# Patient Record
Sex: FEMALE | Race: WHITE | NOT HISPANIC OR LATINO | Employment: OTHER | URBAN - METROPOLITAN AREA
[De-identification: names, ages, dates, MRNs, and addresses within clinical notes are randomized per-mention and may not be internally consistent; named-entity substitution may affect disease eponyms.]

---

## 2017-03-13 ENCOUNTER — OFFICE VISIT (OUTPATIENT)
Dept: OBSTETRICS AND GYNECOLOGY | Facility: CLINIC | Age: 57
End: 2017-03-13
Payer: MEDICAID

## 2017-03-13 VITALS
HEART RATE: 78 BPM | SYSTOLIC BLOOD PRESSURE: 124 MMHG | WEIGHT: 190 LBS | DIASTOLIC BLOOD PRESSURE: 76 MMHG | RESPIRATION RATE: 14 BRPM | HEIGHT: 62 IN | BODY MASS INDEX: 34.96 KG/M2

## 2017-03-13 DIAGNOSIS — Z90.722 HISTORY OF TOTAL HYSTERECTOMY WITH BILATERAL SALPINGO-OOPHORECTOMY (BSO): ICD-10-CM

## 2017-03-13 DIAGNOSIS — Z78.0 POSTMENOPAUSAL STATUS: ICD-10-CM

## 2017-03-13 DIAGNOSIS — Z01.419 WELL WOMAN EXAM WITH ROUTINE GYNECOLOGICAL EXAM: Primary | ICD-10-CM

## 2017-03-13 DIAGNOSIS — Z90.79 HISTORY OF TOTAL HYSTERECTOMY WITH BILATERAL SALPINGO-OOPHORECTOMY (BSO): ICD-10-CM

## 2017-03-13 DIAGNOSIS — Z90.710 HISTORY OF TOTAL HYSTERECTOMY WITH BILATERAL SALPINGO-OOPHORECTOMY (BSO): ICD-10-CM

## 2017-03-13 DIAGNOSIS — L30.9 DERMATITIS: ICD-10-CM

## 2017-03-13 DIAGNOSIS — Z12.31 ENCOUNTER FOR SCREENING MAMMOGRAM FOR BREAST CANCER: ICD-10-CM

## 2017-03-13 PROCEDURE — 99396 PREV VISIT EST AGE 40-64: CPT | Mod: S$PBB,,, | Performed by: OBSTETRICS & GYNECOLOGY

## 2017-03-13 PROCEDURE — 99999 PR PBB SHADOW E&M-EST. PATIENT-LVL III: CPT | Mod: PBBFAC,,, | Performed by: OBSTETRICS & GYNECOLOGY

## 2017-03-13 PROCEDURE — 99213 OFFICE O/P EST LOW 20 MIN: CPT | Mod: PBBFAC | Performed by: OBSTETRICS & GYNECOLOGY

## 2017-03-13 RX ORDER — ZOLPIDEM TARTRATE 12.5 MG/1
12.5 TABLET, FILM COATED, EXTENDED RELEASE ORAL NIGHTLY
Refills: 0 | COMMUNITY
Start: 2017-02-01 | End: 2017-06-16

## 2017-03-13 RX ORDER — CLOBETASOL PROPIONATE 0.5 MG/G
OINTMENT TOPICAL
Refills: 2 | COMMUNITY
Start: 2017-02-16 | End: 2017-06-16

## 2017-03-13 RX ORDER — NYSTATIN 100000 [USP'U]/G
POWDER TOPICAL 2 TIMES DAILY
Qty: 60 G | Refills: 1 | Status: SHIPPED | OUTPATIENT
Start: 2017-03-13 | End: 2018-04-09 | Stop reason: SDUPTHER

## 2017-03-13 NOTE — MR AVS SNAPSHOT
[facility]  [address] [place] [id number]  Phone: [phone]                  Merna Regalado   3/13/2017 2:30 PM   Office Visit    Description:  Female : [id number]   Provider:  Ambar Herman MD   Department:  [facility]           Reason for Visit     Well Woman           Diagnoses this Visit        Comments    Well woman exam with routine gynecological exam    -  Primary     History of total hysterectomy with bilateral salpingo-oophorectomy (BSO)         Postmenopausal status         Encounter for screening mammogram for breast cancer                To Do List           Future Appointments        Provider Department Dept Phone    3/15/2017 3:20 PM STA MAMMO1 [facility] [phone]      Goals (5 Years of Data)     None      [facility]     [facility] -  Assistance  Registered nurses in the [facility] provide clinical advisement, health education, appointment booking, and other advisory services.  Call for this free service at [phone].             Medications           Message regarding Medications     Verify the changes and/or additions to your medication regime listed below are the same as discussed with your clinician today.  If any of these changes or additions are incorrect, please notify your healthcare provider.             Verify that the below list of medications is an accurate representation of the medications you are currently taking.  If none reported, the list may be blank. If incorrect, please contact your healthcare provider. Carry this list with you in case of emergency.           Current Medications     alendronate (FOSAMAX) 70 MG tablet Take 1 tablet (70 mg total) by mouth every 7 days. TAKE BEFORE BREAKFAST AS DIRECTED    aspirin 81 MG Chew Take 81 mg by mouth once daily.    carvedilol (COREG) 25 MG tablet Take 25 mg by mouth 2 (two) times daily with meals.    clobetasol (TEMOVATE) 0.05 %  "cream Apply thin layer to affected area (vulva) nightly for 2 months, then twice weekly at bedtime.    clobetasol 0.05% (TEMOVATE) 0.05 % Oint APPLY TWICE DAILY TO BODY    clopidogrel (PLAVIX) 75 mg tablet Take 75 mg by mouth once daily.    doxazosin (CARDURA) 2 MG tablet TAKE 1 TABLET ORALLY ONCE A DAY. NEEDS APPT FOR FURTHER REFILLS    doxazosin (CARDURA) 4 MG tablet TAKE 1 TABLET ORALLY ONCE A DAY.    fenofibrate micronized (LOFIBRA) 134 MG Cap Take 134 mg by mouth daily with breakfast.    fenofibric acid (FIBRICOR) 135 mg CpDR TAKE 1 CAPSULE(S) BY MOUTH ONCE A DAY    fluoxetine (PROZAC) 20 MG capsule TAKE 1 CAPSULE(S) EVERY DAY BY ORAL ROUTE FOR 30 DAYS.    gabapentin (NEURONTIN) 300 MG capsule Take 300 mg by mouth once daily.    hydrochlorothiazide (MICROZIDE) 12.5 mg capsule Take 12.5 mg by mouth once daily.    hydrocodone-acetaminophen 7.5-325mg (NORCO) 7.5-325 mg per tablet     insulin glargine (LANTUS) 100 unit/mL injection Inject 25 Units into the skin every evening.    JARDIANCE 25 mg Tab     lisinopril (PRINIVIL,ZESTRIL) 20 MG tablet Take 20 mg by mouth once daily.    meloxicam (MOBIC) 15 MG tablet Take 15 mg by mouth once daily.    nifedipine (PROCARDIA-XL) 90 MG (OSM) TR24 TAKE 1 TABLET ORALLY ONCE A DAY.    nitroGLYCERIN (NITROSTAT) 0.4 MG SL tablet Place 0.4 mg under the tongue every 5 (five) minutes as needed for Chest pain.    pravastatin (PRAVACHOL) 40 MG tablet Take 40 mg by mouth once daily.    TRUE METRIX GLUCOSE TEST STRIP Strp     zolpidem (AMBIEN CR) 12.5 MG CR tablet Take 12.5 mg by mouth nightly.    zolpidem (AMBIEN) 10 mg Tab Take 5 mg by mouth nightly as needed.    pantoprazole (PROTONIX) 40 MG tablet Take 1 tablet (40 mg total) by mouth once daily.           Clinical Reference Information           Your Vitals Were     BP Pulse Resp Height Weight BMI    124/76 78 14 5' 2" (1.575 m) 86.2 kg (190 lb) 34.75 kg/m2      Blood Pressure          Most Recent Value    BP  124/76      Allergies " as of 3/13/2017     Codeine    Pcn [Penicillins]    Sulfa (Sulfonamide Antibiotics)      Immunizations Administered on Date of Encounter - 3/13/2017     None      Orders Placed During Today's Visit     Future Labs/Procedures Expected by Expires    Mammo Digital Screening Bilat with CAD  3/13/2017 6/16/2018    Mammo Digital Screening Bilat with CAD  3/13/2017 6/16/2018      Language Assistance Services     ATTENTION: Language assistance services are available, free of charge. Please call [phone].      ATENCIÓN: Si habla sunilrah, tiene a hill disposición servicios gratuitos de asistencia lingüística. Llame al [phone].     CHÚ Ý: N?u b?n nói Ti?ng Vi?t, có các d?ch v? h? tr? ngôn ng? mi?n phí dành cho b?n. G?i s? [phone].         [facility] complies with applicable Federal civil rights laws and does not discriminate on the basis of race, color, national origin, age, disability, or sex.

## 2017-03-13 NOTE — PROGRESS NOTES
Subjective:    Patient ID: Merna Regalado is a 56 y.o. y.o. female.     Chief Complaint: Annual Well Woman Exam     History of Present Illness:  Merna presents today for Annual Well Woman exam. She has a history of hysterectomy with BSO.  She is currently using no hormone replacement therapy and she reports no problems with menopausal symptoms. She denies breast tenderness, masses, nipple discharge. She will be due for screening mammogram next month.  She denies difficulty with urination or bowel movements.  She is current with colonoscopy.  She is current with health maintenance.  She is known to have osteoporosis.  Takes fosamax for this.    The following portions of the patient's history were reviewed and updated as appropriate: allergies, current medications, past family history, past medical history, past social history, past surgical history and problem list.      Menstrual History:   No LMP recorded. Patient has had a hysterectomy.    OB History      Para Term  AB TAB SAB Ectopic Multiple Living    5 3   2  2               ROS:   CONSTITUTIONAL: positive for weight gain; Negative for fever, chills, diaphoresis, weakness, fatigue, weight loss  ENT: negative for sore throat, nasal congestion, nasal discharge, epistaxis, tinnitus, hearing loss  EYES: negative for blurry vision, decreased vision, loss of vision, eye pain, diplopia, photophobia, discharge  SKIN: Negative for rash, itching, hives  RESPIRATORY: negative for cough, hemoptysis, shortness of breath, pleuritic chest pain, wheezing  CARDIOVASCULAR: negative for chest pain, dyspnea on exertion, orthopnea, paroxysmal nocturnal dyspnea, edema, palpitations  BREAST: negative for breast pain, mass, nipple changes, nipple discharge  GASTROINTESTINAL: negative for abdominal pain, flank pain, nausea, vomiting, diarrhea, constipation, black stool, blood in stool  GENITOURINARY: negative for abnormal vaginal bleeding, amenorrhea, decreased libido,  dysuria, genital sores, hematuria, incontinence, menorrhagia, pelvic pain, urinary frequency, vaginal discharge  HEMATOLOGIC/LYMPHATIC: negative for swollen lymph nodes, bleeding, bruising  MUSCULOSKELETAL: negative for back pain, joint pain, joint stiffness, joint swelling, muscle pain, muscle weakness  NEUROLOGICAL: negative for dizzy/vertigo, headache, focal weakness, numbness/tingling, speech problems, loss of consciousness, confusion, memory loss  BEHAVORIAL/PSYCH: negative for anxiety, depression, psychosis  ENDOCRINE: negative for polydipsia/polyuria, palpitations, skin changes, temperature intolerance, unexpected weight changes  ALLERGIC/IMMUNOLOGIC: negative for urticaria, hay fever, angioedema      Objective:    Vital Signs:  Vitals:    03/13/17 1440   BP: 124/76   Pulse: 78   Resp: 14       Physical Exam:  General:  alert; oriented x 4; obese female   Skin:  Skin color, texture, turgor normal. No rashes or lesions   HEENT:  conjunctivae/corneas clear. PERRL.   Neck: supple, trachea midline   Respiratory:  clear to auscultation bilaterally   Heart:  regular rate and rhythm, S1, S2 normal, no murmur, click, rub or gallop   Breasts: Symmetrical; no masses or lymphadenopathy;  no discharge, erythema, or tenderness. + dermatitis present underneath right breast   Abdomen:  soft, non-tender; bowel sounds normal   Pelvis: External genitalia: normal general appearance  Urinary system: urethral meatus normal, bladder nontender  Vaginal: atrophic mucosa; no prolapse or lesions  Cervix: removed surgically  Uterus: removed surgically  Adnexa: removed surgically; nontender; no palpable masses   Extremities: Normal ROM; no edema, no cyanosis   Neurologial: Normal strength and tone. No focal numbness or weakness. Reflexes 2+ and equal.   Psychiatric: normal mood, speech, dress, and thought processes         Assessment:      1. Well woman exam with routine gynecological exam    2. History of total hysterectomy with  bilateral salpingo-oophorectomy (BSO)    3. Postmenopausal status    4. Encounter for screening mammogram for breast cancer          Plan:      Well woman exam with routine gynecological exam    History of total hysterectomy with bilateral salpingo-oophorectomy (BSO)    Postmenopausal status    Encounter for screening mammogram for breast cancer  -     Mammo Digital Screening Bilat with CAD; Future; Expected date: 3/13/17  -     Mammo Digital Screening Bilat with CAD; Future; Expected date: 3/13/17        COUNSELING:  Merna was counseled on A.C.O.G. Pap guidelines and recommendations for yearly pelvic exams in addition to recommendations for yearly mammograms and monthly self breast exams. In addition she was counseled on continued adequate intake of calcium and vitamin D; to see her PCP for other health maintenance.

## 2017-03-15 ENCOUNTER — HOSPITAL ENCOUNTER (OUTPATIENT)
Dept: RADIOLOGY | Facility: HOSPITAL | Age: 57
Discharge: HOME | End: 2017-03-15
Attending: OBSTETRICS & GYNECOLOGY
Payer: MEDICAID

## 2017-03-15 DIAGNOSIS — Z12.31 ENCOUNTER FOR SCREENING MAMMOGRAM FOR BREAST CANCER: ICD-10-CM

## 2017-03-15 PROCEDURE — 77063 BREAST TOMOSYNTHESIS BI: CPT | Mod: 26,,, | Performed by: RADIOLOGY

## 2017-03-15 PROCEDURE — 77067 SCR MAMMO BI INCL CAD: CPT | Mod: 26,,, | Performed by: RADIOLOGY

## 2017-03-15 PROCEDURE — 77067 SCR MAMMO BI INCL CAD: CPT | Mod: TC

## 2017-06-16 ENCOUNTER — HOSPITAL ENCOUNTER (EMERGENCY)
Facility: HOSPITAL | Age: 57
Discharge: ANOTHER HOSPITAL | End: 2017-06-16
Attending: SURGERY
Payer: MEDICAID

## 2017-06-16 VITALS
DIASTOLIC BLOOD PRESSURE: 59 MMHG | BODY MASS INDEX: 35.15 KG/M2 | HEIGHT: 62 IN | SYSTOLIC BLOOD PRESSURE: 162 MMHG | RESPIRATION RATE: 14 BRPM | TEMPERATURE: 98 F | HEART RATE: 51 BPM | OXYGEN SATURATION: 100 % | WEIGHT: 191 LBS

## 2017-06-16 DIAGNOSIS — R07.9 CHEST PAIN: ICD-10-CM

## 2017-06-16 DIAGNOSIS — R07.9 CHEST PAIN, UNSPECIFIED TYPE: Primary | ICD-10-CM

## 2017-06-16 LAB
ALBUMIN SERPL BCP-MCNC: 3.7 G/DL
ALP SERPL-CCNC: 47 U/L
ALT SERPL W/O P-5'-P-CCNC: 12 U/L
ANION GAP SERPL CALC-SCNC: 7 MMOL/L
APTT BLDCRRT: 22.5 SEC
AST SERPL-CCNC: 17 U/L
BASOPHILS # BLD AUTO: 0.04 K/UL
BASOPHILS NFR BLD: 0.6 %
BILIRUB SERPL-MCNC: 0.5 MG/DL
BNP SERPL-MCNC: 108 PG/ML
BUN SERPL-MCNC: 19 MG/DL
CALCIUM SERPL-MCNC: 9 MG/DL
CHLORIDE SERPL-SCNC: 107 MMOL/L
CK MB SERPL-MCNC: 1.5 NG/ML
CK MB SERPL-RTO: 1.9 %
CK SERPL-CCNC: 81 U/L
CK SERPL-CCNC: 81 U/L
CO2 SERPL-SCNC: 26 MMOL/L
CREAT SERPL-MCNC: 1.5 MG/DL
D DIMER PPP IA.FEU-MCNC: 0.34 MG/L FEU
DIFFERENTIAL METHOD: ABNORMAL
EOSINOPHIL # BLD AUTO: 0.2 K/UL
EOSINOPHIL NFR BLD: 2.3 %
ERYTHROCYTE [DISTWIDTH] IN BLOOD BY AUTOMATED COUNT: 13.8 %
EST. GFR  (AFRICAN AMERICAN): 45 ML/MIN/1.73 M^2
EST. GFR  (NON AFRICAN AMERICAN): 39 ML/MIN/1.73 M^2
GLUCOSE SERPL-MCNC: 223 MG/DL
HCT VFR BLD AUTO: 36.6 %
HGB BLD-MCNC: 12.1 G/DL
INR PPP: 1
LYMPHOCYTES # BLD AUTO: 2.3 K/UL
LYMPHOCYTES NFR BLD: 32.5 %
MCH RBC QN AUTO: 28.7 PG
MCHC RBC AUTO-ENTMCNC: 33.1 %
MCV RBC AUTO: 87 FL
MONOCYTES # BLD AUTO: 0.6 K/UL
MONOCYTES NFR BLD: 8.4 %
NEUTROPHILS # BLD AUTO: 4 K/UL
NEUTROPHILS NFR BLD: 56.2 %
PLATELET # BLD AUTO: 170 K/UL
PMV BLD AUTO: 11.4 FL
POTASSIUM SERPL-SCNC: 4.3 MMOL/L
PROT SERPL-MCNC: 6.9 G/DL
PROTHROMBIN TIME: 9.9 SEC
RBC # BLD AUTO: 4.22 M/UL
SODIUM SERPL-SCNC: 140 MMOL/L
TROPONIN I SERPL DL<=0.01 NG/ML-MCNC: <0.006 NG/ML
WBC # BLD AUTO: 7.02 K/UL

## 2017-06-16 PROCEDURE — 36415 COLL VENOUS BLD VENIPUNCTURE: CPT

## 2017-06-16 PROCEDURE — 63600175 PHARM REV CODE 636 W HCPCS: Performed by: SURGERY

## 2017-06-16 PROCEDURE — 85025 COMPLETE CBC W/AUTO DIFF WBC: CPT

## 2017-06-16 PROCEDURE — 25000003 PHARM REV CODE 250: Performed by: SURGERY

## 2017-06-16 PROCEDURE — 99285 EMERGENCY DEPT VISIT HI MDM: CPT | Mod: 25

## 2017-06-16 PROCEDURE — 85379 FIBRIN DEGRADATION QUANT: CPT

## 2017-06-16 PROCEDURE — 85730 THROMBOPLASTIN TIME PARTIAL: CPT

## 2017-06-16 PROCEDURE — 80053 COMPREHEN METABOLIC PANEL: CPT

## 2017-06-16 PROCEDURE — 27000494 *HC OXYGEN SET UP (STAH ONLY)

## 2017-06-16 PROCEDURE — 85610 PROTHROMBIN TIME: CPT

## 2017-06-16 PROCEDURE — 96374 THER/PROPH/DIAG INJ IV PUSH: CPT

## 2017-06-16 PROCEDURE — 93005 ELECTROCARDIOGRAM TRACING: CPT

## 2017-06-16 PROCEDURE — 84484 ASSAY OF TROPONIN QUANT: CPT

## 2017-06-16 PROCEDURE — 82553 CREATINE MB FRACTION: CPT

## 2017-06-16 PROCEDURE — 83880 ASSAY OF NATRIURETIC PEPTIDE: CPT

## 2017-06-16 RX ORDER — NAPROXEN SODIUM 220 MG/1
81 TABLET, FILM COATED ORAL
Status: COMPLETED | OUTPATIENT
Start: 2017-06-16 | End: 2017-06-16

## 2017-06-16 RX ORDER — PANTOPRAZOLE SODIUM 40 MG/1
40 TABLET, DELAYED RELEASE ORAL
Status: COMPLETED | OUTPATIENT
Start: 2017-06-16 | End: 2017-06-16

## 2017-06-16 RX ORDER — ONDANSETRON 2 MG/ML
4 INJECTION INTRAMUSCULAR; INTRAVENOUS
Status: COMPLETED | OUTPATIENT
Start: 2017-06-16 | End: 2017-06-16

## 2017-06-16 RX ORDER — ATORVASTATIN CALCIUM 40 MG/1
40 TABLET, FILM COATED ORAL
Status: COMPLETED | OUTPATIENT
Start: 2017-06-16 | End: 2017-06-16

## 2017-06-16 RX ADMIN — ASPIRIN 81 MG 81 MG: 81 TABLET ORAL at 03:06

## 2017-06-16 RX ADMIN — ATORVASTATIN CALCIUM 40 MG: 40 TABLET, FILM COATED ORAL at 05:06

## 2017-06-16 RX ADMIN — NITROGLYCERIN 1 INCH: 20 OINTMENT TOPICAL at 05:06

## 2017-06-16 RX ADMIN — ONDANSETRON 4 MG: 2 INJECTION INTRAMUSCULAR; INTRAVENOUS at 06:06

## 2017-06-16 RX ADMIN — PANTOPRAZOLE SODIUM 40 MG: 40 TABLET, DELAYED RELEASE ORAL at 05:06

## 2017-06-16 NOTE — ED TRIAGE NOTES
Patient comes in with a 2 day history of sharp stabbing, shooting pain under her left breast.  Now states her left hand is starting to go numb.  States she has been belching.  Denies nausea/vomiting and shortness of breath.  Has had diarrhea.

## 2017-06-16 NOTE — ED PROVIDER NOTES
"[facility]                                        June 16, 2017                   Chief Complaint  56 y.o. female with Chest Pain (pain to left breast area for 2 days.)    History of Present Illness  Merna Regalado presents to the emergency room with chest pain for last 2 days  Patient reports left pectoral pain for last 48 hours on again off again per HX  Patient gives a history of 7 peripheral vascular and coronary stents by [facility]  Patient rates the pain 4/10, more intense in the last 12 hours for history now  Patient is normal sinus rhythm on EKG without ST changes or concerning finding  Patient is a nonsmoker but states she has several cardiac risk factors on interview  Did not take any nitroglycerin before arrival, "I think my stents are clogged now"    The history is provided by the patient  Medical history: CAD, DM, lipidemia, GERD, HTN, neuropathy  Surgical history: Stents, cholecystectomy, hysterectomy, kidney stones  ALLERGIES: Codeine, penicillin, sulfa    Review of Systems and Physical Exam     Review of Systems  -- Constitution - no fever, denies fatigue, no weakness, no chills  -- Eyes - no tearing or redness, no visual disturbance  -- Ear, Nose - no tinnitus or earache, no nasal congestion or discharge  -- Mouth,Throat - no sore throat, no toothache, normal voice, normal swallowing  -- Respiratory - denies cough and congestion, no shortness of breath, no CHOUDHARY  -- Cardiovascular - chest pain, no palpitations, denies claudication  -- Gastrointestinal - denies abdominal pain, nausea, vomiting, or diarrhea  -- Musculoskeletal - denies back pain, negative for myalgias and arthralgias   -- Neurological - no headache, denies weakness or seizure; no LOC  -- Skin - denies pallor, rash, or changes in skin. no hives or welts noted    Vital Signs  -- Her oral temperature is 97.6 °F (36.4 °C).   -- Her blood pressure is 175/67 (abnormal) and her pulse is 53   -- Her respiration is 10 and oxygen " "saturation is 100%.      Physical Exam  -- Nursing note and vitals reviewed  -- Constitutional: Appears well-developed and well-nourished  -- Head: Atraumatic. Normocephalic. No obvious abnormality  -- Eyes: Pupils are equal and reactive to light. Normal conjunctiva and lids  -- Cardiac: Normal rate, regular rhythm and normal heart sounds  -- Pulmonary: Normal respiratory effort, breath sounds clear to auscultation  -- Abdominal: Soft, no tenderness. Normal bowel sounds. Normal liver edge  -- Musculoskeletal: Normal range of motion, no effusions. Joints stable   -- Neurological: No focal deficits. Showed good interaction with staff  -- Vascular: Posterior tibial, dorsalis pedis and radial pulses 2+ bilaterally      Emergency Room Course     Treatment and Evaluation  -- The EKG findings today were without concerning findings from baseline   -- Chest x-ray showed no infiltrate and showed no acute pathology   -- The CBC drawn in the ER today was within normal limits   -- The troponin drawn in the ER today was within normal limits   -- The PT, PTT, and INR were within normal limits.      Abnormal lab values  -- Glucose 223 (*)   -- Creatinine 1.5 (*)   -- Alkaline Phosphatase 47 (*)   -- Anion Gap 7 (*)   -- eGFR if  45 (*)   -- eGFR if non  39 (*)   -- Hematocrit 36.6 (*)   --  (*)     Medications Given  -- morphine injection 2 mg (not administered)   -- ondansetron injection 4 mg (not administered)   -- aspirin chewable tablet 81 mg (81 mg Oral Given 6/16/17 6936)   -- nitroGLYCERIN 2% TD oint ointment 1 inch (1 inch Topical Given 6/16/17 1750)   -- pantoprazole EC tablet 40 mg (40 mg Oral Given 6/16/17 1750)   -- atorvastatin tablet 40 mg (40 mg Oral Given 6/16/17 1750)     ED Physician Notes  -- 6:13 PM: I discussed the patient's negative workup with the patient at bedside  -- Still having chest pain: "blood is not flowing through my stents"  -- Patient is emphatic about seeing " a cardiologist, on [facility] [place] transfer   -- I do not have a cardiologist, discussed with LETTY MCCLELLAND at [place]     Diagnosis  -- The primary encounter diagnosis was Chest pain, unspecified type.   -- A diagnosis of Chest pain was also pertinent to this visit.    Disposition and Plan  -- Disposition: transfer  -- Condition: stable  -- The patient needs a higher level of care  -- The patient is appropriate for transfer    This note is dictated on Dragon Natural Speaking word recognition program.  There are word recognition mistakes that are occasionally missed on review.           Miky Boo MD  06/16/17 7667

## 2017-09-05 PROBLEM — R79.89 ABNORMAL THYROID BLOOD TEST: Status: ACTIVE | Noted: 2017-06-05

## 2017-09-05 PROBLEM — Z83.49 FAMILY HISTORY OF THYROID DISEASE IN SISTER: Chronic | Status: ACTIVE | Noted: 2017-09-05

## 2017-09-05 PROBLEM — E11.69 HYPERLIPIDEMIA ASSOCIATED WITH TYPE 2 DIABETES MELLITUS: Chronic | Status: ACTIVE | Noted: 2017-09-05

## 2017-09-05 PROBLEM — I10 HYPERTENSION: Chronic | Status: ACTIVE | Noted: 2017-09-05

## 2017-09-05 PROBLEM — E11.9 DIABETES MELLITUS: Status: ACTIVE | Noted: 2017-09-05

## 2017-09-05 PROBLEM — E78.5 HYPERLIPIDEMIA ASSOCIATED WITH TYPE 2 DIABETES MELLITUS: Chronic | Status: ACTIVE | Noted: 2017-09-05

## 2017-11-20 ENCOUNTER — HOSPITAL ENCOUNTER (OUTPATIENT)
Dept: RADIOLOGY | Facility: HOSPITAL | Age: 57
Discharge: HOME | End: 2017-11-20
Attending: INTERNAL MEDICINE
Payer: MEDICAID

## 2017-11-20 DIAGNOSIS — N18.4 CHRONIC KIDNEY DISEASE, STAGE IV (SEVERE): ICD-10-CM

## 2017-11-20 PROCEDURE — 76770 US EXAM ABDO BACK WALL COMP: CPT | Mod: TC

## 2017-11-20 PROCEDURE — 76770 US EXAM ABDO BACK WALL COMP: CPT | Mod: 26,,, | Performed by: RADIOLOGY

## 2017-12-14 DIAGNOSIS — L29.2 VULVAR ITCHING: ICD-10-CM

## 2017-12-14 NOTE — TELEPHONE ENCOUNTER
----- Message from Cora Saunders sent at 12/14/2017  1:38 PM CST -----  Contact: Self  Merna Regalado  MRN: [id number]  Home Phone      [phone]  Work Phone      Not on file.  Mobile          [phone]    Patient Care Team:  Billy De La Paz MD as PCP - General (Family Medicine)  Ambar Herman MD as Obstetrician (Obstetrics)  OB? No  What phone number can you be reached at? [phone]  Message:   Patient would like more vaginal cream called in to [facility] in [place]. Please return call.

## 2017-12-14 NOTE — TELEPHONE ENCOUNTER
Merna desire refill of clobetasol. Annual exam due 3/17  Patient Active Problem List   Diagnosis    Abnormal thyroid blood test    Family history of thyroid disease in sister    BMI 35.0-35.9,adult    Hypertension    Diabetes mellitus    Hyperlipidemia associated with type 2 diabetes mellitus     Prior to Admission medications    Medication Sig Start Date End Date Taking? Authorizing Provider   alendronate (FOSAMAX) 70 MG tablet  8/25/17   Historical Provider, MD   aspirin 81 MG Chew Take 81 mg by mouth once daily.    Historical Provider, MD MOHINDER BENTLEY 100 unit/mL (3 mL) InPn pen  8/26/17   Historical Provider, MD   carvedilol (COREG) 25 MG tablet Take 25 mg by mouth 2 (two) times daily with meals.    Historical Provider, MD   clobetasol (TEMOVATE) 0.05 % cream Apply thin layer to affected area (vulva) nightly for 2 months, then twice weekly at bedtime. 3/8/16   Ambar Herman MD   clobetasol-emollient 0.05 % Crea Apply 1 application topically 2 (two) times daily as needed. Apply to affected area 9/12/17   Historical Provider, MD   clopidogrel (PLAVIX) 75 mg tablet Take 75 mg by mouth once daily.    Historical Provider, MD   doxazosin (CARDURA) 4 MG tablet TAKE 1 TABLET ORALLY ONCE A DAY. 8/29/16   Historical Provider, MD   etodolac (LODINE) 400 MG tablet TAKE 1 TABLET(S) TWICE A DAY BY ORAL ROUTE. 9/12/17   Historical Provider, MD   fenofibrate 160 MG Tab Take 160 mg by mouth once daily. 9/13/17   Historical Provider, MD   fluoxetine (PROZAC) 20 MG capsule TAKE 1 CAPSULE(S) EVERY DAY BY ORAL ROUTE FOR 30 DAYS. 9/2/16   Historical Provider, MD   gabapentin (NEURONTIN) 300 MG capsule Take 300 mg by mouth once daily.    Historical Provider, MD   hydrocodone-acetaminophen 7.5-325mg (NORCO) 7.5-325 mg per tablet Take 1 tablet by mouth every 8 (eight) hours as needed. 10/2/17   TONG Salomon   JARDIANCE 25 mg Tab  11/2/16   Historical Provider, MD   levothyroxine (SYNTHROID) 50 MCG tablet Take 1  tablet (50 mcg total) by mouth before breakfast. Wait 30 minutes before eating or drinking. 9/5/17 9/5/18  Sally Irving MD   lisinopril (PRINIVIL,ZESTRIL) 40 MG tablet Take 40 mg by mouth once daily. 9/13/17   Historical Provider, MD   nifedipine (PROCARDIA-XL) 90 MG (OSM) TR24 TAKE 1 TABLET ORALLY ONCE A DAY. 8/29/16   Historical Provider, MD   nitroGLYCERIN (NITROSTAT) 0.4 MG SL tablet Place 1 tablet (0.4 mg total) under the tongue every 5 (five) minutes as needed for Chest pain. 6/16/17   Billy Alvares Jr., MD   nystatin (MYCOSTATIN) powder Apply topically 2 (two) times daily. 3/13/17   Ambar Herman MD   ondansetron (ZOFRAN-ODT) 4 MG TbDL Take 4 mg by mouth every 6 (six) hours as needed.    Historical Provider, MD   pantoprazole (PROTONIX) 40 MG tablet  8/27/17   Historical Provider, MD   pravastatin (PRAVACHOL) 40 MG tablet Take 40 mg by mouth once daily.    Historical Provider, MD   trazodone (DESYREL) 100 MG tablet  9/28/17   Historical Provider, MD   TRUE METRIX GLUCOSE TEST STRIP Strp  9/14/16   Historical Provider, MD   zolpidem (AMBIEN) 10 mg Tab Take 5 mg by mouth nightly as needed.    Historical Provider, MD

## 2017-12-15 RX ORDER — CLOBETASOL PROPIONATE 0.5 MG/G
CREAM TOPICAL
Qty: 60 G | Refills: 0 | Status: SHIPPED | OUTPATIENT
Start: 2017-12-15 | End: 2018-01-15 | Stop reason: SDUPTHER

## 2018-01-15 DIAGNOSIS — L29.2 VULVAR ITCHING: ICD-10-CM

## 2018-01-15 RX ORDER — CLOBETASOL PROPIONATE 0.5 MG/G
CREAM TOPICAL
Qty: 60 G | Refills: 0 | Status: SHIPPED | OUTPATIENT
Start: 2018-01-15 | End: 2018-04-09 | Stop reason: SDUPTHER

## 2018-01-15 NOTE — TELEPHONE ENCOUNTER
Merna desire refill of Clobetasol. Annual due 3/18 with Dr. Herman  Patient Active Problem List   Diagnosis    Abnormal thyroid blood test    Family history of thyroid disease in sister    BMI 35.0-35.9,adult    Hypertension    Diabetes mellitus    Hyperlipidemia associated with type 2 diabetes mellitus     Prior to Admission medications    Medication Sig Start Date End Date Taking? Authorizing Provider   alendronate (FOSAMAX) 70 MG tablet  8/25/17   Historical Provider, MD   aspirin 81 MG Chew Take 81 mg by mouth once daily.    Historical Provider, MD   BASAGLAR KWIKPEN 100 unit/mL (3 mL) InPn pen  8/26/17   Historical Provider, MD   carvedilol (COREG) 25 MG tablet Take 25 mg by mouth 2 (two) times daily with meals.    Historical Provider, MD   clobetasol (TEMOVATE) 0.05 % cream Apply thin layer to affected area (vulva) nightly for 2 months, then twice weekly at bedtime. 12/15/17   Ambar Herman MD   clobetasol-emollient 0.05 % Crea Apply 1 application topically 2 (two) times daily as needed. Apply to affected area 9/12/17   Historical Provider, MD   clopidogrel (PLAVIX) 75 mg tablet Take 75 mg by mouth once daily.    Historical Provider, MD   doxazosin (CARDURA) 4 MG tablet TAKE 1 TABLET ORALLY ONCE A DAY. 8/29/16   Historical Provider, MD   etodolac (LODINE) 400 MG tablet TAKE 1 TABLET(S) TWICE A DAY BY ORAL ROUTE. 9/12/17   Historical Provider, MD   fenofibrate 160 MG Tab Take 160 mg by mouth once daily. 9/13/17   Historical Provider, MD   fluoxetine (PROZAC) 20 MG capsule TAKE 1 CAPSULE(S) EVERY DAY BY ORAL ROUTE FOR 30 DAYS. 9/2/16   Historical Provider, MD   gabapentin (NEURONTIN) 300 MG capsule Take 300 mg by mouth once daily.    Historical Provider, MD   hydrocodone-acetaminophen 7.5-325mg (NORCO) 7.5-325 mg per tablet Take 1 tablet by mouth every 8 (eight) hours as needed. 10/2/17   TONG Salomon   JARDIANCE 25 mg Tab  11/2/16   Historical Provider, MD   levothyroxine (SYNTHROID) 50 MCG tablet  Take 1 tablet (50 mcg total) by mouth before breakfast. Wait 30 minutes before eating or drinking. 9/5/17 9/5/18  Sally Irving MD   lisinopril (PRINIVIL,ZESTRIL) 40 MG tablet Take 40 mg by mouth once daily. 9/13/17   Historical Provider, MD   nifedipine (PROCARDIA-XL) 90 MG (OSM) TR24 TAKE 1 TABLET ORALLY ONCE A DAY. 8/29/16   Historical Provider, MD   nitroGLYCERIN (NITROSTAT) 0.4 MG SL tablet Place 1 tablet (0.4 mg total) under the tongue every 5 (five) minutes as needed for Chest pain. 6/16/17   Billy Alvares Jr., MD   nystatin (MYCOSTATIN) powder Apply topically 2 (two) times daily. 3/13/17   Ambar Herman MD   ondansetron (ZOFRAN-ODT) 4 MG TbDL Take 4 mg by mouth every 6 (six) hours as needed.    Historical Provider, MD   pantoprazole (PROTONIX) 40 MG tablet  8/27/17   Historical Provider, MD   pravastatin (PRAVACHOL) 40 MG tablet Take 40 mg by mouth once daily.    Historical Provider, MD   trazodone (DESYREL) 100 MG tablet  9/28/17   Historical Provider, MD   TRUE METRIX GLUCOSE TEST STRIP Strp  9/14/16   Historical Provider, MD   zolpidem (AMBIEN) 10 mg Tab Take 5 mg by mouth nightly as needed.    Historical Provider, MD

## 2018-01-23 ENCOUNTER — TELEPHONE (OUTPATIENT)
Dept: OBSTETRICS AND GYNECOLOGY | Facility: CLINIC | Age: 58
End: 2018-01-23

## 2018-01-23 NOTE — TELEPHONE ENCOUNTER
----- Message from Hui Washington MA sent at 1/23/2018  3:31 PM CST -----  Contact: self  Merna Regalado  MRN: [id number]  Home Phone      [phone]  Work Phone      Not on file.  Mobile          [phone]    Patient Care Team:  Billy De La Paz MD as PCP - General (Family Medicine)  Ambar Herman MD as Obstetrician (Obstetrics)  OB? No  What phone number can you be reached at?  [phone]  Message:   Needs to discuss vaginal cream she is trying to get refilled.

## 2018-01-23 NOTE — TELEPHONE ENCOUNTER
PA for Clobetasol submitted via covermymeds per patient request. Patient notified she will be notified with approval/denial in 48-72 hours. Patient verbalized understanding.

## 2018-01-25 ENCOUNTER — TELEPHONE (OUTPATIENT)
Dept: OBSTETRICS AND GYNECOLOGY | Facility: CLINIC | Age: 58
End: 2018-01-25

## 2018-01-26 ENCOUNTER — TELEPHONE (OUTPATIENT)
Dept: OBSTETRICS AND GYNECOLOGY | Facility: CLINIC | Age: 58
End: 2018-01-26

## 2018-01-26 DIAGNOSIS — Z12.31 ENCOUNTER FOR SCREENING MAMMOGRAM FOR BREAST CANCER: Primary | ICD-10-CM

## 2018-01-26 NOTE — TELEPHONE ENCOUNTER
----- Message from Hui Washington MA sent at 1/26/2018  9:29 AM CST -----  Contact: self  Merna Regalado  MRN: [id number]  Home Phone      [phone]  Work Phone      Not on file.  Mobile          [phone]    Patient Care Team:  Billy De La Paz MD as PCP - General (Family Medicine)  Ambar Herman MD as Obstetrician (Obstetrics)  OB? No  What phone number can you be reached at?  [phone]  Message:  Please link mammo orders to appt 04/09/18.  Thanks!

## 2018-04-09 ENCOUNTER — OFFICE VISIT (OUTPATIENT)
Dept: OBSTETRICS AND GYNECOLOGY | Facility: CLINIC | Age: 58
End: 2018-04-09
Payer: MEDICAID

## 2018-04-09 VITALS
RESPIRATION RATE: 17 BRPM | SYSTOLIC BLOOD PRESSURE: 126 MMHG | WEIGHT: 189 LBS | HEIGHT: 62 IN | DIASTOLIC BLOOD PRESSURE: 85 MMHG | BODY MASS INDEX: 34.78 KG/M2 | HEART RATE: 59 BPM

## 2018-04-09 DIAGNOSIS — M80.00XD AGE-RELATED OSTEOPOROSIS WITH CURRENT PATHOLOGICAL FRACTURE WITH ROUTINE HEALING, SUBSEQUENT ENCOUNTER: ICD-10-CM

## 2018-04-09 DIAGNOSIS — Z12.4 CERVICAL CANCER SCREENING: ICD-10-CM

## 2018-04-09 DIAGNOSIS — L30.9 DERMATITIS: ICD-10-CM

## 2018-04-09 DIAGNOSIS — Z90.79 HISTORY OF TOTAL HYSTERECTOMY WITH BILATERAL SALPINGO-OOPHORECTOMY (BSO): ICD-10-CM

## 2018-04-09 DIAGNOSIS — L29.2 VULVAR ITCHING: ICD-10-CM

## 2018-04-09 DIAGNOSIS — Z90.710 HISTORY OF TOTAL HYSTERECTOMY WITH BILATERAL SALPINGO-OOPHORECTOMY (BSO): ICD-10-CM

## 2018-04-09 DIAGNOSIS — Z90.722 HISTORY OF TOTAL HYSTERECTOMY WITH BILATERAL SALPINGO-OOPHORECTOMY (BSO): ICD-10-CM

## 2018-04-09 DIAGNOSIS — Z01.419 WELL WOMAN EXAM WITH ROUTINE GYNECOLOGICAL EXAM: Primary | ICD-10-CM

## 2018-04-09 PROCEDURE — 99999 PR PBB SHADOW E&M-EST. PATIENT-LVL III: CPT | Mod: PBBFAC,,, | Performed by: OBSTETRICS & GYNECOLOGY

## 2018-04-09 PROCEDURE — 99213 OFFICE O/P EST LOW 20 MIN: CPT | Mod: PBBFAC | Performed by: OBSTETRICS & GYNECOLOGY

## 2018-04-09 PROCEDURE — 99396 PREV VISIT EST AGE 40-64: CPT | Mod: S$PBB,,, | Performed by: OBSTETRICS & GYNECOLOGY

## 2018-04-09 PROCEDURE — 88175 CYTOPATH C/V AUTO FLUID REDO: CPT | Performed by: PATHOLOGY

## 2018-04-09 PROCEDURE — 88141 CYTOPATH C/V INTERPRET: CPT | Mod: ,,, | Performed by: PATHOLOGY

## 2018-04-09 RX ORDER — CLOBETASOL PROPIONATE 0.5 MG/G
CREAM TOPICAL
Qty: 60 G | Refills: 4 | Status: SHIPPED | OUTPATIENT
Start: 2018-04-09 | End: 2022-07-28

## 2018-04-09 RX ORDER — ALENDRONATE SODIUM 70 MG/1
70 TABLET ORAL
Qty: 4 TABLET | Refills: 11 | Status: SHIPPED | OUTPATIENT
Start: 2018-04-09

## 2018-04-09 RX ORDER — NYSTATIN 100000 [USP'U]/G
POWDER TOPICAL 2 TIMES DAILY
Qty: 60 G | Refills: 4 | Status: SHIPPED | OUTPATIENT
Start: 2018-04-09 | End: 2022-07-28

## 2018-04-09 NOTE — PROGRESS NOTES
"Subjective:    Patient ID: Merna Regalado is a 57 y.o. y.o. female.     Chief Complaint: Annual Well Woman Exam     History of Present Illness:  Merna presents today for Annual Well Woman exam. She has a history of hysterectomy with BSO.  She is currently using no hormone replacement therapy and she reports no problems with menopausal symptoms. She denies breast tenderness, masses, nipple discharge. She states she has had negative screening mammogram earlier this year.   She denies difficulty with urination or bowel movements.  She is current with colonoscopy.  She is current with health maintenance.  She is known to have osteoporosis.  Takes fosamax for this.  Declines repeat DEXA until next year. She desires refills of the creams that she has been prescribed for skin breakdown and for rash underneath her breasts.     Past Medical History:   Diagnosis Date    Coronary artery disease     Diabetes mellitus     Dyslipidemia     GERD (gastroesophageal reflux disease)     Hypertension     Neuropathy      Past Surgical History:   Procedure Laterality Date    cardiac stents      CHOLECYSTECTOMY      HYSTERECTOMY      stents to kidney       Review of patient's allergies indicates:   Allergen Reactions    Codeine Nausea Only    Pcn [penicillins] Other (See Comments)     "I get the shakes"    Sulfa (sulfonamide antibiotics)      Current Outpatient Prescriptions on File Prior to Visit   Medication Sig Dispense Refill    aspirin 81 MG Chew Take 81 mg by mouth once daily.      BASAGLAR KWIKPEN 100 unit/mL (3 mL) InPn pen       carvedilol (COREG) 25 MG tablet Take 25 mg by mouth 2 (two) times daily with meals.      clopidogrel (PLAVIX) 75 mg tablet Take 75 mg by mouth once daily.      doxazosin (CARDURA) 4 MG tablet TAKE 1 TABLET ORALLY ONCE A DAY.  11    fenofibrate 160 MG Tab Take 160 mg by mouth once daily.  6    fluoxetine (PROZAC) 20 MG capsule TAKE 1 CAPSULE(S) EVERY DAY BY ORAL ROUTE FOR 30 DAYS.  5 "    gabapentin (NEURONTIN) 300 MG capsule Take 300 mg by mouth once daily.      JARDIANCE 25 mg Tab       levothyroxine (SYNTHROID) 50 MCG tablet Take 1 tablet (50 mcg total) by mouth before breakfast. Wait 30 minutes before eating or drinking. 30 tablet 11    lisinopril (PRINIVIL,ZESTRIL) 40 MG tablet Take 40 mg by mouth once daily.  6    nifedipine (PROCARDIA-XL) 90 MG (OSM) TR24 TAKE 1 TABLET ORALLY ONCE A DAY.  11    nitroGLYCERIN (NITROSTAT) 0.4 MG SL tablet Place 1 tablet (0.4 mg total) under the tongue every 5 (five) minutes as needed for Chest pain. 5 tablet 2    ondansetron (ZOFRAN-ODT) 4 MG TbDL Take 4 mg by mouth every 6 (six) hours as needed.      pantoprazole (PROTONIX) 40 MG tablet       pravastatin (PRAVACHOL) 40 MG tablet Take 40 mg by mouth once daily.      trazodone (DESYREL) 100 MG tablet       TRUE METRIX GLUCOSE TEST STRIP Strp       [DISCONTINUED] alendronate (FOSAMAX) 70 MG tablet       [DISCONTINUED] clobetasol (TEMOVATE) 0.05 % cream Apply thin layer to affected area (vulva) nightly for 2 months, then twice weekly at bedtime. 60 g 0    [DISCONTINUED] nystatin (MYCOSTATIN) powder Apply topically 2 (two) times daily. 60 g 1    [DISCONTINUED] clobetasol-emollient 0.05 % Crea Apply 1 application topically 2 (two) times daily as needed. Apply to affected area  0    [DISCONTINUED] etodolac (LODINE) 400 MG tablet TAKE 1 TABLET(S) TWICE A DAY BY ORAL ROUTE.  1    [DISCONTINUED] hydrocodone-acetaminophen 7.5-325mg (NORCO) 7.5-325 mg per tablet Take 1 tablet by mouth every 8 (eight) hours as needed. 35 tablet 0    [DISCONTINUED] zolpidem (AMBIEN) 10 mg Tab Take 5 mg by mouth nightly as needed.       No current facility-administered medications on file prior to visit.      Social History   Substance Use Topics    Smoking status: Former Smoker     Types: Cigarettes     Quit date: 11/15/2008    Smokeless tobacco: Never Used    Alcohol use No     Family History   Problem Relation Age  of Onset    Breast cancer Sister     Cancer Maternal Aunt     Heart disease Mother     Colon cancer Neg Hx     Ovarian cancer Neg Hx      The following portions of the patient's history were reviewed and updated as appropriate: allergies, current medications, past family history, past medical history, past social history, past surgical history and problem list.    Menstrual History:   No LMP recorded. Patient has had a hysterectomy.    OB History      Para Term  AB Living    5 3     2      SAB TAB Ectopic Multiple Live Births    2                    ROS:   CONSTITUTIONAL: Negative for fever, chills, diaphoresis, weakness, fatigue, weight loss, weight gain  ENT: negative for sore throat, nasal congestion, nasal discharge, epistaxis, tinnitus, hearing loss  EYES: negative for blurry vision, decreased vision, loss of vision, eye pain, diplopia, photophobia, discharge  SKIN: Negative for rash, itching, hives  RESPIRATORY: negative for cough, hemoptysis, shortness of breath, pleuritic chest pain, wheezing  CARDIOVASCULAR: negative for chest pain, dyspnea on exertion, orthopnea, paroxysmal nocturnal dyspnea, edema, palpitations  BREAST: negative for breast pain, mass, nipple changes, nipple discharge  GASTROINTESTINAL: negative for abdominal pain, flank pain, nausea, vomiting, diarrhea, constipation, black stool, blood in stool  GENITOURINARY: negative for abnormal vaginal bleeding, amenorrhea, decreased libido, dysuria, genital sores, hematuria, incontinence, menorrhagia, pelvic pain, urinary frequency, vaginal discharge  HEMATOLOGIC/LYMPHATIC: negative for swollen lymph nodes, bleeding, bruising  MUSCULOSKELETAL: negative for back pain, joint pain, joint stiffness, joint swelling, muscle pain, muscle weakness  NEUROLOGICAL: negative for dizzy/vertigo, headache, focal weakness, numbness/tingling, speech problems, loss of consciousness, confusion, memory loss  BEHAVORIAL/PSYCH: negative for anxiety,  depression, psychosis  ENDOCRINE: negative for polydipsia/polyuria, palpitations, skin changes, temperature intolerance, unexpected weight changes  ALLERGIC/IMMUNOLOGIC: negative for urticaria, hay fever, angioedema      Objective:    Vital Signs:  Vitals:    04/09/18 1440   BP: 126/85   Pulse: (!) 59   Resp: 17       Physical Exam:  General:  alert; oriented x 4; well-nourished female   Skin:  Skin color, texture, turgor normal. No rashes or lesions   HEENT:  conjunctivae/corneas clear.   Neck: supple, trachea midline   Respiratory:  clear to auscultation bilaterally   Heart:  regular rate and rhythm   Breasts: Symmetrical; no palpable masses or lymphadenopathy;  no discharge, erythema, or tenderness   Abdomen:  soft, non-tender; bowel sounds normal   Pelvis: External genitalia: normal general appearance  Urinary system: urethral meatus normal, bladder nontender  Vaginal: atrophic mucosa; no prolapse or lesions  Cervix: removed surgically  Uterus: removed surgically  Adnexa: removed surgically   Extremities: Normal ROM; no edema, no cyanosis   Neurologial: Normal strength and tone. No focal numbness or weakness. Reflexes 2+ and equal.   Psychiatric: normal mood, speech, dress, and thought processes         Assessment:      1. Well woman exam with routine gynecological exam    2. History of total hysterectomy with bilateral salpingo-oophorectomy (BSO)    3. Dermatitis    4. Vulvar itching    5. Age-related osteoporosis with current pathological fracture with routine healing, subsequent encounter    6. Cervical cancer screening          Plan:      Well woman exam with routine gynecological exam    History of total hysterectomy with bilateral salpingo-oophorectomy (BSO)    Dermatitis  -     nystatin (MYCOSTATIN) powder; Apply topically 2 (two) times daily.  Dispense: 60 g; Refill: 4  -     clobetasol (TEMOVATE) 0.05 % cream; Apply thin layer to affected area (vulva) nightly for 2 months, then twice weekly at bedtime.   Dispense: 60 g; Refill: 04    Vulvar itching  -     clobetasol (TEMOVATE) 0.05 % cream; Apply thin layer to affected area (vulva) nightly for 2 months, then twice weekly at bedtime.  Dispense: 60 g; Refill: 04    Age-related osteoporosis with current pathological fracture with routine healing, subsequent encounter  -     alendronate (FOSAMAX) 70 MG tablet; Take 1 tablet (70 mg total) by mouth every 7 days.  Dispense: 4 tablet; Refill: 11    Cervical cancer screening  -     Liquid-based pap smear, screening        COUNSELING:  Merna was counseled on A.C.O.G. Pap guidelines and recommendations for yearly pelvic exams in addition to recommendations for yearly mammograms and monthly self breast exams. In addition she was counseled on recommendations regarding DEXA and adequate intake of calcium and vitamin D. She is to see her PCP for other health maintenance.

## 2019-04-08 ENCOUNTER — HOSPITAL ENCOUNTER (OUTPATIENT)
Facility: HOSPITAL | Age: 59
Discharge: HOME | End: 2019-04-09
Attending: SURGERY | Admitting: INTERNAL MEDICINE
Payer: MEDICAID

## 2019-04-08 DIAGNOSIS — R52 GENERALIZED BODY ACHES: ICD-10-CM

## 2019-04-08 DIAGNOSIS — J10.1 INFLUENZA A: Primary | ICD-10-CM

## 2019-04-08 DIAGNOSIS — R05.9 COUGH: ICD-10-CM

## 2019-04-08 DIAGNOSIS — R06.02 SOB (SHORTNESS OF BREATH): ICD-10-CM

## 2019-04-08 DIAGNOSIS — J96.01 ACUTE HYPOXEMIC RESPIRATORY FAILURE: ICD-10-CM

## 2019-04-08 DIAGNOSIS — R79.89 POSITIVE D DIMER: ICD-10-CM

## 2019-04-08 DIAGNOSIS — Z79.4 TYPE 2 DIABETES MELLITUS WITH DIABETIC POLYNEUROPATHY, WITH LONG-TERM CURRENT USE OF INSULIN: ICD-10-CM

## 2019-04-08 DIAGNOSIS — E03.4 HYPOTHYROIDISM DUE TO ACQUIRED ATROPHY OF THYROID: ICD-10-CM

## 2019-04-08 DIAGNOSIS — I10 ESSENTIAL HYPERTENSION: Chronic | ICD-10-CM

## 2019-04-08 DIAGNOSIS — E11.42 TYPE 2 DIABETES MELLITUS WITH DIABETIC POLYNEUROPATHY, WITH LONG-TERM CURRENT USE OF INSULIN: ICD-10-CM

## 2019-04-08 PROBLEM — E03.9 HYPOTHYROID: Status: ACTIVE | Noted: 2019-04-08

## 2019-04-08 LAB
ALBUMIN SERPL BCP-MCNC: 3.7 G/DL (ref 3.5–5.2)
ALLENS TEST: ABNORMAL
ALP SERPL-CCNC: 36 U/L (ref 55–135)
ALT SERPL W/O P-5'-P-CCNC: 10 U/L (ref 10–44)
ANION GAP SERPL CALC-SCNC: 13 MMOL/L (ref 8–16)
AST SERPL-CCNC: 17 U/L (ref 10–40)
BASOPHILS # BLD AUTO: 0.01 K/UL (ref 0–0.2)
BASOPHILS NFR BLD: 0.2 % (ref 0–1.9)
BILIRUB SERPL-MCNC: 0.7 MG/DL (ref 0.1–1)
BNP SERPL-MCNC: 397 PG/ML (ref 0–99)
BUN SERPL-MCNC: 19 MG/DL (ref 6–20)
CALCIUM SERPL-MCNC: 8.6 MG/DL (ref 8.7–10.5)
CHLORIDE SERPL-SCNC: 103 MMOL/L (ref 95–110)
CK MB SERPL-MCNC: 0.9 NG/ML (ref 0.1–6.5)
CK MB SERPL-RTO: 0.9 % (ref 0–5)
CK SERPL-CCNC: 101 U/L (ref 20–180)
CK SERPL-CCNC: 101 U/L (ref 20–180)
CO2 SERPL-SCNC: 17 MMOL/L (ref 23–29)
CREAT SERPL-MCNC: 1.3 MG/DL (ref 0.5–1.4)
D DIMER PPP IA.FEU-MCNC: 0.5 MG/L FEU
DELSYS: ABNORMAL
DEPRECATED S PYO AG THROAT QL EIA: NEGATIVE
DIFFERENTIAL METHOD: ABNORMAL
EOSINOPHIL # BLD AUTO: 0 K/UL (ref 0–0.5)
EOSINOPHIL NFR BLD: 0.2 % (ref 0–8)
ERYTHROCYTE [DISTWIDTH] IN BLOOD BY AUTOMATED COUNT: 13.4 % (ref 11.5–14.5)
EST. GFR  (AFRICAN AMERICAN): 53 ML/MIN/1.73 M^2
EST. GFR  (NON AFRICAN AMERICAN): 46 ML/MIN/1.73 M^2
GLUCOSE SERPL-MCNC: 157 MG/DL (ref 70–110)
HCO3 UR-SCNC: 18.8 MMOL/L (ref 22–26)
HCT VFR BLD AUTO: 37.4 % (ref 37–48.5)
HGB BLD-MCNC: 12.3 G/DL (ref 12–16)
INFLUENZA A, MOLECULAR: POSITIVE
INFLUENZA B, MOLECULAR: NEGATIVE
LYMPHOCYTES # BLD AUTO: 1 K/UL (ref 1–4.8)
LYMPHOCYTES NFR BLD: 24.1 % (ref 18–48)
MCH RBC QN AUTO: 28.4 PG (ref 27–31)
MCHC RBC AUTO-ENTMCNC: 32.9 G/DL (ref 32–36)
MCV RBC AUTO: 86 FL (ref 82–98)
MONOCYTES # BLD AUTO: 0.5 K/UL (ref 0.3–1)
MONOCYTES NFR BLD: 11.7 % (ref 4–15)
NEUTROPHILS # BLD AUTO: 2.6 K/UL (ref 1.8–7.7)
NEUTROPHILS NFR BLD: 63.8 % (ref 38–73)
PCO2 BLDA: 29 MMHG (ref 35–45)
PH SMN: 7.42 [PH] (ref 7.35–7.45)
PLATELET # BLD AUTO: 114 K/UL (ref 150–350)
PMV BLD AUTO: 11.4 FL (ref 9.2–12.9)
PO2 BLDA: 58 MMHG (ref 75–100)
POC BE: -4.6 MMOL/L (ref -2–2)
POC COHB: 0.4 % (ref 0–3)
POC METHB: 0.4 % (ref 0–1.5)
POC O2HB ARTERIAL: 91.1 % (ref 94–100)
POC SATURATED O2: 91.8 % (ref 90–100)
POC TCO2: 19.7 MMOL/L
POC THB: 11.7 G/DL (ref 12–18)
POCT GLUCOSE: 187 MG/DL (ref 70–110)
POCT GLUCOSE: 238 MG/DL (ref 70–110)
POTASSIUM SERPL-SCNC: 4 MMOL/L (ref 3.5–5.1)
PROT SERPL-MCNC: 6.7 G/DL (ref 6–8.4)
RBC # BLD AUTO: 4.33 M/UL (ref 4–5.4)
SITE: ABNORMAL
SODIUM SERPL-SCNC: 133 MMOL/L (ref 136–145)
SPECIMEN SOURCE: ABNORMAL
TROPONIN I SERPL DL<=0.01 NG/ML-MCNC: 0.01 NG/ML (ref 0–0.03)
TROPONIN I SERPL DL<=0.01 NG/ML-MCNC: 0.02 NG/ML (ref 0–0.03)
WBC # BLD AUTO: 4.1 K/UL (ref 3.9–12.7)

## 2019-04-08 PROCEDURE — 85025 COMPLETE CBC W/AUTO DIFF WBC: CPT

## 2019-04-08 PROCEDURE — G0378 HOSPITAL OBSERVATION PER HR: HCPCS

## 2019-04-08 PROCEDURE — 83880 ASSAY OF NATRIURETIC PEPTIDE: CPT

## 2019-04-08 PROCEDURE — 82803 BLOOD GASES ANY COMBINATION: CPT | Performed by: NURSE PRACTITIONER

## 2019-04-08 PROCEDURE — 93005 ELECTROCARDIOGRAM TRACING: CPT

## 2019-04-08 PROCEDURE — 63600175 PHARM REV CODE 636 W HCPCS: Performed by: SURGERY

## 2019-04-08 PROCEDURE — 96372 THER/PROPH/DIAG INJ SC/IM: CPT | Performed by: SURGERY

## 2019-04-08 PROCEDURE — 93010 ELECTROCARDIOGRAM REPORT: CPT | Mod: ,,, | Performed by: INTERNAL MEDICINE

## 2019-04-08 PROCEDURE — 25000242 PHARM REV CODE 250 ALT 637 W/ HCPCS: Performed by: NURSE PRACTITIONER

## 2019-04-08 PROCEDURE — 99222 1ST HOSP IP/OBS MODERATE 55: CPT | Mod: ,,, | Performed by: INTERNAL MEDICINE

## 2019-04-08 PROCEDURE — 82550 ASSAY OF CK (CPK): CPT

## 2019-04-08 PROCEDURE — 94640 AIRWAY INHALATION TREATMENT: CPT

## 2019-04-08 PROCEDURE — 83036 HEMOGLOBIN GLYCOSYLATED A1C: CPT

## 2019-04-08 PROCEDURE — 94761 N-INVAS EAR/PLS OXIMETRY MLT: CPT

## 2019-04-08 PROCEDURE — 99285 EMERGENCY DEPT VISIT HI MDM: CPT | Mod: 25

## 2019-04-08 PROCEDURE — 87880 STREP A ASSAY W/OPTIC: CPT

## 2019-04-08 PROCEDURE — 25000242 PHARM REV CODE 250 ALT 637 W/ HCPCS: Performed by: SURGERY

## 2019-04-08 PROCEDURE — 85379 FIBRIN DEGRADATION QUANT: CPT

## 2019-04-08 PROCEDURE — 82553 CREATINE MB FRACTION: CPT

## 2019-04-08 PROCEDURE — 87502 INFLUENZA DNA AMP PROBE: CPT

## 2019-04-08 PROCEDURE — 93010 EKG 12-LEAD: ICD-10-PCS | Mod: ,,, | Performed by: INTERNAL MEDICINE

## 2019-04-08 PROCEDURE — 99222 PR INITIAL HOSPITAL CARE,LEVL II: ICD-10-PCS | Mod: ,,, | Performed by: INTERNAL MEDICINE

## 2019-04-08 PROCEDURE — 84484 ASSAY OF TROPONIN QUANT: CPT

## 2019-04-08 PROCEDURE — 84484 ASSAY OF TROPONIN QUANT: CPT | Mod: 91

## 2019-04-08 PROCEDURE — S5571 INSULIN DISPOS PEN 3 ML: HCPCS | Performed by: INTERNAL MEDICINE

## 2019-04-08 PROCEDURE — 80053 COMPREHEN METABOLIC PANEL: CPT

## 2019-04-08 PROCEDURE — 63600175 PHARM REV CODE 636 W HCPCS: Performed by: INTERNAL MEDICINE

## 2019-04-08 PROCEDURE — 27000221 HC OXYGEN, UP TO 24 HOURS

## 2019-04-08 PROCEDURE — 25000003 PHARM REV CODE 250: Performed by: INTERNAL MEDICINE

## 2019-04-08 PROCEDURE — 96374 THER/PROPH/DIAG INJ IV PUSH: CPT

## 2019-04-08 PROCEDURE — 87081 CULTURE SCREEN ONLY: CPT

## 2019-04-08 PROCEDURE — 36415 COLL VENOUS BLD VENIPUNCTURE: CPT

## 2019-04-08 PROCEDURE — 25000003 PHARM REV CODE 250: Performed by: NURSE PRACTITIONER

## 2019-04-08 PROCEDURE — 36600 WITHDRAWAL OF ARTERIAL BLOOD: CPT

## 2019-04-08 RX ORDER — ACETAMINOPHEN 325 MG/1
650 TABLET ORAL EVERY 8 HOURS PRN
Status: DISCONTINUED | OUTPATIENT
Start: 2019-04-08 | End: 2019-04-09 | Stop reason: HOSPADM

## 2019-04-08 RX ORDER — PRAVASTATIN SODIUM 40 MG/1
40 TABLET ORAL DAILY
Status: DISCONTINUED | OUTPATIENT
Start: 2019-04-09 | End: 2019-04-09 | Stop reason: HOSPADM

## 2019-04-08 RX ORDER — IBUPROFEN 200 MG
16 TABLET ORAL
Status: DISCONTINUED | OUTPATIENT
Start: 2019-04-08 | End: 2019-04-09 | Stop reason: HOSPADM

## 2019-04-08 RX ORDER — OSELTAMIVIR PHOSPHATE 30 MG/1
30 CAPSULE ORAL 2 TIMES DAILY
Status: DISCONTINUED | OUTPATIENT
Start: 2019-04-08 | End: 2019-04-09 | Stop reason: HOSPADM

## 2019-04-08 RX ORDER — OSELTAMIVIR PHOSPHATE 30 MG/1
30 CAPSULE ORAL 2 TIMES DAILY
Status: DISCONTINUED | OUTPATIENT
Start: 2019-04-08 | End: 2019-04-08

## 2019-04-08 RX ORDER — ONDANSETRON 2 MG/ML
4 INJECTION INTRAMUSCULAR; INTRAVENOUS EVERY 8 HOURS PRN
Status: DISCONTINUED | OUTPATIENT
Start: 2019-04-08 | End: 2019-04-09 | Stop reason: HOSPADM

## 2019-04-08 RX ORDER — ACETAMINOPHEN 500 MG
1000 TABLET ORAL
Status: COMPLETED | OUTPATIENT
Start: 2019-04-08 | End: 2019-04-08

## 2019-04-08 RX ORDER — IBUPROFEN 800 MG/1
800 TABLET ORAL
Status: COMPLETED | OUTPATIENT
Start: 2019-04-08 | End: 2019-04-08

## 2019-04-08 RX ORDER — GABAPENTIN 300 MG/1
300 CAPSULE ORAL DAILY
Status: DISCONTINUED | OUTPATIENT
Start: 2019-04-09 | End: 2019-04-09 | Stop reason: HOSPADM

## 2019-04-08 RX ORDER — DOXAZOSIN 2 MG/1
4 TABLET ORAL DAILY
Status: DISCONTINUED | OUTPATIENT
Start: 2019-04-09 | End: 2019-04-09 | Stop reason: HOSPADM

## 2019-04-08 RX ORDER — NAPROXEN SODIUM 220 MG/1
81 TABLET, FILM COATED ORAL DAILY
Status: DISCONTINUED | OUTPATIENT
Start: 2019-04-09 | End: 2019-04-09 | Stop reason: HOSPADM

## 2019-04-08 RX ORDER — IPRATROPIUM BROMIDE AND ALBUTEROL SULFATE 2.5; .5 MG/3ML; MG/3ML
3 SOLUTION RESPIRATORY (INHALATION) EVERY 4 HOURS
Status: DISCONTINUED | OUTPATIENT
Start: 2019-04-08 | End: 2019-04-09 | Stop reason: HOSPADM

## 2019-04-08 RX ORDER — GLUCAGON 1 MG
1 KIT INJECTION
Status: DISCONTINUED | OUTPATIENT
Start: 2019-04-08 | End: 2019-04-09 | Stop reason: HOSPADM

## 2019-04-08 RX ORDER — PANTOPRAZOLE SODIUM 40 MG/1
40 TABLET, DELAYED RELEASE ORAL DAILY
Status: DISCONTINUED | OUTPATIENT
Start: 2019-04-09 | End: 2019-04-09 | Stop reason: HOSPADM

## 2019-04-08 RX ORDER — CLOPIDOGREL BISULFATE 75 MG/1
75 TABLET ORAL DAILY
Status: DISCONTINUED | OUTPATIENT
Start: 2019-04-09 | End: 2019-04-09 | Stop reason: HOSPADM

## 2019-04-08 RX ORDER — METHYLPREDNISOLONE SOD SUCC 125 MG
125 VIAL (EA) INJECTION EVERY 8 HOURS
Status: DISCONTINUED | OUTPATIENT
Start: 2019-04-08 | End: 2019-04-08

## 2019-04-08 RX ORDER — FENOFIBRATE 160 MG/1
160 TABLET ORAL DAILY
Status: DISCONTINUED | OUTPATIENT
Start: 2019-04-09 | End: 2019-04-09 | Stop reason: HOSPADM

## 2019-04-08 RX ORDER — CARVEDILOL 25 MG/1
25 TABLET ORAL 2 TIMES DAILY WITH MEALS
Status: DISCONTINUED | OUTPATIENT
Start: 2019-04-08 | End: 2019-04-09 | Stop reason: HOSPADM

## 2019-04-08 RX ORDER — NIFEDIPINE 30 MG/1
90 TABLET, EXTENDED RELEASE ORAL DAILY
Status: DISCONTINUED | OUTPATIENT
Start: 2019-04-09 | End: 2019-04-09 | Stop reason: HOSPADM

## 2019-04-08 RX ORDER — IPRATROPIUM BROMIDE AND ALBUTEROL SULFATE 2.5; .5 MG/3ML; MG/3ML
3 SOLUTION RESPIRATORY (INHALATION)
Status: COMPLETED | OUTPATIENT
Start: 2019-04-08 | End: 2019-04-08

## 2019-04-08 RX ORDER — LISINOPRIL 40 MG/1
40 TABLET ORAL DAILY
Status: DISCONTINUED | OUTPATIENT
Start: 2019-04-09 | End: 2019-04-09 | Stop reason: HOSPADM

## 2019-04-08 RX ORDER — ALBUTEROL SULFATE 2.5 MG/.5ML
10 SOLUTION RESPIRATORY (INHALATION) ONCE
Status: COMPLETED | OUTPATIENT
Start: 2019-04-08 | End: 2019-04-08

## 2019-04-08 RX ORDER — TRAZODONE HYDROCHLORIDE 50 MG/1
100 TABLET ORAL NIGHTLY
Status: DISCONTINUED | OUTPATIENT
Start: 2019-04-08 | End: 2019-04-09 | Stop reason: HOSPADM

## 2019-04-08 RX ORDER — LEVOTHYROXINE SODIUM 50 UG/1
50 TABLET ORAL
Status: DISCONTINUED | OUTPATIENT
Start: 2019-04-09 | End: 2019-04-08

## 2019-04-08 RX ORDER — FLUOXETINE HYDROCHLORIDE 20 MG/1
20 CAPSULE ORAL DAILY
Status: DISCONTINUED | OUTPATIENT
Start: 2019-04-09 | End: 2019-04-09 | Stop reason: HOSPADM

## 2019-04-08 RX ORDER — OSELTAMIVIR PHOSPHATE 75 MG/1
75 CAPSULE ORAL 2 TIMES DAILY
Status: DISCONTINUED | OUTPATIENT
Start: 2019-04-08 | End: 2019-04-08

## 2019-04-08 RX ORDER — IBUPROFEN 200 MG
24 TABLET ORAL
Status: DISCONTINUED | OUTPATIENT
Start: 2019-04-08 | End: 2019-04-09 | Stop reason: HOSPADM

## 2019-04-08 RX ORDER — SODIUM CHLORIDE 0.9 % (FLUSH) 0.9 %
10 SYRINGE (ML) INJECTION
Status: DISCONTINUED | OUTPATIENT
Start: 2019-04-08 | End: 2019-04-09 | Stop reason: HOSPADM

## 2019-04-08 RX ORDER — INSULIN ASPART 100 [IU]/ML
1-10 INJECTION, SOLUTION INTRAVENOUS; SUBCUTANEOUS
Status: DISCONTINUED | OUTPATIENT
Start: 2019-04-08 | End: 2019-04-09 | Stop reason: HOSPADM

## 2019-04-08 RX ORDER — METHYLPREDNISOLONE SOD SUCC 125 MG
80 VIAL (EA) INJECTION DAILY
Status: DISCONTINUED | OUTPATIENT
Start: 2019-04-09 | End: 2019-04-09

## 2019-04-08 RX ADMIN — CARVEDILOL 25 MG: 25 TABLET, FILM COATED ORAL at 05:04

## 2019-04-08 RX ADMIN — METHYLPREDNISOLONE SODIUM SUCCINATE 125 MG: 125 INJECTION, POWDER, FOR SOLUTION INTRAMUSCULAR; INTRAVENOUS at 03:04

## 2019-04-08 RX ADMIN — ACETAMINOPHEN 1000 MG: 500 TABLET, FILM COATED ORAL at 01:04

## 2019-04-08 RX ADMIN — IPRATROPIUM BROMIDE AND ALBUTEROL SULFATE 3 ML: .5; 3 SOLUTION RESPIRATORY (INHALATION) at 01:04

## 2019-04-08 RX ADMIN — ALBUTEROL SULFATE 10 MG: 2.5 SOLUTION RESPIRATORY (INHALATION) at 03:04

## 2019-04-08 RX ADMIN — OSELTAMIVIR PHOSPHATE 30 MG: 30 CAPSULE ORAL at 05:04

## 2019-04-08 RX ADMIN — TRAZODONE HYDROCHLORIDE 100 MG: 50 TABLET ORAL at 09:04

## 2019-04-08 RX ADMIN — IBUPROFEN 800 MG: 800 TABLET ORAL at 02:04

## 2019-04-08 RX ADMIN — INSULIN DETEMIR 60 UNITS: 100 INJECTION, SOLUTION SUBCUTANEOUS at 09:04

## 2019-04-08 RX ADMIN — INSULIN ASPART 2 UNITS: 100 INJECTION, SOLUTION INTRAVENOUS; SUBCUTANEOUS at 09:04

## 2019-04-08 RX ADMIN — IPRATROPIUM BROMIDE AND ALBUTEROL SULFATE 3 ML: .5; 3 SOLUTION RESPIRATORY (INHALATION) at 07:04

## 2019-04-08 NOTE — NURSING TRANSFER
Nursing Transfer Note      4/8/2019     Transfer From: er    Transfer via wheelchair    Transfer with 2l to O2    Transported by [facility]    Medicines sent: na    Chart send with patient: Yes    Notified: na    Patient reassessed at: 4/8/2019 1615    Upon arrival to floor: cardiac monitor applied, patient oriented to room, call bell in reach and bed in lowest position

## 2019-04-08 NOTE — ASSESSMENT & PLAN NOTE
Due to influenza  Unable to get sats > 89% on RA  2L NC, wean as tolerated  duonebs Q4H  Solumedrol 80mg Qday, wean. Unclear if truly has COPD but may help wheezing/air movement

## 2019-04-08 NOTE — HPI
Patient presented to ER with cough and SOB. Sx started Friday evening but worsening yesterday afternoon. Cough is productive. + SOB, worse with exertion. H/o smoking but quit 12 years ago. No known diagnosis of COPD. She has subjective fevers, never checked her temperature. 101 F in ER. sats 89% on RA. Influenza positive. CXR clear. sats did not improved s/p breathing treatments. 92-94% on 2L on arrival to the floor though she reprots feeling much better. She has no other acute sx.     Of note, Reports off her synthroid for last 1 month; TSH was not drawn.

## 2019-04-08 NOTE — SUBJECTIVE & OBJECTIVE
"Past Medical History:   Diagnosis Date    Coronary artery disease     Diabetes mellitus     Dyslipidemia     GERD (gastroesophageal reflux disease)     Hypertension     Neuropathy        Past Surgical History:   Procedure Laterality Date    cardiac stents      CHOLECYSTECTOMY      HYSTERECTOMY      stents to kidney         Review of patient's allergies indicates:   Allergen Reactions    Codeine Nausea Only    Pcn [penicillins] Other (See Comments)     "I get the shakes"    Sulfa (sulfonamide antibiotics)        No current facility-administered medications on file prior to encounter.      Current Outpatient Medications on File Prior to Encounter   Medication Sig    alendronate (FOSAMAX) 70 MG tablet Take 1 tablet (70 mg total) by mouth every 7 days.    aspirin 81 MG Chew Take 81 mg by mouth once daily.    BASAGLAR KWIKPEN 100 unit/mL (3 mL) InPn pen 75 Units every evening.     carvedilol (COREG) 25 MG tablet Take 25 mg by mouth 2 (two) times daily with meals.    clopidogrel (PLAVIX) 75 mg tablet Take 75 mg by mouth once daily.    doxazosin (CARDURA) 4 MG tablet TAKE 1 TABLET ORALLY ONCE A DAY.    ertugliflozin (STEGLATRO) 15 mg Tab Take 15 mg by mouth once daily.    fenofibrate 160 MG Tab Take 160 mg by mouth once daily.    fluoxetine (PROZAC) 20 MG capsule TAKE 1 CAPSULE(S) EVERY DAY BY ORAL ROUTE FOR 30 DAYS.    gabapentin (NEURONTIN) 300 MG capsule Take 300 mg by mouth once daily.    levothyroxine (SYNTHROID) 50 MCG tablet Take 1 tablet (50 mcg total) by mouth before breakfast. Wait 30 minutes before eating or drinking.    lisinopril (PRINIVIL,ZESTRIL) 40 MG tablet Take 40 mg by mouth once daily.    nifedipine (PROCARDIA-XL) 90 MG (OSM) TR24 TAKE 1 TABLET ORALLY ONCE A DAY.    pantoprazole (PROTONIX) 40 MG tablet     pravastatin (PRAVACHOL) 40 MG tablet Take 40 mg by mouth once daily.    trazodone (DESYREL) 100 MG tablet     [DISCONTINUED] JARDIANCE 25 mg Tab     clobetasol " (TEMOVATE) 0.05 % cream Apply thin layer to affected area (vulva) nightly for 2 months, then twice weekly at bedtime.    nitroGLYCERIN (NITROSTAT) 0.4 MG SL tablet Place 1 tablet (0.4 mg total) under the tongue every 5 (five) minutes as needed for Chest pain.    nystatin (MYCOSTATIN) powder Apply topically 2 (two) times daily.    ondansetron (ZOFRAN-ODT) 4 MG TbDL Take 4 mg by mouth every 6 (six) hours as needed.    TRUE METRIX GLUCOSE TEST STRIP Strp      Family History     Problem Relation (Age of Onset)    Breast cancer Sister    Cancer Maternal Aunt    Heart disease Mother        Tobacco Use    Smoking status: Former Smoker     Types: Cigarettes     Last attempt to quit: 11/15/2008     Years since quitting: 10.4    Smokeless tobacco: Never Used   Substance and Sexual Activity    Alcohol use: No    Drug use: No    Sexual activity: Not Currently     Partners: Male     Birth control/protection: Surgical     Comment:      Review of Systems   Constitutional: Positive for fever (subjective). Negative for activity change, fatigue and unexpected weight change.   HENT: Negative for congestion, ear pain, hearing loss, rhinorrhea and sore throat.    Eyes: Negative for pain, redness and visual disturbance.   Respiratory: Positive for cough and shortness of breath. Negative for wheezing.    Cardiovascular: Negative for chest pain, palpitations and leg swelling.   Gastrointestinal: Negative for abdominal pain, constipation, diarrhea, nausea and vomiting.   Genitourinary: Negative for dysuria, frequency and urgency.   Musculoskeletal: Negative for back pain, joint swelling and neck pain.   Skin: Negative for color change, rash and wound.   Neurological: Negative for dizziness, tremors, weakness, light-headedness and headaches.     Objective:     Vital Signs (Most Recent):  Temp: 98.8 °F (37.1 °C) (04/08/19 1628)  Pulse: (!) 57 (04/08/19 1630)  Resp: 20 (04/08/19 1628)  BP: 135/63 (04/08/19 1628)  SpO2: 95 %  (04/08/19 1628) Vital Signs (24h Range):  Temp:  [98.8 °F (37.1 °C)-101.4 °F (38.6 °C)] 98.8 °F (37.1 °C)  Pulse:  [57-77] 57  Resp:  [16-27] 20  SpO2:  [89 %-99 %] 95 %  BP: (120-187)/(59-77) 135/63     Weight: 91 kg (200 lb 9.9 oz)  Body mass index is 39.18 kg/m².    Physical Exam   Constitutional: She is oriented to person, place, and time. She appears well-developed and well-nourished. No distress.   HENT:   Head: Normocephalic and atraumatic.   Right Ear: External ear normal.   Left Ear: External ear normal.   Eyes: Pupils are equal, round, and reactive to light. Conjunctivae and EOM are normal. Right eye exhibits no discharge. Left eye exhibits no discharge.   Neck: Neck supple. No tracheal deviation present.   Cardiovascular: Normal rate and regular rhythm.   No murmur heard.  Pulmonary/Chest: Effort normal and breath sounds normal. No respiratory distress. She has no wheezes.   Poor air movement b/l  No active wheezing   Abdominal: Soft. Bowel sounds are normal. She exhibits no distension. There is no tenderness.   Neurological: She is alert and oriented to person, place, and time. No cranial nerve deficit.   Skin: Skin is warm and dry.   Psychiatric: She has a normal mood and affect. Her behavior is normal.   Nursing note and vitals reviewed.        CRANIAL NERVES     CN III, IV, VI   Pupils are equal, round, and reactive to light.  Extraocular motions are normal.        Significant Labs:   CBC:   Recent Labs   Lab 04/08/19  1333   WBC 4.10   HGB 12.3   HCT 37.4   *     CMP:   Recent Labs   Lab 04/08/19  1333   *   K 4.0      CO2 17*   *   BUN 19   CREATININE 1.3   CALCIUM 8.6*   PROT 6.7   ALBUMIN 3.7   BILITOT 0.7   ALKPHOS 36*   AST 17   ALT 10   ANIONGAP 13   EGFRNONAA 46*     Troponin:   Recent Labs   Lab 04/08/19  1333   TROPONINI 0.014     Urine Studies: No results for input(s): COLORU, APPEARANCEUA, PHUR, SPECGRAV, PROTEINUA, GLUCUA, KETONESU, BILIRUBINUA, OCCULTUA,  NITRITE, UROBILINOGEN, LEUKOCYTESUR, RBCUA, WBCUA, BACTERIA, SQUAMEPITHEL, HYALINECASTS in the last 48 hours.    Invalid input(s): IGGYR  All pertinent labs within the past 24 hours have been reviewed.    Significant Imaging: I have reviewed all pertinent imaging results/findings within the past 24 hours.

## 2019-04-08 NOTE — H&P
"[facility] Medicine  History & Physical    Patient Name: Merna Regalado  MRN: [id number]  Admission Date: 4/8/2019  Attending Physician: Jalyn Garcia MD   Primary Care Provider: Billy De La Paz MD         Patient information was obtained from patient and ER records.     Subjective:     Principal Problem:Influenza A    Chief Complaint:   Chief Complaint   Patient presents with    Cough        HPI: Patient presented to ER with cough and SOB. Sx started Friday evening but worsening yesterday afternoon. Cough is productive. + SOB, worse with exertion. H/o smoking but quit 12 years ago. No known diagnosis of COPD. She has subjective fevers, never checked her temperature. 101 F in ER. sats 89% on RA. Influenza positive. CXR clear. sats did not improved s/p breathing treatments. 92-94% on 2L on arrival to the floor though she reprots feeling much better. She has no other acute sx.     Of note, Reports off her synthroid for last 1 month; TSH was not drawn.     Past Medical History:   Diagnosis Date    Coronary artery disease     Diabetes mellitus     Dyslipidemia     GERD (gastroesophageal reflux disease)     Hypertension     Neuropathy        Past Surgical History:   Procedure Laterality Date    cardiac stents      CHOLECYSTECTOMY      HYSTERECTOMY      stents to kidney         Review of patient's allergies indicates:   Allergen Reactions    Codeine Nausea Only    Pcn [penicillins] Other (See Comments)     "I get the shakes"    Sulfa (sulfonamide antibiotics)        No current facility-administered medications on file prior to encounter.      Current Outpatient Medications on File Prior to Encounter   Medication Sig    alendronate (FOSAMAX) 70 MG tablet Take 1 tablet (70 mg total) by mouth every 7 days.    aspirin 81 MG Chew Take 81 mg by mouth once daily.    BASAGLAR KWIKPEN 100 unit/mL (3 mL) InPn pen 75 Units every evening.     carvedilol (COREG) 25 MG tablet Take 25 mg " by mouth 2 (two) times daily with meals.    clopidogrel (PLAVIX) 75 mg tablet Take 75 mg by mouth once daily.    doxazosin (CARDURA) 4 MG tablet TAKE 1 TABLET ORALLY ONCE A DAY.    ertugliflozin (STEGLATRO) 15 mg Tab Take 15 mg by mouth once daily.    fenofibrate 160 MG Tab Take 160 mg by mouth once daily.    fluoxetine (PROZAC) 20 MG capsule TAKE 1 CAPSULE(S) EVERY DAY BY ORAL ROUTE FOR 30 DAYS.    gabapentin (NEURONTIN) 300 MG capsule Take 300 mg by mouth once daily.    levothyroxine (SYNTHROID) 50 MCG tablet Take 1 tablet (50 mcg total) by mouth before breakfast. Wait 30 minutes before eating or drinking.    lisinopril (PRINIVIL,ZESTRIL) 40 MG tablet Take 40 mg by mouth once daily.    nifedipine (PROCARDIA-XL) 90 MG (OSM) TR24 TAKE 1 TABLET ORALLY ONCE A DAY.    pantoprazole (PROTONIX) 40 MG tablet     pravastatin (PRAVACHOL) 40 MG tablet Take 40 mg by mouth once daily.    trazodone (DESYREL) 100 MG tablet     [DISCONTINUED] JARDIANCE 25 mg Tab     clobetasol (TEMOVATE) 0.05 % cream Apply thin layer to affected area (vulva) nightly for 2 months, then twice weekly at bedtime.    nitroGLYCERIN (NITROSTAT) 0.4 MG SL tablet Place 1 tablet (0.4 mg total) under the tongue every 5 (five) minutes as needed for Chest pain.    nystatin (MYCOSTATIN) powder Apply topically 2 (two) times daily.    ondansetron (ZOFRAN-ODT) 4 MG TbDL Take 4 mg by mouth every 6 (six) hours as needed.    TRUE METRIX GLUCOSE TEST STRIP Strp      Family History     Problem Relation (Age of Onset)    Breast cancer Sister    Cancer Maternal Aunt    Heart disease Mother        Tobacco Use    Smoking status: Former Smoker     Types: Cigarettes     Last attempt to quit: 11/15/2008     Years since quitting: 10.4    Smokeless tobacco: Never Used   Substance and Sexual Activity    Alcohol use: No    Drug use: No    Sexual activity: Not Currently     Partners: Male     Birth control/protection: Surgical     Comment:       Review of Systems   Constitutional: Positive for fever (subjective). Negative for activity change, fatigue and unexpected weight change.   HENT: Negative for congestion, ear pain, hearing loss, rhinorrhea and sore throat.    Eyes: Negative for pain, redness and visual disturbance.   Respiratory: Positive for cough and shortness of breath. Negative for wheezing.    Cardiovascular: Negative for chest pain, palpitations and leg swelling.   Gastrointestinal: Negative for abdominal pain, constipation, diarrhea, nausea and vomiting.   Genitourinary: Negative for dysuria, frequency and urgency.   Musculoskeletal: Negative for back pain, joint swelling and neck pain.   Skin: Negative for color change, rash and wound.   Neurological: Negative for dizziness, tremors, weakness, light-headedness and headaches.     Objective:     Vital Signs (Most Recent):  Temp: 98.8 °F (37.1 °C) (04/08/19 1628)  Pulse: (!) 57 (04/08/19 1630)  Resp: 20 (04/08/19 1628)  BP: 135/63 (04/08/19 1628)  SpO2: 95 % (04/08/19 1628) Vital Signs (24h Range):  Temp:  [98.8 °F (37.1 °C)-101.4 °F (38.6 °C)] 98.8 °F (37.1 °C)  Pulse:  [57-77] 57  Resp:  [16-27] 20  SpO2:  [89 %-99 %] 95 %  BP: (120-187)/(59-77) 135/63     Weight: 91 kg (200 lb 9.9 oz)  Body mass index is 39.18 kg/m².    Physical Exam   Constitutional: She is oriented to person, place, and time. She appears well-developed and well-nourished. No distress.   HENT:   Head: Normocephalic and atraumatic.   Right Ear: External ear normal.   Left Ear: External ear normal.   Eyes: Pupils are equal, round, and reactive to light. Conjunctivae and EOM are normal. Right eye exhibits no discharge. Left eye exhibits no discharge.   Neck: Neck supple. No tracheal deviation present.   Cardiovascular: Normal rate and regular rhythm.   No murmur heard.  Pulmonary/Chest: Effort normal and breath sounds normal. No respiratory distress. She has no wheezes.   Poor air movement b/l  No active wheezing    Abdominal: Soft. Bowel sounds are normal. She exhibits no distension. There is no tenderness.   Neurological: She is alert and oriented to person, place, and time. No cranial nerve deficit.   Skin: Skin is warm and dry.   Psychiatric: She has a normal mood and affect. Her behavior is normal.   Nursing note and vitals reviewed.        CRANIAL NERVES     CN III, IV, VI   Pupils are equal, round, and reactive to light.  Extraocular motions are normal.        Significant Labs:   CBC:   Recent Labs   Lab 04/08/19  1333   WBC 4.10   HGB 12.3   HCT 37.4   *     CMP:   Recent Labs   Lab 04/08/19  1333   *   K 4.0      CO2 17*   *   BUN 19   CREATININE 1.3   CALCIUM 8.6*   PROT 6.7   ALBUMIN 3.7   BILITOT 0.7   ALKPHOS 36*   AST 17   ALT 10   ANIONGAP 13   EGFRNONAA 46*     Troponin:   Recent Labs   Lab 04/08/19  1333   TROPONINI 0.014     Urine Studies: No results for input(s): COLORU, APPEARANCEUA, PHUR, SPECGRAV, PROTEINUA, GLUCUA, KETONESU, BILIRUBINUA, OCCULTUA, NITRITE, UROBILINOGEN, LEUKOCYTESUR, RBCUA, WBCUA, BACTERIA, SQUAMEPITHEL, HYALINECASTS in the last 48 hours.    Invalid input(s): WRIGHTSUR  All pertinent labs within the past 24 hours have been reviewed.    Significant Imaging: I have reviewed all pertinent imaging results/findings within the past 24 hours.    Assessment/Plan:     * Influenza A  tamiflu started  No signs of acute pneumonia  duoenbs for hypoxia      Hypothyroid  Off her synthroid for 1 month  Hold home med and check TSH in AM      Acute hypoxemic respiratory failure  Due to influenza  Unable to get sats > 89% on RA  2L NC, wean as tolerated  duonebs Q4H  Solumedrol 80mg Qday, wean. Unclear if truly has COPD but may help wheezing/air movement      Hyperlipidemia associated with type 2 diabetes mellitus  Cont home pravastatin      Diabetes mellitus  basaglar 75 units QHS at home: start 60 units here  SSI  Hold home PO meds  accuchecks  Diabetic  diet      Hypertension  Cont home meds as reported  BP stable        VTE Risk Mitigation (From admission, onward)        Ordered     IP VTE HIGH RISK PATIENT  Once      04/08/19 1654     Place sequential compression device  Until discontinued      04/08/19 1654             Jalyn Garcia MD  Department of Hospital Medicine   [facility]

## 2019-04-08 NOTE — ASSESSMENT & PLAN NOTE
basaglar 75 units QHS at home: start 60 units here  SSI  Hold home PO meds  accuchecks  Diabetic diet

## 2019-04-08 NOTE — ED TRIAGE NOTES
58 y.o. female presents to ER ED 05/ED 05   Chief Complaint   Patient presents with    Cough   Productive cough since Saturday. No acute distress noted.

## 2019-04-08 NOTE — ED PROVIDER NOTES
"Encounter Date: 4/8/2019       History     Chief Complaint   Patient presents with    Cough     The history is provided by the patient.   URI   The primary symptoms include fever, sore throat, cough and myalgias. Primary symptoms do not include headaches, ear pain, wheezing, abdominal pain, nausea, vomiting, arthralgias or rash. The current episode started two days ago. This is a new problem. The problem has been gradually worsening. The maximum temperature recorded prior to her arrival was unknown.   The sore throat is not accompanied by trouble swallowing, drooling, hoarse voice or stridor.   The cough is non-productive.   The myalgias are generalized. The myalgias are not associated with weakness, tenderness or swelling.   Symptoms associated with the illness include chills, congestion and rhinorrhea.     Review of patient's allergies indicates:   Allergen Reactions    Codeine Nausea Only    Pcn [penicillins] Other (See Comments)     "I get the shakes"    Sulfa (sulfonamide antibiotics)      Past Medical History:   Diagnosis Date    Coronary artery disease     Diabetes mellitus     Dyslipidemia     GERD (gastroesophageal reflux disease)     Hypertension     Neuropathy      Past Surgical History:   Procedure Laterality Date    cardiac stents      CHOLECYSTECTOMY      HYSTERECTOMY      stents to kidney       Family History   Problem Relation Age of Onset    Breast cancer Sister     Cancer Maternal Aunt     Heart disease Mother     Colon cancer Neg Hx     Ovarian cancer Neg Hx      Social History     Tobacco Use    Smoking status: Former Smoker     Types: Cigarettes     Last attempt to quit: 11/15/2008     Years since quitting: 10.4    Smokeless tobacco: Never Used   Substance Use Topics    Alcohol use: No    Drug use: No     Review of Systems   Constitutional: Positive for chills and fever.   HENT: Positive for congestion, postnasal drip, rhinorrhea and sore throat. Negative for drooling, ear " pain, hoarse voice and trouble swallowing.    Eyes: Negative for pain, discharge, redness and visual disturbance.   Respiratory: Positive for cough. Negative for shortness of breath, wheezing and stridor.    Cardiovascular: Negative for chest pain and leg swelling.   Gastrointestinal: Negative for abdominal pain, constipation, diarrhea, nausea and vomiting.   Genitourinary: Negative for difficulty urinating, dysuria, flank pain, frequency and urgency.   Musculoskeletal: Positive for myalgias. Negative for arthralgias, back pain and neck pain.   Skin: Negative for rash and wound.   Neurological: Negative for seizures, weakness and headaches.   Psychiatric/Behavioral: Negative.        Physical Exam     Initial Vitals [04/08/19 1302]   BP Pulse Resp Temp SpO2   (!) 120/59 77 20 (!) 100.9 °F (38.3 °C) (!) 92 %      MAP       --         Physical Exam    Nursing note and vitals reviewed.  Constitutional: No distress.   HENT:   Head: Normocephalic and atraumatic.   Right Ear: Tympanic membrane, external ear and ear canal normal.   Left Ear: Tympanic membrane, external ear and ear canal normal.   Mouth/Throat: No oropharyngeal exudate, posterior oropharyngeal erythema or tonsillar abscesses.   Clear post nasal drip noted. Erythema bilateral nasal mucosa with clear nasal discharge noted.   Eyes: Conjunctivae, EOM and lids are normal. Pupils are equal, round, and reactive to light.   Neck: Neck supple.   Cardiovascular: Normal rate, regular rhythm, S1 normal, S2 normal, normal heart sounds and intact distal pulses.   Pulmonary/Chest: Effort normal and breath sounds normal. No respiratory distress.   Abdominal: Soft. Bowel sounds are normal. There is no tenderness.   Musculoskeletal: Normal range of motion.   Neurological: She is alert and oriented to person, place, and time. She has normal strength. GCS eye subscore is 4. GCS verbal subscore is 5. GCS motor subscore is 6.   Skin: Skin is warm and dry. Capillary refill takes  less than 2 seconds. No rash noted.   Psychiatric: She has a normal mood and affect. Her speech is normal and behavior is normal.         ED Course   Procedures  Labs Reviewed   INFLUENZA A & B BY MOLECULAR - Abnormal; Notable for the following components:       Result Value    Influenza A, Molecular Positive (*)     All other components within normal limits   CBC W/ AUTO DIFFERENTIAL - Abnormal; Notable for the following components:    Platelets 114 (*)     All other components within normal limits   COMPREHENSIVE METABOLIC PANEL - Abnormal; Notable for the following components:    Sodium 133 (*)     CO2 17 (*)     Glucose 157 (*)     Calcium 8.6 (*)     Alkaline Phosphatase 36 (*)     eGFR if  53 (*)     eGFR if non  46 (*)     All other components within normal limits   D DIMER, QUANTITATIVE - Abnormal; Notable for the following components:    D-Dimer 0.50 (*)     All other components within normal limits   B-TYPE NATRIURETIC PEPTIDE - Abnormal; Notable for the following components:     (*)     All other components within normal limits   THROAT SCREEN, RAPID   CULTURE, STREP A,  THROAT   CK-MB   CK   TROPONIN I     EKG Readings: (Independently Interpreted)   EKG read with MD at the time it was performed. EKG without concerning findings.      ECG Results          EKG 12-lead (Final result)  Result time 04/08/19 13:54:49    Final result by Interface, Lab In [facility] (04/08/19 13:54:49)                 Narrative:    Test Reason : R52,    Vent. Rate : 075 BPM     Atrial Rate : 075 BPM     P-R Int : 136 ms          QRS Dur : 078 ms      QT Int : 396 ms       P-R-T Axes : 080 076 081 degrees     QTc Int : 442 ms    Normal sinus rhythm  Nonspecific ST abnormality Now present  Abnormal ECG  When compared with ECG of 16-JUN-2017 20:27,    Confirmed by CHALINO DOVER MD ([id number]) on 4/8/2019 1:54:37 PM    Referred By: [email or web address]   SELF           Confirmed By:CHALINO DOVER MD                             Imaging Results          X-Ray Chest PA And Lateral (Final result)  Result time 04/08/19 13:49:10    Final result by Shabbir Hills MD (04/08/19 13:49:10)                 Impression:      No acute cardiopulmonary processes.      Electronically signed by: Shabbir Hills MD  Date:    04/08/2019  Time:    13:49             Narrative:    EXAMINATION:  XR CHEST PA AND LATERAL    CLINICAL HISTORY:  Cough    TECHNIQUE:  PA and lateral views of the chest were performed.    COMPARISON:  Chest 06/16/2017    FINDINGS:  Cardiomediastinal silhouette remains within normal limits.  There is atherosclerosis of the aortic arch.  Lungs are well inflated.  There is no lobar consolidations or pneumothorax or pulmonary vascular congestion or pleural effusions.  Surgical clips in the right upper quadrant from previous cholecystectomy.  No significant bony thorax abnormality.                                        Medications   methylPREDNISolone sodium succinate injection 125 mg (has no administration in time range)   albuterol-ipratropium 2.5 mg-0.5 mg/3 mL nebulizer solution 3 mL (3 mLs Nebulization Given 4/8/19 1324)   acetaminophen tablet 1,000 mg (1,000 mg Oral Given 4/8/19 1317)   ibuprofen tablet 800 mg (800 mg Oral Given 4/8/19 1413)   albuterol sulfate nebulizer solution 10 mg (10 mg Nebulization Given 4/8/19 1502)           --Care was assumed per Dr. Boo. Patient to be admitted d/t low oxygen sat in the setting of influenza.            Clinical Impression:       ICD-10-CM ICD-9-CM   1. Influenza A J10.1 487.1   2. Generalized body aches R52 780.96   3. Cough R05 786.2   4. Positive D dimer R79.89 790.92   5. SOB (shortness of breath) R06.02 786.05               I took over this patient from the nurse practitioner today  Patient has significant hypoxia not on oxygen, 88% on my assessment  Patient with a clear chest x-ray, influenza a, former smoker  Wheezing in all fields on my assessment bilaterally, steroids  given  Patient to be admitted overnight on IV steroids breathing treatments and Tamiflu                 Miky Boo MD  04/08/19 1870

## 2019-04-09 VITALS
RESPIRATION RATE: 19 BRPM | DIASTOLIC BLOOD PRESSURE: 74 MMHG | BODY MASS INDEX: 39.39 KG/M2 | TEMPERATURE: 97 F | HEIGHT: 60 IN | OXYGEN SATURATION: 92 % | SYSTOLIC BLOOD PRESSURE: 173 MMHG | WEIGHT: 200.63 LBS | HEART RATE: 63 BPM

## 2019-04-09 LAB
ALBUMIN SERPL BCP-MCNC: 3.3 G/DL (ref 3.5–5.2)
ALP SERPL-CCNC: 44 U/L (ref 55–135)
ALT SERPL W/O P-5'-P-CCNC: 10 U/L (ref 10–44)
ANION GAP SERPL CALC-SCNC: 14 MMOL/L (ref 8–16)
AST SERPL-CCNC: 16 U/L (ref 10–40)
BASOPHILS # BLD AUTO: 0 K/UL (ref 0–0.2)
BASOPHILS NFR BLD: 0 % (ref 0–1.9)
BILIRUB SERPL-MCNC: 0.4 MG/DL (ref 0.1–1)
BUN SERPL-MCNC: 34 MG/DL (ref 6–20)
CALCIUM SERPL-MCNC: 8.6 MG/DL (ref 8.7–10.5)
CHLORIDE SERPL-SCNC: 106 MMOL/L (ref 95–110)
CO2 SERPL-SCNC: 17 MMOL/L (ref 23–29)
CREAT SERPL-MCNC: 1.5 MG/DL (ref 0.5–1.4)
DIFFERENTIAL METHOD: ABNORMAL
EOSINOPHIL # BLD AUTO: 0 K/UL (ref 0–0.5)
EOSINOPHIL NFR BLD: 0 % (ref 0–8)
ERYTHROCYTE [DISTWIDTH] IN BLOOD BY AUTOMATED COUNT: 13 % (ref 11.5–14.5)
EST. GFR  (AFRICAN AMERICAN): 45 ML/MIN/1.73 M^2
EST. GFR  (NON AFRICAN AMERICAN): 39 ML/MIN/1.73 M^2
ESTIMATED AVG GLUCOSE: 143 MG/DL (ref 68–131)
GLUCOSE SERPL-MCNC: 316 MG/DL (ref 70–110)
HBA1C MFR BLD HPLC: 6.6 % (ref 4–5.6)
HCT VFR BLD AUTO: 37.6 % (ref 37–48.5)
HGB BLD-MCNC: 12.8 G/DL (ref 12–16)
LYMPHOCYTES # BLD AUTO: 0.8 K/UL (ref 1–4.8)
LYMPHOCYTES NFR BLD: 16.7 % (ref 18–48)
MCH RBC QN AUTO: 29 PG (ref 27–31)
MCHC RBC AUTO-ENTMCNC: 34 G/DL (ref 32–36)
MCV RBC AUTO: 85 FL (ref 82–98)
MONOCYTES # BLD AUTO: 0.2 K/UL (ref 0.3–1)
MONOCYTES NFR BLD: 4.5 % (ref 4–15)
NEUTROPHILS # BLD AUTO: 3.5 K/UL (ref 1.8–7.7)
NEUTROPHILS NFR BLD: 78.8 % (ref 38–73)
PLATELET # BLD AUTO: 118 K/UL (ref 150–350)
PMV BLD AUTO: 11.5 FL (ref 9.2–12.9)
POCT GLUCOSE: 309 MG/DL (ref 70–110)
POTASSIUM SERPL-SCNC: 4.1 MMOL/L (ref 3.5–5.1)
PROT SERPL-MCNC: 6.5 G/DL (ref 6–8.4)
RBC # BLD AUTO: 4.42 M/UL (ref 4–5.4)
SODIUM SERPL-SCNC: 137 MMOL/L (ref 136–145)
TROPONIN I SERPL DL<=0.01 NG/ML-MCNC: <0.006 NG/ML (ref 0–0.03)
TROPONIN I SERPL DL<=0.01 NG/ML-MCNC: <0.006 NG/ML (ref 0–0.03)
TSH SERPL DL<=0.005 MIU/L-ACNC: 1.61 UIU/ML (ref 0.4–4)
WBC # BLD AUTO: 4.49 K/UL (ref 3.9–12.7)

## 2019-04-09 PROCEDURE — 80053 COMPREHEN METABOLIC PANEL: CPT

## 2019-04-09 PROCEDURE — G0378 HOSPITAL OBSERVATION PER HR: HCPCS

## 2019-04-09 PROCEDURE — 25000242 PHARM REV CODE 250 ALT 637 W/ HCPCS: Performed by: SURGERY

## 2019-04-09 PROCEDURE — 99239 HOSP IP/OBS DSCHRG MGMT >30: CPT | Mod: ,,, | Performed by: INTERNAL MEDICINE

## 2019-04-09 PROCEDURE — 63700000 PHARM REV CODE 250 ALT 637 W/O HCPCS: Performed by: INTERNAL MEDICINE

## 2019-04-09 PROCEDURE — 99239 PR HOSPITAL DISCHARGE DAY,>30 MIN: ICD-10-PCS | Mod: ,,, | Performed by: INTERNAL MEDICINE

## 2019-04-09 PROCEDURE — 94640 AIRWAY INHALATION TREATMENT: CPT

## 2019-04-09 PROCEDURE — 25000003 PHARM REV CODE 250: Performed by: INTERNAL MEDICINE

## 2019-04-09 PROCEDURE — 25000003 PHARM REV CODE 250: Performed by: SURGERY

## 2019-04-09 PROCEDURE — 84443 ASSAY THYROID STIM HORMONE: CPT

## 2019-04-09 PROCEDURE — 27000221 HC OXYGEN, UP TO 24 HOURS

## 2019-04-09 PROCEDURE — 96372 THER/PROPH/DIAG INJ SC/IM: CPT | Performed by: SURGERY

## 2019-04-09 PROCEDURE — 84484 ASSAY OF TROPONIN QUANT: CPT | Mod: 91

## 2019-04-09 PROCEDURE — 94761 N-INVAS EAR/PLS OXIMETRY MLT: CPT

## 2019-04-09 PROCEDURE — 36415 COLL VENOUS BLD VENIPUNCTURE: CPT

## 2019-04-09 PROCEDURE — 84484 ASSAY OF TROPONIN QUANT: CPT

## 2019-04-09 PROCEDURE — 94760 N-INVAS EAR/PLS OXIMETRY 1: CPT

## 2019-04-09 PROCEDURE — 85025 COMPLETE CBC W/AUTO DIFF WBC: CPT

## 2019-04-09 RX ORDER — ALBUTEROL SULFATE 0.83 MG/ML
2.5 SOLUTION RESPIRATORY (INHALATION) EVERY 6 HOURS PRN
Qty: 180 ML | Refills: 0 | Status: SHIPPED | OUTPATIENT
Start: 2019-04-09 | End: 2022-03-28

## 2019-04-09 RX ORDER — OSELTAMIVIR PHOSPHATE 30 MG/1
30 CAPSULE ORAL 2 TIMES DAILY
Qty: 8 CAPSULE | Refills: 0 | Status: SHIPPED | OUTPATIENT
Start: 2019-04-09 | End: 2019-04-13

## 2019-04-09 RX ORDER — METHYLPREDNISOLONE 4 MG/1
TABLET ORAL
Qty: 21 TABLET | Refills: 0 | Status: ON HOLD | OUTPATIENT
Start: 2019-04-09 | End: 2021-11-02 | Stop reason: HOSPADM

## 2019-04-09 RX ADMIN — ASPIRIN 81 MG 81 MG: 81 TABLET ORAL at 08:04

## 2019-04-09 RX ADMIN — INSULIN ASPART 8 UNITS: 100 INJECTION, SOLUTION INTRAVENOUS; SUBCUTANEOUS at 08:04

## 2019-04-09 RX ADMIN — CLOPIDOGREL BISULFATE 75 MG: 75 TABLET ORAL at 08:04

## 2019-04-09 RX ADMIN — FENOFIBRATE 160 MG: 160 TABLET ORAL at 08:04

## 2019-04-09 RX ADMIN — NIFEDIPINE 90 MG: 30 TABLET, FILM COATED, EXTENDED RELEASE ORAL at 08:04

## 2019-04-09 RX ADMIN — LISINOPRIL 40 MG: 40 TABLET ORAL at 08:04

## 2019-04-09 RX ADMIN — PANTOPRAZOLE SODIUM 40 MG: 40 TABLET, DELAYED RELEASE ORAL at 08:04

## 2019-04-09 RX ADMIN — CARVEDILOL 25 MG: 25 TABLET, FILM COATED ORAL at 08:04

## 2019-04-09 RX ADMIN — PRAVASTATIN SODIUM 40 MG: 40 TABLET ORAL at 08:04

## 2019-04-09 RX ADMIN — OSELTAMIVIR PHOSPHATE 30 MG: 30 CAPSULE ORAL at 08:04

## 2019-04-09 RX ADMIN — FLUOXETINE 20 MG: 20 CAPSULE ORAL at 08:04

## 2019-04-09 RX ADMIN — IPRATROPIUM BROMIDE AND ALBUTEROL SULFATE 3 ML: .5; 3 SOLUTION RESPIRATORY (INHALATION) at 11:04

## 2019-04-09 RX ADMIN — IPRATROPIUM BROMIDE AND ALBUTEROL SULFATE 3 ML: .5; 3 SOLUTION RESPIRATORY (INHALATION) at 04:04

## 2019-04-09 RX ADMIN — IPRATROPIUM BROMIDE AND ALBUTEROL SULFATE 3 ML: .5; 3 SOLUTION RESPIRATORY (INHALATION) at 07:04

## 2019-04-09 RX ADMIN — DOXAZOSIN MESYLATE 4 MG: 2 TABLET ORAL at 08:04

## 2019-04-09 RX ADMIN — GABAPENTIN 300 MG: 300 CAPSULE ORAL at 08:04

## 2019-04-09 RX ADMIN — IPRATROPIUM BROMIDE AND ALBUTEROL SULFATE 3 ML: .5; 3 SOLUTION RESPIRATORY (INHALATION) at 12:04

## 2019-04-09 NOTE — ASSESSMENT & PLAN NOTE
basaglar 75 units QHS at home: start 60 units here  SSI  Hold home PO meds.  accuchecks  Diabetic diet  3/9 , 187, 309 ; detemir 60U Q HS; getting solumedrol 80mg  IV daily

## 2019-04-09 NOTE — NURSING
Discussed discharge instructions at bedside with patient and daughter.  Included prescriptions with their side effects, follow up appointment, what to take when, what to hold until follow up, importance of hydration and taking prescriptions.  Questions encouraged and answered.  Patient verbalized understanding.

## 2019-04-09 NOTE — PROGRESS NOTES
Staff Handoff  Bedside handoff report received from Renee DUPREE. Will monitor.      Resident Handoff

## 2019-04-09 NOTE — SUBJECTIVE & OBJECTIVE
Review of Systems   Constitutional: Negative for activity change, fatigue and unexpected weight change. Fever: subjective.   HENT: Negative for congestion, ear pain, hearing loss, rhinorrhea and sore throat.    Eyes: Negative for pain, redness and visual disturbance.   Respiratory: Positive for cough. Negative for wheezing.         Breathing is much better    Cardiovascular: Negative for chest pain, palpitations and leg swelling.   Gastrointestinal: Negative for abdominal pain, constipation, diarrhea, nausea and vomiting.   Genitourinary: Negative for dysuria, frequency and urgency.   Musculoskeletal: Negative for back pain, joint swelling and neck pain.   Skin: Negative for color change, rash and wound.   Neurological: Negative for dizziness, tremors, weakness, light-headedness and headaches.     Objective:     Vital Signs (Most Recent):  Temp: 96.4 °F (35.8 °C) (04/09/19 0441)  Pulse: (!) 57 (04/09/19 0554)  Resp: 18 (04/09/19 0441)  BP: 124/67 (04/09/19 0441)  SpO2: 96 % (04/09/19 0441) Vital Signs (24h Range):  Temp:  [96.4 °F (35.8 °C)-101.4 °F (38.6 °C)] 96.4 °F (35.8 °C)  Pulse:  [53-77] 57  Resp:  [16-27] 18  SpO2:  [89 %-99 %] 96 %  BP: (114-187)/(56-77) 124/67     Weight: 91 kg (200 lb 9.9 oz)  Body mass index is 39.18 kg/m².    Physical Exam   Constitutional: She is oriented to person, place, and time. She appears well-developed and well-nourished. No distress.   HENT:   Head: Normocephalic and atraumatic.   Right Ear: External ear normal.   Left Ear: External ear normal.   Eyes: Pupils are equal, round, and reactive to light. Conjunctivae and EOM are normal. Right eye exhibits no discharge. Left eye exhibits no discharge.   Neck: Neck supple. No tracheal deviation present.   Cardiovascular: Normal rate and regular rhythm.   No murmur heard.  Pulmonary/Chest: Effort normal and breath sounds normal. No stridor. No respiratory distress. She has no wheezes. She has no rales. She exhibits no tenderness.    Abdominal: Soft. Bowel sounds are normal. She exhibits no distension. There is no tenderness.   Neurological: She is alert and oriented to person, place, and time. No cranial nerve deficit.   Skin: Skin is warm and dry.   Psychiatric: She has a normal mood and affect. Her behavior is normal.   Nursing note and vitals reviewed.        CRANIAL NERVES     CN III, IV, VI   Pupils are equal, round, and reactive to light.  Extraocular motions are normal.        Significant Labs:   CBC:   Recent Labs   Lab 04/08/19  1333 04/09/19  0557   WBC 4.10 4.49   HGB 12.3 12.8   HCT 37.4 37.6   * 118*     CMP:   Recent Labs   Lab 04/08/19  1333   *   K 4.0      CO2 17*   *   BUN 19   CREATININE 1.3   CALCIUM 8.6*   PROT 6.7   ALBUMIN 3.7   BILITOT 0.7   ALKPHOS 36*   AST 17   ALT 10   ANIONGAP 13   EGFRNONAA 46*     Troponin:   Recent Labs   Lab 04/08/19  1333 04/08/19  1929 04/09/19  0013   TROPONINI 0.014 0.019 <0.006     Urine Studies: No results for input(s): COLORU, APPEARANCEUA, PHUR, SPECGRAV, PROTEINUA, GLUCUA, KETONESU, BILIRUBINUA, OCCULTUA, NITRITE, UROBILINOGEN, LEUKOCYTESUR, RBCUA, WBCUA, BACTERIA, SQUAMEPITHEL, HYALINECASTS in the last 48 hours.    Invalid input(s): WRIGHTSUR  All pertinent labs within the past 24 hours have been reviewed.    Significant Imaging: I have reviewed all pertinent imaging results/findings within the past 24 hours.

## 2019-04-09 NOTE — PLAN OF CARE
04/09/19 0921   Advance Directives (For Healthcare)   Advance Directive  (If Adv Dir status is received, view document under Code in header or Chart Review Media tab) Patient does not have Advance Directive, declines information.

## 2019-04-09 NOTE — PLAN OF CARE
"   04/09/19 0921   Discharge Assessment   Assessment Type Discharge Planning Assessment   Confirmed/corrected address and phone number on facesheet? Yes   Assessment information obtained from? Patient   Expected Length of Stay (days) 1   Communicated expected length of stay with patient/caregiver yes   Prior to hospitilization cognitive status: Alert/Oriented   Prior to hospitalization functional status: Independent   Current cognitive status: Alert/Oriented   Current Functional Status: Independent   Facility Arrived From: home   Lives With other (see comments)  (ex , as roomates)   Able to Return to Prior Arrangements yes   Is patient able to care for self after discharge? Yes   Who are your caregiver(s) and their phone number(s)? [phone] Echo Poon   Patient's perception of discharge disposition home or selfcare   Readmission Within the Last 30 Days no previous admission in last 30 days   Patient currently being followed by outpatient case management? No   Patient currently receives any other outside agency services? No   Equipment Currently Used at Home rollator;glucometer   Do you have any problems affording any of your prescribed medications? No   Is the patient taking medications as prescribed? yes   Does the patient have transportation home? Yes   Transportation Anticipated family or friend will provide   Dialysis Name and Scheduled days n/a   Does the patient receive services at the Coumadin Clinic? No   Discharge Plan A Home   Discharge Plan B Home with family   DME Needed Upon Discharge  none   Patient/Family in Agreement with Plan yes         Patient admitted for Hypoxemia and Flu A. She states she lives at home with ex  as a "roommate situation", and that she can rely on him to "call 911 if something happens at home to her. She states she has 2 daughters that she can rely on as well. She denies any needs for discharge at this time. Discharge planning brochure and educational " handout of what signs and symptoms to look for after discharge given to patient and family. Patient and family are encouraged to call with any questions or help the would need. Contact information given. CM will continue to follow patient throughout the transitions of care, and assist with any discharge needs.

## 2019-04-09 NOTE — ASSESSMENT & PLAN NOTE
Due to influenza  Unable to get sats > 89% on RA  2L NC, wean as tolerated  duonebs Q4H  Solumedrol 80mg Qday, wean. Unclear if truly has COPD but may help wheezing/air movement  Much better today; no wheezing; wean o2; to RA; if tolerates D/C home with medrol dose pack .  o2 sat 96% on Room air

## 2019-04-09 NOTE — HOSPITAL COURSE
4/9/19 Pt feeling better, notes breathing and wheezing better. Only c/o still having cough  O2 sat 96% on 2LNC , afebrile, WBC 4.49   BS ^ 301; getting Solumedrol 80mg IV daily; no wheezing this am; wean to RA if tolerates will stop IV solumedrol and D/C home with medrol dose pack; complete Tamiflu as OP

## 2019-04-09 NOTE — ASSESSMENT & PLAN NOTE
basaglar 75 units QHS at home: start 60 units here  SSI  Hold home PO meds.  accuchecks  Diabetic diet  3/9 , 187, 309 ; detemir 60U Q HS; getting solumedrol 80mg  IV daily   D/c IV solumderol; d/c home with medrol dose pack - resume home insulin  Hold steglatro until f/u with PCP- creat 1.3 >1.5 in setting of influenza, dehydration

## 2019-04-09 NOTE — PLAN OF CARE
04/09/19 0924   Final Note   Assessment Type Final Discharge Note   Anticipated Discharge Disposition Home   What phone number can be called within the next 1-3 days to see how you are doing after discharge? [phone]   Hospital Follow Up  Appt(s) scheduled? Yes   Discharge plans and expectations educations in teach back method with documentation complete? Yes   Right Care Referral Info   Post Acute Recommendation No Care         Patient will discharge today. Oxygen weaned, and room air saturation will be checked. She does not look in distress while off oxygen. She states one of her daughters will come to pick her up upon discharge, but she would like to have lunch first. Patient reminded about what signs and symptoms to look for when discharged, and educated that if any symptoms return, make a same day appointment with PCP or come back to the ED. Patient states understanding and agreement. Patient denies any other needs or concerns for discharge.

## 2019-04-09 NOTE — ASSESSMENT & PLAN NOTE
Off her synthroid for 1 month  Hold home med and check TSH in AM  4/9 resume synthroid at 50m mcg- re- check in 2 months as OP .

## 2019-04-09 NOTE — ASSESSMENT & PLAN NOTE
tamiflu started  No signs of acute pneumonia  duoenbs for hypoxia.      CXR:  Cardiomediastinal silhouette remains within normal limits.  There is atherosclerosis of the aortic arch.  Lungs are well inflated.  There is no lobar consolidations or pneumothorax or pulmonary vascular congestion or pleural effusions.  Surgical clips in the right upper quadrant from previous cholecystectomy.  No significant bony thorax abnormality  4/9 completed tamilfy- 4 more days- had 2 doses here

## 2019-04-09 NOTE — ASSESSMENT & PLAN NOTE
tamiflu started  No signs of acute pneumonia  duoenbs for hypoxia.      CXR:  Cardiomediastinal silhouette remains within normal limits.  There is atherosclerosis of the aortic arch.  Lungs are well inflated.  There is no lobar consolidations or pneumothorax or pulmonary vascular congestion or pleural effusions.  Surgical clips in the right upper quadrant from previous cholecystectomy.  No significant bony thorax abnormality

## 2019-04-09 NOTE — PROGRESS NOTES
[facility] Hospital Medicine  Progress Note    Patient Name: Merna Regalado  MRN: [id number]  Patient Class: OP- Observation   Admission Date: 4/8/2019  Length of Stay: 0 days  Attending Physician: Jalyn Garcia MD  Primary Care Provider: Billy De La Paz MD        Subjective:     Principal Problem:Influenza A    HPI:  Patient presented to ER with cough and SOB. Sx started Friday evening but worsening yesterday afternoon. Cough is productive. + SOB, worse with exertion. H/o smoking but quit 12 years ago. No known diagnosis of COPD. She has subjective fevers, never checked her temperature. 101 F in ER. sats 89% on RA. Influenza positive. CXR clear. sats did not improved s/p breathing treatments. 92-94% on 2L on arrival to the floor though she reprots feeling much better. She has no other acute sx.     Of note, Reports off her synthroid for last 1 month; TSH was not drawn.     Hospital Course:  4/9/19 Pt feeling better, notes breathing and wheezing better. Only c/o still having cough  O2 sat 96% on 2LNC , afebrile, WBC 4.49   BS ^ 301; getting Solumedrol 80mg IV daily; no wheezing this am; wean to RA if tolerates will stop IV solumedrol and D/C home with medrol dose pack; complete Tamiflu as OP          Review of Systems   Constitutional: Negative for activity change, fatigue and unexpected weight change. Fever: subjective.   HENT: Negative for congestion, ear pain, hearing loss, rhinorrhea and sore throat.    Eyes: Negative for pain, redness and visual disturbance.   Respiratory: Positive for cough. Negative for wheezing.         Breathing is much better    Cardiovascular: Negative for chest pain, palpitations and leg swelling.   Gastrointestinal: Negative for abdominal pain, constipation, diarrhea, nausea and vomiting.   Genitourinary: Negative for dysuria, frequency and urgency.   Musculoskeletal: Negative for back pain, joint swelling and neck pain.   Skin: Negative for color change, rash and  wound.   Neurological: Negative for dizziness, tremors, weakness, light-headedness and headaches.     Objective:     Vital Signs (Most Recent):  Temp: 96.4 °F (35.8 °C) (04/09/19 0441)  Pulse: (!) 57 (04/09/19 0554)  Resp: 18 (04/09/19 0441)  BP: 124/67 (04/09/19 0441)  SpO2: 96 % (04/09/19 0441) Vital Signs (24h Range):  Temp:  [96.4 °F (35.8 °C)-101.4 °F (38.6 °C)] 96.4 °F (35.8 °C)  Pulse:  [53-77] 57  Resp:  [16-27] 18  SpO2:  [89 %-99 %] 96 %  BP: (114-187)/(56-77) 124/67     Weight: 91 kg (200 lb 9.9 oz)  Body mass index is 39.18 kg/m².    Physical Exam   Constitutional: She is oriented to person, place, and time. She appears well-developed and well-nourished. No distress.   HENT:   Head: Normocephalic and atraumatic.   Right Ear: External ear normal.   Left Ear: External ear normal.   Eyes: Pupils are equal, round, and reactive to light. Conjunctivae and EOM are normal. Right eye exhibits no discharge. Left eye exhibits no discharge.   Neck: Neck supple. No tracheal deviation present.   Cardiovascular: Normal rate and regular rhythm.   No murmur heard.  Pulmonary/Chest: Effort normal and breath sounds normal. No stridor. No respiratory distress. She has no wheezes. She has no rales. She exhibits no tenderness.   Abdominal: Soft. Bowel sounds are normal. She exhibits no distension. There is no tenderness.   Neurological: She is alert and oriented to person, place, and time. No cranial nerve deficit.   Skin: Skin is warm and dry.   Psychiatric: She has a normal mood and affect. Her behavior is normal.   Nursing note and vitals reviewed.        CRANIAL NERVES     CN III, IV, VI   Pupils are equal, round, and reactive to light.  Extraocular motions are normal.        Significant Labs:   CBC:   Recent Labs   Lab 04/08/19  1333 04/09/19  0557   WBC 4.10 4.49   HGB 12.3 12.8   HCT 37.4 37.6   * 118*     CMP:   Recent Labs   Lab 04/08/19  1333   *   K 4.0      CO2 17*   *   BUN 19    CREATININE 1.3   CALCIUM 8.6*   PROT 6.7   ALBUMIN 3.7   BILITOT 0.7   ALKPHOS 36*   AST 17   ALT 10   ANIONGAP 13   EGFRNONAA 46*     Troponin:   Recent Labs   Lab 04/08/19  1333 04/08/19  1929 04/09/19  0013   TROPONINI 0.014 0.019 <0.006     Urine Studies: No results for input(s): COLORU, APPEARANCEUA, PHUR, SPECGRAV, PROTEINUA, GLUCUA, KETONESU, BILIRUBINUA, OCCULTUA, NITRITE, UROBILINOGEN, LEUKOCYTESUR, RBCUA, WBCUA, BACTERIA, SQUAMEPITHEL, HYALINECASTS in the last 48 hours.    Invalid input(s): WRIGHTSUR  All pertinent labs within the past 24 hours have been reviewed.    Significant Imaging: I have reviewed all pertinent imaging results/findings within the past 24 hours.    Assessment/Plan:      * Influenza A  tamiflu started  No signs of acute pneumonia  duoenbs for hypoxia.      CXR:  Cardiomediastinal silhouette remains within normal limits.  There is atherosclerosis of the aortic arch.  Lungs are well inflated.  There is no lobar consolidations or pneumothorax or pulmonary vascular congestion or pleural effusions.  Surgical clips in the right upper quadrant from previous cholecystectomy.  No significant bony thorax abnormality      Hypothyroid  Off her synthroid for 1 month  Hold home med and check TSH in AM  4/9 resume synthroid at 50m mcg- re- check in 2 months as OP .      Acute hypoxemic respiratory failure  Due to influenza  Unable to get sats > 89% on RA  2L NC, wean as tolerated  duonebs Q4H  Solumedrol 80mg Qday, wean. Unclear if truly has COPD but may help wheezing/air movement  Much better today; no wheezing; wean o2; to RA; if tolerates D/C home with medrol dose pack .      Hyperlipidemia associated with type 2 diabetes mellitus  Cont home pravastatin.      Diabetes mellitus  basaglar 75 units QHS at home: start 60 units here  SSI  Hold home PO meds.  accuchecks  Diabetic diet  3/9 , 187, 309 ; detemir 60U Q HS; getting solumedrol 80mg  IV daily       Hypertension  Cont home meds as  reported  BP stable.      VTE Risk Mitigation (From admission, onward)        Ordered     IP VTE HIGH RISK PATIENT  Once      04/08/19 1654     Place sequential compression device  Until discontinued      04/08/19 1654              La Nena Vasquez MD  Department of Hospital Medicine   [facility]

## 2019-04-09 NOTE — DISCHARGE SUMMARY
[facility] Hospital Medicine  Discharge Summary      Patient Name: Merna Regalado  MRN: [id number]  Admission Date: 4/8/2019  Hospital Length of Stay: 0 days  Discharge Date and Time:  04/09/2019 10:40 AM  Attending Physician: Jalyn Garcia MD   Discharging Provider: Dana Jaime NP  Primary Care Provider: Billy De La Paz MD      HPI:   Patient presented to ER with cough and SOB. Sx started Friday evening but worsening yesterday afternoon. Cough is productive. + SOB, worse with exertion. H/o smoking but quit 12 years ago. No known diagnosis of COPD. She has subjective fevers, never checked her temperature. 101 F in ER. sats 89% on RA. Influenza positive. CXR clear. sats did not improved s/p breathing treatments. 92-94% on 2L on arrival to the floor though she reprots feeling much better. She has no other acute sx.     Of note, Reports off her synthroid for last 1 month; TSH was not drawn.     * No surgery found *      Hospital Course:   4/9/19 Pt feeling better, notes breathing and wheezing better. Only c/o still having cough  O2 sat 96% on 2LNC , afebrile, WBC 4.49   BS ^ 301; getting Solumedrol 80mg IV daily; no wheezing this am; wean to RA if tolerates will stop IV solumedrol and D/C home with medrol dose pack; complete Tamiflu as OP         Consults:         Final Active Diagnoses:    Diagnosis Date Noted POA    PRINCIPAL PROBLEM:  Influenza A [J10.1] 04/08/2019 Yes    Acute hypoxemic respiratory failure [J96.01] 04/08/2019 Yes    Hypothyroid [E03.9] 04/08/2019 Yes    Hypertension [I10] 09/05/2017 Yes     Chronic    Hyperlipidemia associated with type 2 diabetes mellitus [E11.69, E78.5] 09/05/2017 Yes     Chronic    Diabetes mellitus [E11.9] 09/05/2017 Yes      Problems Resolved During this Admission:       Discharged Condition: stable    Disposition: Home or Self Care    Follow Up:  Follow-up Information     Billy De La Paz MD. Go on 4/15/2019.    Specialty:  Family  Medicine  Why:  at 10am for hospital discharge follow up  Contact information:  [address] [facility]  [place] [address]  [phone]                 Patient Instructions:   No discharge procedures on file.    Significant Diagnostic Studies:   Significant Labs:   CBC:        Recent Labs   Lab 04/08/19  1333 04/09/19  0557   WBC 4.10 4.49   HGB 12.3 12.8   HCT 37.4 37.6   * 118*      CMP:       Recent Labs   Lab 04/08/19  1333   *   K 4.0      CO2 17*   *   BUN 19   CREATININE 1.3   CALCIUM 8.6*   PROT 6.7   ALBUMIN 3.7   BILITOT 0.7   ALKPHOS 36*   AST 17   ALT 10   ANIONGAP 13   EGFRNONAA 46*      Troponin:         Recent Labs   Lab 04/08/19  1333 04/08/19  1929 04/09/19  0013   TROPONINI 0.014 0.019 <0.006        4/8/19 CXR   Cardiomediastinal silhouette remains within normal limits.  There is atherosclerosis of the aortic arch.  Lungs are well inflated.  There is no lobar consolidations or pneumothorax or pulmonary vascular congestion or pleural effusions.  Surgical clips in the right upper quadrant from previous cholecystectomy.  No significant bony thorax abnormality.      Pending Diagnostic Studies:     None         Medications:  Reconciled Home Medications:      Medication List      START taking these medications    albuterol 2.5 mg /3 mL (0.083 %) nebulizer solution  Commonly known as:  PROVENTIL  Take 3 mLs (2.5 mg total) by nebulization every 6 (six) hours as needed for Wheezing. Rescue     methylPREDNISolone 4 mg tablet  Commonly known as:  MEDROL DOSEPACK  use as directed     oseltamivir 30 MG capsule  Commonly known as:  TAMIFLU  Take 1 capsule (30 mg total) by mouth 2 (two) times daily for 4 days        CONTINUE taking these medications    alendronate 70 MG tablet  Commonly known as:  FOSAMAX  Take 1 tablet (70 mg total) by mouth every 7 days.     aspirin 81 MG Chew  Take 81 mg by mouth once daily.     BASAGLAR KWIKPEN U-100 INSULIN glargine 100 units/mL  (3mL) SubQ pen  Generic drug:  insulin  75 Units every evening.     carvedilol 25 MG tablet  Commonly known as:  COREG  Take 25 mg by mouth 2 (two) times daily with meals.     clobetasol 0.05 % cream  Commonly known as:  TEMOVATE  Apply thin layer to affected area (vulva) nightly for 2 months, then twice weekly at bedtime.     clopidogrel 75 mg tablet  Commonly known as:  PLAVIX  Take 75 mg by mouth once daily.     doxazosin 4 MG tablet  Commonly known as:  CARDURA  TAKE 1 TABLET ORALLY ONCE A DAY.     fenofibrate 160 MG Tab  Take 160 mg by mouth once daily.     FLUoxetine 20 MG capsule  TAKE 1 CAPSULE(S) EVERY DAY BY ORAL ROUTE FOR 30 DAYS.     gabapentin 300 MG capsule  Commonly known as:  NEURONTIN  Take 300 mg by mouth once daily.     levothyroxine 50 MCG tablet  Commonly known as:  SYNTHROID  Take 1 tablet (50 mcg total) by mouth before breakfast. Wait 30 minutes before eating or drinking.     lisinopril 40 MG tablet  Commonly known as:  PRINIVIL,ZESTRIL  Take 40 mg by mouth once daily.     NIFEdipine 90 MG (OSM) 24 hr tablet  Commonly known as:  PROCARDIA-XL  TAKE 1 TABLET ORALLY ONCE A DAY.     nitroGLYCERIN 0.4 MG SL tablet  Commonly known as:  NITROSTAT  Place 1 tablet (0.4 mg total) under the tongue every 5 (five) minutes as needed for Chest pain.     nystatin powder  Commonly known as:  MYCOSTATIN  Apply topically 2 (two) times daily.     ondansetron 4 MG Tbdl  Commonly known as:  ZOFRAN-ODT  Take 4 mg by mouth every 6 (six) hours as needed.     pantoprazole 40 MG tablet  Commonly known as:  PROTONIX     pravastatin 40 MG tablet  Commonly known as:  PRAVACHOL  Take 40 mg by mouth once daily.     traZODone 100 MG tablet  Commonly known as:  DESYREL     TRUE METRIX GLUCOSE TEST STRIP Strp  Generic drug:  blood sugar diagnostic        STOP taking these medications    STEGLATRO 15 mg Tab  Generic drug:  ertugliflozin            Indwelling Lines/Drains at time of discharge:   Lines/Drains/Airways          None           Time spent on the discharge of patient: 20 minutes  Patient was seen and examined on the date of discharge and determined to be suitable for discharge.         Dana Jaime NP  Department of Hospital Medicine  [facility]

## 2019-04-09 NOTE — ASSESSMENT & PLAN NOTE
Due to influenza  Unable to get sats > 89% on RA  2L NC, wean as tolerated  duonebs Q4H  Solumedrol 80mg Qday, wean. Unclear if truly has COPD but may help wheezing/air movement  Much better today; no wheezing; wean o2; to RA; if tolerates D/C home with medrol dose pack .

## 2019-04-11 LAB — BACTERIA THROAT CULT: NORMAL

## 2019-04-30 ENCOUNTER — LAB VISIT (OUTPATIENT)
Dept: LAB | Facility: HOSPITAL | Age: 59
End: 2019-04-30
Attending: INTERNAL MEDICINE
Payer: MEDICAID

## 2019-04-30 DIAGNOSIS — E55.9 VITAMIN D DEFICIENCY DISEASE: Primary | ICD-10-CM

## 2019-04-30 DIAGNOSIS — I10 HYPERTENSION, ESSENTIAL: ICD-10-CM

## 2019-04-30 DIAGNOSIS — E11.21 TYPE II DIABETES MELLITUS WITH NEPHROPATHY: ICD-10-CM

## 2019-04-30 LAB
ALBUMIN SERPL BCP-MCNC: 3.4 G/DL (ref 3.5–5.2)
ALP SERPL-CCNC: 44 U/L (ref 55–135)
ALT SERPL W/O P-5'-P-CCNC: 6 U/L (ref 10–44)
ANION GAP SERPL CALC-SCNC: 5 MMOL/L (ref 8–16)
AST SERPL-CCNC: 13 U/L (ref 10–40)
BASOPHILS # BLD AUTO: 0.03 K/UL (ref 0–0.2)
BASOPHILS NFR BLD: 0.7 % (ref 0–1.9)
BILIRUB SERPL-MCNC: 0.3 MG/DL (ref 0.1–1)
BUN SERPL-MCNC: 25 MG/DL (ref 6–20)
CALCIUM SERPL-MCNC: 8.8 MG/DL (ref 8.7–10.5)
CHLORIDE SERPL-SCNC: 109 MMOL/L (ref 95–110)
CO2 SERPL-SCNC: 24 MMOL/L (ref 23–29)
CREAT SERPL-MCNC: 1.2 MG/DL (ref 0.5–1.4)
DIFFERENTIAL METHOD: NORMAL
EOSINOPHIL # BLD AUTO: 0.1 K/UL (ref 0–0.5)
EOSINOPHIL NFR BLD: 1.7 % (ref 0–8)
ERYTHROCYTE [DISTWIDTH] IN BLOOD BY AUTOMATED COUNT: 14.1 % (ref 11.5–14.5)
EST. GFR  (AFRICAN AMERICAN): 58 ML/MIN/1.73 M^2
EST. GFR  (NON AFRICAN AMERICAN): 51 ML/MIN/1.73 M^2
GLUCOSE SERPL-MCNC: 142 MG/DL (ref 70–110)
HCT VFR BLD AUTO: 37 % (ref 37–48.5)
HGB BLD-MCNC: 12.2 G/DL (ref 12–16)
LYMPHOCYTES # BLD AUTO: 1.5 K/UL (ref 1–4.8)
LYMPHOCYTES NFR BLD: 36.9 % (ref 18–48)
MCH RBC QN AUTO: 28.5 PG (ref 27–31)
MCHC RBC AUTO-ENTMCNC: 33 G/DL (ref 32–36)
MCV RBC AUTO: 86 FL (ref 82–98)
MONOCYTES # BLD AUTO: 0.3 K/UL (ref 0.3–1)
MONOCYTES NFR BLD: 7.6 % (ref 4–15)
NEUTROPHILS # BLD AUTO: 2.2 K/UL (ref 1.8–7.7)
NEUTROPHILS NFR BLD: 53.1 % (ref 38–73)
PHOSPHATE SERPL-MCNC: 3.8 MG/DL (ref 2.7–4.5)
PLATELET # BLD AUTO: 164 K/UL (ref 150–350)
PMV BLD AUTO: 10.2 FL (ref 9.2–12.9)
POTASSIUM SERPL-SCNC: 4.8 MMOL/L (ref 3.5–5.1)
PROT SERPL-MCNC: 6.5 G/DL (ref 6–8.4)
PTH-INTACT SERPL-MCNC: 54 PG/ML (ref 9–77)
RBC # BLD AUTO: 4.28 M/UL (ref 4–5.4)
SODIUM SERPL-SCNC: 138 MMOL/L (ref 136–145)
WBC # BLD AUTO: 4.09 K/UL (ref 3.9–12.7)

## 2019-04-30 PROCEDURE — 36415 COLL VENOUS BLD VENIPUNCTURE: CPT

## 2019-04-30 PROCEDURE — 83970 ASSAY OF PARATHORMONE: CPT

## 2019-04-30 PROCEDURE — 80053 COMPREHEN METABOLIC PANEL: CPT

## 2019-04-30 PROCEDURE — 82306 VITAMIN D 25 HYDROXY: CPT

## 2019-04-30 PROCEDURE — 85025 COMPLETE CBC W/AUTO DIFF WBC: CPT

## 2019-04-30 PROCEDURE — 84100 ASSAY OF PHOSPHORUS: CPT

## 2019-05-01 LAB — 25(OH)D3+25(OH)D2 SERPL-MCNC: 14 NG/ML (ref 30–96)

## 2021-03-16 ENCOUNTER — HOSPITAL ENCOUNTER (EMERGENCY)
Facility: HOSPITAL | Age: 61
Discharge: HOME | End: 2021-03-16
Attending: SURGERY
Payer: MEDICAID

## 2021-03-16 VITALS
WEIGHT: 186.5 LBS | HEIGHT: 62 IN | OXYGEN SATURATION: 99 % | BODY MASS INDEX: 34.32 KG/M2 | RESPIRATION RATE: 16 BRPM | HEART RATE: 77 BPM | DIASTOLIC BLOOD PRESSURE: 89 MMHG | SYSTOLIC BLOOD PRESSURE: 178 MMHG | TEMPERATURE: 98 F

## 2021-03-16 DIAGNOSIS — F45.42 PAIN DISORDER ASSOCIATED WITH PSYCHOLOGICAL AND PHYSICAL FACTORS: Primary | ICD-10-CM

## 2021-03-16 DIAGNOSIS — S32.020A COMPRESSION FRACTURE OF L2 VERTEBRA, INITIAL ENCOUNTER: ICD-10-CM

## 2021-03-16 DIAGNOSIS — M54.9 BACK PAIN, UNSPECIFIED BACK LOCATION, UNSPECIFIED BACK PAIN LATERALITY, UNSPECIFIED CHRONICITY: ICD-10-CM

## 2021-03-16 PROCEDURE — 96374 THER/PROPH/DIAG INJ IV PUSH: CPT

## 2021-03-16 PROCEDURE — 96375 TX/PRO/DX INJ NEW DRUG ADDON: CPT

## 2021-03-16 PROCEDURE — 99285 EMERGENCY DEPT VISIT HI MDM: CPT | Mod: 25

## 2021-03-16 PROCEDURE — 63600175 PHARM REV CODE 636 W HCPCS: Performed by: SURGERY

## 2021-03-16 RX ORDER — CYCLOBENZAPRINE HCL 10 MG
10 TABLET ORAL 3 TIMES DAILY PRN
Qty: 10 TABLET | Refills: 0 | Status: SHIPPED | OUTPATIENT
Start: 2021-03-16 | End: 2021-03-21

## 2021-03-16 RX ORDER — ONDANSETRON 2 MG/ML
4 INJECTION INTRAMUSCULAR; INTRAVENOUS
Status: DISCONTINUED | OUTPATIENT
Start: 2021-03-16 | End: 2021-03-16

## 2021-03-16 RX ORDER — MEPERIDINE HYDROCHLORIDE 25 MG/ML
25 INJECTION INTRAMUSCULAR; INTRAVENOUS; SUBCUTANEOUS
Status: DISCONTINUED | OUTPATIENT
Start: 2021-03-16 | End: 2021-03-16

## 2021-03-16 RX ORDER — MEPERIDINE HYDROCHLORIDE 25 MG/ML
25 INJECTION INTRAMUSCULAR; INTRAVENOUS; SUBCUTANEOUS
Status: COMPLETED | OUTPATIENT
Start: 2021-03-16 | End: 2021-03-16

## 2021-03-16 RX ORDER — ONDANSETRON 2 MG/ML
4 INJECTION INTRAMUSCULAR; INTRAVENOUS
Status: COMPLETED | OUTPATIENT
Start: 2021-03-16 | End: 2021-03-16

## 2021-03-16 RX ORDER — TRAMADOL HYDROCHLORIDE 50 MG/1
50 TABLET ORAL EVERY 6 HOURS PRN
Qty: 20 TABLET | Refills: 0 | Status: SHIPPED | OUTPATIENT
Start: 2021-03-16 | End: 2021-03-18

## 2021-03-16 RX ADMIN — MEPERIDINE HYDROCHLORIDE 25 MG: 25 INJECTION INTRAMUSCULAR; INTRAVENOUS; SUBCUTANEOUS at 03:03

## 2021-03-16 RX ADMIN — ONDANSETRON 4 MG: 2 INJECTION INTRAMUSCULAR; INTRAVENOUS at 03:03

## 2021-03-17 ENCOUNTER — NURSE TRIAGE (OUTPATIENT)
Dept: ADMINISTRATIVE | Facility: CLINIC | Age: 61
End: 2021-03-17

## 2021-04-26 ENCOUNTER — HOSPITAL ENCOUNTER (EMERGENCY)
Facility: HOSPITAL | Age: 61
Discharge: HOME | End: 2021-04-26
Attending: FAMILY MEDICINE
Payer: MEDICAID

## 2021-04-26 VITALS
HEART RATE: 74 BPM | BODY MASS INDEX: 35.67 KG/M2 | OXYGEN SATURATION: 98 % | RESPIRATION RATE: 16 BRPM | SYSTOLIC BLOOD PRESSURE: 110 MMHG | WEIGHT: 195 LBS | TEMPERATURE: 97 F | DIASTOLIC BLOOD PRESSURE: 60 MMHG

## 2021-04-26 DIAGNOSIS — L98.491 ULCER OF ABDOMEN WALL, LIMITED TO BREAKDOWN OF SKIN: Primary | ICD-10-CM

## 2021-04-26 PROCEDURE — 99282 EMERGENCY DEPT VISIT SF MDM: CPT

## 2021-10-31 ENCOUNTER — HOSPITAL ENCOUNTER (OUTPATIENT)
Facility: HOSPITAL | Age: 61
Discharge: HOME | End: 2021-11-02
Attending: SURGERY | Admitting: INTERNAL MEDICINE
Payer: MEDICAID

## 2021-10-31 DIAGNOSIS — R41.82 ALTERED MENTAL STATUS, UNSPECIFIED ALTERED MENTAL STATUS TYPE: ICD-10-CM

## 2021-10-31 DIAGNOSIS — I25.10 CARDIOVASCULAR DISEASE: ICD-10-CM

## 2021-10-31 DIAGNOSIS — S82.892A CLOSED FRACTURE OF LEFT ANKLE, INITIAL ENCOUNTER: ICD-10-CM

## 2021-10-31 DIAGNOSIS — N30.00 ACUTE CYSTITIS WITHOUT HEMATURIA: Primary | ICD-10-CM

## 2021-10-31 DIAGNOSIS — N30.01 ACUTE CYSTITIS WITH HEMATURIA: ICD-10-CM

## 2021-10-31 DIAGNOSIS — M25.579 ANKLE PAIN: ICD-10-CM

## 2021-10-31 DIAGNOSIS — R55 SYNCOPE: ICD-10-CM

## 2021-10-31 LAB
ALBUMIN SERPL BCP-MCNC: 3.7 G/DL (ref 3.5–5.2)
ALP SERPL-CCNC: 82 U/L (ref 55–135)
ALT SERPL W/O P-5'-P-CCNC: 9 U/L (ref 10–44)
AMPHET+METHAMPHET UR QL: NEGATIVE
ANION GAP SERPL CALC-SCNC: 13 MMOL/L (ref 8–16)
APAP SERPL-MCNC: <3 UG/ML (ref 10–20)
AST SERPL-CCNC: 18 U/L (ref 10–40)
BACTERIA #/AREA URNS HPF: ABNORMAL /HPF
BARBITURATES UR QL SCN>200 NG/ML: NEGATIVE
BASOPHILS # BLD AUTO: 0.05 K/UL (ref 0–0.2)
BASOPHILS NFR BLD: 0.5 % (ref 0–1.9)
BENZODIAZ UR QL SCN>200 NG/ML: NEGATIVE
BILIRUB SERPL-MCNC: 0.6 MG/DL (ref 0.1–1)
BILIRUB UR QL STRIP: NEGATIVE
BNP SERPL-MCNC: 49 PG/ML (ref 0–99)
BUN SERPL-MCNC: 18 MG/DL (ref 6–20)
BZE UR QL SCN: NEGATIVE
CALCIUM SERPL-MCNC: 9.3 MG/DL (ref 8.7–10.5)
CANNABINOIDS UR QL SCN: NEGATIVE
CHLORIDE SERPL-SCNC: 101 MMOL/L (ref 95–110)
CK MB SERPL-MCNC: 4.2 NG/ML (ref 0.1–6.5)
CK MB SERPL-RTO: 3.1 % (ref 0–5)
CK SERPL-CCNC: 137 U/L (ref 20–180)
CK SERPL-CCNC: 137 U/L (ref 20–180)
CLARITY UR: CLEAR
CO2 SERPL-SCNC: 17 MMOL/L (ref 23–29)
COLOR UR: YELLOW
CREAT SERPL-MCNC: 1.4 MG/DL (ref 0.5–1.4)
CREAT UR-MCNC: 63.1 MG/DL (ref 15–325)
DIFFERENTIAL METHOD: ABNORMAL
EOSINOPHIL # BLD AUTO: 0.1 K/UL (ref 0–0.5)
EOSINOPHIL NFR BLD: 0.5 % (ref 0–8)
ERYTHROCYTE [DISTWIDTH] IN BLOOD BY AUTOMATED COUNT: 13.2 % (ref 11.5–14.5)
EST. GFR  (AFRICAN AMERICAN): 47 ML/MIN/1.73 M^2
EST. GFR  (NON AFRICAN AMERICAN): 41 ML/MIN/1.73 M^2
ETHANOL SERPL-MCNC: <10 MG/DL
GLUCOSE SERPL-MCNC: 312 MG/DL (ref 70–110)
GLUCOSE UR QL STRIP: ABNORMAL
HCT VFR BLD AUTO: 37.5 % (ref 37–48.5)
HGB BLD-MCNC: 13 G/DL (ref 12–16)
HGB UR QL STRIP: NEGATIVE
IMM GRANULOCYTES # BLD AUTO: 0.03 K/UL (ref 0–0.04)
IMM GRANULOCYTES NFR BLD AUTO: 0.3 % (ref 0–0.5)
KETONES UR QL STRIP: ABNORMAL
LEUKOCYTE ESTERASE UR QL STRIP: NEGATIVE
LYMPHOCYTES # BLD AUTO: 1.8 K/UL (ref 1–4.8)
LYMPHOCYTES NFR BLD: 16.5 % (ref 18–48)
MCH RBC QN AUTO: 29.3 PG (ref 27–31)
MCHC RBC AUTO-ENTMCNC: 34.7 G/DL (ref 32–36)
MCV RBC AUTO: 85 FL (ref 82–98)
METHADONE UR QL SCN>300 NG/ML: NEGATIVE
MICROSCOPIC COMMENT: ABNORMAL
MONOCYTES # BLD AUTO: 0.7 K/UL (ref 0.3–1)
MONOCYTES NFR BLD: 5.9 % (ref 4–15)
NEUTROPHILS # BLD AUTO: 8.4 K/UL (ref 1.8–7.7)
NEUTROPHILS NFR BLD: 76.3 % (ref 38–73)
NITRITE UR QL STRIP: POSITIVE
NRBC BLD-RTO: 0 /100 WBC
OPIATES UR QL SCN: NEGATIVE
PCP UR QL SCN>25 NG/ML: NEGATIVE
PH UR STRIP: 5 [PH] (ref 5–8)
PLATELET # BLD AUTO: 171 K/UL (ref 150–450)
PMV BLD AUTO: 10.5 FL (ref 9.2–12.9)
POTASSIUM SERPL-SCNC: 4.5 MMOL/L (ref 3.5–5.1)
PROT SERPL-MCNC: 7 G/DL (ref 6–8.4)
PROT UR QL STRIP: NEGATIVE
RBC # BLD AUTO: 4.44 M/UL (ref 4–5.4)
RBC #/AREA URNS HPF: 1 /HPF (ref 0–4)
SALICYLATES SERPL-MCNC: <5 MG/DL (ref 15–30)
SODIUM SERPL-SCNC: 131 MMOL/L (ref 136–145)
SP GR UR STRIP: 1.01 (ref 1–1.03)
SQUAMOUS #/AREA URNS HPF: 1 /HPF
TOXICOLOGY INFORMATION: NORMAL
TROPONIN I SERPL DL<=0.01 NG/ML-MCNC: 0.01 NG/ML (ref 0–0.03)
URN SPEC COLLECT METH UR: ABNORMAL
UROBILINOGEN UR STRIP-ACNC: NEGATIVE EU/DL
WBC # BLD AUTO: 11.01 K/UL (ref 3.9–12.7)
WBC #/AREA URNS HPF: >100 /HPF (ref 0–5)
WBC CLUMPS URNS QL MICRO: ABNORMAL

## 2021-10-31 PROCEDURE — 80143 DRUG ASSAY ACETAMINOPHEN: CPT | Performed by: SURGERY

## 2021-10-31 PROCEDURE — 87077 CULTURE AEROBIC IDENTIFY: CPT | Performed by: SURGERY

## 2021-10-31 PROCEDURE — 87088 URINE BACTERIA CULTURE: CPT | Performed by: SURGERY

## 2021-10-31 PROCEDURE — G0378 HOSPITAL OBSERVATION PER HR: HCPCS

## 2021-10-31 PROCEDURE — 80307 DRUG TEST PRSMV CHEM ANLYZR: CPT | Performed by: SURGERY

## 2021-10-31 PROCEDURE — 96365 THER/PROPH/DIAG IV INF INIT: CPT

## 2021-10-31 PROCEDURE — 93005 ELECTROCARDIOGRAM TRACING: CPT

## 2021-10-31 PROCEDURE — 93010 EKG 12-LEAD: ICD-10-PCS | Mod: ,,, | Performed by: INTERNAL MEDICINE

## 2021-10-31 PROCEDURE — 80179 DRUG ASSAY SALICYLATE: CPT | Performed by: SURGERY

## 2021-10-31 PROCEDURE — 80053 COMPREHEN METABOLIC PANEL: CPT | Performed by: SURGERY

## 2021-10-31 PROCEDURE — 82077 ASSAY SPEC XCP UR&BREATH IA: CPT | Performed by: SURGERY

## 2021-10-31 PROCEDURE — 87086 URINE CULTURE/COLONY COUNT: CPT | Performed by: SURGERY

## 2021-10-31 PROCEDURE — 96375 TX/PRO/DX INJ NEW DRUG ADDON: CPT | Mod: 59

## 2021-10-31 PROCEDURE — 81000 URINALYSIS NONAUTO W/SCOPE: CPT | Mod: 59 | Performed by: SURGERY

## 2021-10-31 PROCEDURE — 87186 SC STD MICRODIL/AGAR DIL: CPT | Performed by: SURGERY

## 2021-10-31 PROCEDURE — 85025 COMPLETE CBC W/AUTO DIFF WBC: CPT | Performed by: SURGERY

## 2021-10-31 PROCEDURE — 63600175 PHARM REV CODE 636 W HCPCS: Performed by: SURGERY

## 2021-10-31 PROCEDURE — 83880 ASSAY OF NATRIURETIC PEPTIDE: CPT | Performed by: SURGERY

## 2021-10-31 PROCEDURE — 99291 CRITICAL CARE FIRST HOUR: CPT | Mod: 25

## 2021-10-31 PROCEDURE — 96374 THER/PROPH/DIAG INJ IV PUSH: CPT

## 2021-10-31 PROCEDURE — 36415 COLL VENOUS BLD VENIPUNCTURE: CPT | Performed by: SURGERY

## 2021-10-31 PROCEDURE — 93010 ELECTROCARDIOGRAM REPORT: CPT | Mod: ,,, | Performed by: INTERNAL MEDICINE

## 2021-10-31 PROCEDURE — 83036 HEMOGLOBIN GLYCOSYLATED A1C: CPT | Performed by: SURGERY

## 2021-10-31 PROCEDURE — 84484 ASSAY OF TROPONIN QUANT: CPT | Performed by: SURGERY

## 2021-10-31 PROCEDURE — 82553 CREATINE MB FRACTION: CPT | Performed by: SURGERY

## 2021-10-31 RX ORDER — LEVOFLOXACIN 5 MG/ML
500 INJECTION, SOLUTION INTRAVENOUS
Status: DISCONTINUED | OUTPATIENT
Start: 2021-11-01 | End: 2021-10-31

## 2021-10-31 RX ORDER — LEVOFLOXACIN 5 MG/ML
750 INJECTION, SOLUTION INTRAVENOUS
Status: DISCONTINUED | OUTPATIENT
Start: 2021-11-01 | End: 2021-11-01

## 2021-10-31 RX ORDER — HYDRALAZINE HYDROCHLORIDE 20 MG/ML
10 INJECTION INTRAMUSCULAR; INTRAVENOUS
Status: COMPLETED | OUTPATIENT
Start: 2021-10-31 | End: 2021-10-31

## 2021-10-31 RX ADMIN — HYDRALAZINE HYDROCHLORIDE 10 MG: 20 INJECTION INTRAMUSCULAR; INTRAVENOUS at 11:10

## 2021-11-01 PROBLEM — R41.82 AMS (ALTERED MENTAL STATUS): Status: ACTIVE | Noted: 2021-11-01

## 2021-11-01 PROBLEM — R79.89 ELEVATED TROPONIN: Status: ACTIVE | Noted: 2021-11-01

## 2021-11-01 PROBLEM — I25.10 CAD (CORONARY ARTERY DISEASE): Status: ACTIVE | Noted: 2021-11-01

## 2021-11-01 PROBLEM — S82.202A FRACTURE OF LEFT TIBIA: Status: ACTIVE | Noted: 2021-11-01

## 2021-11-01 PROBLEM — G62.9 NEUROPATHY: Status: ACTIVE | Noted: 2021-11-01

## 2021-11-01 PROBLEM — N30.01 ACUTE CYSTITIS WITH HEMATURIA: Status: ACTIVE | Noted: 2021-11-01

## 2021-11-01 LAB
ALBUMIN SERPL BCP-MCNC: 3.6 G/DL (ref 3.5–5.2)
ALP SERPL-CCNC: 67 U/L (ref 55–135)
ALT SERPL W/O P-5'-P-CCNC: 11 U/L (ref 10–44)
ANION GAP SERPL CALC-SCNC: 17 MMOL/L (ref 8–16)
AST SERPL-CCNC: 16 U/L (ref 10–40)
BASOPHILS # BLD AUTO: 0.02 K/UL (ref 0–0.2)
BASOPHILS NFR BLD: 0.2 % (ref 0–1.9)
BILIRUB SERPL-MCNC: 0.7 MG/DL (ref 0.1–1)
BUN SERPL-MCNC: 17 MG/DL (ref 6–20)
CALCIUM SERPL-MCNC: 9.4 MG/DL (ref 8.7–10.5)
CHLORIDE SERPL-SCNC: 99 MMOL/L (ref 95–110)
CK SERPL-CCNC: 310 U/L (ref 20–180)
CO2 SERPL-SCNC: 16 MMOL/L (ref 23–29)
CREAT SERPL-MCNC: 1.2 MG/DL (ref 0.5–1.4)
D DIMER PPP IA.FEU-MCNC: 3.24 MG/L FEU
DIFFERENTIAL METHOD: ABNORMAL
EOSINOPHIL # BLD AUTO: 0 K/UL (ref 0–0.5)
EOSINOPHIL NFR BLD: 0.1 % (ref 0–8)
ERYTHROCYTE [DISTWIDTH] IN BLOOD BY AUTOMATED COUNT: 13.2 % (ref 11.5–14.5)
EST. GFR  (AFRICAN AMERICAN): 57 ML/MIN/1.73 M^2
EST. GFR  (NON AFRICAN AMERICAN): 50 ML/MIN/1.73 M^2
ESTIMATED AVG GLUCOSE: 280 MG/DL (ref 68–131)
GLUCOSE SERPL-MCNC: 401 MG/DL (ref 70–110)
HBA1C MFR BLD: 11.4 % (ref 4–5.6)
HCT VFR BLD AUTO: 36.8 % (ref 37–48.5)
HGB BLD-MCNC: 12.5 G/DL (ref 12–16)
IMM GRANULOCYTES # BLD AUTO: 0.03 K/UL (ref 0–0.04)
IMM GRANULOCYTES NFR BLD AUTO: 0.3 % (ref 0–0.5)
LYMPHOCYTES # BLD AUTO: 1.3 K/UL (ref 1–4.8)
LYMPHOCYTES NFR BLD: 13.2 % (ref 18–48)
MCH RBC QN AUTO: 28.9 PG (ref 27–31)
MCHC RBC AUTO-ENTMCNC: 34 G/DL (ref 32–36)
MCV RBC AUTO: 85 FL (ref 82–98)
MONOCYTES # BLD AUTO: 0.5 K/UL (ref 0.3–1)
MONOCYTES NFR BLD: 4.6 % (ref 4–15)
NEUTROPHILS # BLD AUTO: 8.3 K/UL (ref 1.8–7.7)
NEUTROPHILS NFR BLD: 81.6 % (ref 38–73)
NRBC BLD-RTO: 0 /100 WBC
PLATELET # BLD AUTO: 163 K/UL (ref 150–450)
PMV BLD AUTO: 10.7 FL (ref 9.2–12.9)
POCT GLUCOSE: 244 MG/DL (ref 70–110)
POCT GLUCOSE: 306 MG/DL (ref 70–110)
POCT GLUCOSE: 306 MG/DL (ref 70–110)
POCT GLUCOSE: 427 MG/DL (ref 70–110)
POCT GLUCOSE: 430 MG/DL (ref 70–110)
POTASSIUM SERPL-SCNC: 4.8 MMOL/L (ref 3.5–5.1)
PROT SERPL-MCNC: 6.8 G/DL (ref 6–8.4)
RBC # BLD AUTO: 4.32 M/UL (ref 4–5.4)
SODIUM SERPL-SCNC: 132 MMOL/L (ref 136–145)
TROPONIN I SERPL DL<=0.01 NG/ML-MCNC: 0.01 NG/ML (ref 0–0.03)
TROPONIN I SERPL DL<=0.01 NG/ML-MCNC: 0.19 NG/ML (ref 0–0.03)
TROPONIN I SERPL DL<=0.01 NG/ML-MCNC: 0.47 NG/ML (ref 0–0.03)
TROPONIN I SERPL DL<=0.01 NG/ML-MCNC: 0.54 NG/ML (ref 0–0.03)
TSH SERPL DL<=0.005 MIU/L-ACNC: 2.5 UIU/ML (ref 0.4–4)
WBC # BLD AUTO: 10.17 K/UL (ref 3.9–12.7)

## 2021-11-01 PROCEDURE — 96365 THER/PROPH/DIAG IV INF INIT: CPT | Mod: 59

## 2021-11-01 PROCEDURE — 94761 N-INVAS EAR/PLS OXIMETRY MLT: CPT

## 2021-11-01 PROCEDURE — G0378 HOSPITAL OBSERVATION PER HR: HCPCS

## 2021-11-01 PROCEDURE — 36415 COLL VENOUS BLD VENIPUNCTURE: CPT | Performed by: NURSE PRACTITIONER

## 2021-11-01 PROCEDURE — 63600175 PHARM REV CODE 636 W HCPCS: Performed by: SURGERY

## 2021-11-01 PROCEDURE — 25000003 PHARM REV CODE 250: Performed by: SURGERY

## 2021-11-01 PROCEDURE — 25000003 PHARM REV CODE 250: Performed by: NURSE PRACTITIONER

## 2021-11-01 PROCEDURE — 25500020 PHARM REV CODE 255: Performed by: INTERNAL MEDICINE

## 2021-11-01 PROCEDURE — 63600175 PHARM REV CODE 636 W HCPCS: Performed by: INTERNAL MEDICINE

## 2021-11-01 PROCEDURE — 93010 ELECTROCARDIOGRAM REPORT: CPT | Mod: ,,, | Performed by: INTERNAL MEDICINE

## 2021-11-01 PROCEDURE — 93010 EKG 12-LEAD: ICD-10-PCS | Mod: ,,, | Performed by: INTERNAL MEDICINE

## 2021-11-01 PROCEDURE — 93005 ELECTROCARDIOGRAM TRACING: CPT

## 2021-11-01 PROCEDURE — 85025 COMPLETE CBC W/AUTO DIFF WBC: CPT | Performed by: SURGERY

## 2021-11-01 PROCEDURE — 80053 COMPREHEN METABOLIC PANEL: CPT | Performed by: SURGERY

## 2021-11-01 PROCEDURE — 25000003 PHARM REV CODE 250: Performed by: INTERNAL MEDICINE

## 2021-11-01 PROCEDURE — 84484 ASSAY OF TROPONIN QUANT: CPT | Performed by: SURGERY

## 2021-11-01 PROCEDURE — 94760 N-INVAS EAR/PLS OXIMETRY 1: CPT

## 2021-11-01 PROCEDURE — 96368 THER/DIAG CONCURRENT INF: CPT

## 2021-11-01 PROCEDURE — 29515 APPLICATION SHORT LEG SPLINT: CPT | Mod: LT

## 2021-11-01 PROCEDURE — 96375 TX/PRO/DX INJ NEW DRUG ADDON: CPT

## 2021-11-01 PROCEDURE — 36415 COLL VENOUS BLD VENIPUNCTURE: CPT | Performed by: SURGERY

## 2021-11-01 PROCEDURE — 82550 ASSAY OF CK (CPK): CPT | Performed by: INTERNAL MEDICINE

## 2021-11-01 PROCEDURE — C9399 UNCLASSIFIED DRUGS OR BIOLOG: HCPCS | Performed by: SURGERY

## 2021-11-01 PROCEDURE — 96366 THER/PROPH/DIAG IV INF ADDON: CPT

## 2021-11-01 PROCEDURE — 36415 COLL VENOUS BLD VENIPUNCTURE: CPT | Performed by: INTERNAL MEDICINE

## 2021-11-01 PROCEDURE — 85379 FIBRIN DEGRADATION QUANT: CPT | Performed by: NURSE PRACTITIONER

## 2021-11-01 PROCEDURE — 99219 PR INITIAL OBSERVATION CARE,LEVL II: ICD-10-PCS | Mod: ,,, | Performed by: INTERNAL MEDICINE

## 2021-11-01 PROCEDURE — 96376 TX/PRO/DX INJ SAME DRUG ADON: CPT

## 2021-11-01 PROCEDURE — 84443 ASSAY THYROID STIM HORMONE: CPT | Performed by: INTERNAL MEDICINE

## 2021-11-01 PROCEDURE — 96372 THER/PROPH/DIAG INJ SC/IM: CPT | Mod: 59

## 2021-11-01 PROCEDURE — 99219 PR INITIAL OBSERVATION CARE,LEVL II: CPT | Mod: ,,, | Performed by: INTERNAL MEDICINE

## 2021-11-01 RX ORDER — LEVOFLOXACIN 5 MG/ML
750 INJECTION, SOLUTION INTRAVENOUS
Status: DISCONTINUED | OUTPATIENT
Start: 2021-11-02 | End: 2021-11-02

## 2021-11-01 RX ORDER — RANOLAZINE 500 MG/1
500 TABLET, EXTENDED RELEASE ORAL 2 TIMES DAILY
Status: DISCONTINUED | OUTPATIENT
Start: 2021-11-01 | End: 2021-11-02 | Stop reason: HOSPADM

## 2021-11-01 RX ORDER — IBUPROFEN 200 MG
24 TABLET ORAL
Status: DISCONTINUED | OUTPATIENT
Start: 2021-11-01 | End: 2021-11-02 | Stop reason: HOSPADM

## 2021-11-01 RX ORDER — INSULIN ASPART 100 [IU]/ML
0-5 INJECTION, SOLUTION INTRAVENOUS; SUBCUTANEOUS
Status: DISCONTINUED | OUTPATIENT
Start: 2021-11-01 | End: 2021-11-02 | Stop reason: HOSPADM

## 2021-11-01 RX ORDER — DIPHENHYDRAMINE HCL 50 MG
50 CAPSULE ORAL ONCE
Status: COMPLETED | OUTPATIENT
Start: 2021-11-01 | End: 2021-11-01

## 2021-11-01 RX ORDER — PRAVASTATIN SODIUM 40 MG/1
40 TABLET ORAL DAILY
Status: DISCONTINUED | OUTPATIENT
Start: 2021-11-01 | End: 2021-11-02 | Stop reason: HOSPADM

## 2021-11-01 RX ORDER — SODIUM CHLORIDE 0.9 % (FLUSH) 0.9 %
10 SYRINGE (ML) INJECTION
Status: DISCONTINUED | OUTPATIENT
Start: 2021-11-01 | End: 2021-11-02 | Stop reason: HOSPADM

## 2021-11-01 RX ORDER — ASPIRIN 325 MG
325 TABLET ORAL
Status: ACTIVE | OUTPATIENT
Start: 2021-11-01 | End: 2021-11-01

## 2021-11-01 RX ORDER — PANTOPRAZOLE SODIUM 40 MG/1
40 TABLET, DELAYED RELEASE ORAL DAILY
Status: DISCONTINUED | OUTPATIENT
Start: 2021-11-01 | End: 2021-11-02 | Stop reason: HOSPADM

## 2021-11-01 RX ORDER — IBUPROFEN 800 MG/1
800 TABLET ORAL EVERY 6 HOURS PRN
Status: DISCONTINUED | OUTPATIENT
Start: 2021-11-01 | End: 2021-11-02 | Stop reason: HOSPADM

## 2021-11-01 RX ORDER — NAPROXEN SODIUM 220 MG/1
81 TABLET, FILM COATED ORAL DAILY
Status: DISCONTINUED | OUTPATIENT
Start: 2021-11-01 | End: 2021-11-02 | Stop reason: HOSPADM

## 2021-11-01 RX ORDER — INSULIN ASPART 100 [IU]/ML
15 INJECTION, SOLUTION INTRAVENOUS; SUBCUTANEOUS ONCE
Status: COMPLETED | OUTPATIENT
Start: 2021-11-01 | End: 2021-11-01

## 2021-11-01 RX ORDER — DIPHENHYDRAMINE HCL 50 MG
50 CAPSULE ORAL EVERY 6 HOURS PRN
Status: DISCONTINUED | OUTPATIENT
Start: 2021-11-01 | End: 2021-11-01

## 2021-11-01 RX ORDER — TALC
6 POWDER (GRAM) TOPICAL NIGHTLY PRN
Status: DISCONTINUED | OUTPATIENT
Start: 2021-11-01 | End: 2021-11-02 | Stop reason: HOSPADM

## 2021-11-01 RX ORDER — IBUPROFEN 200 MG
16 TABLET ORAL
Status: DISCONTINUED | OUTPATIENT
Start: 2021-11-01 | End: 2021-11-02 | Stop reason: HOSPADM

## 2021-11-01 RX ORDER — ACETAMINOPHEN 500 MG
1000 TABLET ORAL EVERY 8 HOURS PRN
Status: DISCONTINUED | OUTPATIENT
Start: 2021-11-01 | End: 2021-11-02 | Stop reason: HOSPADM

## 2021-11-01 RX ORDER — GLUCAGON 1 MG
1 KIT INJECTION
Status: DISCONTINUED | OUTPATIENT
Start: 2021-11-01 | End: 2021-11-02 | Stop reason: HOSPADM

## 2021-11-01 RX ORDER — CLOPIDOGREL BISULFATE 75 MG/1
75 TABLET ORAL DAILY
Status: DISCONTINUED | OUTPATIENT
Start: 2021-11-01 | End: 2021-11-02 | Stop reason: HOSPADM

## 2021-11-01 RX ORDER — ONDANSETRON 2 MG/ML
4 INJECTION INTRAMUSCULAR; INTRAVENOUS
Status: COMPLETED | OUTPATIENT
Start: 2021-11-01 | End: 2021-11-01

## 2021-11-01 RX ORDER — INSULIN ASPART 100 [IU]/ML
8 INJECTION, SOLUTION INTRAVENOUS; SUBCUTANEOUS
Status: DISCONTINUED | OUTPATIENT
Start: 2021-11-01 | End: 2021-11-02 | Stop reason: HOSPADM

## 2021-11-01 RX ORDER — ISOSORBIDE MONONITRATE 30 MG/1
30 TABLET, EXTENDED RELEASE ORAL DAILY
Status: DISCONTINUED | OUTPATIENT
Start: 2021-11-01 | End: 2021-11-02 | Stop reason: HOSPADM

## 2021-11-01 RX ORDER — LISINOPRIL 40 MG/1
40 TABLET ORAL DAILY
Status: DISCONTINUED | OUTPATIENT
Start: 2021-11-01 | End: 2021-11-02 | Stop reason: HOSPADM

## 2021-11-01 RX ORDER — FAMOTIDINE 20 MG/1
20 TABLET, FILM COATED ORAL 2 TIMES DAILY
Status: DISCONTINUED | OUTPATIENT
Start: 2021-11-01 | End: 2021-11-01

## 2021-11-01 RX ORDER — ONDANSETRON 2 MG/ML
4 INJECTION INTRAMUSCULAR; INTRAVENOUS EVERY 8 HOURS PRN
Status: DISCONTINUED | OUTPATIENT
Start: 2021-11-01 | End: 2021-11-02 | Stop reason: HOSPADM

## 2021-11-01 RX ORDER — GABAPENTIN 300 MG/1
300 CAPSULE ORAL DAILY
Status: DISCONTINUED | OUTPATIENT
Start: 2021-11-01 | End: 2021-11-02 | Stop reason: HOSPADM

## 2021-11-01 RX ORDER — CARVEDILOL 25 MG/1
25 TABLET ORAL 2 TIMES DAILY WITH MEALS
Status: DISCONTINUED | OUTPATIENT
Start: 2021-11-01 | End: 2021-11-02 | Stop reason: HOSPADM

## 2021-11-01 RX ORDER — ACETAMINOPHEN 325 MG/1
650 TABLET ORAL EVERY 8 HOURS PRN
Status: DISCONTINUED | OUTPATIENT
Start: 2021-11-01 | End: 2021-11-01

## 2021-11-01 RX ORDER — PHENAZOPYRIDINE HYDROCHLORIDE 100 MG/1
200 TABLET, FILM COATED ORAL
Status: DISCONTINUED | OUTPATIENT
Start: 2021-11-01 | End: 2021-11-02 | Stop reason: HOSPADM

## 2021-11-01 RX ORDER — FAMOTIDINE 20 MG/1
20 TABLET, FILM COATED ORAL ONCE
Status: COMPLETED | OUTPATIENT
Start: 2021-11-01 | End: 2021-11-01

## 2021-11-01 RX ORDER — MEPERIDINE HYDROCHLORIDE 25 MG/ML
12.5 INJECTION INTRAMUSCULAR; INTRAVENOUS; SUBCUTANEOUS EVERY 4 HOURS PRN
Status: DISCONTINUED | OUTPATIENT
Start: 2021-11-01 | End: 2021-11-01

## 2021-11-01 RX ADMIN — PHENAZOPYRIDINE 200 MG: 100 TABLET ORAL at 12:11

## 2021-11-01 RX ADMIN — RANOLAZINE 500 MG: 500 TABLET, EXTENDED RELEASE ORAL at 09:11

## 2021-11-01 RX ADMIN — ACETAMINOPHEN 1000 MG: 500 TABLET ORAL at 11:11

## 2021-11-01 RX ADMIN — PRAVASTATIN SODIUM 40 MG: 40 TABLET ORAL at 01:11

## 2021-11-01 RX ADMIN — GABAPENTIN 300 MG: 300 CAPSULE ORAL at 01:11

## 2021-11-01 RX ADMIN — INSULIN DETEMIR 15 UNITS: 100 INJECTION, SOLUTION SUBCUTANEOUS at 02:11

## 2021-11-01 RX ADMIN — MELATONIN TAB 3 MG 6 MG: 3 TAB at 11:11

## 2021-11-01 RX ADMIN — ISOSORBIDE MONONITRATE 30 MG: 30 TABLET, EXTENDED RELEASE ORAL at 04:11

## 2021-11-01 RX ADMIN — INSULIN ASPART 8 UNITS: 100 INJECTION, SOLUTION INTRAVENOUS; SUBCUTANEOUS at 04:11

## 2021-11-01 RX ADMIN — INSULIN DETEMIR 15 UNITS: 100 INJECTION, SOLUTION SUBCUTANEOUS at 09:11

## 2021-11-01 RX ADMIN — INSULIN ASPART 8 UNITS: 100 INJECTION, SOLUTION INTRAVENOUS; SUBCUTANEOUS at 12:11

## 2021-11-01 RX ADMIN — FAMOTIDINE 20 MG: 20 TABLET, FILM COATED ORAL at 07:11

## 2021-11-01 RX ADMIN — IOHEXOL 100 ML: 350 INJECTION, SOLUTION INTRAVENOUS at 08:11

## 2021-11-01 RX ADMIN — CARVEDILOL 25 MG: 25 TABLET, FILM COATED ORAL at 04:11

## 2021-11-01 RX ADMIN — ASPIRIN 81 MG: 81 TABLET, CHEWABLE ORAL at 01:11

## 2021-11-01 RX ADMIN — PHENAZOPYRIDINE 200 MG: 100 TABLET ORAL at 04:11

## 2021-11-01 RX ADMIN — CLOPIDOGREL 75 MG: 75 TABLET, FILM COATED ORAL at 01:11

## 2021-11-01 RX ADMIN — MEPERIDINE HYDROCHLORIDE 12.5 MG: 25 INJECTION INTRAMUSCULAR; INTRAVENOUS; SUBCUTANEOUS at 01:11

## 2021-11-01 RX ADMIN — LISINOPRIL 40 MG: 40 TABLET ORAL at 01:11

## 2021-11-01 RX ADMIN — INSULIN ASPART 4 UNITS: 100 INJECTION, SOLUTION INTRAVENOUS; SUBCUTANEOUS at 04:11

## 2021-11-01 RX ADMIN — INSULIN ASPART 4 UNITS: 100 INJECTION, SOLUTION INTRAVENOUS; SUBCUTANEOUS at 12:11

## 2021-11-01 RX ADMIN — DIPHENHYDRAMINE HYDROCHLORIDE 50 MG: 50 CAPSULE ORAL at 07:11

## 2021-11-01 RX ADMIN — INSULIN ASPART 15 UNITS: 100 INJECTION, SOLUTION INTRAVENOUS; SUBCUTANEOUS at 06:11

## 2021-11-01 RX ADMIN — MEPERIDINE HYDROCHLORIDE 12.5 MG: 25 INJECTION INTRAMUSCULAR; INTRAVENOUS; SUBCUTANEOUS at 05:11

## 2021-11-01 RX ADMIN — LEVOFLOXACIN 750 MG: 750 INJECTION, SOLUTION INTRAVENOUS at 11:11

## 2021-11-01 RX ADMIN — ACETAMINOPHEN 650 MG: 325 TABLET ORAL at 01:11

## 2021-11-01 RX ADMIN — ONDANSETRON 4 MG: 2 INJECTION INTRAMUSCULAR; INTRAVENOUS at 12:11

## 2021-11-01 RX ADMIN — PROMETHAZINE HYDROCHLORIDE 12.5 MG: 25 INJECTION INTRAMUSCULAR; INTRAVENOUS at 02:11

## 2021-11-01 RX ADMIN — LEVOFLOXACIN 750 MG: 750 INJECTION, SOLUTION INTRAVENOUS at 12:11

## 2021-11-01 RX ADMIN — INSULIN ASPART 1 UNITS: 100 INJECTION, SOLUTION INTRAVENOUS; SUBCUTANEOUS at 09:11

## 2021-11-01 RX ADMIN — PANTOPRAZOLE SODIUM 40 MG: 40 TABLET, DELAYED RELEASE ORAL at 01:11

## 2021-11-01 RX ADMIN — INSULIN ASPART 8 UNITS: 100 INJECTION, SOLUTION INTRAVENOUS; SUBCUTANEOUS at 06:11

## 2021-11-02 VITALS
BODY MASS INDEX: 35.38 KG/M2 | DIASTOLIC BLOOD PRESSURE: 67 MMHG | HEART RATE: 60 BPM | WEIGHT: 192.25 LBS | SYSTOLIC BLOOD PRESSURE: 143 MMHG | HEIGHT: 62 IN | OXYGEN SATURATION: 98 % | TEMPERATURE: 98 F | RESPIRATION RATE: 18 BRPM

## 2021-11-02 LAB
ALBUMIN SERPL BCP-MCNC: 3.5 G/DL (ref 3.5–5.2)
ALP SERPL-CCNC: 68 U/L (ref 55–135)
ALT SERPL W/O P-5'-P-CCNC: 6 U/L (ref 10–44)
ANION GAP SERPL CALC-SCNC: 15 MMOL/L (ref 8–16)
AST SERPL-CCNC: 17 U/L (ref 10–40)
BASOPHILS # BLD AUTO: 0.05 K/UL (ref 0–0.2)
BASOPHILS NFR BLD: 0.6 % (ref 0–1.9)
BILIRUB SERPL-MCNC: 0.6 MG/DL (ref 0.1–1)
BUN SERPL-MCNC: 27 MG/DL (ref 6–20)
CALCIUM SERPL-MCNC: 9.6 MG/DL (ref 8.7–10.5)
CHLORIDE SERPL-SCNC: 99 MMOL/L (ref 95–110)
CO2 SERPL-SCNC: 18 MMOL/L (ref 23–29)
CREAT SERPL-MCNC: 1.6 MG/DL (ref 0.5–1.4)
DIFFERENTIAL METHOD: ABNORMAL
EOSINOPHIL # BLD AUTO: 0.1 K/UL (ref 0–0.5)
EOSINOPHIL NFR BLD: 1.5 % (ref 0–8)
ERYTHROCYTE [DISTWIDTH] IN BLOOD BY AUTOMATED COUNT: 13.8 % (ref 11.5–14.5)
EST. GFR  (AFRICAN AMERICAN): 40 ML/MIN/1.73 M^2
EST. GFR  (NON AFRICAN AMERICAN): 35 ML/MIN/1.73 M^2
GLUCOSE SERPL-MCNC: 333 MG/DL (ref 70–110)
HCT VFR BLD AUTO: 36.2 % (ref 37–48.5)
HGB BLD-MCNC: 12.3 G/DL (ref 12–16)
IMM GRANULOCYTES # BLD AUTO: 0.04 K/UL (ref 0–0.04)
IMM GRANULOCYTES NFR BLD AUTO: 0.5 % (ref 0–0.5)
LYMPHOCYTES # BLD AUTO: 2.1 K/UL (ref 1–4.8)
LYMPHOCYTES NFR BLD: 24.2 % (ref 18–48)
MCH RBC QN AUTO: 29.3 PG (ref 27–31)
MCHC RBC AUTO-ENTMCNC: 34 G/DL (ref 32–36)
MCV RBC AUTO: 86 FL (ref 82–98)
MONOCYTES # BLD AUTO: 0.8 K/UL (ref 0.3–1)
MONOCYTES NFR BLD: 9.8 % (ref 4–15)
NEUTROPHILS # BLD AUTO: 5.4 K/UL (ref 1.8–7.7)
NEUTROPHILS NFR BLD: 63.4 % (ref 38–73)
NRBC BLD-RTO: 0 /100 WBC
PLATELET # BLD AUTO: 175 K/UL (ref 150–450)
PMV BLD AUTO: 10.5 FL (ref 9.2–12.9)
POCT GLUCOSE: 261 MG/DL (ref 70–110)
POCT GLUCOSE: 340 MG/DL (ref 70–110)
POTASSIUM SERPL-SCNC: 3.9 MMOL/L (ref 3.5–5.1)
PROT SERPL-MCNC: 6.9 G/DL (ref 6–8.4)
RBC # BLD AUTO: 4.2 M/UL (ref 4–5.4)
SODIUM SERPL-SCNC: 132 MMOL/L (ref 136–145)
TROPONIN I SERPL DL<=0.01 NG/ML-MCNC: 0.24 NG/ML (ref 0–0.03)
TROPONIN I SERPL DL<=0.01 NG/ML-MCNC: 0.37 NG/ML (ref 0–0.03)
WBC # BLD AUTO: 8.58 K/UL (ref 3.9–12.7)

## 2021-11-02 PROCEDURE — 96366 THER/PROPH/DIAG IV INF ADDON: CPT

## 2021-11-02 PROCEDURE — 99225 PR SUBSEQUENT OBSERVATION CARE,LEVEL II: ICD-10-PCS | Mod: ,,, | Performed by: INTERNAL MEDICINE

## 2021-11-02 PROCEDURE — G0378 HOSPITAL OBSERVATION PER HR: HCPCS

## 2021-11-02 PROCEDURE — 84484 ASSAY OF TROPONIN QUANT: CPT | Performed by: SURGERY

## 2021-11-02 PROCEDURE — 25000003 PHARM REV CODE 250: Performed by: NURSE PRACTITIONER

## 2021-11-02 PROCEDURE — 99225 PR SUBSEQUENT OBSERVATION CARE,LEVEL II: CPT | Mod: ,,, | Performed by: INTERNAL MEDICINE

## 2021-11-02 PROCEDURE — 80053 COMPREHEN METABOLIC PANEL: CPT | Performed by: INTERNAL MEDICINE

## 2021-11-02 PROCEDURE — 85025 COMPLETE CBC W/AUTO DIFF WBC: CPT | Performed by: INTERNAL MEDICINE

## 2021-11-02 PROCEDURE — 97161 PT EVAL LOW COMPLEX 20 MIN: CPT

## 2021-11-02 PROCEDURE — 36415 COLL VENOUS BLD VENIPUNCTURE: CPT | Performed by: SURGERY

## 2021-11-02 PROCEDURE — 63600175 PHARM REV CODE 636 W HCPCS: Performed by: INTERNAL MEDICINE

## 2021-11-02 PROCEDURE — 25000003 PHARM REV CODE 250: Performed by: INTERNAL MEDICINE

## 2021-11-02 PROCEDURE — 96372 THER/PROPH/DIAG INJ SC/IM: CPT

## 2021-11-02 PROCEDURE — 94760 N-INVAS EAR/PLS OXIMETRY 1: CPT

## 2021-11-02 RX ORDER — LEVOFLOXACIN 750 MG/1
750 TABLET ORAL DAILY
Qty: 5 TABLET | Refills: 0 | Status: ON HOLD | OUTPATIENT
Start: 2021-11-02 | End: 2021-11-13 | Stop reason: HOSPADM

## 2021-11-02 RX ORDER — LEVOFLOXACIN 5 MG/ML
750 INJECTION, SOLUTION INTRAVENOUS
Status: DISCONTINUED | OUTPATIENT
Start: 2021-11-04 | End: 2021-11-02 | Stop reason: HOSPADM

## 2021-11-02 RX ORDER — RANOLAZINE 500 MG/1
500 TABLET, EXTENDED RELEASE ORAL 2 TIMES DAILY
Qty: 60 TABLET | Refills: 0 | Status: SHIPPED | OUTPATIENT
Start: 2021-11-02 | End: 2022-07-28

## 2021-11-02 RX ORDER — ISOSORBIDE MONONITRATE 30 MG/1
30 TABLET, EXTENDED RELEASE ORAL DAILY
Qty: 30 TABLET | Refills: 1 | Status: SHIPPED | OUTPATIENT
Start: 2021-11-03 | End: 2022-07-28

## 2021-11-02 RX ADMIN — CLOPIDOGREL 75 MG: 75 TABLET, FILM COATED ORAL at 08:11

## 2021-11-02 RX ADMIN — GABAPENTIN 300 MG: 300 CAPSULE ORAL at 08:11

## 2021-11-02 RX ADMIN — ISOSORBIDE MONONITRATE 30 MG: 30 TABLET, EXTENDED RELEASE ORAL at 08:11

## 2021-11-02 RX ADMIN — PHENAZOPYRIDINE 200 MG: 100 TABLET ORAL at 12:11

## 2021-11-02 RX ADMIN — ACETAMINOPHEN 1000 MG: 500 TABLET ORAL at 08:11

## 2021-11-02 RX ADMIN — PHENAZOPYRIDINE 200 MG: 100 TABLET ORAL at 08:11

## 2021-11-02 RX ADMIN — ASPIRIN 81 MG: 81 TABLET, CHEWABLE ORAL at 08:11

## 2021-11-02 RX ADMIN — INSULIN ASPART 8 UNITS: 100 INJECTION, SOLUTION INTRAVENOUS; SUBCUTANEOUS at 12:11

## 2021-11-02 RX ADMIN — PANTOPRAZOLE SODIUM 40 MG: 40 TABLET, DELAYED RELEASE ORAL at 08:11

## 2021-11-02 RX ADMIN — INSULIN ASPART 8 UNITS: 100 INJECTION, SOLUTION INTRAVENOUS; SUBCUTANEOUS at 08:11

## 2021-11-02 RX ADMIN — LISINOPRIL 40 MG: 40 TABLET ORAL at 08:11

## 2021-11-02 RX ADMIN — INSULIN ASPART 4 UNITS: 100 INJECTION, SOLUTION INTRAVENOUS; SUBCUTANEOUS at 12:11

## 2021-11-02 RX ADMIN — INSULIN ASPART 3 UNITS: 100 INJECTION, SOLUTION INTRAVENOUS; SUBCUTANEOUS at 08:11

## 2021-11-02 RX ADMIN — IBUPROFEN 800 MG: 800 TABLET, FILM COATED ORAL at 02:11

## 2021-11-02 RX ADMIN — CARVEDILOL 25 MG: 25 TABLET, FILM COATED ORAL at 08:11

## 2021-11-02 RX ADMIN — PRAVASTATIN SODIUM 40 MG: 40 TABLET ORAL at 08:11

## 2021-11-02 RX ADMIN — RANOLAZINE 500 MG: 500 TABLET, EXTENDED RELEASE ORAL at 08:11

## 2021-11-03 ENCOUNTER — HOSPITAL ENCOUNTER (EMERGENCY)
Facility: HOSPITAL | Age: 61
Discharge: ANOTHER HOSPITAL | End: 2021-11-04
Attending: SURGERY
Payer: MEDICAID

## 2021-11-03 DIAGNOSIS — R55 SYNCOPE: ICD-10-CM

## 2021-11-03 DIAGNOSIS — R79.89 ELEVATED TROPONIN: ICD-10-CM

## 2021-11-03 DIAGNOSIS — G93.41 METABOLIC ENCEPHALOPATHY: Primary | ICD-10-CM

## 2021-11-03 LAB
ALBUMIN SERPL BCP-MCNC: 2.9 G/DL (ref 3.5–5.2)
ALP SERPL-CCNC: 52 U/L (ref 55–135)
ALT SERPL W/O P-5'-P-CCNC: 5 U/L (ref 10–44)
AMPHET+METHAMPHET UR QL: NEGATIVE
ANION GAP SERPL CALC-SCNC: 9 MMOL/L (ref 8–16)
AST SERPL-CCNC: 16 U/L (ref 10–40)
BACTERIA #/AREA URNS HPF: ABNORMAL /HPF
BACTERIA UR CULT: ABNORMAL
BARBITURATES UR QL SCN>200 NG/ML: NEGATIVE
BASOPHILS # BLD AUTO: 0.05 K/UL (ref 0–0.2)
BASOPHILS NFR BLD: 0.7 % (ref 0–1.9)
BENZODIAZ UR QL SCN>200 NG/ML: NEGATIVE
BILIRUB SERPL-MCNC: 0.5 MG/DL (ref 0.1–1)
BILIRUB UR QL STRIP: NEGATIVE
BUN SERPL-MCNC: 36 MG/DL (ref 6–20)
BZE UR QL SCN: NEGATIVE
CALCIUM SERPL-MCNC: 8.4 MG/DL (ref 8.7–10.5)
CANNABINOIDS UR QL SCN: NEGATIVE
CHLORIDE SERPL-SCNC: 104 MMOL/L (ref 95–110)
CK MB SERPL-MCNC: 2 NG/ML (ref 0.1–6.5)
CK MB SERPL-RTO: 0.6 % (ref 0–5)
CK SERPL-CCNC: 342 U/L (ref 20–180)
CK SERPL-CCNC: 342 U/L (ref 20–180)
CLARITY UR: CLEAR
CO2 SERPL-SCNC: 20 MMOL/L (ref 23–29)
COLOR UR: ABNORMAL
CREAT SERPL-MCNC: 2.4 MG/DL (ref 0.5–1.4)
CREAT UR-MCNC: 81.9 MG/DL (ref 15–325)
DIFFERENTIAL METHOD: ABNORMAL
EOSINOPHIL # BLD AUTO: 0.1 K/UL (ref 0–0.5)
EOSINOPHIL NFR BLD: 1.8 % (ref 0–8)
ERYTHROCYTE [DISTWIDTH] IN BLOOD BY AUTOMATED COUNT: 13.4 % (ref 11.5–14.5)
EST. GFR  (AFRICAN AMERICAN): 25 ML/MIN/1.73 M^2
EST. GFR  (NON AFRICAN AMERICAN): 21 ML/MIN/1.73 M^2
ETHANOL SERPL-MCNC: <10 MG/DL
GLUCOSE SERPL-MCNC: 393 MG/DL (ref 70–110)
GLUCOSE UR QL STRIP: ABNORMAL
HCT VFR BLD AUTO: 29.2 % (ref 37–48.5)
HGB BLD-MCNC: 10.1 G/DL (ref 12–16)
HGB UR QL STRIP: ABNORMAL
IMM GRANULOCYTES # BLD AUTO: 0.02 K/UL (ref 0–0.04)
IMM GRANULOCYTES NFR BLD AUTO: 0.3 % (ref 0–0.5)
KETONES UR QL STRIP: NEGATIVE
LACTATE SERPL-SCNC: 0.8 MMOL/L (ref 0.5–2.2)
LEUKOCYTE ESTERASE UR QL STRIP: NEGATIVE
LYMPHOCYTES # BLD AUTO: 2 K/UL (ref 1–4.8)
LYMPHOCYTES NFR BLD: 29.3 % (ref 18–48)
MCH RBC QN AUTO: 29.7 PG (ref 27–31)
MCHC RBC AUTO-ENTMCNC: 34.6 G/DL (ref 32–36)
MCV RBC AUTO: 86 FL (ref 82–98)
METHADONE UR QL SCN>300 NG/ML: NEGATIVE
MICROSCOPIC COMMENT: ABNORMAL
MONOCYTES # BLD AUTO: 0.6 K/UL (ref 0.3–1)
MONOCYTES NFR BLD: 8.7 % (ref 4–15)
NEUTROPHILS # BLD AUTO: 4 K/UL (ref 1.8–7.7)
NEUTROPHILS NFR BLD: 59.2 % (ref 38–73)
NITRITE UR QL STRIP: POSITIVE
NRBC BLD-RTO: 0 /100 WBC
OPIATES UR QL SCN: NEGATIVE
PCP UR QL SCN>25 NG/ML: NEGATIVE
PH UR STRIP: 5 [PH] (ref 5–8)
PLATELET # BLD AUTO: 157 K/UL (ref 150–450)
PMV BLD AUTO: 10.9 FL (ref 9.2–12.9)
POCT GLUCOSE: 330 MG/DL (ref 70–110)
POTASSIUM SERPL-SCNC: 4.6 MMOL/L (ref 3.5–5.1)
PROCALCITONIN SERPL IA-MCNC: 0.16 NG/ML
PROT SERPL-MCNC: 5.7 G/DL (ref 6–8.4)
PROT UR QL STRIP: NEGATIVE
RBC # BLD AUTO: 3.4 M/UL (ref 4–5.4)
RBC #/AREA URNS HPF: 3 /HPF (ref 0–4)
SALICYLATES SERPL-MCNC: <5 MG/DL (ref 15–30)
SODIUM SERPL-SCNC: 133 MMOL/L (ref 136–145)
SP GR UR STRIP: <=1.005 (ref 1–1.03)
SQUAMOUS #/AREA URNS HPF: 2 /HPF
TOXICOLOGY INFORMATION: NORMAL
TROPONIN I SERPL DL<=0.01 NG/ML-MCNC: 0.17 NG/ML (ref 0–0.03)
TROPONIN I SERPL DL<=0.01 NG/ML-MCNC: 0.2 NG/ML (ref 0–0.03)
URN SPEC COLLECT METH UR: ABNORMAL
UROBILINOGEN UR STRIP-ACNC: NEGATIVE EU/DL
WBC # BLD AUTO: 6.68 K/UL (ref 3.9–12.7)
WBC #/AREA URNS HPF: 5 /HPF (ref 0–5)
YEAST URNS QL MICRO: ABNORMAL

## 2021-11-03 PROCEDURE — 93005 ELECTROCARDIOGRAM TRACING: CPT | Mod: 59

## 2021-11-03 PROCEDURE — 80179 DRUG ASSAY SALICYLATE: CPT | Performed by: NURSE PRACTITIONER

## 2021-11-03 PROCEDURE — 84484 ASSAY OF TROPONIN QUANT: CPT | Performed by: NURSE PRACTITIONER

## 2021-11-03 PROCEDURE — 36556 INSERT NON-TUNNEL CV CATH: CPT

## 2021-11-03 PROCEDURE — 25000003 PHARM REV CODE 250: Performed by: SURGERY

## 2021-11-03 PROCEDURE — 80053 COMPREHEN METABOLIC PANEL: CPT | Performed by: NURSE PRACTITIONER

## 2021-11-03 PROCEDURE — 96361 HYDRATE IV INFUSION ADD-ON: CPT

## 2021-11-03 PROCEDURE — 25000003 PHARM REV CODE 250: Performed by: NURSE PRACTITIONER

## 2021-11-03 PROCEDURE — 63600175 PHARM REV CODE 636 W HCPCS: Performed by: NURSE PRACTITIONER

## 2021-11-03 PROCEDURE — 93010 ELECTROCARDIOGRAM REPORT: CPT | Mod: ,,, | Performed by: INTERNAL MEDICINE

## 2021-11-03 PROCEDURE — 82077 ASSAY SPEC XCP UR&BREATH IA: CPT | Performed by: NURSE PRACTITIONER

## 2021-11-03 PROCEDURE — 99291 CRITICAL CARE FIRST HOUR: CPT

## 2021-11-03 PROCEDURE — 93010 EKG 12-LEAD: ICD-10-PCS | Mod: ,,, | Performed by: INTERNAL MEDICINE

## 2021-11-03 PROCEDURE — 84145 PROCALCITONIN (PCT): CPT | Performed by: NURSE PRACTITIONER

## 2021-11-03 PROCEDURE — 25000003 PHARM REV CODE 250: Performed by: STUDENT IN AN ORGANIZED HEALTH CARE EDUCATION/TRAINING PROGRAM

## 2021-11-03 PROCEDURE — 85025 COMPLETE CBC W/AUTO DIFF WBC: CPT | Performed by: NURSE PRACTITIONER

## 2021-11-03 PROCEDURE — 96374 THER/PROPH/DIAG INJ IV PUSH: CPT

## 2021-11-03 PROCEDURE — 87040 BLOOD CULTURE FOR BACTERIA: CPT | Performed by: NURSE PRACTITIONER

## 2021-11-03 PROCEDURE — 83605 ASSAY OF LACTIC ACID: CPT | Performed by: NURSE PRACTITIONER

## 2021-11-03 PROCEDURE — 80307 DRUG TEST PRSMV CHEM ANLYZR: CPT | Performed by: NURSE PRACTITIONER

## 2021-11-03 PROCEDURE — 81000 URINALYSIS NONAUTO W/SCOPE: CPT | Mod: 59 | Performed by: NURSE PRACTITIONER

## 2021-11-03 PROCEDURE — 36415 COLL VENOUS BLD VENIPUNCTURE: CPT | Performed by: NURSE PRACTITIONER

## 2021-11-03 PROCEDURE — 82553 CREATINE MB FRACTION: CPT | Performed by: NURSE PRACTITIONER

## 2021-11-03 RX ORDER — LIDOCAINE HYDROCHLORIDE 10 MG/ML
5 INJECTION, SOLUTION EPIDURAL; INFILTRATION; INTRACAUDAL; PERINEURAL
Status: COMPLETED | OUTPATIENT
Start: 2021-11-03 | End: 2021-11-03

## 2021-11-03 RX ORDER — NOREPINEPHRINE BITARTRATE/D5W 4MG/250ML
0-3 PLASTIC BAG, INJECTION (ML) INTRAVENOUS CONTINUOUS
Status: DISCONTINUED | OUTPATIENT
Start: 2021-11-03 | End: 2021-11-04 | Stop reason: HOSPADM

## 2021-11-03 RX ORDER — SODIUM CHLORIDE 9 MG/ML
INJECTION, SOLUTION INTRAVENOUS ONCE
Status: COMPLETED | OUTPATIENT
Start: 2021-11-03 | End: 2021-11-03

## 2021-11-03 RX ORDER — SODIUM CHLORIDE 9 MG/ML
INJECTION, SOLUTION INTRAVENOUS ONCE
Status: COMPLETED | OUTPATIENT
Start: 2021-11-03 | End: 2021-11-04

## 2021-11-03 RX ORDER — SODIUM CHLORIDE 9 MG/ML
INJECTION, SOLUTION INTRAVENOUS
Status: COMPLETED | OUTPATIENT
Start: 2021-11-03 | End: 2021-11-03

## 2021-11-03 RX ORDER — NALOXONE HCL 0.4 MG/ML
1 VIAL (ML) INJECTION
Status: COMPLETED | OUTPATIENT
Start: 2021-11-03 | End: 2021-11-03

## 2021-11-03 RX ADMIN — SODIUM CHLORIDE: 0.9 INJECTION, SOLUTION INTRAVENOUS at 05:11

## 2021-11-03 RX ADMIN — SODIUM CHLORIDE: 0.9 INJECTION, SOLUTION INTRAVENOUS at 08:11

## 2021-11-03 RX ADMIN — SODIUM CHLORIDE: 0.9 INJECTION, SOLUTION INTRAVENOUS at 03:11

## 2021-11-03 RX ADMIN — LIDOCAINE HYDROCHLORIDE 50 MG: 10 INJECTION, SOLUTION EPIDURAL; INFILTRATION; INTRACAUDAL; PERINEURAL at 07:11

## 2021-11-03 RX ADMIN — NALOXONE HYDROCHLORIDE 1 MG: 0.4 INJECTION, SOLUTION INTRAMUSCULAR; INTRAVENOUS; SUBCUTANEOUS at 03:11

## 2021-11-04 VITALS
DIASTOLIC BLOOD PRESSURE: 73 MMHG | SYSTOLIC BLOOD PRESSURE: 153 MMHG | WEIGHT: 192 LBS | TEMPERATURE: 98 F | HEART RATE: 85 BPM | OXYGEN SATURATION: 97 % | RESPIRATION RATE: 20 BRPM | BODY MASS INDEX: 35.12 KG/M2

## 2021-11-04 PROBLEM — N17.9 AKI (ACUTE KIDNEY INJURY): Status: ACTIVE | Noted: 2021-11-04

## 2021-11-04 LAB
ALBUMIN SERPL BCP-MCNC: 3 G/DL (ref 3.5–5.2)
ALP SERPL-CCNC: 69 U/L (ref 55–135)
ALT SERPL W/O P-5'-P-CCNC: 10 U/L (ref 10–44)
ANION GAP SERPL CALC-SCNC: 14 MMOL/L (ref 8–16)
AST SERPL-CCNC: 16 U/L (ref 10–40)
BASOPHILS # BLD AUTO: 0.03 K/UL (ref 0–0.2)
BASOPHILS NFR BLD: 0.4 % (ref 0–1.9)
BILIRUB SERPL-MCNC: 0.4 MG/DL (ref 0.1–1)
BUN SERPL-MCNC: 30 MG/DL (ref 6–20)
CALCIUM SERPL-MCNC: 8.7 MG/DL (ref 8.7–10.5)
CHLORIDE SERPL-SCNC: 104 MMOL/L (ref 95–110)
CO2 SERPL-SCNC: 15 MMOL/L (ref 23–29)
CREAT SERPL-MCNC: 1.5 MG/DL (ref 0.5–1.4)
DIFFERENTIAL METHOD: ABNORMAL
EOSINOPHIL # BLD AUTO: 0 K/UL (ref 0–0.5)
EOSINOPHIL NFR BLD: 0.5 % (ref 0–8)
ERYTHROCYTE [DISTWIDTH] IN BLOOD BY AUTOMATED COUNT: 13.6 % (ref 11.5–14.5)
EST. GFR  (AFRICAN AMERICAN): 44 ML/MIN/1.73 M^2
EST. GFR  (NON AFRICAN AMERICAN): 38 ML/MIN/1.73 M^2
GLUCOSE SERPL-MCNC: 433 MG/DL (ref 70–110)
HCT VFR BLD AUTO: 31.1 % (ref 37–48.5)
HGB BLD-MCNC: 10.2 G/DL (ref 12–16)
IMM GRANULOCYTES # BLD AUTO: 0.03 K/UL (ref 0–0.04)
IMM GRANULOCYTES NFR BLD AUTO: 0.4 % (ref 0–0.5)
LYMPHOCYTES # BLD AUTO: 1.1 K/UL (ref 1–4.8)
LYMPHOCYTES NFR BLD: 14.2 % (ref 18–48)
MCH RBC QN AUTO: 29.2 PG (ref 27–31)
MCHC RBC AUTO-ENTMCNC: 32.8 G/DL (ref 32–36)
MCV RBC AUTO: 89 FL (ref 82–98)
MONOCYTES # BLD AUTO: 0.5 K/UL (ref 0.3–1)
MONOCYTES NFR BLD: 6.7 % (ref 4–15)
NEUTROPHILS # BLD AUTO: 6.1 K/UL (ref 1.8–7.7)
NEUTROPHILS NFR BLD: 77.8 % (ref 38–73)
NRBC BLD-RTO: 0 /100 WBC
PLATELET # BLD AUTO: 153 K/UL (ref 150–450)
PMV BLD AUTO: 10.4 FL (ref 9.2–12.9)
POTASSIUM SERPL-SCNC: 4.4 MMOL/L (ref 3.5–5.1)
PROT SERPL-MCNC: 6.3 G/DL (ref 6–8.4)
RBC # BLD AUTO: 3.49 M/UL (ref 4–5.4)
SODIUM SERPL-SCNC: 133 MMOL/L (ref 136–145)
WBC # BLD AUTO: 7.81 K/UL (ref 3.9–12.7)

## 2021-11-04 PROCEDURE — 63600175 PHARM REV CODE 636 W HCPCS: Performed by: STUDENT IN AN ORGANIZED HEALTH CARE EDUCATION/TRAINING PROGRAM

## 2021-11-04 PROCEDURE — 80053 COMPREHEN METABOLIC PANEL: CPT | Mod: 91 | Performed by: STUDENT IN AN ORGANIZED HEALTH CARE EDUCATION/TRAINING PROGRAM

## 2021-11-04 PROCEDURE — 93010 ELECTROCARDIOGRAM REPORT: CPT | Mod: ,,, | Performed by: INTERNAL MEDICINE

## 2021-11-04 PROCEDURE — 36415 COLL VENOUS BLD VENIPUNCTURE: CPT | Performed by: STUDENT IN AN ORGANIZED HEALTH CARE EDUCATION/TRAINING PROGRAM

## 2021-11-04 PROCEDURE — 25000003 PHARM REV CODE 250: Performed by: STUDENT IN AN ORGANIZED HEALTH CARE EDUCATION/TRAINING PROGRAM

## 2021-11-04 PROCEDURE — 85025 COMPLETE CBC W/AUTO DIFF WBC: CPT | Mod: 91 | Performed by: STUDENT IN AN ORGANIZED HEALTH CARE EDUCATION/TRAINING PROGRAM

## 2021-11-04 PROCEDURE — 93005 ELECTROCARDIOGRAM TRACING: CPT

## 2021-11-04 PROCEDURE — 93010 EKG 12-LEAD: ICD-10-PCS | Mod: ,,, | Performed by: INTERNAL MEDICINE

## 2021-11-04 RX ORDER — LEVOFLOXACIN 5 MG/ML
750 INJECTION, SOLUTION INTRAVENOUS
Status: COMPLETED | OUTPATIENT
Start: 2021-11-04 | End: 2021-11-04

## 2021-11-04 RX ORDER — OXYCODONE AND ACETAMINOPHEN 5; 325 MG/1; MG/1
1 TABLET ORAL
Status: COMPLETED | OUTPATIENT
Start: 2021-11-04 | End: 2021-11-04

## 2021-11-04 RX ADMIN — OXYCODONE HYDROCHLORIDE AND ACETAMINOPHEN 1 TABLET: 5; 325 TABLET ORAL at 06:11

## 2021-11-04 RX ADMIN — LEVOFLOXACIN 750 MG: 750 INJECTION, SOLUTION INTRAVENOUS at 08:11

## 2021-11-05 PROBLEM — E87.20 NORMAL ANION GAP METABOLIC ACIDOSIS: Status: ACTIVE | Noted: 2021-11-05

## 2021-11-06 PROBLEM — R74.01 TRANSAMINITIS: Status: ACTIVE | Noted: 2021-11-06

## 2021-11-09 LAB — BACTERIA BLD CULT: NORMAL

## 2021-11-11 PROBLEM — N17.9 AKI (ACUTE KIDNEY INJURY): Status: RESOLVED | Noted: 2021-11-04 | Resolved: 2021-11-11

## 2021-11-11 PROBLEM — R74.01 TRANSAMINITIS: Status: RESOLVED | Noted: 2021-11-06 | Resolved: 2021-11-11

## 2021-11-11 PROBLEM — R41.82 ALTERED MENTAL STATUS: Status: RESOLVED | Noted: 2021-11-01 | Resolved: 2021-11-11

## 2021-11-11 PROBLEM — E87.20 NORMAL ANION GAP METABOLIC ACIDOSIS: Status: RESOLVED | Noted: 2021-11-05 | Resolved: 2021-11-11

## 2021-11-16 ENCOUNTER — PATIENT OUTREACH (OUTPATIENT)
Dept: ADMINISTRATIVE | Facility: CLINIC | Age: 61
End: 2021-11-16
Payer: MEDICAID

## 2022-01-11 ENCOUNTER — HOSPITAL ENCOUNTER (EMERGENCY)
Facility: HOSPITAL | Age: 62
Discharge: HOME | End: 2022-01-11
Attending: SURGERY
Payer: MEDICAID

## 2022-01-11 VITALS
SYSTOLIC BLOOD PRESSURE: 174 MMHG | TEMPERATURE: 97 F | RESPIRATION RATE: 20 BRPM | HEART RATE: 58 BPM | BODY MASS INDEX: 33.11 KG/M2 | DIASTOLIC BLOOD PRESSURE: 74 MMHG | WEIGHT: 181 LBS | OXYGEN SATURATION: 99 %
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
## 2022-01-11 DIAGNOSIS — M25.579 ANKLE PAIN, UNSPECIFIED CHRONICITY, UNSPECIFIED LATERALITY: Primary | ICD-10-CM

## 2022-01-11 PROCEDURE — 99283 EMERGENCY DEPT VISIT LOW MDM: CPT | Mod: 25

## 2022-01-11 NOTE — ED PROVIDER NOTES
"Encounter Date: 1/11/2022       History     Chief Complaint   Patient presents with    Ankle Pain     C/o left ankle pain. Patient states had her walking boot on and felt something pop while climbing stairs. Patient reports "they just took the cast off yesterday from surgery."     Chief complaint:  Left ankle pain  61-year-old female with history of CAD diabetes and hypertension presents to the ED with reports of pain her left ankle.  Patient states she had a tri malleolar fracture in December and has since had surgery and has been in a cast  She states that she had her cast removed yesterday was placed in a walking boot and tripped going up stairs and felt a popping and cracking sensation in the previously injured ankle.  She states she currently has 5/10 deep aching pain denies any numbness or tingling        Review of patient's allergies indicates:   Allergen Reactions    Codeine Nausea Only    Iodine Itching    Pcn [penicillins] Other (See Comments)     Headache and nausea with PO PCN     Sulfa (sulfonamide antibiotics)     Oxycontin [oxycodone] Nausea And Vomiting     Past Medical History:   Diagnosis Date    Coronary artery disease     Diabetes mellitus     Dyslipidemia     GERD (gastroesophageal reflux disease)     Hypertension     Neuropathy      Past Surgical History:   Procedure Laterality Date    APPLICATION OF LARGE EXTERNAL FIXATION DEVICE TO TIBIA Left 11/5/2021    Procedure: APPLICATION, EXTERNAL FIXATION DEVICE, LARGE, TIBIA;  Surgeon: Bk Richter MD;  Location: [facility];  Service: Orthopedics;  Laterality: Left;    cardiac stents      CHOLECYSTECTOMY      HYSTERECTOMY      OPEN REDUCTION AND INTERNAL FIXATION (ORIF) OF PILON FRACTURE Left 11/12/2021    Procedure: ORIF, FRACTURE, PILON;  Surgeon: Alec Pettit MD;  Location: [facility];  Service: Orthopedics;  Laterality: Left;    REMOVAL OF EXTERNAL FIXATION DEVICE Left 11/12/2021    Procedure: REMOVAL, EXTERNAL FIXATION " DEVICE;  Surgeon: Alec Pettit MD;  Location: [facility];  Service: Orthopedics;  Laterality: Left;    stents to kidney       Family History   Problem Relation Age of Onset    Breast cancer Sister     Cancer Maternal Aunt     Heart disease Mother     Colon cancer Neg Hx     Ovarian cancer Neg Hx      Social History     Tobacco Use    Smoking status: Former Smoker     Types: Cigarettes     Quit date: 11/15/2008     Years since quittin.1    Smokeless tobacco: Never Used   Substance Use Topics    Alcohol use: No    Drug use: No     Review of Systems   Constitutional: Negative.    Respiratory: Negative.    Cardiovascular: Negative.    Musculoskeletal: Positive for arthralgias, gait problem, joint swelling and myalgias.       Physical Exam     Initial Vitals [22 1313]   BP Pulse Resp Temp SpO2   (!) 174/74 (!) 58 20 96.9 °F (36.1 °C) 99 %      MAP       --         Physical Exam    Nursing note and vitals reviewed.  Constitutional: She appears well-developed and well-nourished.   Cardiovascular: Normal rate and regular rhythm.   Pulmonary/Chest: Breath sounds normal.   Musculoskeletal:         General: Tenderness present. Normal range of motion.      Comments: Tenderness to the posterior aspect of the left heel neurovascularly intact good cap refill     Neurological: She is alert and oriented to person, place, and time. She has normal reflexes.         ED Course   Procedures  Labs Reviewed - No data to display       Imaging Results          X-Ray Ankle Complete Left (Final result)  Result time 22 13:34:39    Final result by David Eagle MD (22 13:34:39)                 Impression:      As above.      Electronically signed by: David Eagle MD  Date:    2022  Time:    13:34             Narrative:    EXAMINATION:  XR ANKLE COMPLETE 3 VIEW LEFT    CLINICAL HISTORY:  .  Unspecified fall, initial encounter    TECHNIQUE:  Three views of the left ankle dated   2022.    COMPARISON:  January 10, 2022.    FINDINGS:  No significant change when compared with the prior study of January 10, 2022    Status post ORIF with surgical fixation hardware and screws intact.  No change in alignment.  No evidence of an acute fracture.                                 Medications - No data to display  Medical Decision Making:   Differential Diagnosis:   Ankle fracture, sprain, strain  ED Management:  [age] year female after having a mechanical fall she felt a popping and cracking sensation in her left ankle.  Patient is status post ORIF for trimalleolar fracture.  Patient has full range of motion she does have tenderness to the posterior aspect of the heel neurovascularly intact repeat x-ray showed no acute changes will discharge home with instructions to take ibuprofen rest and follow-up with Dr. Regalado given return precautions including but not limited to new worsening symptoms                      Clinical Impression:   Final diagnoses:  [W19.XXXA] Fall  [M25.579] Ankle pain, unspecified chronicity, unspecified laterality (Primary)          ED Disposition Condition    Discharge Stable        ED Prescriptions     None        Follow-up Information     Follow up With Specialties Details Why Contact Info    [facility] Schedule an appointment as soon as possible for a visit in 3 days  [address]  [address]RD FLOOR  [facility]  [place] [address]  [phone]             Ana Rowley NP  01/11/22 1920

## 2022-01-11 NOTE — DISCHARGE INSTRUCTIONS
No fracture was found today on x-ray  He has continued to experience pain please follow-up with Dr. Regalado  You may take Tylenol or Motrin for any pain please elevate and rest extremity to improve swelling and discomfort

## 2022-02-28 ENCOUNTER — HOSPITAL ENCOUNTER (EMERGENCY)
Facility: HOSPITAL | Age: 62
Discharge: HOME | End: 2022-02-28
Attending: SURGERY
Payer: MEDICAID

## 2022-02-28 VITALS
RESPIRATION RATE: 20 BRPM | OXYGEN SATURATION: 99 % | SYSTOLIC BLOOD PRESSURE: 115 MMHG | DIASTOLIC BLOOD PRESSURE: 53 MMHG | TEMPERATURE: 100 F | HEART RATE: 62 BPM | BODY MASS INDEX: 33.37 KG/M2 | WEIGHT: 182.44 LBS

## 2022-02-28 DIAGNOSIS — L08.9 FINGER INFECTION: Primary | ICD-10-CM

## 2022-02-28 LAB
ALBUMIN SERPL BCP-MCNC: 3.5 G/DL (ref 3.5–5.2)
ALP SERPL-CCNC: 64 U/L (ref 55–135)
ALT SERPL W/O P-5'-P-CCNC: 7 U/L (ref 10–44)
ANION GAP SERPL CALC-SCNC: 10 MMOL/L (ref 8–16)
AST SERPL-CCNC: 13 U/L (ref 10–40)
BASOPHILS # BLD AUTO: 0.05 K/UL (ref 0–0.2)
BASOPHILS NFR BLD: 0.8 % (ref 0–1.9)
BILIRUB SERPL-MCNC: 0.4 MG/DL (ref 0.1–1)
BUN SERPL-MCNC: 23 MG/DL (ref 6–20)
CALCIUM SERPL-MCNC: 9.5 MG/DL (ref 8.7–10.5)
CHLORIDE SERPL-SCNC: 104 MMOL/L (ref 95–110)
CO2 SERPL-SCNC: 22 MMOL/L (ref 23–29)
CREAT SERPL-MCNC: 1.3 MG/DL (ref 0.5–1.4)
CRP SERPL-MCNC: 10.6 MG/L (ref 0–8.2)
DIFFERENTIAL METHOD: ABNORMAL
EOSINOPHIL # BLD AUTO: 0.1 K/UL (ref 0–0.5)
EOSINOPHIL NFR BLD: 2.1 % (ref 0–8)
ERYTHROCYTE [DISTWIDTH] IN BLOOD BY AUTOMATED COUNT: 13.9 % (ref 11.5–14.5)
ERYTHROCYTE [SEDIMENTATION RATE] IN BLOOD BY WESTERGREN METHOD: 26 MM/HR (ref 0–20)
EST. GFR  (AFRICAN AMERICAN): 52 ML/MIN/1.73 M^2
EST. GFR  (NON AFRICAN AMERICAN): 45 ML/MIN/1.73 M^2
GLUCOSE SERPL-MCNC: 118 MG/DL (ref 70–110)
HCT VFR BLD AUTO: 34.6 % (ref 37–48.5)
HGB BLD-MCNC: 11.3 G/DL (ref 12–16)
IMM GRANULOCYTES # BLD AUTO: 0.01 K/UL (ref 0–0.04)
IMM GRANULOCYTES NFR BLD AUTO: 0.2 % (ref 0–0.5)
LACTATE SERPL-SCNC: 1.3 MMOL/L (ref 0.5–2.2)
LYMPHOCYTES # BLD AUTO: 2.5 K/UL (ref 1–4.8)
LYMPHOCYTES NFR BLD: 40.2 % (ref 18–48)
MCH RBC QN AUTO: 28.4 PG (ref 27–31)
MCHC RBC AUTO-ENTMCNC: 32.7 G/DL (ref 32–36)
MCV RBC AUTO: 87 FL (ref 82–98)
MONOCYTES # BLD AUTO: 0.5 K/UL (ref 0.3–1)
MONOCYTES NFR BLD: 8.1 % (ref 4–15)
NEUTROPHILS # BLD AUTO: 3.1 K/UL (ref 1.8–7.7)
NEUTROPHILS NFR BLD: 48.6 % (ref 38–73)
NRBC BLD-RTO: 0 /100 WBC
PLATELET # BLD AUTO: 203 K/UL (ref 150–450)
PMV BLD AUTO: 10.4 FL (ref 9.2–12.9)
POTASSIUM SERPL-SCNC: 5 MMOL/L (ref 3.5–5.1)
PROT SERPL-MCNC: 7 G/DL (ref 6–8.4)
RBC # BLD AUTO: 3.98 M/UL (ref 4–5.4)
SODIUM SERPL-SCNC: 136 MMOL/L (ref 136–145)
WBC # BLD AUTO: 6.29 K/UL (ref 3.9–12.7)

## 2022-02-28 PROCEDURE — 83605 ASSAY OF LACTIC ACID: CPT | Performed by: SURGERY

## 2022-02-28 PROCEDURE — 86140 C-REACTIVE PROTEIN: CPT | Performed by: SURGERY

## 2022-02-28 PROCEDURE — 85025 COMPLETE CBC W/AUTO DIFF WBC: CPT | Performed by: SURGERY

## 2022-02-28 PROCEDURE — 36415 COLL VENOUS BLD VENIPUNCTURE: CPT | Performed by: SURGERY

## 2022-02-28 PROCEDURE — 87040 BLOOD CULTURE FOR BACTERIA: CPT | Performed by: SURGERY

## 2022-02-28 PROCEDURE — 99284 EMERGENCY DEPT VISIT MOD MDM: CPT | Mod: 25

## 2022-02-28 PROCEDURE — 80053 COMPREHEN METABOLIC PANEL: CPT | Performed by: SURGERY

## 2022-02-28 PROCEDURE — 85651 RBC SED RATE NONAUTOMATED: CPT | Performed by: SURGERY

## 2022-03-03 ENCOUNTER — PES CALL (OUTPATIENT)
Dept: ADMINISTRATIVE | Facility: CLINIC | Age: 62
End: 2022-03-03
Payer: MEDICAID

## 2022-03-05 LAB
BACTERIA BLD CULT: NORMAL
BACTERIA BLD CULT: NORMAL

## 2022-03-31 PROBLEM — Z98.890 S/P ORIF (OPEN REDUCTION INTERNAL FIXATION) FRACTURE: Status: ACTIVE | Noted: 2022-03-31

## 2022-03-31 PROBLEM — Z87.81 S/P ORIF (OPEN REDUCTION INTERNAL FIXATION) FRACTURE: Status: ACTIVE | Noted: 2022-03-31

## 2022-05-09 ENCOUNTER — LAB VISIT (OUTPATIENT)
Dept: LAB | Facility: HOSPITAL | Age: 62
End: 2022-05-09
Attending: DERMATOLOGY
Payer: MEDICAID

## 2022-05-09 DIAGNOSIS — B35.1 TINEA UNGUIUM: ICD-10-CM

## 2022-05-09 DIAGNOSIS — L60.3 NAIL DYSTROPHY: ICD-10-CM

## 2022-05-09 DIAGNOSIS — Z79.899 OTHER LONG TERM (CURRENT) DRUG THERAPY: ICD-10-CM

## 2022-05-09 DIAGNOSIS — L21.8 OTHER SEBORRHEIC DERMATITIS: Primary | ICD-10-CM

## 2022-05-09 LAB
ALBUMIN SERPL BCP-MCNC: 3.5 G/DL (ref 3.5–5.2)
ALP SERPL-CCNC: 78 U/L (ref 55–135)
ALT SERPL W/O P-5'-P-CCNC: 7 U/L (ref 10–44)
AST SERPL-CCNC: 13 U/L (ref 10–40)
BASOPHILS # BLD AUTO: 0.04 K/UL (ref 0–0.2)
BASOPHILS NFR BLD: 0.7 % (ref 0–1.9)
BILIRUB DIRECT SERPL-MCNC: 0.2 MG/DL (ref 0.1–0.3)
BILIRUB SERPL-MCNC: 0.3 MG/DL (ref 0.1–1)
DIFFERENTIAL METHOD: ABNORMAL
EOSINOPHIL # BLD AUTO: 0.2 K/UL (ref 0–0.5)
EOSINOPHIL NFR BLD: 2.7 % (ref 0–8)
ERYTHROCYTE [DISTWIDTH] IN BLOOD BY AUTOMATED COUNT: 13.4 % (ref 11.5–14.5)
HCT VFR BLD AUTO: 34.1 % (ref 37–48.5)
HGB BLD-MCNC: 11.2 G/DL (ref 12–16)
IMM GRANULOCYTES # BLD AUTO: 0.02 K/UL (ref 0–0.04)
IMM GRANULOCYTES NFR BLD AUTO: 0.3 % (ref 0–0.5)
LYMPHOCYTES # BLD AUTO: 1.9 K/UL (ref 1–4.8)
LYMPHOCYTES NFR BLD: 33 % (ref 18–48)
MCH RBC QN AUTO: 28.9 PG (ref 27–31)
MCHC RBC AUTO-ENTMCNC: 32.8 G/DL (ref 32–36)
MCV RBC AUTO: 88 FL (ref 82–98)
MONOCYTES # BLD AUTO: 0.5 K/UL (ref 0.3–1)
MONOCYTES NFR BLD: 7.7 % (ref 4–15)
NEUTROPHILS # BLD AUTO: 3.2 K/UL (ref 1.8–7.7)
NEUTROPHILS NFR BLD: 55.6 % (ref 38–73)
NRBC BLD-RTO: 0 /100 WBC
PLATELET # BLD AUTO: 143 K/UL (ref 150–450)
PMV BLD AUTO: 10.3 FL (ref 9.2–12.9)
PROT SERPL-MCNC: 6.7 G/DL (ref 6–8.4)
RBC # BLD AUTO: 3.88 M/UL (ref 4–5.4)
WBC # BLD AUTO: 5.82 K/UL (ref 3.9–12.7)

## 2022-05-09 PROCEDURE — 80076 HEPATIC FUNCTION PANEL: CPT | Performed by: DERMATOLOGY

## 2022-05-09 PROCEDURE — 85025 COMPLETE CBC W/AUTO DIFF WBC: CPT | Performed by: DERMATOLOGY

## 2022-05-09 PROCEDURE — 36415 COLL VENOUS BLD VENIPUNCTURE: CPT | Performed by: DERMATOLOGY

## 2022-05-11 ENCOUNTER — HOSPITAL ENCOUNTER (EMERGENCY)
Facility: HOSPITAL | Age: 62
Discharge: ANOTHER HOSPITAL | End: 2022-05-11
Attending: STUDENT IN AN ORGANIZED HEALTH CARE EDUCATION/TRAINING PROGRAM
Payer: MEDICAID

## 2022-05-11 VITALS
BODY MASS INDEX: 35.33 KG/M2 | TEMPERATURE: 98 F | WEIGHT: 192 LBS | SYSTOLIC BLOOD PRESSURE: 190 MMHG | DIASTOLIC BLOOD PRESSURE: 75 MMHG | HEART RATE: 60 BPM | HEIGHT: 62 IN | RESPIRATION RATE: 18 BRPM | OXYGEN SATURATION: 96 %

## 2022-05-11 DIAGNOSIS — S89.301A DISPLACED PHYSEAL FRACTURE OF DISTAL END OF RIGHT FIBULA, INITIAL ENCOUNTER: Primary | ICD-10-CM

## 2022-05-11 DIAGNOSIS — M25.569 KNEE PAIN: ICD-10-CM

## 2022-05-11 DIAGNOSIS — S82.301A CLOSED FRACTURE OF DISTAL END OF RIGHT TIBIA, UNSPECIFIED FRACTURE MORPHOLOGY, INITIAL ENCOUNTER: ICD-10-CM

## 2022-05-11 DIAGNOSIS — M25.571 RIGHT ANKLE PAIN: ICD-10-CM

## 2022-05-11 DIAGNOSIS — S01.81XA LACERATION OF FOREHEAD, INITIAL ENCOUNTER: ICD-10-CM

## 2022-05-11 DIAGNOSIS — S82.832A CLOSED FRACTURE OF PROXIMAL END OF LEFT FIBULA, UNSPECIFIED FRACTURE MORPHOLOGY, INITIAL ENCOUNTER: ICD-10-CM

## 2022-05-11 DIAGNOSIS — M25.559 HIP PAIN: ICD-10-CM

## 2022-05-11 PROBLEM — S82.409A FIBULA FRACTURE: Status: ACTIVE | Noted: 2022-05-11

## 2022-05-11 PROBLEM — J45.909 ASTHMA: Status: ACTIVE | Noted: 2022-05-11

## 2022-05-11 PROBLEM — S82.851A CLOSED DISPLACED TRIMALLEOLAR FRACTURE OF RIGHT ANKLE: Status: ACTIVE | Noted: 2022-05-11

## 2022-05-11 PROBLEM — D64.9 NORMOCYTIC ANEMIA: Status: ACTIVE | Noted: 2022-05-11

## 2022-05-11 LAB
ABO + RH BLD: ABNORMAL
ALBUMIN SERPL BCP-MCNC: 3.4 G/DL (ref 3.5–5.2)
ALP SERPL-CCNC: 69 U/L (ref 55–135)
ALT SERPL W/O P-5'-P-CCNC: 9 U/L (ref 10–44)
ANION GAP SERPL CALC-SCNC: 10 MMOL/L (ref 8–16)
APTT BLDCRRT: 23.6 SEC (ref 21–32)
AST SERPL-CCNC: 11 U/L (ref 10–40)
BASOPHILS # BLD AUTO: 0.03 K/UL (ref 0–0.2)
BASOPHILS NFR BLD: 0.3 % (ref 0–1.9)
BILIRUB SERPL-MCNC: 0.4 MG/DL (ref 0.1–1)
BLD GP AB SCN CELLS X3 SERPL QL: ABNORMAL
BLOOD GROUP ANTIBODIES SERPL: NORMAL
BUN SERPL-MCNC: 32 MG/DL (ref 6–20)
CALCIUM SERPL-MCNC: 9 MG/DL (ref 8.7–10.5)
CHLORIDE SERPL-SCNC: 103 MMOL/L (ref 95–110)
CO2 SERPL-SCNC: 22 MMOL/L (ref 23–29)
CREAT SERPL-MCNC: 1.8 MG/DL (ref 0.5–1.4)
DIFFERENTIAL METHOD: ABNORMAL
EOSINOPHIL # BLD AUTO: 0.1 K/UL (ref 0–0.5)
EOSINOPHIL NFR BLD: 1.4 % (ref 0–8)
ERYTHROCYTE [DISTWIDTH] IN BLOOD BY AUTOMATED COUNT: 13.5 % (ref 11.5–14.5)
EST. GFR  (AFRICAN AMERICAN): 35 ML/MIN/1.73 M^2
EST. GFR  (NON AFRICAN AMERICAN): 30 ML/MIN/1.73 M^2
GLUCOSE SERPL-MCNC: 414 MG/DL (ref 70–110)
HCT VFR BLD AUTO: 31.4 % (ref 37–48.5)
HGB BLD-MCNC: 10.5 G/DL (ref 12–16)
IMM GRANULOCYTES # BLD AUTO: 0.04 K/UL (ref 0–0.04)
IMM GRANULOCYTES NFR BLD AUTO: 0.5 % (ref 0–0.5)
INR PPP: 1 (ref 0.8–1.2)
LYMPHOCYTES # BLD AUTO: 1.2 K/UL (ref 1–4.8)
LYMPHOCYTES NFR BLD: 14.1 % (ref 18–48)
MCH RBC QN AUTO: 29.4 PG (ref 27–31)
MCHC RBC AUTO-ENTMCNC: 33.4 G/DL (ref 32–36)
MCV RBC AUTO: 88 FL (ref 82–98)
MONOCYTES # BLD AUTO: 0.5 K/UL (ref 0.3–1)
MONOCYTES NFR BLD: 6 % (ref 4–15)
NEUTROPHILS # BLD AUTO: 6.8 K/UL (ref 1.8–7.7)
NEUTROPHILS NFR BLD: 77.7 % (ref 38–73)
NRBC BLD-RTO: 0 /100 WBC
PLATELET # BLD AUTO: 133 K/UL (ref 150–450)
PMV BLD AUTO: 10.9 FL (ref 9.2–12.9)
POCT GLUCOSE: 383 MG/DL (ref 70–110)
POCT GLUCOSE: 429 MG/DL (ref 70–110)
POCT GLUCOSE: 466 MG/DL (ref 70–110)
POTASSIUM SERPL-SCNC: 5 MMOL/L (ref 3.5–5.1)
PROT SERPL-MCNC: 6.4 G/DL (ref 6–8.4)
PROTHROMBIN TIME: 10.4 SEC (ref 9–12.5)
RBC # BLD AUTO: 3.57 M/UL (ref 4–5.4)
SARS-COV-2 RDRP RESP QL NAA+PROBE: NEGATIVE
SODIUM SERPL-SCNC: 135 MMOL/L (ref 136–145)
WBC # BLD AUTO: 8.79 K/UL (ref 3.9–12.7)

## 2022-05-11 PROCEDURE — 96375 TX/PRO/DX INJ NEW DRUG ADDON: CPT

## 2022-05-11 PROCEDURE — 96374 THER/PROPH/DIAG INJ IV PUSH: CPT

## 2022-05-11 PROCEDURE — 63600175 PHARM REV CODE 636 W HCPCS: Performed by: STUDENT IN AN ORGANIZED HEALTH CARE EDUCATION/TRAINING PROGRAM

## 2022-05-11 PROCEDURE — 85730 THROMBOPLASTIN TIME PARTIAL: CPT | Performed by: STUDENT IN AN ORGANIZED HEALTH CARE EDUCATION/TRAINING PROGRAM

## 2022-05-11 PROCEDURE — 82962 GLUCOSE BLOOD TEST: CPT

## 2022-05-11 PROCEDURE — 29515 APPLICATION SHORT LEG SPLINT: CPT | Mod: RT

## 2022-05-11 PROCEDURE — 85610 PROTHROMBIN TIME: CPT | Performed by: STUDENT IN AN ORGANIZED HEALTH CARE EDUCATION/TRAINING PROGRAM

## 2022-05-11 PROCEDURE — 36415 COLL VENOUS BLD VENIPUNCTURE: CPT | Performed by: STUDENT IN AN ORGANIZED HEALTH CARE EDUCATION/TRAINING PROGRAM

## 2022-05-11 PROCEDURE — 25000003 PHARM REV CODE 250: Performed by: STUDENT IN AN ORGANIZED HEALTH CARE EDUCATION/TRAINING PROGRAM

## 2022-05-11 PROCEDURE — U0002 COVID-19 LAB TEST NON-CDC: HCPCS | Performed by: STUDENT IN AN ORGANIZED HEALTH CARE EDUCATION/TRAINING PROGRAM

## 2022-05-11 PROCEDURE — 86870 RBC ANTIBODY IDENTIFICATION: CPT | Performed by: STUDENT IN AN ORGANIZED HEALTH CARE EDUCATION/TRAINING PROGRAM

## 2022-05-11 PROCEDURE — 99285 EMERGENCY DEPT VISIT HI MDM: CPT | Mod: 25

## 2022-05-11 PROCEDURE — 86901 BLOOD TYPING SEROLOGIC RH(D): CPT | Performed by: STUDENT IN AN ORGANIZED HEALTH CARE EDUCATION/TRAINING PROGRAM

## 2022-05-11 PROCEDURE — 96361 HYDRATE IV INFUSION ADD-ON: CPT

## 2022-05-11 PROCEDURE — 80053 COMPREHEN METABOLIC PANEL: CPT | Performed by: STUDENT IN AN ORGANIZED HEALTH CARE EDUCATION/TRAINING PROGRAM

## 2022-05-11 PROCEDURE — 85025 COMPLETE CBC W/AUTO DIFF WBC: CPT | Performed by: STUDENT IN AN ORGANIZED HEALTH CARE EDUCATION/TRAINING PROGRAM

## 2022-05-11 PROCEDURE — 12011 RPR F/E/E/N/L/M 2.5 CM/<: CPT | Mod: 59

## 2022-05-11 RX ORDER — OXYCODONE AND ACETAMINOPHEN 5; 325 MG/1; MG/1
1 TABLET ORAL
Status: COMPLETED | OUTPATIENT
Start: 2022-05-11 | End: 2022-05-11

## 2022-05-11 RX ORDER — ONDANSETRON 2 MG/ML
4 INJECTION INTRAMUSCULAR; INTRAVENOUS
Status: COMPLETED | OUTPATIENT
Start: 2022-05-11 | End: 2022-05-11

## 2022-05-11 RX ORDER — LIDOCAINE HYDROCHLORIDE 10 MG/ML
5 INJECTION, SOLUTION EPIDURAL; INFILTRATION; INTRACAUDAL; PERINEURAL
Status: COMPLETED | OUTPATIENT
Start: 2022-05-11 | End: 2022-05-11

## 2022-05-11 RX ADMIN — INSULIN HUMAN 6 UNITS: 100 INJECTION, SOLUTION PARENTERAL at 05:05

## 2022-05-11 RX ADMIN — ONDANSETRON HYDROCHLORIDE 4 MG: 2 SOLUTION INTRAMUSCULAR; INTRAVENOUS at 05:05

## 2022-05-11 RX ADMIN — OXYCODONE HYDROCHLORIDE AND ACETAMINOPHEN 1 TABLET: 5; 325 TABLET ORAL at 05:05

## 2022-05-11 RX ADMIN — LIDOCAINE HYDROCHLORIDE 50 MG: 10 INJECTION, SOLUTION EPIDURAL; INFILTRATION; INTRACAUDAL; PERINEURAL at 04:05

## 2022-05-11 RX ADMIN — SODIUM CHLORIDE 1000 ML: 0.9 INJECTION, SOLUTION INTRAVENOUS at 05:05

## 2022-05-11 NOTE — ED PROVIDER NOTES
Encounter Date: 5/11/2022       History     Chief Complaint   Patient presents with    Fall     Pt fell in camper.  C/c of right foot pain and forehead pain.  Abr/bruising noted to right orbital. Pt has bandage applied to orbital area prior to arrival.  Pt placed walking boot on right foot due to accident. Unable to exam foot in triage.       61-year-old female with history of CAD, hypertension, hyperlipidemia, GERD, recent left ankle fracture, presenting after mechanical fall in her home.  Patient reports she was walking to the bathroom, twisted her right ankle, and hit her head on a cabinet.  Patient denies any loss of consciousness.  No numbness or weakness.  Patient unable to ambulate on her right angle, but states she is able to dorsiflex and plantarflex..        Review of patient's allergies indicates:   Allergen Reactions    Codeine Nausea Only    Iodine Itching    Pcn [penicillins] Other (See Comments)     Headache and nausea with PO PCN     Sulfa (sulfonamide antibiotics)     Oxycontin [oxycodone] Nausea And Vomiting     Past Medical History:   Diagnosis Date    Asthma     Coronary artery disease     Diabetes mellitus     Dyslipidemia     GERD (gastroesophageal reflux disease)     Hypertension     Neuropathy      Past Surgical History:   Procedure Laterality Date    ANKLE HARDWARE REMOVAL Left 3/31/2022    Procedure: REMOVAL, HARDWARE, ANKLE;  Surgeon: Alec Pettit MD;  Location: [facility];  Service: Orthopedics;  Laterality: Left;    APPLICATION OF LARGE EXTERNAL FIXATION DEVICE TO TIBIA Left 11/05/2021    Procedure: APPLICATION, EXTERNAL FIXATION DEVICE, LARGE, TIBIA;  Surgeon: Bk Richter MD;  Location: [facility];  Service: Orthopedics;  Laterality: Left;    cardiac stents      thinks 3 total (1st 2 was in 2009 and later 2015 or so)    CHOLECYSTECTOMY      FEMORAL BYPASS      HYSTERECTOMY      OPEN REDUCTION AND INTERNAL FIXATION (ORIF) OF PILON FRACTURE Left 11/12/2021     Procedure: ORIF, FRACTURE, PILON;  Surgeon: Alec Pettit MD;  Location: [facility];  Service: Orthopedics;  Laterality: Left;    REMOVAL OF EXTERNAL FIXATION DEVICE Left 2021    Procedure: REMOVAL, EXTERNAL FIXATION DEVICE;  Surgeon: Alec Pettit MD;  Location: [facility];  Service: Orthopedics;  Laterality: Left;    stents to kidney       Family History   Problem Relation Age of Onset    Breast cancer Sister     Cancer Maternal Aunt     Heart disease Mother     Colon cancer Neg Hx     Ovarian cancer Neg Hx      Social History     Tobacco Use    Smoking status: Former Smoker     Types: Cigarettes     Quit date: 11/15/2008     Years since quittin.4    Smokeless tobacco: Never Used   Substance Use Topics    Alcohol use: No    Drug use: No     Review of Systems   Constitutional: Negative for fever.   HENT: Negative for sore throat.         Laceration to right forehead   Respiratory: Negative for shortness of breath.    Cardiovascular: Negative for chest pain.   Gastrointestinal: Negative for nausea.   Genitourinary: Negative for dysuria.   Musculoskeletal: Negative for back pain.        Right ankle pain   Skin: Negative for rash.   Neurological: Negative for weakness.   Hematological: Does not bruise/bleed easily.       Physical Exam     Initial Vitals   BP Pulse Resp Temp SpO2   22 0358 22 0358 22 0358 22 0359 22 0358   (!) 199/91 76 18 98.3 °F (36.8 °C) 98 %      MAP       --                Physical Exam    Nursing note and vitals reviewed.  Constitutional: She appears well-developed.   HENT:   Head: Normocephalic.   Eyes: Pupils are equal, round, and reactive to light.   Neck:   Normal range of motion.  Cardiovascular:   No murmur heard.  Pulmonary/Chest: No respiratory distress.   Abdominal: Abdomen is soft.   Musculoskeletal:         General: No edema.      Cervical back: Normal range of motion.      Comments: Swelling to right ankle with limited range of motion  with inversion and eversion.  There is superficial abrasion to the medial malleolus.  Sensation fully intact, cap refill within normal limits.    Moving all extremities. No pain with palpation to bilateral shoulders, elbows, wrists, hips, knees. No midline tenderness.      Neurological: She is alert.   Skin: Skin is warm.   Psychiatric: She has a normal mood and affect.         ED Course   Procedures  Labs Reviewed   CBC W/ AUTO DIFFERENTIAL - Abnormal; Notable for the following components:       Result Value    RBC 3.57 (*)     Hemoglobin 10.5 (*)     Hematocrit 31.4 (*)     Platelets 133 (*)     Gran % 77.7 (*)     Lymph % 14.1 (*)     All other components within normal limits   COMPREHENSIVE METABOLIC PANEL - Abnormal; Notable for the following components:    Sodium 135 (*)     CO2 22 (*)     Glucose 414 (*)     BUN 32 (*)     Creatinine 1.8 (*)     Albumin 3.4 (*)     ALT 9 (*)     eGFR if  35 (*)     eGFR if non  30 (*)     All other components within normal limits   POCT GLUCOSE - Abnormal; Notable for the following components:    POCT Glucose 466 (*)     All other components within normal limits   POCT GLUCOSE - Abnormal; Notable for the following components:    POCT Glucose 429 (*)     All other components within normal limits   POCT GLUCOSE - Abnormal; Notable for the following components:    POCT Glucose 383 (*)     All other components within normal limits   APTT   PROTIME-INR   SARS-COV-2 RNA AMPLIFICATION, QUAL   TYPE & SCREEN   POCT GLUCOSE MONITORING CONTINUOUS          Imaging Results          CT Head Without Contrast (In process)                X-Ray Ankle Complete Right (In process)                  Medications   LIDOcaine (PF) 10 mg/ml (1%) injection 50 mg (50 mg Infiltration Given 5/11/22 0420)   oxyCODONE-acetaminophen 5-325 mg per tablet 1 tablet (1 tablet Oral Given 5/11/22 0544)   ondansetron injection 4 mg (4 mg Intravenous Given 5/11/22 0544)   insulin  regular injection 6 Units (6 Units Intravenous Given 5/11/22 0552)   sodium chloride 0.9% bolus 1,000 mL (1,000 mLs Intravenous New Bag 5/11/22 0558)     Medical Decision Making:   Differential Diagnosis:   DDX:  Patient presenting with head trauma, and right ankle pain after mechanical fall.  Given patient age, and mechanism, will image head to rule out intracranial hemorrhage or fracture.  Do not suspect cervical injury or central cord syndrome given exam.  Right ankle concern for possible fracture, will x-ray.  Exam suggests limb is neurovascularly intact.  DX:  CT head, x-ray  TX:  Analgesia PRN  Dispo:  Pending workup.               ED Course as of 05/11/22 0714   Wed May 11, 2022   0432 Trauma fracture.  Pulse easily dopplerable. [NB]   0522 CTH no acute abnormalities.  [NB]   0710 Signed out to Dr Mahnaz Matt at 6am. Neurovascularly intact. Pending XR read and ortho consult.  [NB]      ED Course User Index  [NB] Shaquille Boyd MD             Clinical Impression:   Final diagnoses:  [M25.571] Right ankle pain  [S89.301A] Displaced physeal fracture of distal end of right fibula, initial encounter (Primary)  [S01.81XA] Laceration of forehead, initial encounter          ED Disposition Condition    Transfer to Another Facility Stable              Shaquille Boyd MD  05/11/22 0411       Shaquille Boyd MD  05/11/22 0714

## 2022-05-11 NOTE — PROVIDER PROGRESS NOTES - EMERGENCY DEPT.
Encounter Date: 5/11/2022    ED Physician Progress Notes               Lac Repair    Date/Time: 5/11/2022 5:07 AM  Performed by: Shaquille Boyd MD  Authorized by: Shaquille Boyd MD     Consent:     Consent obtained:  Verbal    Risks discussed:  Infection and pain    Alternatives discussed:  No treatment and delayed treatment  Universal protocol:     Procedure explained and questions answered to patient or proxy's satisfaction: yes    Laceration details:     Location:  Face    Face location:  R eyebrow    Length (cm):  2  Pre-procedure details:     Preparation:  Patient was prepped and draped in usual sterile fashion  Treatment:     Amount of cleaning:  Standard    Irrigation solution:  Sterile water    Irrigation method:  Syringe  Skin repair:     Repair method:  Sutures    Suture size:  5-0    Suture material:  Nylon    Suture technique:  Simple interrupted    Number of sutures:  2  Approximation:     Approximation:  Close  Post-procedure details:     Dressing:  Open (no dressing)    Procedure completion:  Tolerated well, no immediate complications

## 2022-05-11 NOTE — ED NOTES
Pt resting comfortably, right leg elevated. Pt reports no pain at this time. NAD. Family at bedside. Pt updated on plan of care and transfer status.

## 2022-05-11 NOTE — ED PROVIDER NOTES
Encounter Date: 5/11/2022       History     Chief Complaint   Patient presents with    Fall     Pt fell in camper.  C/c of right foot pain and forehead pain.  Abr/bruising noted to right orbital. Pt has bandage applied to orbital area prior to arrival.  Pt placed walking boot on right foot due to accident. Unable to exam foot in triage.       HPI  Review of patient's allergies indicates:   Allergen Reactions    Codeine Nausea Only    Iodine Itching    Pcn [penicillins] Other (See Comments)     Headache and nausea with PO PCN     Sulfa (sulfonamide antibiotics)     Oxycontin [oxycodone] Nausea And Vomiting     Past Medical History:   Diagnosis Date    Asthma     Coronary artery disease     Diabetes mellitus     Dyslipidemia     GERD (gastroesophageal reflux disease)     Hypertension     Neuropathy      Past Surgical History:   Procedure Laterality Date    ANKLE HARDWARE REMOVAL Left 3/31/2022    Procedure: REMOVAL, HARDWARE, ANKLE;  Surgeon: Alec Pettit MD;  Location: [facility];  Service: Orthopedics;  Laterality: Left;    APPLICATION OF LARGE EXTERNAL FIXATION DEVICE TO TIBIA Left 11/05/2021    Procedure: APPLICATION, EXTERNAL FIXATION DEVICE, LARGE, TIBIA;  Surgeon: Bk Richter MD;  Location: [facility];  Service: Orthopedics;  Laterality: Left;    cardiac stents      thinks 3 total (1st 2 was in 2009 and later 2015 or so)    CHOLECYSTECTOMY      FEMORAL BYPASS      HYSTERECTOMY      OPEN REDUCTION AND INTERNAL FIXATION (ORIF) OF PILON FRACTURE Left 11/12/2021    Procedure: ORIF, FRACTURE, PILON;  Surgeon: Alec Pettit MD;  Location: [facility];  Service: Orthopedics;  Laterality: Left;    REMOVAL OF EXTERNAL FIXATION DEVICE Left 11/12/2021    Procedure: REMOVAL, EXTERNAL FIXATION DEVICE;  Surgeon: Alec Pettit MD;  Location: [facility];  Service: Orthopedics;  Laterality: Left;    stents to kidney       Family History   Problem Relation Age of Onset    Breast cancer Sister     Cancer  Maternal Aunt     Heart disease Mother     Colon cancer Neg Hx     Ovarian cancer Neg Hx      Social History     Tobacco Use    Smoking status: Former Smoker     Types: Cigarettes     Quit date: 11/15/2008     Years since quittin.4    Smokeless tobacco: Never Used   Substance Use Topics    Alcohol use: No    Drug use: No     Review of Systems    Physical Exam     Initial Vitals   BP Pulse Resp Temp SpO2   22 0358 22 0358 22 0358 22 0359 22 035   (!) 199/91 76 18 98.3 °F (36.8 °C) 98 %      MAP       --                Physical Exam    ED Course   Procedures  Labs Reviewed   CBC W/ AUTO DIFFERENTIAL - Abnormal; Notable for the following components:       Result Value    RBC 3.57 (*)     Hemoglobin 10.5 (*)     Hematocrit 31.4 (*)     Platelets 133 (*)     Gran % 77.7 (*)     Lymph % 14.1 (*)     All other components within normal limits   COMPREHENSIVE METABOLIC PANEL - Abnormal; Notable for the following components:    Sodium 135 (*)     CO2 22 (*)     Glucose 414 (*)     BUN 32 (*)     Creatinine 1.8 (*)     Albumin 3.4 (*)     ALT 9 (*)     eGFR if  35 (*)     eGFR if non  30 (*)     All other components within normal limits   POCT GLUCOSE - Abnormal; Notable for the following components:    POCT Glucose 466 (*)     All other components within normal limits   POCT GLUCOSE - Abnormal; Notable for the following components:    POCT Glucose 429 (*)     All other components within normal limits   POCT GLUCOSE - Abnormal; Notable for the following components:    POCT Glucose 383 (*)     All other components within normal limits   APTT   PROTIME-INR   SARS-COV-2 RNA AMPLIFICATION, QUAL   TYPE & SCREEN   POCT GLUCOSE MONITORING CONTINUOUS          Imaging Results          CT Head Without Contrast (In process)                X-Ray Ankle Complete Right (In process)                  Medications   LIDOcaine (PF) 10 mg/ml (1%) injection 50 mg (50 mg  Infiltration Given 5/11/22 0420)   oxyCODONE-acetaminophen 5-325 mg per tablet 1 tablet (1 tablet Oral Given 5/11/22 0544)   ondansetron injection 4 mg (4 mg Intravenous Given 5/11/22 0544)   insulin regular injection 6 Units (6 Units Intravenous Given 5/11/22 0552)   sodium chloride 0.9% bolus 1,000 mL (1,000 mLs Intravenous New Bag 5/11/22 0558)                 ED Course as of 05/11/22 0715   Wed May 11, 2022   0432 Trauma fracture.  Pulse easily dopplerable. [NB]   0522 CTH no acute abnormalities.  [NB]   0710 Signed out to Dr Mahnaz Matt at 6am. Neurovascularly intact. Pending XR read and ortho consult.  [NB]      ED Course User Index  [NB] Shaquille Boyd MD             Clinical Impression:   Final diagnoses:  [M25.571] Right ankle pain  [S89.301A] Displaced physeal fracture of distal end of right fibula, initial encounter (Primary)  [S01.81XA] Laceration of forehead, initial encounter  [S82.301A] Closed fracture of distal end of right tibia, unspecified fracture morphology, initial encounter      proximal fibular fracture, left, nondisplaced     ED Disposition Condition    Transfer to Another Facility Stable         NOTE FROM STEPHANIE:    1. Patient transferred from Dr Boyd at 0600 awaiting [facility] read of Xray and transfer  2. Per [facility] patient has a distal diaphysis fracture of the fibula, a minimally displaced oblique fracture through the distal tibial diaphysis, and concern for cortical abnormality at the medial malleolus.  Given concern for trimalleolar fracture we will transfer the patient for orthopedic evaluation  3. I re-examined the patient prior to transfer. Patient now complains of left hip pain and left knee pain as well.  I will image these areas although my clinical exam finds no objective abnormalities.  Patient's right leg is neurovascularly intact distally.  She does have some numbness to fine touch over the dorsal aspect of her foot.  She is able wiggle her toes and move her ankles.   She can feel pressure over the foot and ankle.  She also has palpable pedal pulses.  4.  My interpretation of Xray of hip and knee on the left shows no acute fracture. I applied a posterior lower extremity splint. Applied and checked by myself.  Will proceed with transfer.   5. Radiology report received and nondisplaced proximal fibular fracture noted to left lower extremity.  Patient placed in a long-leg splint.  Transfer center notified by nursing to update the receiving facility.    Diagnosis: as above  Condition: stable  Dispo: transfer     Mahnaz Matt MD  05/11/22 0919

## 2022-05-20 NOTE — PROVIDER PROGRESS NOTES - EMERGENCY DEPT.
Encounter Date: 5/11/2022    ED Physician Progress Notes           Lac Repair    Date/Time: 5/20/2022 6:06 AM  Performed by: Shaquille Boyd MD  Authorized by: Shaquille Boyd MD     Consent:     Consent obtained:  Verbal    Consent given by:  Patient    Risks discussed:  Infection and pain    Alternatives discussed:  No treatment and delayed treatment  Universal protocol:     Procedure explained and questions answered to patient or proxy's satisfaction: yes    Anesthesia:     Anesthesia method:  Local infiltration    Local anesthetic:  Lidocaine 1% w/o epi  Laceration details:     Location:  Scalp    Scalp location:  Frontal    Length (cm):  2  Pre-procedure details:     Preparation:  Patient was prepped and draped in usual sterile fashion  Exploration:     Limited defect created (wound extended): no    Treatment:     Amount of cleaning:  Standard    Irrigation solution:  Sterile water    Irrigation method:  Syringe    Debridement:  None    Undermining:  None  Skin repair:     Repair method:  Sutures    Suture size:  5-0    Suture material:  Nylon    Suture technique:  Simple interrupted    Number of sutures:  4  Approximation:     Approximation:  Close  Repair type:     Repair type:  Simple  Post-procedure details:     Dressing:  Antibiotic ointment    Procedure completion:  Tolerated well, no immediate complications

## 2022-05-23 PROBLEM — E87.1 HYPONATREMIA: Status: ACTIVE | Noted: 2022-05-23

## 2022-05-27 ENCOUNTER — PATIENT OUTREACH (OUTPATIENT)
Dept: ADMINISTRATIVE | Facility: CLINIC | Age: 62
End: 2022-05-27
Payer: MEDICAID

## 2022-05-27 NOTE — PROGRESS NOTES
.    C3 nurse spoke with Merna Regalado  for a TCC post hospital discharge follow up call. The patient does not have a scheduled HOSFU appointment with Bianca Godoy MD  within 5-7  days post hospital discharge date 06/26/2022. C3 nurse was unable to schedule HOSFU appointment in [facility]. Non [facility] PCP, out of NP area

## 2022-05-28 ENCOUNTER — HOSPITAL ENCOUNTER (INPATIENT)
Facility: HOSPITAL | Age: 62
LOS: 2 days | Discharge: HOME | DRG: 291 | End: 2022-05-30
Attending: SURGERY | Admitting: INTERNAL MEDICINE
Payer: MEDICAID

## 2022-05-28 DIAGNOSIS — I25.10 CARDIOVASCULAR DISEASE: ICD-10-CM

## 2022-05-28 DIAGNOSIS — R06.02 SOB (SHORTNESS OF BREATH): ICD-10-CM

## 2022-05-28 DIAGNOSIS — I50.9 CONGESTIVE HEART FAILURE, UNSPECIFIED HF CHRONICITY, UNSPECIFIED HEART FAILURE TYPE: Primary | ICD-10-CM

## 2022-05-28 DIAGNOSIS — I50.9 CHF (CONGESTIVE HEART FAILURE): ICD-10-CM

## 2022-05-28 LAB
ALBUMIN SERPL BCP-MCNC: 2.9 G/DL (ref 3.5–5.2)
ALLENS TEST: ABNORMAL
ALP SERPL-CCNC: 105 U/L (ref 55–135)
ALT SERPL W/O P-5'-P-CCNC: 9 U/L (ref 10–44)
ANION GAP SERPL CALC-SCNC: 12 MMOL/L (ref 8–16)
AST SERPL-CCNC: 13 U/L (ref 10–40)
BASOPHILS # BLD AUTO: 0.05 K/UL (ref 0–0.2)
BASOPHILS NFR BLD: 0.6 % (ref 0–1.9)
BILIRUB SERPL-MCNC: 0.7 MG/DL (ref 0.1–1)
BILIRUB UR QL STRIP: NEGATIVE
BNP SERPL-MCNC: 872 PG/ML (ref 0–99)
BUN SERPL-MCNC: 17 MG/DL (ref 6–20)
CALCIUM SERPL-MCNC: 8.9 MG/DL (ref 8.7–10.5)
CHLORIDE SERPL-SCNC: 104 MMOL/L (ref 95–110)
CK MB SERPL-MCNC: 1.5 NG/ML (ref 0.1–6.5)
CK MB SERPL-MCNC: 1.7 NG/ML (ref 0.1–6.5)
CK MB SERPL-RTO: 1.9 % (ref 0–5)
CK MB SERPL-RTO: 2.2 % (ref 0–5)
CK SERPL-CCNC: 79 U/L (ref 20–180)
CK SERPL-CCNC: 79 U/L (ref 20–180)
CK SERPL-CCNC: 81 U/L (ref 20–180)
CK SERPL-CCNC: 81 U/L (ref 20–180)
CLARITY UR: CLEAR
CO2 SERPL-SCNC: 19 MMOL/L (ref 23–29)
COLOR UR: YELLOW
CREAT SERPL-MCNC: 1.1 MG/DL (ref 0.5–1.4)
D DIMER PPP IA.FEU-MCNC: 3.57 MG/L FEU
DELSYS: ABNORMAL
DIFFERENTIAL METHOD: ABNORMAL
EOSINOPHIL # BLD AUTO: 0.2 K/UL (ref 0–0.5)
EOSINOPHIL NFR BLD: 2.2 % (ref 0–8)
ERYTHROCYTE [DISTWIDTH] IN BLOOD BY AUTOMATED COUNT: 15.5 % (ref 11.5–14.5)
EST. GFR  (AFRICAN AMERICAN): >60 ML/MIN/1.73 M^2
EST. GFR  (NON AFRICAN AMERICAN): 55 ML/MIN/1.73 M^2
FIO2: 35 (ref 21–100)
GLUCOSE SERPL-MCNC: 252 MG/DL (ref 70–110)
GLUCOSE UR QL STRIP: NEGATIVE
HCO3 UR-SCNC: 21.1 MMOL/L
HCT VFR BLD AUTO: 31 % (ref 37–48.5)
HGB BLD-MCNC: 9.9 G/DL (ref 12–16)
HGB UR QL STRIP: ABNORMAL
IMM GRANULOCYTES # BLD AUTO: 0.04 K/UL (ref 0–0.04)
IMM GRANULOCYTES NFR BLD AUTO: 0.5 % (ref 0–0.5)
INFLUENZA A, MOLECULAR: NEGATIVE
INFLUENZA B, MOLECULAR: NEGATIVE
KETONES UR QL STRIP: NEGATIVE
LACTATE SERPL-SCNC: 1.3 MMOL/L (ref 0.5–2.2)
LEUKOCYTE ESTERASE UR QL STRIP: NEGATIVE
LYMPHOCYTES # BLD AUTO: 1.7 K/UL (ref 1–4.8)
LYMPHOCYTES NFR BLD: 21.6 % (ref 18–48)
MCH RBC QN AUTO: 28.9 PG (ref 27–31)
MCHC RBC AUTO-ENTMCNC: 31.9 G/DL (ref 32–36)
MCV RBC AUTO: 90 FL (ref 82–98)
MONOCYTES # BLD AUTO: 0.6 K/UL (ref 0.3–1)
MONOCYTES NFR BLD: 7.9 % (ref 4–15)
NEUTROPHILS # BLD AUTO: 5.3 K/UL (ref 1.8–7.7)
NEUTROPHILS NFR BLD: 67.2 % (ref 38–73)
NITRITE UR QL STRIP: NEGATIVE
NRBC BLD-RTO: 0 /100 WBC
PCO2 BLDA: 41 MMHG (ref 35–45)
PH SMN: 7.32 [PH] (ref 7.35–7.45)
PH UR STRIP: 6 [PH] (ref 5–8)
PLATELET # BLD AUTO: 278 K/UL (ref 150–450)
PMV BLD AUTO: 9.3 FL (ref 9.2–12.9)
PO2 BLDA: 87 MMHG (ref 75–100)
POC BE: -4.7 MMOL/L (ref -2–2)
POC COHB: 2.1 % (ref 0–3)
POC METHB: 1 % (ref 0–1.5)
POC O2HB ARTERIAL: 94.1 % (ref 94–100)
POC SATURATED O2: 97.2 % (ref 90–100)
POC TCO2: 22.4 MMOL/L
POC THB: 9.7 G/DL (ref 12–18)
POCT GLUCOSE: 349 MG/DL (ref 70–110)
POCT GLUCOSE: 401 MG/DL (ref 70–110)
POTASSIUM SERPL-SCNC: 5.4 MMOL/L (ref 3.5–5.1)
PROCALCITONIN SERPL IA-MCNC: 0.04 NG/ML
PROT SERPL-MCNC: 6.9 G/DL (ref 6–8.4)
PROT UR QL STRIP: NEGATIVE
RBC # BLD AUTO: 3.43 M/UL (ref 4–5.4)
SARS-COV-2 RDRP RESP QL NAA+PROBE: NEGATIVE
SITE: ABNORMAL
SODIUM SERPL-SCNC: 135 MMOL/L (ref 136–145)
SP GR UR STRIP: 1.01 (ref 1–1.03)
SPECIMEN SOURCE: NORMAL
TROPONIN I SERPL DL<=0.01 NG/ML-MCNC: 0.12 NG/ML (ref 0–0.03)
TROPONIN I SERPL DL<=0.01 NG/ML-MCNC: 0.12 NG/ML (ref 0–0.03)
TROPONIN I SERPL DL<=0.01 NG/ML-MCNC: 0.14 NG/ML (ref 0–0.03)
URN SPEC COLLECT METH UR: ABNORMAL
UROBILINOGEN UR STRIP-ACNC: NEGATIVE EU/DL
WBC # BLD AUTO: 7.87 K/UL (ref 3.9–12.7)

## 2022-05-28 PROCEDURE — 85025 COMPLETE CBC W/AUTO DIFF WBC: CPT | Performed by: SURGERY

## 2022-05-28 PROCEDURE — 27000221 HC OXYGEN, UP TO 24 HOURS

## 2022-05-28 PROCEDURE — 96372 THER/PROPH/DIAG INJ SC/IM: CPT | Performed by: SURGERY

## 2022-05-28 PROCEDURE — 93010 ELECTROCARDIOGRAM REPORT: CPT | Mod: ,,, | Performed by: INTERNAL MEDICINE

## 2022-05-28 PROCEDURE — G0378 HOSPITAL OBSERVATION PER HR: HCPCS

## 2022-05-28 PROCEDURE — 83880 ASSAY OF NATRIURETIC PEPTIDE: CPT | Performed by: SURGERY

## 2022-05-28 PROCEDURE — 25000003 PHARM REV CODE 250: Performed by: SURGERY

## 2022-05-28 PROCEDURE — 25000242 PHARM REV CODE 250 ALT 637 W/ HCPCS: Performed by: SURGERY

## 2022-05-28 PROCEDURE — 82803 BLOOD GASES ANY COMBINATION: CPT | Performed by: SURGERY

## 2022-05-28 PROCEDURE — C9399 UNCLASSIFIED DRUGS OR BIOLOG: HCPCS | Performed by: SURGERY

## 2022-05-28 PROCEDURE — 99291 CRITICAL CARE FIRST HOUR: CPT | Mod: 25

## 2022-05-28 PROCEDURE — 63600175 PHARM REV CODE 636 W HCPCS: Performed by: SURGERY

## 2022-05-28 PROCEDURE — 84145 PROCALCITONIN (PCT): CPT | Performed by: SURGERY

## 2022-05-28 PROCEDURE — 87502 INFLUENZA DNA AMP PROBE: CPT | Performed by: SURGERY

## 2022-05-28 PROCEDURE — 99900035 HC TECH TIME PER 15 MIN (STAT)

## 2022-05-28 PROCEDURE — 83605 ASSAY OF LACTIC ACID: CPT | Performed by: SURGERY

## 2022-05-28 PROCEDURE — 85379 FIBRIN DEGRADATION QUANT: CPT | Performed by: SURGERY

## 2022-05-28 PROCEDURE — 94761 N-INVAS EAR/PLS OXIMETRY MLT: CPT

## 2022-05-28 PROCEDURE — 94640 AIRWAY INHALATION TREATMENT: CPT

## 2022-05-28 PROCEDURE — 96374 THER/PROPH/DIAG INJ IV PUSH: CPT | Mod: 59

## 2022-05-28 PROCEDURE — 36600 WITHDRAWAL OF ARTERIAL BLOOD: CPT

## 2022-05-28 PROCEDURE — 81003 URINALYSIS AUTO W/O SCOPE: CPT | Performed by: SURGERY

## 2022-05-28 PROCEDURE — U0002 COVID-19 LAB TEST NON-CDC: HCPCS | Performed by: SURGERY

## 2022-05-28 PROCEDURE — 80053 COMPREHEN METABOLIC PANEL: CPT | Performed by: SURGERY

## 2022-05-28 PROCEDURE — 25500020 PHARM REV CODE 255: Performed by: SURGERY

## 2022-05-28 PROCEDURE — S0073 INJECTION, AZTREONAM, 500 MG: HCPCS | Performed by: SURGERY

## 2022-05-28 PROCEDURE — 96375 TX/PRO/DX INJ NEW DRUG ADDON: CPT

## 2022-05-28 PROCEDURE — 96367 TX/PROPH/DG ADDL SEQ IV INF: CPT

## 2022-05-28 PROCEDURE — 94660 CPAP INITIATION&MGMT: CPT

## 2022-05-28 PROCEDURE — 93005 ELECTROCARDIOGRAM TRACING: CPT

## 2022-05-28 PROCEDURE — 36415 COLL VENOUS BLD VENIPUNCTURE: CPT | Performed by: SURGERY

## 2022-05-28 PROCEDURE — 27000190 HC CPAP FULL FACE MASK W/VALVE

## 2022-05-28 PROCEDURE — 93010 EKG 12-LEAD: ICD-10-PCS | Mod: ,,, | Performed by: INTERNAL MEDICINE

## 2022-05-28 PROCEDURE — 11000001 HC ACUTE MED/SURG PRIVATE ROOM

## 2022-05-28 PROCEDURE — 84484 ASSAY OF TROPONIN QUANT: CPT | Mod: 91 | Performed by: SURGERY

## 2022-05-28 PROCEDURE — 82553 CREATINE MB FRACTION: CPT | Performed by: SURGERY

## 2022-05-28 PROCEDURE — 87040 BLOOD CULTURE FOR BACTERIA: CPT | Performed by: SURGERY

## 2022-05-28 PROCEDURE — 96365 THER/PROPH/DIAG IV INF INIT: CPT

## 2022-05-28 RX ORDER — LEVOTHYROXINE SODIUM 88 UG/1
88 TABLET ORAL
Status: DISCONTINUED | OUTPATIENT
Start: 2022-05-29 | End: 2022-05-30 | Stop reason: HOSPADM

## 2022-05-28 RX ORDER — NIFEDIPINE 30 MG/1
30 TABLET, EXTENDED RELEASE ORAL DAILY
Status: DISCONTINUED | OUTPATIENT
Start: 2022-05-29 | End: 2022-05-30 | Stop reason: HOSPADM

## 2022-05-28 RX ORDER — IBUPROFEN 200 MG
16 TABLET ORAL
Status: DISCONTINUED | OUTPATIENT
Start: 2022-05-28 | End: 2022-05-30 | Stop reason: HOSPADM

## 2022-05-28 RX ORDER — ISOSORBIDE MONONITRATE 30 MG/1
30 TABLET, EXTENDED RELEASE ORAL DAILY
Status: DISCONTINUED | OUTPATIENT
Start: 2022-05-29 | End: 2022-05-30 | Stop reason: HOSPADM

## 2022-05-28 RX ORDER — SODIUM CHLORIDE 0.9 % (FLUSH) 0.9 %
10 SYRINGE (ML) INJECTION
Status: DISCONTINUED | OUTPATIENT
Start: 2022-05-28 | End: 2022-05-30 | Stop reason: HOSPADM

## 2022-05-28 RX ORDER — ATORVASTATIN CALCIUM 80 MG/1
80 TABLET, FILM COATED ORAL NIGHTLY
Status: DISCONTINUED | OUTPATIENT
Start: 2022-05-28 | End: 2022-05-30 | Stop reason: HOSPADM

## 2022-05-28 RX ORDER — CARVEDILOL 25 MG/1
25 TABLET ORAL 2 TIMES DAILY WITH MEALS
Status: DISCONTINUED | OUTPATIENT
Start: 2022-05-28 | End: 2022-05-30

## 2022-05-28 RX ORDER — ASPIRIN 325 MG
325 TABLET ORAL
Status: COMPLETED | OUTPATIENT
Start: 2022-05-28 | End: 2022-05-28

## 2022-05-28 RX ORDER — INSULIN ASPART 100 [IU]/ML
0-5 INJECTION, SOLUTION INTRAVENOUS; SUBCUTANEOUS
Status: DISCONTINUED | OUTPATIENT
Start: 2022-05-28 | End: 2022-05-30 | Stop reason: HOSPADM

## 2022-05-28 RX ORDER — DIPHENHYDRAMINE HYDROCHLORIDE 50 MG/ML
50 INJECTION INTRAMUSCULAR; INTRAVENOUS
Status: COMPLETED | OUTPATIENT
Start: 2022-05-28 | End: 2022-05-28

## 2022-05-28 RX ORDER — VANCOMYCIN 2 GRAM/500 ML IN 0.9 % SODIUM CHLORIDE INTRAVENOUS
2000
Status: DISCONTINUED | OUTPATIENT
Start: 2022-05-29 | End: 2022-05-29

## 2022-05-28 RX ORDER — ONDANSETRON 2 MG/ML
4 INJECTION INTRAMUSCULAR; INTRAVENOUS EVERY 8 HOURS PRN
Status: DISCONTINUED | OUTPATIENT
Start: 2022-05-28 | End: 2022-05-30 | Stop reason: HOSPADM

## 2022-05-28 RX ORDER — ACETAMINOPHEN 325 MG/1
650 TABLET ORAL EVERY 8 HOURS PRN
Status: DISCONTINUED | OUTPATIENT
Start: 2022-05-28 | End: 2022-05-30 | Stop reason: HOSPADM

## 2022-05-28 RX ORDER — TALC
6 POWDER (GRAM) TOPICAL NIGHTLY PRN
Status: DISCONTINUED | OUTPATIENT
Start: 2022-05-28 | End: 2022-05-30 | Stop reason: HOSPADM

## 2022-05-28 RX ORDER — FUROSEMIDE 10 MG/ML
40 INJECTION INTRAMUSCULAR; INTRAVENOUS
Status: COMPLETED | OUTPATIENT
Start: 2022-05-28 | End: 2022-05-28

## 2022-05-28 RX ORDER — HYDRALAZINE HYDROCHLORIDE 20 MG/ML
20 INJECTION INTRAMUSCULAR; INTRAVENOUS
Status: COMPLETED | OUTPATIENT
Start: 2022-05-28 | End: 2022-05-28

## 2022-05-28 RX ORDER — IBUPROFEN 200 MG
24 TABLET ORAL
Status: DISCONTINUED | OUTPATIENT
Start: 2022-05-28 | End: 2022-05-30 | Stop reason: HOSPADM

## 2022-05-28 RX ORDER — GLUCAGON 1 MG
1 KIT INJECTION
Status: DISCONTINUED | OUTPATIENT
Start: 2022-05-28 | End: 2022-05-30 | Stop reason: HOSPADM

## 2022-05-28 RX ORDER — ALBUTEROL SULFATE 0.83 MG/ML
10 SOLUTION RESPIRATORY (INHALATION)
Status: COMPLETED | OUTPATIENT
Start: 2022-05-28 | End: 2022-05-28

## 2022-05-28 RX ORDER — FUROSEMIDE 10 MG/ML
40 INJECTION INTRAMUSCULAR; INTRAVENOUS
Status: DISCONTINUED | OUTPATIENT
Start: 2022-05-29 | End: 2022-05-30

## 2022-05-28 RX ORDER — INSULIN ASPART 100 [IU]/ML
6 INJECTION, SOLUTION INTRAVENOUS; SUBCUTANEOUS
Status: DISCONTINUED | OUTPATIENT
Start: 2022-05-29 | End: 2022-05-30 | Stop reason: HOSPADM

## 2022-05-28 RX ORDER — DOXAZOSIN 2 MG/1
4 TABLET ORAL DAILY
Status: DISCONTINUED | OUTPATIENT
Start: 2022-05-29 | End: 2022-05-30 | Stop reason: HOSPADM

## 2022-05-28 RX ORDER — METHYLPREDNISOLONE SOD SUCC 125 MG
125 VIAL (EA) INJECTION
Status: COMPLETED | OUTPATIENT
Start: 2022-05-28 | End: 2022-05-28

## 2022-05-28 RX ORDER — LISINOPRIL 20 MG/1
20 TABLET ORAL DAILY
Status: DISCONTINUED | OUTPATIENT
Start: 2022-05-29 | End: 2022-05-29

## 2022-05-28 RX ORDER — NAPROXEN SODIUM 220 MG/1
81 TABLET, FILM COATED ORAL DAILY
Status: DISCONTINUED | OUTPATIENT
Start: 2022-05-29 | End: 2022-05-30 | Stop reason: HOSPADM

## 2022-05-28 RX ORDER — LEVALBUTEROL 1.25 MG/.5ML
1.25 SOLUTION, CONCENTRATE RESPIRATORY (INHALATION) EVERY 6 HOURS PRN
Status: DISCONTINUED | OUTPATIENT
Start: 2022-05-28 | End: 2022-05-30 | Stop reason: HOSPADM

## 2022-05-28 RX ORDER — HYDRALAZINE HYDROCHLORIDE 20 MG/ML
10 INJECTION INTRAMUSCULAR; INTRAVENOUS EVERY 6 HOURS PRN
Status: DISCONTINUED | OUTPATIENT
Start: 2022-05-28 | End: 2022-05-30 | Stop reason: HOSPADM

## 2022-05-28 RX ADMIN — ATORVASTATIN CALCIUM 80 MG: 80 TABLET, FILM COATED ORAL at 08:05

## 2022-05-28 RX ADMIN — INSULIN DETEMIR 20 UNITS: 100 INJECTION, SOLUTION SUBCUTANEOUS at 08:05

## 2022-05-28 RX ADMIN — FUROSEMIDE 40 MG: 10 INJECTION, SOLUTION INTRAMUSCULAR; INTRAVENOUS at 03:05

## 2022-05-28 RX ADMIN — ALBUTEROL SULFATE 10 MG: 2.5 SOLUTION RESPIRATORY (INHALATION) at 03:05

## 2022-05-28 RX ADMIN — CARVEDILOL 25 MG: 25 TABLET, FILM COATED ORAL at 08:05

## 2022-05-28 RX ADMIN — AZTREONAM 2000 MG: 2 INJECTION, POWDER, LYOPHILIZED, FOR SOLUTION INTRAMUSCULAR; INTRAVENOUS at 03:05

## 2022-05-28 RX ADMIN — HYDRALAZINE HYDROCHLORIDE 20 MG: 20 INJECTION INTRAMUSCULAR; INTRAVENOUS at 05:05

## 2022-05-28 RX ADMIN — VANCOMYCIN HYDROCHLORIDE 2250 MG: 1 INJECTION, POWDER, LYOPHILIZED, FOR SOLUTION INTRAVENOUS at 04:05

## 2022-05-28 RX ADMIN — IOHEXOL 100 ML: 350 INJECTION, SOLUTION INTRAVENOUS at 04:05

## 2022-05-28 RX ADMIN — METHYLPREDNISOLONE SODIUM SUCCINATE 125 MG: 125 INJECTION, POWDER, FOR SOLUTION INTRAMUSCULAR; INTRAVENOUS at 03:05

## 2022-05-28 RX ADMIN — ASPIRIN 325 MG ORAL TABLET 325 MG: 325 PILL ORAL at 02:05

## 2022-05-28 RX ADMIN — HYDRALAZINE HYDROCHLORIDE 20 MG: 20 INJECTION INTRAMUSCULAR; INTRAVENOUS at 06:05

## 2022-05-28 RX ADMIN — AZTREONAM 2000 MG: 2 INJECTION, POWDER, LYOPHILIZED, FOR SOLUTION INTRAMUSCULAR; INTRAVENOUS at 11:05

## 2022-05-28 RX ADMIN — INSULIN ASPART 3 UNITS: 100 INJECTION, SOLUTION INTRAVENOUS; SUBCUTANEOUS at 08:05

## 2022-05-28 RX ADMIN — DIPHENHYDRAMINE HYDROCHLORIDE 50 MG: 50 INJECTION, SOLUTION INTRAMUSCULAR; INTRAVENOUS at 03:05

## 2022-05-28 NOTE — ED NOTES
Pt with potential iodine allergy listed in chart, denies trouble eating shrimp/shellfish but is unsure of iodine allergy, OK with being premedicated for obtaining CT PE study.

## 2022-05-28 NOTE — ED NOTES
Notified ER MD pt's blood pressure still elevated at 187/77. Reports OK to give another dose of hydralazine 20mg.

## 2022-05-28 NOTE — ED TRIAGE NOTES
61 y.o. female presents to ER ED 05/ED 05   Chief Complaint   Patient presents with    Shortness of Breath   .   C/o SOB for a few days, initial O2 sat upon EMS arrivals 50s, placed on CPAP, splint in place to RLE

## 2022-05-28 NOTE — ED PROVIDER NOTES
Encounter Date: 5/28/2022       History     Chief Complaint   Patient presents with    Shortness of Breath     A 61-year-old female presents with shortness of breath this morning at home  Patient states she quit smoking years ago with a history of asthma reported   Patient also had right leg surgery earlier this month, no postop complication  Patient initially presented with a wet lung exam bilaterally on ED evaluation  Patient was brought in by EMS on BiPAP, BiPAP removed after treatments        Review of patient's allergies indicates:   Allergen Reactions    Codeine Nausea Only    Iodine Itching    Pcn [penicillins] Other (See Comments)     Headache and nausea with PO PCN     Sulfa (sulfonamide antibiotics)     Oxycontin [oxycodone] Nausea And Vomiting     Past Medical History:   Diagnosis Date    Asthma     Coronary artery disease     Diabetes mellitus     Dyslipidemia     GERD (gastroesophageal reflux disease)     Hypertension     Neuropathy      Past Surgical History:   Procedure Laterality Date    ANKLE HARDWARE REMOVAL Left 3/31/2022    Procedure: REMOVAL, HARDWARE, ANKLE;  Surgeon: Alec Pettit MD;  Location: [facility];  Service: Orthopedics;  Laterality: Left;    APPLICATION OF LARGE EXTERNAL FIXATION DEVICE TO TIBIA Left 11/05/2021    Procedure: APPLICATION, EXTERNAL FIXATION DEVICE, LARGE, TIBIA;  Surgeon: Bk Richter MD;  Location: [facility];  Service: Orthopedics;  Laterality: Left;    cardiac stents      thinks 3 total (1st 2 was in 2009 and later 2015 or so)    CHOLECYSTECTOMY      CLOSED REDUCTION Right 5/11/2022    Procedure: CLOSED REDUCTION;  Surgeon: Alec Pettit MD;  Location: [facility];  Service: Orthopedics;  Laterality: Right;  Right Ankle    EXTERNAL FIXATION Right 5/11/2022    Procedure: EXTERNAL FIXATION;  Surgeon: Alec Pettit MD;  Location: [facility];  Service: Orthopedics;  Laterality: Right;  Right Ankle    FEMORAL BYPASS      HYSTERECTOMY      OPEN  REDUCTION AND INTERNAL FIXATION (ORIF) OF PILON FRACTURE Left 2021    Procedure: ORIF, FRACTURE, PILON;  Surgeon: Alec Pettit MD;  Location: [facility];  Service: Orthopedics;  Laterality: Left;    REMOVAL OF EXTERNAL FIXATION DEVICE Left 2021    Procedure: REMOVAL, EXTERNAL FIXATION DEVICE;  Surgeon: Alec Pettit MD;  Location: [facility];  Service: Orthopedics;  Laterality: Left;    REMOVAL OF EXTERNAL FIXATION DEVICE Right 2022    Procedure: REMOVAL, EXTERNAL FIXATION DEVICE;  Surgeon: Alec Pettit MD;  Location: [facility];  Service: Orthopedics;  Laterality: Right;    stents to kidney       Family History   Problem Relation Age of Onset    Breast cancer Sister     Cancer Maternal Aunt     Heart disease Mother     Colon cancer Neg Hx     Ovarian cancer Neg Hx      Social History     Tobacco Use    Smoking status: Former Smoker     Types: Cigarettes     Quit date: 11/15/2008     Years since quittin.5    Smokeless tobacco: Never Used   Substance Use Topics    Alcohol use: No    Drug use: No     Review of Systems   Constitutional: Negative.    HENT: Negative.    Eyes: Negative.    Respiratory: Positive for cough and shortness of breath.    Cardiovascular: Negative.    Gastrointestinal: Negative.    Genitourinary: Negative.    Musculoskeletal: Negative.    Skin: Negative.    Neurological: Negative.    Psychiatric/Behavioral: Negative.        Physical Exam     Initial Vitals   BP Pulse Resp Temp SpO2   22 1356 22 1355 22 1356 22 1407 22 1355   (!) 187/99 96 (!) 39 99 °F (37.2 °C) 99 %      MAP       --                Physical Exam    Constitutional: She appears well-developed.   HENT:   Head: Normocephalic and atraumatic.   Right Ear: External ear normal.   Left Ear: External ear normal.   Nose: Nose normal.   Mouth/Throat: Oropharynx is clear and moist.   Eyes: Conjunctivae and EOM are normal. Pupils are equal, round, and reactive to light.   Neck: Neck  supple. No JVD present.   Normal range of motion.  Cardiovascular: Normal rate and regular rhythm.   Pulmonary/Chest: No respiratory distress. She has no wheezes. She has no rhonchi. She has no rales. She exhibits no tenderness.   Rhonchi at the bilateral bases with diminished air exchange   Abdominal: Abdomen is soft. Bowel sounds are normal. She exhibits no distension. There is no abdominal tenderness. There is no rebound.   Musculoskeletal:         General: Normal range of motion.      Cervical back: Normal range of motion and neck supple.     Neurological: She is alert and oriented to person, place, and time. She has normal strength and normal reflexes.   Skin: Skin is warm and dry.         ED Course   Procedures  Labs Reviewed   COMPREHENSIVE METABOLIC PANEL - Abnormal; Notable for the following components:       Result Value    Sodium 135 (*)     Potassium 5.4 (*)     CO2 19 (*)     Glucose 252 (*)     Albumin 2.9 (*)     ALT 9 (*)     eGFR if non  55 (*)     All other components within normal limits   CBC W/ AUTO DIFFERENTIAL - Abnormal; Notable for the following components:    RBC 3.43 (*)     Hemoglobin 9.9 (*)     Hematocrit 31.0 (*)     MCHC 31.9 (*)     RDW 15.5 (*)     All other components within normal limits   TROPONIN I - Abnormal; Notable for the following components:    Troponin I 0.115 (*)     All other components within normal limits   B-TYPE NATRIURETIC PEPTIDE - Abnormal; Notable for the following components:     (*)     All other components within normal limits   D DIMER, QUANTITATIVE - Abnormal; Notable for the following components:    D-Dimer 3.57 (*)     All other components within normal limits   URINALYSIS, REFLEX TO URINE CULTURE - Abnormal; Notable for the following components:    Occult Blood UA Trace (*)     All other components within normal limits    Narrative:     Specimen Source->Urine   TROPONIN I - Abnormal; Notable for the following components:     Troponin I 0.142 (*)     All other components within normal limits   INFLUENZA A & B BY MOLECULAR   CULTURE, BLOOD   CULTURE, BLOOD   CK   CK-MB   SARS-COV-2 RNA AMPLIFICATION, QUAL   LACTIC ACID, PLASMA   PROCALCITONIN   CK   CK-MB     EKG Readings: (Independently Interpreted)   Initial Reading: No STEMI. Rhythm: Normal Sinus Rhythm. Heart Rate: 73s. Ectopy: No Ectopy. Conduction: Normal. ST Segments: Normal ST Segments. T Waves: Normal.       Imaging Results           CTA Chest Non-Coronary(PE Studies) (Final result)  Result time 05/28/22 16:45:47    Final result by Osman Castillo MD (05/28/22 16:45:47)                 Impression:      1. No CT evidence to suggest an acute pulmonary embolus.  2. Findings consistent with pulmonary edema with small bilateral pleural effusions and bibasilar dependent atelectasis.  3. Small hiatal hernia.  Close interval follow-up is recommended.    This report was flagged in Epic as abnormal.      Electronically signed by: Osman Castillo  Date:    05/28/2022  Time:    16:45             Narrative:    EXAMINATION:  CTA CHEST NON CORONARY    CLINICAL HISTORY:  Pulmonary embolism (PE) suspected, positive D-dimer;    TECHNIQUE:  Low dose axial images, sagittal and coronal reformations were obtained from the thoracic inlet to the lung bases following the IV administration of 100 mL of Omnipaque 350.  Contrast timing was optimized to evaluate the pulmonary arteries.  MIP images were performed.    COMPARISON:  Chest x-ray same day.  CTA chest 11/01/2021.    FINDINGS:  The pulmonary trunk, main right and left pulmonary arteries extending into the segmental and subsegmental pulmonary arteries opacify normally without CT evidence to suggest an acute pulmonary embolus.    There are scattered ground-glass opacities with diffuse interstitial septal thickening consistent with interstitial edema.  Dependent atelectasis is present at the lung bases.  There are small bilateral pleural effusions  measuring to 2.1 cm on the right and 1.8 cm on the left.  No significant axillary or intrathoracic lymphadenopathy.  Heart size is top-normal.    Limited evaluation of the upper abdomen demonstrates a small hiatal hernia.  Previous cholecystectomy.  Spleen is mildly enlarged.                               X-Ray Chest 1 View (Final result)  Result time 05/28/22 14:54:02    Final result by Osman Castillo MD (05/28/22 14:54:02)                 Impression:      1. Interval development of a small nodular infiltrate of the left upper lobe.  Correlate clinically with possible fever and/or elevated white count.  2. Suspected mild underlying interstitial edema.  As deemed clinically necessary, further evaluation may be obtained with dedicated PA and lateral views of the chest.      Electronically signed by: Osman Castillo  Date:    05/28/2022  Time:    14:54             Narrative:    EXAMINATION:  XR CHEST 1 VIEW    CLINICAL HISTORY:  SOB;    TECHNIQUE:  Single frontal view of the chest was performed.    COMPARISON:  05/11/2022.    FINDINGS:  Mild diffuse prominence of the pulmonary interstitium suspicious for mild interstitial edema.  Interval development of a small 2 cm nodular infiltrate of the left upper lobe.  No significant pleural effusion.    Heart size is top-normal.  Calcified aortic plaque.  Trachea midline.    Bony thorax intact.                                 Medications   vancomycin - pharmacy to dose (has no administration in time range)   aztreonam 2 g in dextrose 5 % 100 mL IVPB (ready to mix system) (0 mg Intravenous Stopped 5/28/22 1615)   vancomycin (VANCOCIN) 2,250 mg in dextrose 5 % 500 mL IVPB ( Intravenous Verify Only 5/28/22 1707)   vancomycin 2 g in 0.9% sodium chloride 500 mL IVPB (2,000 mg Intravenous Trough Due As Scheduled Before Dose 5/30/22 1600)   aspirin tablet 325 mg (325 mg Oral Given 5/28/22 1423)   diphenhydrAMINE injection 50 mg (50 mg Intravenous Given 5/28/22 1520)    methylPREDNISolone sodium succinate injection 125 mg (125 mg Intravenous Given 5/28/22 1520)   furosemide injection 40 mg (40 mg Intravenous Given 5/28/22 1520)   albuterol nebulizer solution 10 mg (10 mg Nebulization Given 5/28/22 1512)   iohexoL (OMNIPAQUE 350) injection 100 mL (100 mLs Intravenous Given 5/28/22 1634)   hydrALAZINE injection 20 mg (20 mg Intravenous Given 5/28/22 1705)     Critical Care ED Physician Time (minutes):  -- Performed by: Miky Boo M.D.  -- Date/Time: 6:15 PM 5/28/2022   -- Direct Patient Care (Face Time): 5  -- Additional History from Records or Taking Additional History: 5  -- Ordering, Reviewing, and Interpreting Diagnostic Studies: 5  -- Total Time in Documentation: 11  -- Consultation with Other Physicians: 5  -- Consultation with Family Related to Condition: 0  -- Total Critical Care Time: 31  -- Critical care was necessary to treat CHF/shortness of breath  -- Critical care was time spent personally by me on the following activities:   -- blood draw for specimens discussions with consultants,   -- development of treatment plan with patient or surrogate,   -- examination of patient, ordering and performing treatments   -- review of radiographic studies, re-evaluation of pt's condition  -- review of labs and evaluation of response to treatment       Medical Decision Making:   Initial Assessment:   61-year-old female came in acutely short of breath today  Patient with long history of asthma with no CHF history  Patient recently leg surgery no clear evidence of PE on exam    Differential Diagnosis:   Pneumonia, bronchitis, CHF, COPD, hypoxia, respiratory failure    Clinical Tests:   Lab Tests: Ordered and Reviewed  Radiological Study: Ordered and Reviewed  Medical Tests: Ordered and Reviewed    ED Management:  This patient came and with shortness of breath, given breathing treatment on arrival  We removed the BiPAP put the patient on 2 liters with no hypoxia on ER assessment  today  CXR: possible left apical pneumonia, given vanc and aztreonam for nosocomial pneumonia  Due to recent right leg surgery, CT PE study ordered with no PE noted on evaluation today  Patient with BNP over 800, given IV Lasix for possible increase CHF issues today  Patient has a mild elevation of troponin but she has had this in all previous lab work    Plan of care will be to treat the patient's possible pneumonia with antibiotics  The also diurese the patient with IV Lasix with a [facility] cardiology consult  We also better manage the patient's blood pressure on the floor, home meds ordered  Patient will get serial troponins to make sure there is no increase on admission  Pulmonology consult for possible pneumonia and overall shortness of breath  Stable for admission tonight, discussed with the admit doctor this evening                      Clinical Impression:   Final diagnoses:  [R06.02] SOB (shortness of breath)  [I50.9] Congestive heart failure, unspecified HF chronicity, unspecified heart failure type (Primary)          ED Disposition Condition    Observation                 Miky Boo MD  05/28/22 3086

## 2022-05-28 NOTE — PROGRESS NOTES
Pharmacokinetic Initial Assessment: IV Vancomycin    Assessment/Plan:    Initiate intravenous vancomycin with loading dose of 2250 mg once followed by a maintenance dose of vancomycin 2000mg IV every 24 hours  Desired empiric serum trough concentration is 10 to 20 mcg/mL  Draw vancomycin trough level 60 min prior to third dose on 5/30/2022 at approximately 1600  Pharmacy will continue to follow and monitor vancomycin.      Please contact pharmacy at extension [phone] with any questions regarding this assessment.     Thank you for the consult,   Dawna Morales       Patient brief summary:  Merna Regalado is a 61 y.o. female initiated on antimicrobial therapy with IV Vancomycin for treatment of suspected lower respiratory infection    Drug Allergies:   Review of patient's allergies indicates:   Allergen Reactions    Codeine Nausea Only    Iodine Itching    Pcn [penicillins] Other (See Comments)     Headache and nausea with PO PCN     Sulfa (sulfonamide antibiotics)     Oxycontin [oxycodone] Nausea And Vomiting       Actual Body Weight:   94.7Kg    Renal Function:   Estimated Creatinine Clearance: 59 mL/min (based on SCr of 1.1 mg/dL).,     Dialysis Method (if applicable):  N/A    CBC (last 72 hours):  Recent Labs   Lab Result Units 05/26/22  0347 05/28/22  1411   WBC K/uL 7.40 7.87   Hemoglobin g/dL 8.4* 9.9*   Hematocrit % 26.0* 31.0*   Platelets K/uL 253 278   Gran % % 60.2 67.2   Lymph % % 23.9 21.6   Mono % % 10.3 7.9   Eosinophil % % 4.3 2.2   Basophil % % 0.8 0.6   Differential Method  Automated Automated       Metabolic Panel (last 72 hours):  Recent Labs   Lab Result Units 05/26/22  0347 05/28/22  1410 05/28/22  1412   Sodium mmol/L 129* 135*  --    Potassium mmol/L 4.3 5.4*  --    Chloride mmol/L 101 104  --    CO2 mmol/L 21* 19*  --    Glucose mg/dL 94 252*  --    Glucose, UA   --   --  Negative   BUN mg/dL 20 17  --    Creatinine mg/dL 1.0 1.1  --    Albumin g/dL 2.6* 2.9*  --    Total Bilirubin mg/dL 0.5  0.7  --    Alkaline Phosphatase U/L 78 105  --    AST U/L 16 13  --    ALT U/L 9* 9*  --    Magnesium mg/dL 2.4  --   --    Phosphorus mg/dL 4.8*  --   --        Drug levels (last 3 results):  No results for input(s): VANCOMYCINRA, VANCORANDOM, VANCOMYCINPE, VANCOPEAK, VANCOMYCINTR, VANCOTROUGH in the last 72 hours.    Microbiologic Results:  Microbiology Results (last 7 days)       Procedure Component Value Units Date/Time    Influenza A & B by Molecular [[id number]] Collected: 05/28/22 1412    Order Status: Completed Specimen: Nasopharyngeal Swab from Nasal Swab Updated: 05/28/22 1445     Influenza A, Molecular Negative     Influenza B, Molecular Negative     Flu A & B Source Nasal swab    Blood culture [[id number]] Collected: 05/28/22 1410    Order Status: Sent Specimen: Blood from Antecubital, Right Updated: 05/28/22 1416    Blood culture [[id number]] Collected: 05/28/22 1416    Order Status: Sent Specimen: Blood from Antecubital, Right Updated: 05/28/22 1416

## 2022-05-29 PROBLEM — I50.9 CONGESTIVE HEART FAILURE: Status: ACTIVE | Noted: 2022-05-29

## 2022-05-29 LAB
ALBUMIN SERPL BCP-MCNC: 2.6 G/DL (ref 3.5–5.2)
ALP SERPL-CCNC: 73 U/L (ref 55–135)
ALT SERPL W/O P-5'-P-CCNC: 6 U/L (ref 10–44)
ANION GAP SERPL CALC-SCNC: 11 MMOL/L (ref 8–16)
AST SERPL-CCNC: 10 U/L (ref 10–40)
BASOPHILS # BLD AUTO: 0 K/UL (ref 0–0.2)
BASOPHILS NFR BLD: 0 % (ref 0–1.9)
BILIRUB SERPL-MCNC: 0.6 MG/DL (ref 0.1–1)
BUN SERPL-MCNC: 25 MG/DL (ref 6–20)
CALCIUM SERPL-MCNC: 8.5 MG/DL (ref 8.7–10.5)
CHLORIDE SERPL-SCNC: 100 MMOL/L (ref 95–110)
CO2 SERPL-SCNC: 21 MMOL/L (ref 23–29)
CREAT SERPL-MCNC: 1.3 MG/DL (ref 0.5–1.4)
DIFFERENTIAL METHOD: ABNORMAL
EOSINOPHIL # BLD AUTO: 0 K/UL (ref 0–0.5)
EOSINOPHIL NFR BLD: 0 % (ref 0–8)
ERYTHROCYTE [DISTWIDTH] IN BLOOD BY AUTOMATED COUNT: 15.4 % (ref 11.5–14.5)
EST. GFR  (AFRICAN AMERICAN): 52 ML/MIN/1.73 M^2
EST. GFR  (NON AFRICAN AMERICAN): 45 ML/MIN/1.73 M^2
GLUCOSE SERPL-MCNC: 352 MG/DL (ref 70–110)
HCT VFR BLD AUTO: 28.2 % (ref 37–48.5)
HGB BLD-MCNC: 8.9 G/DL (ref 12–16)
IMM GRANULOCYTES # BLD AUTO: 0.05 K/UL (ref 0–0.04)
IMM GRANULOCYTES NFR BLD AUTO: 0.7 % (ref 0–0.5)
LYMPHOCYTES # BLD AUTO: 0.8 K/UL (ref 1–4.8)
LYMPHOCYTES NFR BLD: 11 % (ref 18–48)
MCH RBC QN AUTO: 28.3 PG (ref 27–31)
MCHC RBC AUTO-ENTMCNC: 31.6 G/DL (ref 32–36)
MCV RBC AUTO: 90 FL (ref 82–98)
MONOCYTES # BLD AUTO: 0.2 K/UL (ref 0.3–1)
MONOCYTES NFR BLD: 3.2 % (ref 4–15)
NEUTROPHILS # BLD AUTO: 6.1 K/UL (ref 1.8–7.7)
NEUTROPHILS NFR BLD: 85.1 % (ref 38–73)
NRBC BLD-RTO: 0 /100 WBC
PLATELET # BLD AUTO: 239 K/UL (ref 150–450)
PMV BLD AUTO: 9.6 FL (ref 9.2–12.9)
POCT GLUCOSE: 232 MG/DL (ref 70–110)
POCT GLUCOSE: 256 MG/DL (ref 70–110)
POCT GLUCOSE: 258 MG/DL (ref 70–110)
POCT GLUCOSE: 361 MG/DL (ref 70–110)
POTASSIUM SERPL-SCNC: 4.6 MMOL/L (ref 3.5–5.1)
PROT SERPL-MCNC: 6.1 G/DL (ref 6–8.4)
RBC # BLD AUTO: 3.14 M/UL (ref 4–5.4)
SODIUM SERPL-SCNC: 132 MMOL/L (ref 136–145)
TROPONIN I SERPL DL<=0.01 NG/ML-MCNC: 0.1 NG/ML (ref 0–0.03)
TROPONIN I SERPL DL<=0.01 NG/ML-MCNC: 0.11 NG/ML (ref 0–0.03)
TROPONIN I SERPL DL<=0.01 NG/ML-MCNC: 0.12 NG/ML (ref 0–0.03)
TROPONIN I SERPL DL<=0.01 NG/ML-MCNC: 0.12 NG/ML (ref 0–0.03)
WBC # BLD AUTO: 7.19 K/UL (ref 3.9–12.7)

## 2022-05-29 PROCEDURE — 96372 THER/PROPH/DIAG INJ SC/IM: CPT | Performed by: SURGERY

## 2022-05-29 PROCEDURE — 99223 1ST HOSP IP/OBS HIGH 75: CPT | Mod: ,,, | Performed by: INTERNAL MEDICINE

## 2022-05-29 PROCEDURE — 99223 PR INITIAL HOSPITAL CARE,LEVL III: ICD-10-PCS | Mod: ,,, | Performed by: INTERNAL MEDICINE

## 2022-05-29 PROCEDURE — 63600175 PHARM REV CODE 636 W HCPCS: Performed by: SURGERY

## 2022-05-29 PROCEDURE — 85025 COMPLETE CBC W/AUTO DIFF WBC: CPT | Performed by: SURGERY

## 2022-05-29 PROCEDURE — 63600175 PHARM REV CODE 636 W HCPCS: Performed by: INTERNAL MEDICINE

## 2022-05-29 PROCEDURE — 25000003 PHARM REV CODE 250: Performed by: SURGERY

## 2022-05-29 PROCEDURE — 27000221 HC OXYGEN, UP TO 24 HOURS

## 2022-05-29 PROCEDURE — 36415 COLL VENOUS BLD VENIPUNCTURE: CPT | Performed by: SURGERY

## 2022-05-29 PROCEDURE — 93010 EKG 12-LEAD: ICD-10-PCS | Mod: ,,, | Performed by: INTERNAL MEDICINE

## 2022-05-29 PROCEDURE — 25000242 PHARM REV CODE 250 ALT 637 W/ HCPCS: Performed by: INTERNAL MEDICINE

## 2022-05-29 PROCEDURE — 94761 N-INVAS EAR/PLS OXIMETRY MLT: CPT

## 2022-05-29 PROCEDURE — 80053 COMPREHEN METABOLIC PANEL: CPT | Performed by: SURGERY

## 2022-05-29 PROCEDURE — S0073 INJECTION, AZTREONAM, 500 MG: HCPCS | Performed by: SURGERY

## 2022-05-29 PROCEDURE — 93010 ELECTROCARDIOGRAM REPORT: CPT | Mod: ,,, | Performed by: INTERNAL MEDICINE

## 2022-05-29 PROCEDURE — 11000001 HC ACUTE MED/SURG PRIVATE ROOM

## 2022-05-29 PROCEDURE — 25000003 PHARM REV CODE 250: Performed by: INTERNAL MEDICINE

## 2022-05-29 PROCEDURE — 93005 ELECTROCARDIOGRAM TRACING: CPT

## 2022-05-29 PROCEDURE — 84484 ASSAY OF TROPONIN QUANT: CPT | Performed by: SURGERY

## 2022-05-29 RX ORDER — SUCRALFATE 1 G/10ML
1 SUSPENSION ORAL EVERY 6 HOURS
Status: DISCONTINUED | OUTPATIENT
Start: 2022-05-29 | End: 2022-05-29

## 2022-05-29 RX ORDER — SUCRALFATE 1 G/10ML
1 SUSPENSION ORAL EVERY 6 HOURS PRN
Status: DISCONTINUED | OUTPATIENT
Start: 2022-05-29 | End: 2022-05-29

## 2022-05-29 RX ORDER — RANOLAZINE 500 MG/1
500 TABLET, EXTENDED RELEASE ORAL 2 TIMES DAILY
Status: DISCONTINUED | OUTPATIENT
Start: 2022-05-29 | End: 2022-05-30 | Stop reason: HOSPADM

## 2022-05-29 RX ORDER — FENOFIBRATE 160 MG/1
160 TABLET ORAL DAILY
Status: DISCONTINUED | OUTPATIENT
Start: 2022-05-29 | End: 2022-05-30 | Stop reason: HOSPADM

## 2022-05-29 RX ORDER — HYDROCODONE BITARTRATE AND ACETAMINOPHEN 5; 325 MG/1; MG/1
1 TABLET ORAL EVERY 6 HOURS PRN
Status: DISCONTINUED | OUTPATIENT
Start: 2022-05-29 | End: 2022-05-30 | Stop reason: HOSPADM

## 2022-05-29 RX ORDER — MICONAZOLE NITRATE 2 %
POWDER (GRAM) TOPICAL 2 TIMES DAILY
Status: DISCONTINUED | OUTPATIENT
Start: 2022-05-29 | End: 2022-05-30 | Stop reason: HOSPADM

## 2022-05-29 RX ORDER — PANTOPRAZOLE SODIUM 40 MG/1
40 TABLET, DELAYED RELEASE ORAL DAILY
Status: DISCONTINUED | OUTPATIENT
Start: 2022-05-29 | End: 2022-05-30 | Stop reason: HOSPADM

## 2022-05-29 RX ORDER — LISINOPRIL 40 MG/1
40 TABLET ORAL DAILY
Status: DISCONTINUED | OUTPATIENT
Start: 2022-05-30 | End: 2022-05-30 | Stop reason: HOSPADM

## 2022-05-29 RX ORDER — ENOXAPARIN SODIUM 100 MG/ML
40 INJECTION SUBCUTANEOUS EVERY 24 HOURS
Status: DISCONTINUED | OUTPATIENT
Start: 2022-05-29 | End: 2022-05-30 | Stop reason: HOSPADM

## 2022-05-29 RX ORDER — MAG HYDROX/ALUMINUM HYD/SIMETH 200-200-20
30 SUSPENSION, ORAL (FINAL DOSE FORM) ORAL
Status: DISCONTINUED | OUTPATIENT
Start: 2022-05-29 | End: 2022-05-29

## 2022-05-29 RX ORDER — LISINOPRIL 40 MG/1
40 TABLET ORAL DAILY
Status: DISCONTINUED | OUTPATIENT
Start: 2022-05-29 | End: 2022-05-29

## 2022-05-29 RX ORDER — LISINOPRIL 20 MG/1
20 TABLET ORAL ONCE
Status: COMPLETED | OUTPATIENT
Start: 2022-05-29 | End: 2022-05-29

## 2022-05-29 RX ORDER — MAG HYDROX/ALUMINUM HYD/SIMETH 200-200-20
30 SUSPENSION, ORAL (FINAL DOSE FORM) ORAL EVERY 6 HOURS PRN
Status: DISCONTINUED | OUTPATIENT
Start: 2022-05-29 | End: 2022-05-30 | Stop reason: HOSPADM

## 2022-05-29 RX ADMIN — RANOLAZINE 500 MG: 500 TABLET, FILM COATED, EXTENDED RELEASE ORAL at 10:05

## 2022-05-29 RX ADMIN — LISINOPRIL 20 MG: 20 TABLET ORAL at 11:05

## 2022-05-29 RX ADMIN — FUROSEMIDE 40 MG: 10 INJECTION, SOLUTION INTRAMUSCULAR; INTRAVENOUS at 04:05

## 2022-05-29 RX ADMIN — RANOLAZINE 500 MG: 500 TABLET, FILM COATED, EXTENDED RELEASE ORAL at 08:05

## 2022-05-29 RX ADMIN — FUROSEMIDE 40 MG: 10 INJECTION, SOLUTION INTRAMUSCULAR; INTRAVENOUS at 05:05

## 2022-05-29 RX ADMIN — CARVEDILOL 25 MG: 25 TABLET, FILM COATED ORAL at 05:05

## 2022-05-29 RX ADMIN — MICONAZOLE NITRATE 2 % TOPICAL POWDER: at 10:05

## 2022-05-29 RX ADMIN — INSULIN ASPART 6 UNITS: 100 INJECTION, SOLUTION INTRAVENOUS; SUBCUTANEOUS at 11:05

## 2022-05-29 RX ADMIN — ENOXAPARIN SODIUM 40 MG: 40 INJECTION SUBCUTANEOUS at 05:05

## 2022-05-29 RX ADMIN — CARVEDILOL 25 MG: 25 TABLET, FILM COATED ORAL at 07:05

## 2022-05-29 RX ADMIN — INSULIN ASPART 1 UNITS: 100 INJECTION, SOLUTION INTRAVENOUS; SUBCUTANEOUS at 08:05

## 2022-05-29 RX ADMIN — HYDROCODONE BITARTRATE AND ACETAMINOPHEN 1 TABLET: 5; 325 TABLET ORAL at 05:05

## 2022-05-29 RX ADMIN — ATORVASTATIN CALCIUM 80 MG: 80 TABLET, FILM COATED ORAL at 08:05

## 2022-05-29 RX ADMIN — INSULIN ASPART 5 UNITS: 100 INJECTION, SOLUTION INTRAVENOUS; SUBCUTANEOUS at 07:05

## 2022-05-29 RX ADMIN — DOXAZOSIN 4 MG: 2 TABLET ORAL at 08:05

## 2022-05-29 RX ADMIN — LISINOPRIL 20 MG: 20 TABLET ORAL at 08:05

## 2022-05-29 RX ADMIN — HYDRALAZINE HYDROCHLORIDE 10 MG: 20 INJECTION, SOLUTION INTRAMUSCULAR; INTRAVENOUS at 12:05

## 2022-05-29 RX ADMIN — LEVOTHYROXINE SODIUM 88 MCG: 88 TABLET ORAL at 06:05

## 2022-05-29 RX ADMIN — MICONAZOLE NITRATE 2 % TOPICAL POWDER: at 08:05

## 2022-05-29 RX ADMIN — INSULIN DETEMIR 20 UNITS: 100 INJECTION, SOLUTION SUBCUTANEOUS at 08:05

## 2022-05-29 RX ADMIN — ASPIRIN 81 MG: 81 TABLET, CHEWABLE ORAL at 08:05

## 2022-05-29 RX ADMIN — AZTREONAM 2000 MG: 2 INJECTION, POWDER, LYOPHILIZED, FOR SOLUTION INTRAMUSCULAR; INTRAVENOUS at 06:05

## 2022-05-29 RX ADMIN — INSULIN ASPART 2 UNITS: 100 INJECTION, SOLUTION INTRAVENOUS; SUBCUTANEOUS at 05:05

## 2022-05-29 RX ADMIN — PANTOPRAZOLE SODIUM 40 MG: 40 TABLET, DELAYED RELEASE ORAL at 10:05

## 2022-05-29 RX ADMIN — Medication 6 MG: at 11:05

## 2022-05-29 RX ADMIN — INSULIN ASPART 6 UNITS: 100 INJECTION, SOLUTION INTRAVENOUS; SUBCUTANEOUS at 05:05

## 2022-05-29 RX ADMIN — NIFEDIPINE 30 MG: 30 TABLET, FILM COATED, EXTENDED RELEASE ORAL at 08:05

## 2022-05-29 RX ADMIN — FENOFIBRATE 160 MG: 160 TABLET ORAL at 10:05

## 2022-05-29 RX ADMIN — INSULIN ASPART 6 UNITS: 100 INJECTION, SOLUTION INTRAVENOUS; SUBCUTANEOUS at 07:05

## 2022-05-29 RX ADMIN — INSULIN ASPART 3 UNITS: 100 INJECTION, SOLUTION INTRAVENOUS; SUBCUTANEOUS at 11:05

## 2022-05-29 RX ADMIN — ISOSORBIDE MONONITRATE 30 MG: 30 TABLET, EXTENDED RELEASE ORAL at 08:05

## 2022-05-29 NOTE — CONSULTS
[place] [facility] Surg (3rd Fl)  Pulmonology  Consult Note    Patient Name: Merna Regalado  MRN: [id number]  Admission Date: 5/28/2022  Hospital Length of Stay: 0 days  Code Status: Full Code  Attending Physician: La Nena Vasquez MD  Primary Care Provider: Bianca Godoy MD   Principal Problem: Congestive heart failure    Consults  Subjective:     HPI:  Merna Regalado is a 61 y.o. year old female that's presents with a chief complaint of SOB and Wheezing for 2 days PTA.Had a relatively sudden onset of of SOB went to ER and was admitted .BNP elevated to the 800's and ct chest shows ground glass infiltrates and not just perihilar. heres herself wheezing when she does a lot aaroung the house . Consulted to evaluate Respiratory status.    Past Medical History:   Diagnosis Date    Asthma     Coronary artery disease     Diabetes mellitus     Dyslipidemia     GERD (gastroesophageal reflux disease)     Hypertension     Neuropathy         Past Surgical History:   Procedure Laterality Date    ANKLE HARDWARE REMOVAL Left 3/31/2022    Procedure: REMOVAL, HARDWARE, ANKLE;  Surgeon: Alec Pettit MD;  Location: [facility];  Service: Orthopedics;  Laterality: Left;    APPLICATION OF LARGE EXTERNAL FIXATION DEVICE TO TIBIA Left 11/05/2021    Procedure: APPLICATION, EXTERNAL FIXATION DEVICE, LARGE, TIBIA;  Surgeon: Bk Richter MD;  Location: [facility];  Service: Orthopedics;  Laterality: Left;    cardiac stents      thinks 3 total (1st 2 was in 2009 and later 2015 or so)    CHOLECYSTECTOMY      CLOSED REDUCTION Right 5/11/2022    Procedure: CLOSED REDUCTION;  Surgeon: Alec Pettit MD;  Location: [facility];  Service: Orthopedics;  Laterality: Right;  Right Ankle    EXTERNAL FIXATION Right 5/11/2022    Procedure: EXTERNAL FIXATION;  Surgeon: Alec Pettit MD;  Location: [facility];  Service: Orthopedics;  Laterality: Right;  Right Ankle    FEMORAL BYPASS      HYSTERECTOMY      OPEN REDUCTION AND INTERNAL FIXATION  (ORIF) OF PILON FRACTURE Left 11/12/2021    Procedure: ORIF, FRACTURE, PILON;  Surgeon: Alec Pettit MD;  Location: [facility];  Service: Orthopedics;  Laterality: Left;    REMOVAL OF EXTERNAL FIXATION DEVICE Left 11/12/2021    Procedure: REMOVAL, EXTERNAL FIXATION DEVICE;  Surgeon: Alec Pettit MD;  Location: [facility] OR;  Service: Orthopedics;  Laterality: Left;    REMOVAL OF EXTERNAL FIXATION DEVICE Right 5/20/2022    Procedure: REMOVAL, EXTERNAL FIXATION DEVICE;  Surgeon: Alec Pettit MD;  Location: [facility] OR;  Service: Orthopedics;  Laterality: Right;    stents to kidney         Prior to Admission medications    Medication Sig Start Date End Date Taking? Authorizing Provider   albuterol sulfate (PROAIR HFA INHL) Inhale into the lungs.   Yes Historical Provider   alendronate (FOSAMAX) 70 MG tablet Take 1 tablet (70 mg total) by mouth every 7 days. 4/9/18  Yes Ambar Herman MD   aspirin 81 MG Chew Take 81 mg by mouth once daily.   Yes Historical Provider   atorvastatin (LIPITOR) 80 MG tablet Take 1 tablet (80 mg total) by mouth every evening. 5/26/22 5/26/23 Yes Piedad Younger MD   BASAGLAR KWIKPEN 100 unit/mL (3 mL) InPn pen 75 Units every evening.  8/26/17  Yes Historical Provider   carvedilol (COREG) 25 MG tablet Take 25 mg by mouth 2 (two) times daily with meals.   Yes Historical Provider   doxazosin (CARDURA) 4 MG tablet TAKE 1 TABLET ORALLY ONCE A DAY. 8/29/16  Yes Historical Provider   fenofibrate 160 MG Tab Take 160 mg by mouth once daily. 9/13/17  Yes Historical Provider   fluoxetine (PROZAC) 20 MG capsule TAKE 1 CAPSULE(S) EVERY DAY BY ORAL ROUTE FOR 30 DAYS. 9/2/16  Yes Historical Provider   gabapentin (NEURONTIN) 300 MG capsule Take 300 mg by mouth 2 (two) times daily.   Yes Historical Provider   HYDROcodone-acetaminophen (NORCO) 5-325 mg per tablet Take 1 tablet by mouth every 8 (eight) hours as needed for Pain. 5/26/22 5/31/22 Yes Piedad Younger MD   isosorbide mononitrate  (IMDUR) 30 MG 24 hr tablet Take 1 tablet (30 mg total) by mouth once daily. 11/3/21 11/3/22 Yes La Nena Vasquez MD   levothyroxine (SYNTHROID) 88 MCG tablet Take 1 tablet (88 mcg total) by mouth before breakfast. 5/27/22 5/27/23 Yes Piedad Younger MD   lisinopriL (PRINIVIL,ZESTRIL) 40 MG tablet Take 0.5 tablets (20 mg total) by mouth once daily. 5/26/22 6/25/22 Yes Piedad Younger MD   metFORMIN (GLUCOPHAGE) 500 MG tablet Take 1 tablet (500 mg total) by mouth daily with breakfast. 11/13/21 5/28/22 Yes Anthony Goldberg MD   NIFEdipine (PROCARDIA-XL) 30 MG (OSM) 24 hr tablet Take 1 tablet (30 mg total) by mouth once daily. 11/14/21 11/14/22 Yes Anthony Goldberg MD   pantoprazole (PROTONIX) 40 MG tablet  8/27/17  Yes Historical Provider   ranolazine (RANEXA) 500 MG Tb12 Take 1 tablet (500 mg total) by mouth 2 (two) times daily. 11/2/21 11/2/22 Yes La Nena Vasquez MD   trazodone (DESYREL) 100 MG tablet  9/28/17  Yes Historical Provider   acetaminophen (TYLENOL) 650 MG TbSR Take 1 tablet (650 mg total) by mouth every 8 (eight) hours. 3/31/22   Ridge Tai MD   clobetasol (TEMOVATE) 0.05 % cream Apply thin layer to affected area (vulva) nightly for 2 months, then twice weekly at bedtime. 4/9/18   Ambar Herman MD   nitroGLYCERIN (NITROSTAT) 0.4 MG SL tablet Place 1 tablet (0.4 mg total) under the tongue every 5 (five) minutes as needed for Chest pain. 6/16/17   Billy Alvares Jr., MD   nystatin (MYCOSTATIN) powder Apply topically 2 (two) times daily. 4/9/18   Ambar Herman MD   ondansetron (ZOFRAN-ODT) 4 MG TbDL Take 4 mg by mouth every 6 (six) hours as needed.    Historical Provider   TRUE METRIX GLUCOSE TEST STRIP Strp  9/14/16   Historical Provider   pravastatin (PRAVACHOL) 40 MG tablet Take 40 mg by mouth once daily.  5/13/22  Historical Provider       Social History     Socioeconomic History    Marital status: Legally     Number of children: 3    Years of education: 10    Tobacco Use    Smoking status: Former Smoker     Types: Cigarettes     Quit date: 11/15/2008     Years since quittin.5    Smokeless tobacco: Never Used   Substance and Sexual Activity    Alcohol use: No    Drug use: No    Sexual activity: Not Currently     Partners: Male     Birth control/protection: Surgical     Comment:        Family History   Problem Relation Age of Onset    Breast cancer Sister     Cancer Maternal Aunt     Heart disease Mother     Colon cancer Neg Hx     Ovarian cancer Neg Hx        Review of patient's allergies indicates:   Allergen Reactions    Codeine Nausea Only    Iodine Itching    Pcn [penicillins] Other (See Comments)     Headache and nausea with PO PCN     Sulfa (sulfonamide antibiotics)     Oxycontin [oxycodone] Nausea And Vomiting    Allergies have been reviewed.     ROS: Review of Systems   Constitutional: Negative for chills, fever and weight loss.   HENT: Negative for congestion, ear discharge and sore throat.    Eyes: Negative for double vision and photophobia.   Respiratory: Positive for cough, sputum production, shortness of breath and wheezing. Negative for hemoptysis and stridor.    Cardiovascular: Positive for leg swelling (mild) and PND (occasional). Negative for palpitations, orthopnea and claudication.   Gastrointestinal: Negative for abdominal pain and diarrhea.   Genitourinary: Negative for dysuria and frequency.   Musculoskeletal: Positive for myalgias (generalized). Negative for back pain and neck pain.   Skin: Negative.    Neurological: Negative for dizziness, weakness and headaches.   Endo/Heme/Allergies: Positive for polydipsia. Does not bruise/bleed easily.   Psychiatric/Behavioral: Negative for memory loss. The patient has insomnia (intermittant ).        PE:   Vitals:    22 0800 22 1000 22 1128 22 1129   BP:   (!) 176/72  Comment: right arm (!) 117/55  Comment: Left arm   BP Location:   Right arm Left arm    Patient Position:   Lying Lying   Pulse: 67 62  61   Resp:    16   Temp:    97 °F (36.1 °C)   TempSrc:    Oral   SpO2:    98%   Weight:       Height:        Physical Exam    Alert and orientated X 3   HEENT: Head: Normocephalic no trauma                Ears : Normal Pinna No Drainage no Battles sign                Eyes: Vision Unchanged, No conjunctivitis,No drainage                Neck: Supple, No JVD,No Abnormal Carotid Pulsations                Throat: No Erythema, No pus,No Swelling,Mallampati score= 3    Chest: course BS bilaterally with intermittent wheezing and basilar rhonchi o2 sat on RA is 96%  Cardiac: RRR S1+ S2 with a -S3: +M = 2/6, No R/H/G  Abdomen: Bowel Sounds are Normal.Soft Abdomen. No organomegaly of Liver,Spleen,or Kidneys   CNS: Non focal and intact. Cranial nerves 2, 346,8,9,10 and 12 are normal.Norrmal gait.Normal posture.  Extremities: No Clubbing,No Cyanosis with oxygen,Positive mild edema of lower extremities Bilateral  Skin: No Rash, No Ulcerative sores,and No cellulitis of the IV site.    Lab Results   Component Value Date    WBC 7.19 05/29/2022    HGB 8.9 (L) 05/29/2022    HCT 28.2 (L) 05/29/2022     05/29/2022    CHOL 157 02/06/2008    TRIG 266 (H) 02/06/2008    HDL 33 (L) 02/06/2008    ALT 6 (L) 05/29/2022    AST 10 05/29/2022     (L) 05/29/2022    K 4.6 05/29/2022     05/29/2022    CREATININE 1.3 05/29/2022    BUN 25 (H) 05/29/2022    CO2 21 (L) 05/29/2022    TSH 4.756 (H) 05/24/2022    INR 1.0 05/11/2022    HGBA1C 7.3 (H) 05/11/2022       CTA CHEST NON CORONARY     CLINICAL HISTORY:  Pulmonary embolism (PE) suspected, positive D-dimer;     TECHNIQUE:  Low dose axial images, sagittal and coronal reformations were obtained from the thoracic inlet to the lung bases following the IV administration of 100 mL of Omnipaque 350.  Contrast timing was optimized to evaluate the pulmonary arteries.  MIP images were performed.     COMPARISON:  Chest x-ray same day.  CTA  chest 11/01/2021.     FINDINGS:  The pulmonary trunk, main right and left pulmonary arteries extending into the segmental and subsegmental pulmonary arteries opacify normally without CT evidence to suggest an acute pulmonary embolus.     There are scattered ground-glass opacities with diffuse interstitial septal thickening consistent with interstitial edema.  Dependent atelectasis is present at the lung bases.  There are small bilateral pleural effusions measuring to 2.1 cm on the right and 1.8 cm on the left.  No significant axillary or intrathoracic lymphadenopathy.  Heart size is top-normal.     Limited evaluation of the upper abdomen demonstrates a small hiatal hernia.  Previous cholecystectomy.  Spleen is mildly enlarged.     Impression:     1. No CT evidence to suggest an acute pulmonary embolus.  2. Findings consistent with pulmonary edema with small bilateral pleural effusions and bibasilar dependent atelectasis.  3. Small hiatal hernia.  Close interval follow-up is recommended.     This report was flagged in Epic as abnormal.        Electronically signed by: Osman Castillo  Date:                                            05/28/2022  Time:                                           16:45          Assessment/Plan:     * Congestive heart failure  Patient is identified as having Systolic (HFrEF) heart failure that is Acute. CHF is currently controlled. Latest ECHO performed and demonstrates- No results found for this or any previous visit.  . Continue Furosemide and monitor clinical status closely. Monitor on telemetry. Patient is on CHF pathway.  Monitor strict Is&Os and daily weights.  Place on fluid restriction of 1 L. Continue to stress to patient importance of self efficacy and  on diet for CHF. Last BNP reviewed- and noted below   Recent Labs   Lab 05/28/22  1410   *   .      Asthma  Quit smoking 5 years ago smoked 1 to 2 PPD for 30 years     Acute hypoxemic respiratory failure  Patient with  Hypoxic Respiratory failure which is Acute.  she is not on home oxygen. Supplemental oxygen was provided and noted-  .   Signs/symptoms of respiratory failure include- increased work of breathing. Contributing diagnoses includes - COPD Labs and images were reviewed. Patient has had a abg. Will treat underlying causes and adjust management of respiratory failure as follows- 1    Diabetes mellitus  Stable      History of Smoking     Quit in last 5 years     COPD    clinically     Critical care time spent on the evaluation and treatment of severe organ dysfunction, review of pertinent labs and imaging studies, discussions with consulting providers and discussions with patient/family: 36 minutes.    Thank you for your consult. I will follow-up with patient. Please contact us if you have any additional questions.     Juan Francisco Vergara MD  Pulmonology  [facility] - [facility] (3rd Fl)

## 2022-05-29 NOTE — PLAN OF CARE
[facility] - Med Surg (3rd Fl)  Initial Discharge Assessment       Primary Care Provider: Bianca Godoy MD    Admission Diagnosis: SOB (shortness of breath) [R06.02]  Congestive heart failure, unspecified HF chronicity, unspecified heart failure type [I50.9]    Admission Date: 5/28/2022  Expected Discharge Date:     Discharge Barriers Identified: None    Payor: MEDICAID / Plan: [facility] COMMUNITY PLAN [facility] ([place] MEDICAID) / Product Type: Managed Medicaid /     Extended Emergency Contact Information  Primary Emergency Contact: Ayah Dejesus   [place]  Mobile Phone: [phone]  Relation: Daughter  Secondary Emergency Contact: regino aponte  Mobile Phone: [phone]  Relation: Spouse   needed? No    Discharge Plan A: Home with family  Discharge Plan B: Home      [facility] [address] [place] [id number]  Phone: [phone] Fax: [phone]    [facility] [address] [place] [address]  Phone: [phone] Fax: [phone]      Initial Assessment (most recent)     Adult Discharge Assessment - 05/29/22 0949        Discharge Assessment    Assessment Type Discharge Planning Assessment     Confirmed/corrected address, phone number and insurance Yes     Confirmed Demographics Correct on Facesheet     Source of Information family     Communicated AUGUSTINA with patient/caregiver Yes     Reason For Admission SOB     Lives With spouse     Do you expect to return to your current living situation? Yes     Do you have help at home or someone to help you manage your care at home? Yes     Who are your caregiver(s) and their phone number(s)?  and Daughter     Prior to hospitilization cognitive status: Unable to Assess     Current cognitive status: Unable to Assess     Walking or Climbing Stairs Difficulty ambulation difficulty, requires equipment     Mobility Management Patient currently has a  wheelchair -  reports that it is large for the camper they live in.  has built a ramp and porch for the patient and they moved her room where she does not have a step to go up in their camper.     Home Accessibility wheelchair accessible     Home Layout Able to live on 1st floor     Equipment Currently Used at Home wheelchair;other (see comments)    reports the wheelchair is big for their camper.    Readmission within 30 days? Yes     Patient currently being followed by outpatient case management? No     Do you currently have service(s) that help you manage your care at home? No     Do you take prescription medications? Yes     Do you have prescription coverage? Yes     Coverage Medicaid - [facility]     Do you have any problems affording any of your prescribed medications? No     Is the patient taking medications as prescribed? yes     Who is going to help you get home at discharge?  and daughter     How do you get to doctors appointments? family or friend will provide     Are you on dialysis? No     Do you take coumadin? No     Discharge Plan A Home with family     Discharge Plan B Home     DME Needed Upon Discharge  none     Discharge Plan discussed with: Spouse/sig other     Discharge Barriers Identified None        Relationship/Environment    Name(s) of Who Lives With Patient Jone Regalado - [phone]                 Readmission Assessment (most recent)     Readmission Assessment - 05/29/22 0942        Readmission    Why were you hospitalized in the last 30 days? ORIF     Why were you readmitted? Alarmed about signs/symptoms     When you left the hospital how did you feel?  reports some mild issues after the patient left on the trip home with her sugars but that she had been doing okay.  states that the patient became very short of breath at home and they brought her in.     When you left the hospital where did you go? Home with Family     Did patient/caregiver refused  recommended DC plan? No   Insurance denied skilled nursing care for patient which was initial recommendation.    When did you start not feeling well? 5.28.2022     Did you try to manage your symptoms your self? Yes     What did you do?  reports that his daughter has a nebulizer and brought it to the camper to try and assist patient with her breathing.     Did you try to see or did see a doctor or nurse before you came? No     Did you have  a follow-up appointment on discharge? --   The patient discharged on Thursday and did not see a physician between Thursday and readmit on Saturday.    Was this a planned readmission? No                  Assessment completed with the patient's  via phone as the patient was in the restroom at the time of my call.     Of note, the wheelchair for patient is large for their [place]. Case management will remain available for any additional needs.

## 2022-05-29 NOTE — SUBJECTIVE & OBJECTIVE
Past Medical History:   Diagnosis Date    Asthma     Coronary artery disease     Diabetes mellitus     Dyslipidemia     GERD (gastroesophageal reflux disease)     Hypertension     Neuropathy        Past Surgical History:   Procedure Laterality Date    ANKLE HARDWARE REMOVAL Left 3/31/2022    Procedure: REMOVAL, HARDWARE, ANKLE;  Surgeon: Alec Pettit MD;  Location: [facility];  Service: Orthopedics;  Laterality: Left;    APPLICATION OF LARGE EXTERNAL FIXATION DEVICE TO TIBIA Left 11/05/2021    Procedure: APPLICATION, EXTERNAL FIXATION DEVICE, LARGE, TIBIA;  Surgeon: Bk Richter MD;  Location: [facility];  Service: Orthopedics;  Laterality: Left;    cardiac stents      thinks 3 total (1st 2 was in 2009 and later 2015 or so)    CHOLECYSTECTOMY      CLOSED REDUCTION Right 5/11/2022    Procedure: CLOSED REDUCTION;  Surgeon: Alec Pettit MD;  Location: [facility];  Service: Orthopedics;  Laterality: Right;  Right Ankle    EXTERNAL FIXATION Right 5/11/2022    Procedure: EXTERNAL FIXATION;  Surgeon: Alec Pettit MD;  Location: [facility];  Service: Orthopedics;  Laterality: Right;  Right Ankle    FEMORAL BYPASS      HYSTERECTOMY      OPEN REDUCTION AND INTERNAL FIXATION (ORIF) OF PILON FRACTURE Left 11/12/2021    Procedure: ORIF, FRACTURE, PILON;  Surgeon: Alec Pettit MD;  Location: [facility];  Service: Orthopedics;  Laterality: Left;    REMOVAL OF EXTERNAL FIXATION DEVICE Left 11/12/2021    Procedure: REMOVAL, EXTERNAL FIXATION DEVICE;  Surgeon: Alec Pettit MD;  Location: [facility];  Service: Orthopedics;  Laterality: Left;    REMOVAL OF EXTERNAL FIXATION DEVICE Right 5/20/2022    Procedure: REMOVAL, EXTERNAL FIXATION DEVICE;  Surgeon: Alec Pettit MD;  Location: [facility];  Service: Orthopedics;  Laterality: Right;    stents to kidney         Review of patient's allergies indicates:   Allergen Reactions    Codeine Nausea Only    Iodine Itching    Pcn [penicillins] Other (See Comments)     Headache and nausea with PO  PCN     Sulfa (sulfonamide antibiotics)     Oxycontin [oxycodone] Nausea And Vomiting       No current facility-administered medications on file prior to encounter.     Current Outpatient Medications on File Prior to Encounter   Medication Sig    albuterol sulfate (PROAIR HFA INHL) Inhale into the lungs.    alendronate (FOSAMAX) 70 MG tablet Take 1 tablet (70 mg total) by mouth every 7 days.    aspirin 81 MG Chew Take 81 mg by mouth once daily.    atorvastatin (LIPITOR) 80 MG tablet Take 1 tablet (80 mg total) by mouth every evening.    BASAGLAR KWIKPEN 100 unit/mL (3 mL) InPn pen 75 Units every evening.     carvedilol (COREG) 25 MG tablet Take 25 mg by mouth 2 (two) times daily with meals.    doxazosin (CARDURA) 4 MG tablet TAKE 1 TABLET ORALLY ONCE A DAY.    fenofibrate 160 MG Tab Take 160 mg by mouth once daily.    fluoxetine (PROZAC) 20 MG capsule TAKE 1 CAPSULE(S) EVERY DAY BY ORAL ROUTE FOR 30 DAYS.    gabapentin (NEURONTIN) 300 MG capsule Take 300 mg by mouth 2 (two) times daily.    HYDROcodone-acetaminophen (NORCO) 5-325 mg per tablet Take 1 tablet by mouth every 8 (eight) hours as needed for Pain.    isosorbide mononitrate (IMDUR) 30 MG 24 hr tablet Take 1 tablet (30 mg total) by mouth once daily.    levothyroxine (SYNTHROID) 88 MCG tablet Take 1 tablet (88 mcg total) by mouth before breakfast.    lisinopriL (PRINIVIL,ZESTRIL) 40 MG tablet Take 0.5 tablets (20 mg total) by mouth once daily.    metFORMIN (GLUCOPHAGE) 500 MG tablet Take 1 tablet (500 mg total) by mouth daily with breakfast.    NIFEdipine (PROCARDIA-XL) 30 MG (OSM) 24 hr tablet Take 1 tablet (30 mg total) by mouth once daily.    pantoprazole (PROTONIX) 40 MG tablet     ranolazine (RANEXA) 500 MG Tb12 Take 1 tablet (500 mg total) by mouth 2 (two) times daily.    trazodone (DESYREL) 100 MG tablet     acetaminophen (TYLENOL) 650 MG TbSR Take 1 tablet (650 mg total) by mouth every 8 (eight) hours.    clobetasol (TEMOVATE) 0.05 % cream Apply thin  layer to affected area (vulva) nightly for 2 months, then twice weekly at bedtime.    nitroGLYCERIN (NITROSTAT) 0.4 MG SL tablet Place 1 tablet (0.4 mg total) under the tongue every 5 (five) minutes as needed for Chest pain.    nystatin (MYCOSTATIN) powder Apply topically 2 (two) times daily.    ondansetron (ZOFRAN-ODT) 4 MG TbDL Take 4 mg by mouth every 6 (six) hours as needed.    TRUE METRIX GLUCOSE TEST STRIP Strp     [DISCONTINUED] pravastatin (PRAVACHOL) 40 MG tablet Take 40 mg by mouth once daily.     Family History       Problem Relation (Age of Onset)    Breast cancer Sister    Cancer Maternal Aunt    Heart disease Mother          Tobacco Use    Smoking status: Former Smoker     Types: Cigarettes     Quit date: 11/15/2008     Years since quittin.5    Smokeless tobacco: Never Used   Substance and Sexual Activity    Alcohol use: No    Drug use: No    Sexual activity: Not Currently     Partners: Male     Birth control/protection: Surgical     Comment:      Review of Systems   Constitutional:  Positive for fatigue. Negative for chills and fever.   HENT:  Negative for congestion, hearing loss, sinus pressure and sore throat.    Eyes:  Negative for photophobia.   Respiratory:  Positive for cough and shortness of breath. Negative for choking, chest tightness and wheezing.    Cardiovascular:  Negative for chest pain and palpitations.   Gastrointestinal:  Negative for blood in stool, nausea and vomiting.   Genitourinary:  Negative for dysuria and hematuria.   Musculoskeletal:  Positive for gait problem. Negative for arthralgias and myalgias.        Recent R ankle surgery    Skin:  Negative for pallor.   Neurological:  Negative for dizziness and numbness.   Hematological:  Does not bruise/bleed easily.   Psychiatric/Behavioral:  Negative for confusion and suicidal ideas. The patient is not nervous/anxious.    Objective:     Vital Signs (Most Recent):  Temp: 98.2 °F (36.8 °C) (22 0722)  Pulse: 67  (05/29/22 0800)  Resp: 20 (05/29/22 0722)  BP: (!) 181/76 (05/29/22 0722)  SpO2: 97 % (05/29/22 0722)   Vital Signs (24h Range):  Temp:  [97.2 °F (36.2 °C)-99 °F (37.2 °C)] 98.2 °F (36.8 °C)  Pulse:  [62-96] 67  Resp:  [18-46] 20  SpO2:  [96 %-100 %] 97 %  BP: (172-202)/() 181/76     Weight: 92.6 kg (204 lb 2.3 oz)  Body mass index is 37.34 kg/m².    Physical Exam  Vitals and nursing note reviewed.   Constitutional:       Appearance: She is well-developed.   HENT:      Head: Normocephalic and atraumatic.   Cardiovascular:      Rate and Rhythm: Normal rate and regular rhythm.      Heart sounds: Normal heart sounds.   Pulmonary:      Effort: Pulmonary effort is normal.      Breath sounds: Normal breath sounds.   Abdominal:      General: Bowel sounds are normal.      Palpations: Abdomen is soft.      Tenderness: There is no abdominal tenderness.   Musculoskeletal:      Comments: R leg hard cast ;recent surgery    Skin:     General: Skin is warm and dry.   Neurological:      Mental Status: She is alert and oriented to person, place, and time.   Psychiatric:         Behavior: Behavior normal.         Thought Content: Thought content normal.         Judgment: Judgment normal.           Significant Labs: All pertinent labs within the past 24 hours have been reviewed.  ABGs:   Recent Labs   Lab 05/28/22  1400   PH 7.320*   PCO2 41   HCO3 21.10   POCSATURATED 97.2   BE -4.70*   TOTALHB 9.7*   COHB 2.1   METHB 1.0   PO2 87     Blood Culture:   Recent Labs   Lab 05/28/22  1410 05/28/22  1416   LABBLOO No Growth to date No Growth to date     CBC:   Recent Labs   Lab 05/28/22  1411 05/29/22  0552   WBC 7.87 7.19   HGB 9.9* 8.9*   HCT 31.0* 28.2*    239     CMP:   Recent Labs   Lab 05/28/22  1410 05/29/22  0552   * 132*   K 5.4* 4.6    100   CO2 19* 21*   * 352*   BUN 17 25*   CREATININE 1.1 1.3   CALCIUM 8.9 8.5*   PROT 6.9 6.1   ALBUMIN 2.9* 2.6*   BILITOT 0.7 0.6   ALKPHOS 105 73   AST 13 10    ALT 9* 6*   ANIONGAP 12 11   EGFRNONAA 55* 45*     Lactic Acid:   Recent Labs   Lab 05/28/22  1416   LACTATE 1.3     Lipid Panel: No results for input(s): CHOL, HDL, LDLCALC, TRIG, CHOLHDL in the last 48 hours.  POCT Glucose:   Recent Labs   Lab 05/28/22  1948 05/28/22  2047 05/29/22  0638   POCTGLUCOSE 349* 401* 361*     Troponin:   Recent Labs   Lab 05/28/22  1649 05/28/22  2317 05/29/22  0552   TROPONINI 0.142* 0.119* 0.097*     TSH:   Recent Labs   Lab 05/24/22  0455   TSH 4.756*     Urine Studies:   Recent Labs   Lab 05/28/22  1412   COLORU Yellow   APPEARANCEUA Clear   PHUR 6.0   SPECGRAV 1.015   PROTEINUA Negative   GLUCUA Negative   KETONESU Negative   BILIRUBINUA Negative   OCCULTUA Trace*   NITRITE Negative   UROBILINOGEN Negative   LEUKOCYTESUR Negative       Significant Imaging: I have reviewed all pertinent imaging results/findings within the past 24 hours.  CT: I have reviewed all pertinent results/findings within the past 24 hours and my personal findings are:     CXR: I have reviewed all pertinent results/findings within the past 24 hours and my personal findings are:         Mild diffuse prominence of the pulmonary interstitium suspicious for mild interstitial edema.  Interval development of a small 2 cm nodular infiltrate of the left upper lobe.  No significant pleural effusion.     Heart size is top-normal.  Calcified aortic plaque.  Trachea midline.     Bony thorax intact.     Impression:     1. Interval development of a small nodular infiltrate of the left upper lobe.  Correlate clinically with possible fever and/or elevated white count.  2. Suspected mild underlying interstitial edema.  As deemed clinically necessary, further evaluation may be obtained with dedicated PA and lateral views of the chest.       1. No CT evidence to suggest an acute pulmonary embolus.  2. Findings consistent with pulmonary edema with small bilateral pleural effusions and bibasilar dependent atelectasis.  3. Small  hiatal hernia.  Close interval follow-up is recommended.

## 2022-05-29 NOTE — NURSING
Pts right arm /72 and left arm /55.    Dr. Vasquez notified of findings. MD said to treat the higher number.     Will give another lisinopril 20mg per MD order and continue to monitor.

## 2022-05-29 NOTE — H&P
[facility] ([address] [place] [address] Medicine  History & Physical    Patient Name: Merna Regalado  MRN: [id number]  Patient Class: IP- Inpatient  Admission Date: 5/28/2022  Attending Physician: La Nena Vasquez MD   Primary Care Provider: Bianca Godoy MD         Patient information was obtained from patient, caregiver / friend and ER records.     Subjective:     Principal Problem:Congestive heart failure    Chief Complaint:   Chief Complaint   Patient presents with    Shortness of Breath        HPI: Merna Regalado is a 61 y.o. female with PMH of HTN, DM-2, hyperlipidemia ,hypothyroidism, CAD , CHF  who had a recent R leg surgery presented to ER with one day h/o dyspnea : work up  in ER showed high BNP ; CXR showed infiltrate ; CT chest basically pulmonary edema .  She was diuresed well with lasix ; placed on IV antibiotics for possible pneumonia ; she has some cough ; no sputum ; no fever ; this am she reports feeling better               Past Medical History:   Diagnosis Date    Asthma     Coronary artery disease     Diabetes mellitus     Dyslipidemia     GERD (gastroesophageal reflux disease)     Hypertension     Neuropathy        Past Surgical History:   Procedure Laterality Date    ANKLE HARDWARE REMOVAL Left 3/31/2022    Procedure: REMOVAL, HARDWARE, ANKLE;  Surgeon: Alec Pettit MD;  Location: [facility];  Service: Orthopedics;  Laterality: Left;    APPLICATION OF LARGE EXTERNAL FIXATION DEVICE TO TIBIA Left 11/05/2021    Procedure: APPLICATION, EXTERNAL FIXATION DEVICE, LARGE, TIBIA;  Surgeon: Bk Richter MD;  Location: [facility];  Service: Orthopedics;  Laterality: Left;    cardiac stents      thinks 3 total (1st 2 was in 2009 and later 2015 or so)    CHOLECYSTECTOMY      CLOSED REDUCTION Right 5/11/2022    Procedure: CLOSED REDUCTION;  Surgeon: Alec Pettit MD;  Location: [facility];  Service: Orthopedics;  Laterality: Right;  Right Ankle    EXTERNAL FIXATION Right 5/11/2022     Procedure: EXTERNAL FIXATION;  Surgeon: Alec Pettit MD;  Location: [facility];  Service: Orthopedics;  Laterality: Right;  Right Ankle    FEMORAL BYPASS      HYSTERECTOMY      OPEN REDUCTION AND INTERNAL FIXATION (ORIF) OF PILON FRACTURE Left 11/12/2021    Procedure: ORIF, FRACTURE, PILON;  Surgeon: Alec Pettit MD;  Location: [facility];  Service: Orthopedics;  Laterality: Left;    REMOVAL OF EXTERNAL FIXATION DEVICE Left 11/12/2021    Procedure: REMOVAL, EXTERNAL FIXATION DEVICE;  Surgeon: Alec Pettit MD;  Location: [facility];  Service: Orthopedics;  Laterality: Left;    REMOVAL OF EXTERNAL FIXATION DEVICE Right 5/20/2022    Procedure: REMOVAL, EXTERNAL FIXATION DEVICE;  Surgeon: Alec Pettit MD;  Location: [facility];  Service: Orthopedics;  Laterality: Right;    stents to kidney         Review of patient's allergies indicates:   Allergen Reactions    Codeine Nausea Only    Iodine Itching    Pcn [penicillins] Other (See Comments)     Headache and nausea with PO PCN     Sulfa (sulfonamide antibiotics)     Oxycontin [oxycodone] Nausea And Vomiting       No current facility-administered medications on file prior to encounter.     Current Outpatient Medications on File Prior to Encounter   Medication Sig    albuterol sulfate (PROAIR HFA INHL) Inhale into the lungs.    alendronate (FOSAMAX) 70 MG tablet Take 1 tablet (70 mg total) by mouth every 7 days.    aspirin 81 MG Chew Take 81 mg by mouth once daily.    atorvastatin (LIPITOR) 80 MG tablet Take 1 tablet (80 mg total) by mouth every evening.    BASAGLAR KWIKPEN 100 unit/mL (3 mL) InPn pen 75 Units every evening.     carvedilol (COREG) 25 MG tablet Take 25 mg by mouth 2 (two) times daily with meals.    doxazosin (CARDURA) 4 MG tablet TAKE 1 TABLET ORALLY ONCE A DAY.    fenofibrate 160 MG Tab Take 160 mg by mouth once daily.    fluoxetine (PROZAC) 20 MG capsule TAKE 1 CAPSULE(S) EVERY DAY BY ORAL ROUTE FOR 30 DAYS.    gabapentin (NEURONTIN) 300  MG capsule Take 300 mg by mouth 2 (two) times daily.    HYDROcodone-acetaminophen (NORCO) 5-325 mg per tablet Take 1 tablet by mouth every 8 (eight) hours as needed for Pain.    isosorbide mononitrate (IMDUR) 30 MG 24 hr tablet Take 1 tablet (30 mg total) by mouth once daily.    levothyroxine (SYNTHROID) 88 MCG tablet Take 1 tablet (88 mcg total) by mouth before breakfast.    lisinopriL (PRINIVIL,ZESTRIL) 40 MG tablet Take 0.5 tablets (20 mg total) by mouth once daily.    metFORMIN (GLUCOPHAGE) 500 MG tablet Take 1 tablet (500 mg total) by mouth daily with breakfast.    NIFEdipine (PROCARDIA-XL) 30 MG (OSM) 24 hr tablet Take 1 tablet (30 mg total) by mouth once daily.    pantoprazole (PROTONIX) 40 MG tablet     ranolazine (RANEXA) 500 MG Tb12 Take 1 tablet (500 mg total) by mouth 2 (two) times daily.    trazodone (DESYREL) 100 MG tablet     acetaminophen (TYLENOL) 650 MG TbSR Take 1 tablet (650 mg total) by mouth every 8 (eight) hours.    clobetasol (TEMOVATE) 0.05 % cream Apply thin layer to affected area (vulva) nightly for 2 months, then twice weekly at bedtime.    nitroGLYCERIN (NITROSTAT) 0.4 MG SL tablet Place 1 tablet (0.4 mg total) under the tongue every 5 (five) minutes as needed for Chest pain.    nystatin (MYCOSTATIN) powder Apply topically 2 (two) times daily.    ondansetron (ZOFRAN-ODT) 4 MG TbDL Take 4 mg by mouth every 6 (six) hours as needed.    TRUE METRIX GLUCOSE TEST STRIP Strp     [DISCONTINUED] pravastatin (PRAVACHOL) 40 MG tablet Take 40 mg by mouth once daily.     Family History       Problem Relation (Age of Onset)    Breast cancer Sister    Cancer Maternal Aunt    Heart disease Mother          Tobacco Use    Smoking status: Former Smoker     Types: Cigarettes     Quit date: 11/15/2008     Years since quittin.5    Smokeless tobacco: Never Used   Substance and Sexual Activity    Alcohol use: No    Drug use: No    Sexual activity: Not Currently     Partners: Male      Birth control/protection: Surgical     Comment:      Review of Systems   Constitutional:  Positive for fatigue. Negative for chills and fever.   HENT:  Negative for congestion, hearing loss, sinus pressure and sore throat.    Eyes:  Negative for photophobia.   Respiratory:  Positive for cough and shortness of breath. Negative for choking, chest tightness and wheezing.    Cardiovascular:  Negative for chest pain and palpitations.   Gastrointestinal:  Negative for blood in stool, nausea and vomiting.   Genitourinary:  Negative for dysuria and hematuria.   Musculoskeletal:  Positive for gait problem. Negative for arthralgias and myalgias.        Recent R ankle surgery    Skin:  Negative for pallor.   Neurological:  Negative for dizziness and numbness.   Hematological:  Does not bruise/bleed easily.   Psychiatric/Behavioral:  Negative for confusion and suicidal ideas. The patient is not nervous/anxious.    Objective:     Vital Signs (Most Recent):  Temp: 98.2 °F (36.8 °C) (05/29/22 0722)  Pulse: 67 (05/29/22 0800)  Resp: 20 (05/29/22 0722)  BP: (!) 181/76 (05/29/22 0722)  SpO2: 97 % (05/29/22 0722)   Vital Signs (24h Range):  Temp:  [97.2 °F (36.2 °C)-99 °F (37.2 °C)] 98.2 °F (36.8 °C)  Pulse:  [62-96] 67  Resp:  [18-46] 20  SpO2:  [96 %-100 %] 97 %  BP: (172-202)/() 181/76     Weight: 92.6 kg (204 lb 2.3 oz)  Body mass index is 37.34 kg/m².    Physical Exam  Vitals and nursing note reviewed.   Constitutional:       Appearance: She is well-developed.   HENT:      Head: Normocephalic and atraumatic.   Cardiovascular:      Rate and Rhythm: Normal rate and regular rhythm.      Heart sounds: Normal heart sounds.   Pulmonary:      Effort: Pulmonary effort is normal.      Breath sounds: Normal breath sounds.   Abdominal:      General: Bowel sounds are normal.      Palpations: Abdomen is soft.      Tenderness: There is no abdominal tenderness.   Musculoskeletal:      Comments: R leg hard cast ;recent surgery     Skin:     General: Skin is warm and dry.   Neurological:      Mental Status: She is alert and oriented to person, place, and time.   Psychiatric:         Behavior: Behavior normal.         Thought Content: Thought content normal.         Judgment: Judgment normal.           Significant Labs: All pertinent labs within the past 24 hours have been reviewed.  ABGs:   Recent Labs   Lab 05/28/22  1400   PH 7.320*   PCO2 41   HCO3 21.10   POCSATURATED 97.2   BE -4.70*   TOTALHB 9.7*   COHB 2.1   METHB 1.0   PO2 87     Blood Culture:   Recent Labs   Lab 05/28/22  1410 05/28/22  1416   LABBLOO No Growth to date No Growth to date     CBC:   Recent Labs   Lab 05/28/22  1411 05/29/22  0552   WBC 7.87 7.19   HGB 9.9* 8.9*   HCT 31.0* 28.2*    239     CMP:   Recent Labs   Lab 05/28/22  1410 05/29/22  0552   * 132*   K 5.4* 4.6    100   CO2 19* 21*   * 352*   BUN 17 25*   CREATININE 1.1 1.3   CALCIUM 8.9 8.5*   PROT 6.9 6.1   ALBUMIN 2.9* 2.6*   BILITOT 0.7 0.6   ALKPHOS 105 73   AST 13 10   ALT 9* 6*   ANIONGAP 12 11   EGFRNONAA 55* 45*     Lactic Acid:   Recent Labs   Lab 05/28/22  1416   LACTATE 1.3     Lipid Panel: No results for input(s): CHOL, HDL, LDLCALC, TRIG, CHOLHDL in the last 48 hours.  POCT Glucose:   Recent Labs   Lab 05/28/22  1948 05/28/22  2047 05/29/22  0638   POCTGLUCOSE 349* 401* 361*     Troponin:   Recent Labs   Lab 05/28/22  1649 05/28/22  2317 05/29/22  0552   TROPONINI 0.142* 0.119* 0.097*     TSH:   Recent Labs   Lab 05/24/22  0455   TSH 4.756*     Urine Studies:   Recent Labs   Lab 05/28/22  1412   COLORU Yellow   APPEARANCEUA Clear   PHUR 6.0   SPECGRAV 1.015   PROTEINUA Negative   GLUCUA Negative   KETONESU Negative   BILIRUBINUA Negative   OCCULTUA Trace*   NITRITE Negative   UROBILINOGEN Negative   LEUKOCYTESUR Negative       Significant Imaging: I have reviewed all pertinent imaging results/findings within the past 24 hours.  CT: I have reviewed all pertinent  results/findings within the past 24 hours and my personal findings are:     CXR: I have reviewed all pertinent results/findings within the past 24 hours and my personal findings are:         Mild diffuse prominence of the pulmonary interstitium suspicious for mild interstitial edema.  Interval development of a small 2 cm nodular infiltrate of the left upper lobe.  No significant pleural effusion.     Heart size is top-normal.  Calcified aortic plaque.  Trachea midline.     Bony thorax intact.     Impression:     1. Interval development of a small nodular infiltrate of the left upper lobe.  Correlate clinically with possible fever and/or elevated white count.  2. Suspected mild underlying interstitial edema.  As deemed clinically necessary, further evaluation may be obtained with dedicated PA and lateral views of the chest.       1. No CT evidence to suggest an acute pulmonary embolus.  2. Findings consistent with pulmonary edema with small bilateral pleural effusions and bibasilar dependent atelectasis.  3. Small hiatal hernia.  Close interval follow-up is recommended.        Assessment/Plan:     * Congestive heart failure  Patient is identified as having Combined Systolic and Diastolic heart failure that is Acute on chronic. CHF is currently uncontrolled due to Pulmonary edema/pleural effusion on CXR. Latest ECHO performed and demonstrates- No results found for this or any previous visit.  . Continue Beta Blocker, ACE/ARB, Furosemide and Nitrate/Vasodilator and monitor clinical status closely. Monitor on telemetry. Patient is on CHF pathway.  Monitor strict Is&Os and daily weights.  . Continue to stress to patient importance of self efficacy and  on diet for CHF. Last BNP reviewed- and noted below   Recent Labs   Lab 05/28/22  1410   *   .  pro calcitonin   normal   No fevers; no white count   CT chest no infiltrate   DC antibiotics         Hyponatremia  Seems chronic   Watch BMP daily ; with diuresis  it may get worse       Closed displaced trimalleolar fracture of right ankle  S/p surgery   Place on Lovenox dvt prophylaxis        CAD (coronary artery disease)  Continue ranolazine  ASA, coreg, isosorbide MN        Hypothyroid  Continue levothyroxine      Hyperlipidemia associated with type 2 diabetes mellitus  Lab Results   Component Value Date    LDLCALC 70.8 02/06/2008     Continue atorvastatin       Diabetes mellitus  Lab Results   Component Value Date    HGBA1C 7.3 (H) 05/11/2022     Correction scale       Hypertension  Uncontrolled  Increase lisinopril 40 mg daily         VTE Risk Mitigation (From admission, onward)         Ordered     enoxaparin injection 40 mg  Daily         05/29/22 0914     IP VTE HIGH RISK PATIENT  Once         05/28/22 2008     Place sequential compression device  Until discontinued         05/28/22 2008                   La Nena Vasquez MD  Department of Hospital Medicine   [place] - Med Surg (3rd Fl)

## 2022-05-29 NOTE — ASSESSMENT & PLAN NOTE
Patient with Hypoxic Respiratory failure which is Acute.  she is not on home oxygen. Supplemental oxygen was provided and noted-  .   Signs/symptoms of respiratory failure include- increased work of breathing. Contributing diagnoses includes - COPD Labs and images were reviewed. Patient has had a abg. Will treat underlying causes and adjust management of respiratory failure as follows- 1

## 2022-05-29 NOTE — ASSESSMENT & PLAN NOTE
Patient is identified as having Systolic (HFrEF) heart failure that is Acute. CHF is currently controlled. Latest ECHO performed and demonstrates- No results found for this or any previous visit.  . Continue Furosemide and monitor clinical status closely. Monitor on telemetry. Patient is on CHF pathway.  Monitor strict Is&Os and daily weights.  Place on fluid restriction of 1 L. Continue to stress to patient importance of self efficacy and  on diet for CHF. Last BNP reviewed- and noted below   Recent Labs   Lab 05/28/22  1410   *   .

## 2022-05-29 NOTE — NURSING
Pt lying in bed. No subjective complaints noted. VSS. BP remains elevated at 181/76. Will give morning meds and re-assess. Pt denies any chest pain. Pt admits to some shortness of breath. Sats 97% on 1L NC.     Purewick in place to suction with clear yellow urine.     Pt with cast to R leg from recent leg surgery. Pt able to wiggle toes, sensation present, and cap refill < 3sec.     No signs of acute distress noted. Will continue to monitor.

## 2022-05-29 NOTE — CLINICAL REVIEW
Chart review complete. Patient admitted with Dx of CHF with possible HCAPS, due to recent admission. Patient does meet admission criteria at INPATIENT level of care. Patient changed to inpatient status.

## 2022-05-29 NOTE — HPI
Merna Regalado is a 61 y.o. female with PMH of HTN, DM-2, hyperlipidemia ,hypothyroidism, CAD , CHF  who had a recent R leg surgery presented to ER with one day h/o dyspnea : work up  in ER showed high BNP ; CXR showed infiltrate ; CT chest basically pulmonary edema .  She was diuresed well with lasix ; placed on IV antibiotics for possible pneumonia ; she has some cough ; no sputum ; no fever ; this am she reports feeling better

## 2022-05-29 NOTE — ASSESSMENT & PLAN NOTE
Patient is identified as having Combined Systolic and Diastolic heart failure that is Acute on chronic. CHF is currently uncontrolled due to Pulmonary edema/pleural effusion on CXR. Latest ECHO performed and demonstrates- No results found for this or any previous visit.  . Continue Beta Blocker, ACE/ARB, Furosemide and Nitrate/Vasodilator and monitor clinical status closely. Monitor on telemetry. Patient is on CHF pathway.  Monitor strict Is&Os and daily weights.  . Continue to stress to patient importance of self efficacy and  on diet for CHF. Last BNP reviewed- and noted below   Recent Labs   Lab 05/28/22  1410   *   .  pro calcitonin   normal   No fevers; no white count   CT chest no infiltrate   DC antibiotics

## 2022-05-30 VITALS
TEMPERATURE: 98 F | HEIGHT: 62 IN | HEART RATE: 54 BPM | WEIGHT: 207 LBS | RESPIRATION RATE: 18 BRPM | BODY MASS INDEX: 38.09 KG/M2 | OXYGEN SATURATION: 98 % | SYSTOLIC BLOOD PRESSURE: 161 MMHG | DIASTOLIC BLOOD PRESSURE: 71 MMHG

## 2022-05-30 PROBLEM — J96.01 ACUTE HYPOXEMIC RESPIRATORY FAILURE: Status: RESOLVED | Noted: 2019-04-08 | Resolved: 2022-05-30

## 2022-05-30 LAB
AORTIC ROOT ANNULUS: 2.27 CM
AV INDEX (PROSTH): 0.85
AV MEAN GRADIENT: 4 MMHG
AV PEAK GRADIENT: 8 MMHG
AV VALVE AREA: 2.67 CM2
AV VELOCITY RATIO: 0.83
BSA FOR ECHO PROCEDURE: 2.03 M2
CV ECHO LV RWT: 0.35 CM
DOP CALC AO PEAK VEL: 1.41 M/S
DOP CALC AO VTI: 37.32 CM
DOP CALC LVOT AREA: 3.1 CM2
DOP CALC LVOT DIAMETER: 2 CM
DOP CALC LVOT PEAK VEL: 1.17 M/S
DOP CALC LVOT STROKE VOLUME: 99.51 CM3
DOP CALCLVOT PEAK VEL VTI: 31.69 CM
E WAVE DECELERATION TIME: 218.65 MSEC
E/A RATIO: 2
ECHO LV POSTERIOR WALL: 0.86 CM (ref 0.6–1.1)
EJECTION FRACTION: 65 %
FRACTIONAL SHORTENING: 34 % (ref 28–44)
INTERVENTRICULAR SEPTUM: 0.82 CM (ref 0.6–1.1)
IVRT: 83.04 MSEC
LA MAJOR: 4.97 CM
LA WIDTH: 5.27 CM
LEFT ATRIUM SIZE: 3.86 CM
LEFT ATRIUM VOLUME INDEX MOD: 17.7 ML/M2
LEFT ATRIUM VOLUME MOD: 34.4 CM3
LEFT INTERNAL DIMENSION IN SYSTOLE: 3.28 CM (ref 2.1–4)
LEFT VENTRICLE DIASTOLIC VOLUME INDEX: 60.11 ML/M2
LEFT VENTRICLE DIASTOLIC VOLUME: 116.61 ML
LEFT VENTRICLE MASS INDEX: 74 G/M2
LEFT VENTRICLE SYSTOLIC VOLUME INDEX: 22.4 ML/M2
LEFT VENTRICLE SYSTOLIC VOLUME: 43.53 ML
LEFT VENTRICULAR INTERNAL DIMENSION IN DIASTOLE: 4.97 CM (ref 3.5–6)
LEFT VENTRICULAR MASS: 143.14 G
MV PEAK A VEL: 0.59 M/S
MV PEAK E VEL: 1.18 M/S
MV STENOSIS PRESSURE HALF TIME: 63.41 MS
MV VALVE AREA P 1/2 METHOD: 3.47 CM2
PISA MRMAX VEL: 0.04 M/S
PISA TR MAX VEL: 2.7 M/S
POCT GLUCOSE: 128 MG/DL (ref 70–110)
POCT GLUCOSE: 98 MG/DL (ref 70–110)
PULM VEIN S/D RATIO: 1.04
PV PEAK D VEL: 0.7 M/S
PV PEAK S VEL: 0.73 M/S
PV PEAK VELOCITY: 0.63 CM/S
RA MAJOR: 4.67 CM
RA PRESSURE: 3 MMHG
RIGHT VENTRICULAR END-DIASTOLIC DIMENSION: 2.37 CM
TR MAX PG: 29 MMHG
TRICUSPID ANNULAR PLANE SYSTOLIC EXCURSION: 2.7 CM
TROPONIN I SERPL DL<=0.01 NG/ML-MCNC: 0.13 NG/ML (ref 0–0.03)
TV REST PULMONARY ARTERY PRESSURE: 32 MMHG

## 2022-05-30 PROCEDURE — 94760 N-INVAS EAR/PLS OXIMETRY 1: CPT

## 2022-05-30 PROCEDURE — 25000003 PHARM REV CODE 250: Performed by: INTERNAL MEDICINE

## 2022-05-30 PROCEDURE — 63600175 PHARM REV CODE 636 W HCPCS: Performed by: SURGERY

## 2022-05-30 PROCEDURE — 99232 PR SUBSEQUENT HOSPITAL CARE,LEVL II: ICD-10-PCS | Mod: ,,, | Performed by: INTERNAL MEDICINE

## 2022-05-30 PROCEDURE — 25000242 PHARM REV CODE 250 ALT 637 W/ HCPCS: Performed by: INTERNAL MEDICINE

## 2022-05-30 PROCEDURE — 36415 COLL VENOUS BLD VENIPUNCTURE: CPT | Performed by: NURSE PRACTITIONER

## 2022-05-30 PROCEDURE — 99232 SBSQ HOSP IP/OBS MODERATE 35: CPT | Mod: ,,, | Performed by: INTERNAL MEDICINE

## 2022-05-30 PROCEDURE — 25000003 PHARM REV CODE 250: Performed by: SURGERY

## 2022-05-30 PROCEDURE — 84484 ASSAY OF TROPONIN QUANT: CPT | Performed by: NURSE PRACTITIONER

## 2022-05-30 RX ORDER — FUROSEMIDE 20 MG/1
20 TABLET ORAL DAILY
Status: DISCONTINUED | OUTPATIENT
Start: 2022-05-31 | End: 2022-05-30 | Stop reason: HOSPADM

## 2022-05-30 RX ORDER — FUROSEMIDE 20 MG/1
20 TABLET ORAL DAILY
Qty: 30 TABLET | Refills: 0 | Status: SHIPPED | OUTPATIENT
Start: 2022-05-31 | End: 2022-08-16

## 2022-05-30 RX ORDER — NIFEDIPINE 60 MG/1
60 TABLET, EXTENDED RELEASE ORAL DAILY
Qty: 30 TABLET | Refills: 0 | Status: SHIPPED | OUTPATIENT
Start: 2022-05-30 | End: 2022-07-28

## 2022-05-30 RX ORDER — CARVEDILOL 12.5 MG/1
12.5 TABLET ORAL 2 TIMES DAILY WITH MEALS
Qty: 60 TABLET | Refills: 0 | Status: SHIPPED | OUTPATIENT
Start: 2022-05-30 | End: 2023-09-18

## 2022-05-30 RX ORDER — CARVEDILOL 12.5 MG/1
12.5 TABLET ORAL 2 TIMES DAILY WITH MEALS
Status: DISCONTINUED | OUTPATIENT
Start: 2022-05-30 | End: 2022-05-30 | Stop reason: HOSPADM

## 2022-05-30 RX ADMIN — ASPIRIN 81 MG: 81 TABLET, CHEWABLE ORAL at 08:05

## 2022-05-30 RX ADMIN — NIFEDIPINE 30 MG: 30 TABLET, FILM COATED, EXTENDED RELEASE ORAL at 08:05

## 2022-05-30 RX ADMIN — FUROSEMIDE 40 MG: 10 INJECTION, SOLUTION INTRAMUSCULAR; INTRAVENOUS at 04:05

## 2022-05-30 RX ADMIN — PANTOPRAZOLE SODIUM 40 MG: 40 TABLET, DELAYED RELEASE ORAL at 08:05

## 2022-05-30 RX ADMIN — ISOSORBIDE MONONITRATE 30 MG: 30 TABLET, EXTENDED RELEASE ORAL at 08:05

## 2022-05-30 RX ADMIN — MICONAZOLE NITRATE 2 % TOPICAL POWDER: at 09:05

## 2022-05-30 RX ADMIN — RANOLAZINE 500 MG: 500 TABLET, FILM COATED, EXTENDED RELEASE ORAL at 08:05

## 2022-05-30 RX ADMIN — LEVOTHYROXINE SODIUM 88 MCG: 88 TABLET ORAL at 05:05

## 2022-05-30 RX ADMIN — FENOFIBRATE 160 MG: 160 TABLET ORAL at 08:05

## 2022-05-30 RX ADMIN — LISINOPRIL 40 MG: 40 TABLET ORAL at 08:05

## 2022-05-30 RX ADMIN — DOXAZOSIN 4 MG: 2 TABLET ORAL at 08:05

## 2022-05-30 NOTE — DISCHARGE SUMMARY
[facility] ([facility])  [facility] Medicine  Discharge Summary      Patient Name: Merna Regalado  MRN: [id number]  Patient Class: IP- Inpatient  Admission Date: 5/28/2022  Hospital Length of Stay: 1 days  Discharge Date and Time:  05/30/2022 11:39 AM  Attending Physician: La Nena Acuna MD   Discharging Provider: Rina Lin NP  Primary Care Provider: Bianca Godoy MD      HPI:   Merna Regalado is a 61 y.o. female with PMH of HTN, DM-2, hyperlipidemia ,hypothyroidism, CAD , CHF  who had a recent R leg surgery presented to ER with one day h/o dyspnea : work up  in ER showed high BNP ; CXR showed infiltrate ; CT chest basically pulmonary edema .  She was diuresed well with lasix ; placed on IV antibiotics for possible pneumonia ; she has some cough ; no sputum ; no fever ; this am she reports feeling better               * No surgery found *      Hospital Course:   5/30 Admitted for pulm edema. Has questionable infitrate in CXR was started on vanc and aztreonam. This was stopped yesterday no fever or elevated WBC. CT did not show infiltrate. Did show some pulm edmea. With elevated BNP was gioven diuresis. Lasix 40mg IV in ER and ordered 40mg IV BID yesterday. She report that she is voiding a lot. Cardiology consulted. Echo ordered today. Out -2L since admission.        Goals of Care Treatment Preferences:  Code Status: Full Code      Consults:   Consults (From admission, onward)        Status Ordering Provider     Inpatient consult to Cardiology[facility]  Once        Provider:  Chaitanya Hunter MD    Completed LA NENA ACUNA     Inpatient consult to Cardiology[facility]  Once        Provider:  Chaitanya Hunter MD    Completed YUE SANTIAGO     Inpatient consult to Pulmonology  Once        Provider:  Juan Francisco Vergara MD    Acknowledged YUE SANTIAGO          * Congestive heart failure  Patient is identified as having Combined Systolic and Diastolic heart failure that is Acute on chronic. CHF is currently  uncontrolled due to Pulmonary edema/pleural effusion on CXR. Latest ECHO performed and demonstrates- No results found for this or any previous visit.  . Continue Beta Blocker, ACE/ARB, Furosemide and Nitrate/Vasodilator and monitor clinical status closely. Monitor on telemetry. Patient is on CHF pathway.  Monitor strict Is&Os and daily weights.  . Continue to stress to patient importance of self efficacy and  on diet for CHF. Last BNP reviewed- and noted below   Recent Labs   Lab 05/28/22  1410   *   .  pro calcitonin   normal   No fevers; no white count   CT chest no infiltrate   DC antibiotics     Cont PO lasix per home routine- diuresed 2L since admission. Stable for d/c home    Hyponatremia  Seems chronic > 132 change lasix back to home routine   Watch BMP daily ; with diuresis it may get worse       Closed displaced trimalleolar fracture of right ankle  S/p surgery   Place on Lovenox dvt prophylaxis        CAD (coronary artery disease)  Continue ranolazine  ASA, coreg, isosorbide MN        Hypothyroid  Continue levothyroxine      Hyperlipidemia associated with type 2 diabetes mellitus  Lab Results   Component Value Date    LDLCALC 70.8 02/06/2008     Continue atorvastatin       Diabetes mellitus  Lab Results   Component Value Date    HGBA1C 7.3 (H) 05/11/2022     Correction scale   BS 98 - resume home meds      Hypertension  Uncontrolled   Increase lisinopril 40 mg daily   //72  If echo ok, pt could likely be discharged today.  BP labile, cont to monitor, consider increasing procardia if remains elevated.    increase procardia, go back down omn lisinopril to home dose. Adding lasix 20 daily as well to home meds       Final Active Diagnoses:    Diagnosis Date Noted POA    PRINCIPAL PROBLEM:  Congestive heart failure [I50.9] 05/29/2022 Yes    Hyponatremia [E87.1] 05/23/2022 Yes    Closed displaced trimalleolar fracture of right ankle [S82.851A] 05/11/2022 Yes    CAD (coronary  artery disease) [I25.10] 11/01/2021 Yes    Hypothyroid [E03.9] 04/08/2019 Yes    Hyperlipidemia associated with type 2 diabetes mellitus [E11.69, E78.5] 09/05/2017 Yes     Chronic    Diabetes mellitus [E11.9] 09/05/2017 Yes    Hypertension [I10] 09/05/2017 Yes     Chronic      Problems Resolved During this Admission:    Diagnosis Date Noted Date Resolved POA    Acute hypoxemic respiratory failure [J96.01] 04/08/2019 05/30/2022 Yes       Discharged Condition: good    Disposition: Home or Self Care    Follow Up:   Follow-up Information     Bianca Godoy MD Follow up in 1 week(s).    Specialty: Family Medicine  Contact information:  [address] [place] [id number] [phone]                       Patient Instructions:   No discharge procedures on file.    Significant Diagnostic Studies:     Covid negative      Procal 0.04     Lactic acid 1.3     ABGs:       Recent Labs   Lab 05/28/22  1400   PH 7.320*   PCO2 41   HCO3 21.10   POCSATURATED 97.2   BE -4.70*   TOTALHB 9.7*   COHB 2.1   METHB 1.0   PO2 87         Blood Culture:        Recent Labs   Lab 05/28/22  1410 05/28/22  1416   LABBLOO No Growth to date  No Growth to date No Growth to date  No Growth to date         CBC:        Recent Labs   Lab 05/28/22  1411 05/29/22  0552   WBC 7.87 7.19   HGB 9.9* 8.9*   HCT 31.0* 28.2*    239         CMP:        Recent Labs   Lab 05/28/22  1410 05/29/22  0552   * 132*   K 5.4* 4.6    100   CO2 19* 21*   * 352*   BUN 17 25*   CREATININE 1.1 1.3   CALCIUM 8.9 8.5*   PROT 6.9 6.1   ALBUMIN 2.9* 2.6*   BILITOT 0.7 0.6   ALKPHOS 105 73   AST 13 10   ALT 9* 6*   ANIONGAP 12 11   EGFRNONAA 55* 45*            Lab Results   Component Value Date     DDIMER 3.57 (H) 05/28/2022      BNP      Recent Labs   Lab 05/28/22  1410   *         Lactic Acid:       Recent Labs   Lab 05/28/22  1416   LACTATE 1.3            POCT Glucose:         Recent Labs   Lab 05/29/22  1653 05/29/22  1928  05/30/22  0609   POCTGLUCOSE 232* 256* 98         Troponin:   Recent Labs   Lab 05/29/22  1055 05/29/22  1654 05/29/22  2300   TROPONINI 0.108* 0.116* 0.123*         TSH:       Recent Labs   Lab 05/24/22  0455   TSH 4.756*         Urine Studies:       Recent Labs   Lab 05/28/22  1412   COLORU Yellow   APPEARANCEUA Clear   PHUR 6.0   SPECGRAV 1.015   PROTEINUA Negative   GLUCUA Negative   KETONESU Negative   BILIRUBINUA Negative   OCCULTUA Trace*   NITRITE Negative   UROBILINOGEN Negative   LEUKOCYTESUR Negative         Blood cultures No growth to dats   Flu negative   Significant Imaging:      CTA chest   1. No CT evidence to suggest an acute pulmonary embolus.  2. Findings consistent with pulmonary edema with small bilateral pleural effusions and bibasilar dependent atelectasis.  3. Small hiatal hernia.         CXR   1. Interval development of a small nodular infiltrate of the left upper lobe.  Correlate clinically with possible fever and/or elevated white count.  2. Suspected mild underlying interstitial edema.  As deemed clinically necessary, further evaluation may be obtained with dedicated PA and lateral views of the chest.     EKG Sinus rhythm with Fusion complexes   Possible Left atrial enlargement   Nonspecific ST abnormality   Abnormal ECG   When compared with ECG of 13-MAY-2022 02:46,   Fusion complexes are now Present   Nonspecific T wave abnormality no longer evident in Inferior leads   Confirmed by Jerman Paulino MD ([id number]) on 5/29/2022 9:58:46 AM           Pending Diagnostic Studies:     Procedure Component Value Units Date/Time    Echo [[id number]] Resulted: 05/30/22 1133    Order Status: Sent Lab Status: In process Updated: 05/30/22 1134     AV mean gradient 4 mmHg      Ao peak sandi 1.41 m/s      Ao VTI 37.32 cm      IVRT 83.04 msec      IVS 0.82 cm      LA size 3.86 cm      Left Atrium Major Axis 4.97 cm      LVIDd 4.97 cm      LVIDs 3.28 cm      LVOT diameter 2.00 cm      LVOT peak VTI 31.69 cm       Posterior Wall 0.86 cm      MV Peak A Campos 0.59 m/s      E wave deceleration time 218.65 msec      MV Peak E Campos 1.18 m/s      PV Peak D Campos 0.70 m/s      PV Peak S Campos 0.73 m/s      RA Major Axis 4.67 cm      RVDD 2.37 cm      TAPSE 2.70 cm      TR Max Campos 2.70 m/s      LA WIDTH 5.27 cm      Ao root annulus 2.27 cm      PV PEAK VELOCITY 0.63 cm/s      MV stenosis pressure 1/2 time 63.41 ms      LV Diastolic Volume 116.61 mL      LV Systolic Volume 43.53 mL      LVOT peak campos 1.17 m/s      Mr max campos 0.04 m/s      LA volume (mod) 34.40 cm3      FS 34 %      LV mass 143.14 g      Left Ventricle Relative Wall Thickness 0.35 cm      AV valve area 2.67 cm2      AV Velocity Ratio 0.83     AV index (prosthetic) 0.85     MV valve area p 1/2 method 3.47 cm2      E/A ratio 2.00     Pulm vein S/D ratio 1.04     LVOT area 3.1 cm2      LVOT stroke volume 99.51 cm3      AV peak gradient 8 mmHg      LV Systolic Volume Index 22.4 mL/m2      LV Diastolic Volume Index 60.11 mL/m2      LV Mass Index 74 g/m2      Triscuspid Valve Regurgitation Peak Gradient 29 mmHg      LA Volume Index (Mod) 17.7 mL/m2      BSA 2.03 m2     Echo [[id number]]     Order Status: Sent Lab Status: No result          Medications:  Reconciled Home Medications:      Medication List      START taking these medications    furosemide 20 MG tablet  Commonly known as: LASIX  Take 1 tablet (20 mg total) by mouth once daily.  Start taking on: May 31, 2022        CHANGE how you take these medications    carvediloL 12.5 MG tablet  Commonly known as: COREG  Take 1 tablet (12.5 mg total) by mouth 2 (two) times daily with meals.  What changed:   · medication strength  · how much to take     NIFEdipine 60 MG (OSM) 24 hr tablet  Commonly known as: PROCARDIA-XL  Take 1 tablet (60 mg total) by mouth once daily.  What changed:   · medication strength  · how much to take        CONTINUE taking these medications    acetaminophen 650 MG Tbsr  Commonly known as: TYLENOL  Take 1  tablet (650 mg total) by mouth every 8 (eight) hours.     alendronate 70 MG tablet  Commonly known as: FOSAMAX  Take 1 tablet (70 mg total) by mouth every 7 days.     aspirin 81 MG Chew  Take 81 mg by mouth once daily.     atorvastatin 80 MG tablet  Commonly known as: LIPITOR  Take 1 tablet (80 mg total) by mouth every evening.     BASAGLAR KWIKPEN U-100 INSULIN glargine 100 units/mL (3mL) SubQ pen  Generic drug: insulin  75 Units every evening.     clobetasoL 0.05 % cream  Commonly known as: TEMOVATE  Apply thin layer to affected area (vulva) nightly for 2 months, then twice weekly at bedtime.     doxazosin 4 MG tablet  Commonly known as: CARDURA  TAKE 1 TABLET ORALLY ONCE A DAY.     fenofibrate 160 MG Tab  Take 160 mg by mouth once daily.     FLUoxetine 20 MG capsule  TAKE 1 CAPSULE(S) EVERY DAY BY ORAL ROUTE FOR 30 DAYS.     gabapentin 300 MG capsule  Commonly known as: NEURONTIN  Take 300 mg by mouth 2 (two) times daily.     HYDROcodone-acetaminophen 5-325 mg per tablet  Commonly known as: NORCO  Take 1 tablet by mouth every 8 (eight) hours as needed for Pain.     isosorbide mononitrate 30 MG 24 hr tablet  Commonly known as: IMDUR  Take 1 tablet (30 mg total) by mouth once daily.     levothyroxine 88 MCG tablet  Commonly known as: SYNTHROID  Take 1 tablet (88 mcg total) by mouth before breakfast.     lisinopriL 40 MG tablet  Commonly known as: PRINIVIL,ZESTRIL  Take 0.5 tablets (20 mg total) by mouth once daily.     metFORMIN 500 MG tablet  Commonly known as: GLUCOPHAGE  Take 1 tablet (500 mg total) by mouth daily with breakfast.     nitroGLYCERIN 0.4 MG SL tablet  Commonly known as: NITROSTAT  Place 1 tablet (0.4 mg total) under the tongue every 5 (five) minutes as needed for Chest pain.     nystatin powder  Commonly known as: MYCOSTATIN  Apply topically 2 (two) times daily.     ondansetron 4 MG Tbdl  Commonly known as: ZOFRAN-ODT  Take 4 mg by mouth every 6 (six) hours as needed.     pantoprazole 40 MG  tablet  Commonly known as: PROTONIX     PROAIR HFA INHL  Inhale into the lungs.     ranolazine 500 MG Tb12  Commonly known as: RANEXA  Take 1 tablet (500 mg total) by mouth 2 (two) times daily.     traZODone 100 MG tablet  Commonly known as: DESYREL     TRUE METRIX GLUCOSE TEST STRIP Strp  Generic drug: blood sugar diagnostic        STOP taking these medications    pravastatin 40 MG tablet  Commonly known as: PRAVACHOL     traMADoL 50 mg tablet  Commonly known as: ULTRAM            Indwelling Lines/Drains at time of discharge:   Lines/Drains/Airways     None                 Time spent on the discharge of patient: 20 minutes         Rina Lin NP  Department of Hospital Medicine  [facility] (3rd Fl)

## 2022-05-30 NOTE — PROGRESS NOTES
[facility] ([facility])  Pulmonology  Progress Note    Patient Name: Merna Regalado  MRN: [id number]  Admission Date: 5/28/2022  Hospital Length of Stay: 1 days  Code Status: Full Code  Attending Provider: La Nena Vasquez MD  Primary Care Provider: Bianca Godoy MD   Principal Problem: Congestive heart failure    Subjective:     Interval History: looks greAT breathing much improved     Objective:     Vital Signs (Most Recent):  Temp: 97.8 °F (36.6 °C) (05/30/22 0714)  Pulse: (!) 55 (05/30/22 0801)  Resp: 16 (05/30/22 0714)  BP: (!) 171/72 (05/30/22 0714)  SpO2: 97 % (05/30/22 0745)   Vital Signs (24h Range):  Temp:  [96.8 °F (36 °C)-98.4 °F (36.9 °C)] 97.8 °F (36.6 °C)  Pulse:  [51-67] 55  Resp:  [16-18] 16  SpO2:  [97 %-98 %] 97 %  BP: (117-176)/(55-72) 171/72     Weight: 94 kg (207 lb 3.7 oz)  Body mass index is 37.9 kg/m².      Intake/Output Summary (Last 24 hours) at 5/30/2022 0954  Last data filed at 5/30/2022 0901  Gross per 24 hour   Intake 802.21 ml   Output 1100 ml   Net -297.79 ml       Physical Exam  Vitals and nursing note reviewed.   Constitutional:       General: She is not in acute distress.     Appearance: Normal appearance. She is well-developed. She is not ill-appearing, toxic-appearing or diaphoretic.   HENT:      Head: Normocephalic and atraumatic.      Jaw: No trismus.      Right Ear: Hearing, tympanic membrane, ear canal and external ear normal.      Left Ear: Hearing, tympanic membrane, ear canal and external ear normal.      Nose: Nose normal. No nasal deformity, mucosal edema or rhinorrhea.      Right Sinus: No maxillary sinus tenderness or frontal sinus tenderness.      Left Sinus: No maxillary sinus tenderness or frontal sinus tenderness.      Mouth/Throat:      Dentition: Normal dentition.      Pharynx: Uvula midline. No posterior oropharyngeal erythema or uvula swelling.   Eyes:      General: Lids are normal. No scleral icterus.     Conjunctiva/sclera: Conjunctivae normal.       Comments: Sclera clear bilat   Neck:      Trachea: Trachea and phonation normal.   Cardiovascular:      Rate and Rhythm: Normal rate and regular rhythm.      Pulses: Normal pulses.      Heart sounds: Normal heart sounds.   Pulmonary:      Effort: Respiratory distress (mild to moderate) present.      Breath sounds: Decreased air movement present. Examination of the right-middle field reveals decreased breath sounds and rhonchi. Examination of the left-middle field reveals decreased breath sounds and rhonchi. Examination of the right-lower field reveals decreased breath sounds, rhonchi and rales. Examination of the left-lower field reveals decreased breath sounds, rhonchi and rales. Decreased breath sounds, rhonchi and rales present. No wheezing.   Abdominal:      General: Bowel sounds are normal. There is no distension.      Palpations: Abdomen is soft.      Tenderness: There is no abdominal tenderness.   Musculoskeletal:         General: No deformity. Normal range of motion.      Cervical back: Full passive range of motion without pain and neck supple.   Skin:     General: Skin is warm and dry.      Coloration: Skin is not pale.   Neurological:      Mental Status: She is alert and oriented to person, place, and time.      Motor: No abnormal muscle tone.      Coordination: Coordination normal.   Psychiatric:         Speech: Speech normal.         Behavior: Behavior normal. Behavior is cooperative.         Thought Content: Thought content normal.         Judgment: Judgment normal.       Vents:       Lines/Drains/Airways       Peripheral Intravenous Line  Duration                  Peripheral IV - Single Lumen 05/28/22 1352 18 G Right Hand 1 day         Peripheral IV - Single Lumen 05/28/22 1400 20 G Left Forearm 1 day                    Significant Labs:    CBC/Anemia Profile:  Recent Labs   Lab 05/28/22  1411 05/29/22  0552   WBC 7.87 7.19   HGB 9.9* 8.9*   HCT 31.0* 28.2*    239   MCV 90 90   RDW 15.5* 15.4*         Chemistries:  Recent Labs   Lab 05/28/22  1410 05/29/22  0552   * 132*   K 5.4* 4.6    100   CO2 19* 21*   BUN 17 25*   CREATININE 1.1 1.3   CALCIUM 8.9 8.5*   ALBUMIN 2.9* 2.6*   PROT 6.9 6.1   BILITOT 0.7 0.6   ALKPHOS 105 73   ALT 9* 6*   AST 13 10       All pertinent labs within the past 24 hours have been reviewed.    Significant Imaging:  I have reviewed all pertinent imaging results/findings within the past 24 hours.    Assessment/Plan:     * Congestive heart failure  Patient is identified as having Systolic (HFrEF) heart failure that is Acute. CHF is currently controlled. Latest ECHO performed and demonstrates- No results found for this or any previous visit.  . Continue Furosemide and monitor clinical status closely. Monitor on telemetry. Patient is on CHF pathway.  Monitor strict Is&Os and daily weights.  Place on fluid restriction of 1 L. Continue to stress to patient importance of self efficacy and  on diet for CHF. Last BNP reviewed- and noted below   Recent Labs   Lab 05/28/22  1410   *   .      Asthma  Quit smoking 5 years ago smoked 1 to 2 PPD for 30 years     Acute hypoxemic respiratory failure  Patient with Hypoxic Respiratory failure which is Acute.  she is not on home oxygen. Supplemental oxygen was provided and noted-  .   Signs/symptoms of respiratory failure include- increased work of breathing. Contributing diagnoses includes - COPD Labs and images were reviewed. Patient has had a abg. Will treat underlying causes and adjust management of respiratory failure as follows- 1    Diabetes mellitus  Stable                  Juan Francisco Vergara MD  Pulmonology  [facility] - Med Surg (3rd Fl)

## 2022-05-30 NOTE — CONSULTS
[facility] - Med Surg (3rd Fl)  Cardiology  Consult Note    Patient Name: Merna Regalado  MRN: [id number]  Admission Date: 5/28/2022  Hospital Length of Stay: 1 days  Code Status: Full Code   Consulting Provider: Aleksey Olguin NP  Primary Care Physician: Bianca Godoy MD  Principal Problem:Congestive heart failure      Inpatient consult to [facility]iology[facility]  Consult performed by: Aleksey Olguin NP  Consult ordered by: La Nena Vasquez MD    Inpatient consult to [facility]  Consult performed by: Aleksey Olguin NP  Consult ordered by: Miky Boo MD        Subjective:     Chief Complaint:  SOB    HPI: 62 y/o female with PMHx of CAD with PCI 2009, PAD, HTN, dyslipidemia, carotid vascular disease presents to the ED with c/o SOB. Pt has recently had surgery on her left ankle and resented with acute SOB. CTA was done to rule out PE. Mild pulmonary edema noted per CT and CXR. Pt placed on diuretics and states she is now back to baseline. [facility] asked to evaluate.     Past Medical History:   Diagnosis Date    Asthma     Coronary artery disease     Diabetes mellitus     Dyslipidemia     GERD (gastroesophageal reflux disease)     Hypertension     Neuropathy        Past Surgical History:   Procedure Laterality Date    ANKLE HARDWARE REMOVAL Left 3/31/2022    Procedure: REMOVAL, HARDWARE, ANKLE;  Surgeon: Alec Pettit MD;  Location: [facility];  Service: Orthopedics;  Laterality: Left;    APPLICATION OF LARGE EXTERNAL FIXATION DEVICE TO TIBIA Left 11/05/2021    Procedure: APPLICATION, EXTERNAL FIXATION DEVICE, LARGE, TIBIA;  Surgeon: Bk Richter MD;  Location: [facility];  Service: Orthopedics;  Laterality: Left;    cardiac stents      thinks 3 total (1st 2 was in 2009 and later 2015 or so)    CHOLECYSTECTOMY      CLOSED REDUCTION Right 5/11/2022    Procedure: CLOSED REDUCTION;  Surgeon: Alec Pettit MD;  Location: [facility];  Service: Orthopedics;  Laterality: Right;  Right Ankle    EXTERNAL  FIXATION Right 5/11/2022    Procedure: EXTERNAL FIXATION;  Surgeon: Alec Pettit MD;  Location: [facility];  Service: Orthopedics;  Laterality: Right;  Right Ankle    FEMORAL BYPASS      HYSTERECTOMY      OPEN REDUCTION AND INTERNAL FIXATION (ORIF) OF PILON FRACTURE Left 11/12/2021    Procedure: ORIF, FRACTURE, PILON;  Surgeon: Alec Pettit MD;  Location: [facility];  Service: Orthopedics;  Laterality: Left;    REMOVAL OF EXTERNAL FIXATION DEVICE Left 11/12/2021    Procedure: REMOVAL, EXTERNAL FIXATION DEVICE;  Surgeon: Alec Pettit MD;  Location: [facility];  Service: Orthopedics;  Laterality: Left;    REMOVAL OF EXTERNAL FIXATION DEVICE Right 5/20/2022    Procedure: REMOVAL, EXTERNAL FIXATION DEVICE;  Surgeon: Alec Pettit MD;  Location: [facility] OR;  Service: Orthopedics;  Laterality: Right;    stents to kidney         Review of patient's allergies indicates:   Allergen Reactions    Codeine Nausea Only    Iodine Itching    Pcn [penicillins] Other (See Comments)     Headache and nausea with PO PCN     Sulfa (sulfonamide antibiotics)     Oxycontin [oxycodone] Nausea And Vomiting       No current facility-administered medications on file prior to encounter.     Current Outpatient Medications on File Prior to Encounter   Medication Sig    albuterol sulfate (PROAIR HFA INHL) Inhale into the lungs.    alendronate (FOSAMAX) 70 MG tablet Take 1 tablet (70 mg total) by mouth every 7 days.    aspirin 81 MG Chew Take 81 mg by mouth once daily.    atorvastatin (LIPITOR) 80 MG tablet Take 1 tablet (80 mg total) by mouth every evening.    BASAGLAR KWIKPEN 100 unit/mL (3 mL) InPn pen 75 Units every evening.     carvedilol (COREG) 25 MG tablet Take 25 mg by mouth 2 (two) times daily with meals.    doxazosin (CARDURA) 4 MG tablet TAKE 1 TABLET ORALLY ONCE A DAY.    fenofibrate 160 MG Tab Take 160 mg by mouth once daily.    fluoxetine (PROZAC) 20 MG capsule TAKE 1 CAPSULE(S) EVERY DAY BY ORAL ROUTE FOR 30 DAYS.     gabapentin (NEURONTIN) 300 MG capsule Take 300 mg by mouth 2 (two) times daily.    HYDROcodone-acetaminophen (NORCO) 5-325 mg per tablet Take 1 tablet by mouth every 8 (eight) hours as needed for Pain.    isosorbide mononitrate (IMDUR) 30 MG 24 hr tablet Take 1 tablet (30 mg total) by mouth once daily.    levothyroxine (SYNTHROID) 88 MCG tablet Take 1 tablet (88 mcg total) by mouth before breakfast.    lisinopriL (PRINIVIL,ZESTRIL) 40 MG tablet Take 0.5 tablets (20 mg total) by mouth once daily.    metFORMIN (GLUCOPHAGE) 500 MG tablet Take 1 tablet (500 mg total) by mouth daily with breakfast.    NIFEdipine (PROCARDIA-XL) 30 MG (OSM) 24 hr tablet Take 1 tablet (30 mg total) by mouth once daily.    pantoprazole (PROTONIX) 40 MG tablet     ranolazine (RANEXA) 500 MG Tb12 Take 1 tablet (500 mg total) by mouth 2 (two) times daily.    trazodone (DESYREL) 100 MG tablet     acetaminophen (TYLENOL) 650 MG TbSR Take 1 tablet (650 mg total) by mouth every 8 (eight) hours.    clobetasol (TEMOVATE) 0.05 % cream Apply thin layer to affected area (vulva) nightly for 2 months, then twice weekly at bedtime.    nitroGLYCERIN (NITROSTAT) 0.4 MG SL tablet Place 1 tablet (0.4 mg total) under the tongue every 5 (five) minutes as needed for Chest pain.    nystatin (MYCOSTATIN) powder Apply topically 2 (two) times daily.    ondansetron (ZOFRAN-ODT) 4 MG TbDL Take 4 mg by mouth every 6 (six) hours as needed.    TRUE METRIX GLUCOSE TEST STRIP Strp     [DISCONTINUED] pravastatin (PRAVACHOL) 40 MG tablet Take 40 mg by mouth once daily.     Family History     Problem Relation (Age of Onset)    Breast cancer Sister    Cancer Maternal Aunt    Heart disease Mother        Tobacco Use    Smoking status: Former Smoker     Types: Cigarettes     Quit date: 11/15/2008     Years since quittin.5    Smokeless tobacco: Never Used   Substance and Sexual Activity    Alcohol use: No    Drug use: No    Sexual activity: Not Currently      Partners: Male     Birth control/protection: Surgical     Comment:      ROS   Constitutional:  Positive for fatigue. Negative for chills and fever.   HENT:  Negative  Eyes:  Negative for photophobia.   Respiratory:  Positive for cough and shortness of breath. Negative for choking, chest tightness and wheezing.    Cardiovascular:  Negative   Gastrointestinal:  Negative  Musculoskeletal:  Positive for gait problem.        Recent R ankle surgery    Skin:  Negative   Neurological:  Negative  Psychiatric/Behavioral:  Negative     Objective:     Vital Signs (Most Recent):  Temp: 97.8 °F (36.6 °C) (05/30/22 0714)  Pulse: (!) 55 (05/30/22 0801)  Resp: 16 (05/30/22 0714)  BP: (!) 171/72 (05/30/22 0714)  SpO2: 97 % (05/30/22 0745) Vital Signs (24h Range):  Temp:  [96.8 °F (36 °C)-98.4 °F (36.9 °C)] 97.8 °F (36.6 °C)  Pulse:  [51-67] 55  Resp:  [16-18] 16  SpO2:  [97 %-98 %] 97 %  BP: (117-176)/(55-72) 171/72     Weight: 94 kg (207 lb 3.7 oz)  Body mass index is 37.9 kg/m².    SpO2: 97 %  O2 Device (Oxygen Therapy): room air      Intake/Output Summary (Last 24 hours) at 5/30/2022 0903  Last data filed at 5/30/2022 0901  Gross per 24 hour   Intake 802.21 ml   Output 1100 ml   Net -297.79 ml       Lines/Drains/Airways     Peripheral Intravenous Line  Duration                Peripheral IV - Single Lumen 05/28/22 1352 18 G Right Hand 1 day         Peripheral IV - Single Lumen 05/28/22 1400 20 G Left Forearm 1 day                Physical Exam   Constitutional:       Appearance: She is well-developed.   HENT:      Head: Normocephalic and atraumatic.   Cardiovascular:      Rate and Rhythm: Normal rate and regular rhythm.      Heart sounds: Normal heart sounds.   Pulmonary:      Effort: Pulmonary effort is normal.      Breath sounds: Normal breath sounds.   Abdominal:      General: Bowel sounds are normal.      Palpations: Abdomen is soft.      Tenderness: There is no abdominal tenderness.   Musculoskeletal:       Comments: R leg hard cast ;recent surgery    Skin:     General: Skin is warm and dry.   Neurological:      Mental Status: She is alert and oriented to person, place, and time.   Psychiatric:         Behavior: Behavior normal.         Thought Content: Thought content normal.         Judgment: Judgment normal.     Significant Labs:   BMP:   Recent Labs   Lab 05/28/22  1410 05/29/22  0552   * 352*   * 132*   K 5.4* 4.6    100   CO2 19* 21*   BUN 17 25*   CREATININE 1.1 1.3   CALCIUM 8.9 8.5*   , CMP   Recent Labs   Lab 05/28/22  1410 05/29/22  0552   * 132*   K 5.4* 4.6    100   CO2 19* 21*   * 352*   BUN 17 25*   CREATININE 1.1 1.3   CALCIUM 8.9 8.5*   PROT 6.9 6.1   ALBUMIN 2.9* 2.6*   BILITOT 0.7 0.6   ALKPHOS 105 73   AST 13 10   ALT 9* 6*   ANIONGAP 12 11   ESTGFRAFRICA >60 52*   EGFRNONAA 55* 45*   , CBC   Recent Labs   Lab 05/28/22  1411 05/29/22  0552   WBC 7.87 7.19   HGB 9.9* 8.9*   HCT 31.0* 28.2*    239    and Troponin   Recent Labs   Lab 05/29/22  1055 05/29/22  1654 05/29/22  2300   TROPONINI 0.108* 0.116* 0.123*         Assessment and Plan:     Active Diagnoses:    Diagnosis Date Noted POA    PRINCIPAL PROBLEM:  Congestive heart failure [I50.9] 05/29/2022 Yes    Hyponatremia [E87.1] 05/23/2022 Yes    Closed displaced trimalleolar fracture of right ankle [S82.851A] 05/11/2022 Yes    Asthma [J45.909] 05/11/2022 Yes    CAD (coronary artery disease) [I25.10] 11/01/2021 Yes    Hypothyroid [E03.9] 04/08/2019 Yes    Acute hypoxemic respiratory failure [J96.01] 04/08/2019 Yes    Hyperlipidemia associated with type 2 diabetes mellitus [E11.69, E78.5] 09/05/2017 Yes     Chronic    Diabetes mellitus [E11.9] 09/05/2017 Yes    Hypertension [I10] 09/05/2017 Yes     Chronic      Problems Resolved During this Admission:       VTE Risk Mitigation (From admission, onward)         Ordered     enoxaparin injection 40 mg  Daily         05/29/22 0914     IP VTE HIGH RISK  PATIENT  Once         05/28/22 2008     Place sequential compression device  Until discontinued         05/28/22 2008              ECHO 2020:  EF 55%  LVDD 1  PAP 26mmHg    MPI 2020:  No reversibility      DX:  Acute diastolic heart failure  HTN  CAD  PAD  Dyslipidemia  COPD/Asthma  Right ankle fx s/p sx  DM2    PLAN:  Pt initially presented with acute SOB  Mild pulmonary edema noted  Pt responded to diuretics  Echocardiogram to assess lv function and valve structure  Pt appears to be at baseline  Decrease diuretics given increased BUN and GFR  Will place on oral diuretics and monitor renal function.  Re check troponin, trivial elevation r/t above. Pt with no CP or EKG changes  If echo ok, pt could likely be discharged today.  BP labile, cont to monitor, consider increasing procardia if remains elevated.     Aleksey Olguin, NP  Cardiology   [place] - Med Surg (3rd Fl)

## 2022-05-30 NOTE — ASSESSMENT & PLAN NOTE
Uncontrolled   Increase lisinopril 40 mg daily   //72  If echo ok, pt could likely be discharged today.  BP labile, cont to monitor, consider increasing procardia if remains elevated.    increase procardia, go back down omn lisinopril to home dose. Adding lasix 20 daily as well to home meds

## 2022-05-30 NOTE — NURSING
Both IV removed. Telemetry removed. Discharge instructions discussed. Without questions or concerns. Lasix education given.

## 2022-05-30 NOTE — PLAN OF CARE
Problem: Adult Inpatient Plan of Care  Goal: Absence of Hospital-Acquired Illness or Injury  Outcome: Ongoing, Progressing     Problem: Adult Inpatient Plan of Care  Goal: Optimal Comfort and Wellbeing  Outcome: Ongoing, Progressing     Problem: Skin Injury Risk Increased  Goal: Skin Health and Integrity  Outcome: Ongoing, Progressing

## 2022-05-30 NOTE — PLAN OF CARE
Problem: Infection  Goal: Absence of Infection Signs and Symptoms  Outcome: Met     Problem: Adult Inpatient Plan of Care  Goal: Plan of Care Review  Outcome: Met  Goal: Patient-Specific Goal (Individualized)  Outcome: Met  Goal: Absence of Hospital-Acquired Illness or Injury  Outcome: Met  Goal: Optimal Comfort and Wellbeing  Outcome: Met  Goal: Readiness for Transition of Care  Outcome: Met     Problem: Diabetes Comorbidity  Goal: Blood Glucose Level Within Targeted Range  Outcome: Met     Problem: Fluid and Electrolyte Imbalance (Acute Kidney Injury/Impairment)  Goal: Fluid and Electrolyte Balance  Outcome: Met     Problem: Oral Intake Inadequate (Acute Kidney Injury/Impairment)  Goal: Optimal Nutrition Intake  Outcome: Met     Problem: Renal Function Impairment (Acute Kidney Injury/Impairment)  Goal: Effective Renal Function  Outcome: Met     Problem: Skin Injury Risk Increased  Goal: Skin Health and Integrity  Outcome: Met     Problem: Fall Injury Risk  Goal: Absence of Fall and Fall-Related Injury  Outcome: Met     Adequate for discharge

## 2022-05-30 NOTE — PROGRESS NOTES
[facility] ([facility])  [facility]  [facility] Note    Patient Name: Merna Regalado  MRN: [id number]  Patient Class: IP- Inpatient   Admission Date: 5/28/2022  Length of Stay: 1 days  Attending Physician: La Nena Vasquez MD  Primary Care Provider: Bianca Godoy MD        Subjective:     Principal Problem:Congestive heart failure        HPI:  Merna Regalado is a 61 y.o. female with PMH of HTN, DM-2, hyperlipidemia ,hypothyroidism, CAD , CHF  who had a recent R leg surgery presented to ER with one day h/o dyspnea : work up  in ER showed high BNP ; CXR showed infiltrate ; CT chest basically pulmonary edema .  She was diuresed well with lasix ; placed on IV antibiotics for possible pneumonia ; she has some cough ; no sputum ; no fever ; this am she reports feeling better               Overview/Hospital Course:  5/30 Admitted for pulm edema. Has questionable infitrate in CXR was started on vanc and aztreonam. This was stopped yesterday no fever or elevated WBC. CT did not show infiltrate. Did show some pulm edmea. With elevated BNP was gioven diuresis. Lasix 40mg IV in ER and ordered 40mg IV BID yesterday. She report that she is voiding a lot. Cardiology consulted. Echo ordered today. Out -2L since admission.       Past Medical History:   Diagnosis Date    Asthma     Coronary artery disease     Diabetes mellitus     Dyslipidemia     GERD (gastroesophageal reflux disease)     Hypertension     Neuropathy        Past Surgical History:   Procedure Laterality Date    ANKLE HARDWARE REMOVAL Left 3/31/2022    Procedure: REMOVAL, HARDWARE, ANKLE;  Surgeon: Alec Pettit MD;  Location: [facility];  Service: Orthopedics;  Laterality: Left;    APPLICATION OF LARGE EXTERNAL FIXATION DEVICE TO TIBIA Left 11/05/2021    Procedure: APPLICATION, EXTERNAL FIXATION DEVICE, LARGE, TIBIA;  Surgeon: Bk Richter MD;  Location: [facility];  Service: Orthopedics;  Laterality: Left;    cardiac stents      thinks 3  total (1st 2 was in 2009 and later 2015 or so)    CHOLECYSTECTOMY      CLOSED REDUCTION Right 5/11/2022    Procedure: CLOSED REDUCTION;  Surgeon: Alec Pettit MD;  Location: [facility];  Service: Orthopedics;  Laterality: Right;  Right Ankle    EXTERNAL FIXATION Right 5/11/2022    Procedure: EXTERNAL FIXATION;  Surgeon: Alec Pettit MD;  Location: [facility];  Service: Orthopedics;  Laterality: Right;  Right Ankle    FEMORAL BYPASS      HYSTERECTOMY      OPEN REDUCTION AND INTERNAL FIXATION (ORIF) OF PILON FRACTURE Left 11/12/2021    Procedure: ORIF, FRACTURE, PILON;  Surgeon: Alec Pettit MD;  Location: [facility];  Service: Orthopedics;  Laterality: Left;    REMOVAL OF EXTERNAL FIXATION DEVICE Left 11/12/2021    Procedure: REMOVAL, EXTERNAL FIXATION DEVICE;  Surgeon: Alec Pettit MD;  Location: [facility];  Service: Orthopedics;  Laterality: Left;    REMOVAL OF EXTERNAL FIXATION DEVICE Right 5/20/2022    Procedure: REMOVAL, EXTERNAL FIXATION DEVICE;  Surgeon: Alec Pettit MD;  Location: [facility];  Service: Orthopedics;  Laterality: Right;    stents to kidney         Review of patient's allergies indicates:   Allergen Reactions    Codeine Nausea Only    Iodine Itching    Pcn [penicillins] Other (See Comments)     Headache and nausea with PO PCN     Sulfa (sulfonamide antibiotics)     Oxycontin [oxycodone] Nausea And Vomiting       No current facility-administered medications on file prior to encounter.     Current Outpatient Medications on File Prior to Encounter   Medication Sig    albuterol sulfate (PROAIR HFA INHL) Inhale into the lungs.    alendronate (FOSAMAX) 70 MG tablet Take 1 tablet (70 mg total) by mouth every 7 days.    aspirin 81 MG Chew Take 81 mg by mouth once daily.    atorvastatin (LIPITOR) 80 MG tablet Take 1 tablet (80 mg total) by mouth every evening.    BASAGLAR KWIKPEN 100 unit/mL (3 mL) InPn pen 75 Units every evening.     carvedilol (COREG) 25 MG tablet Take 25 mg by mouth 2  (two) times daily with meals.    doxazosin (CARDURA) 4 MG tablet TAKE 1 TABLET ORALLY ONCE A DAY.    fenofibrate 160 MG Tab Take 160 mg by mouth once daily.    fluoxetine (PROZAC) 20 MG capsule TAKE 1 CAPSULE(S) EVERY DAY BY ORAL ROUTE FOR 30 DAYS.    gabapentin (NEURONTIN) 300 MG capsule Take 300 mg by mouth 2 (two) times daily.    HYDROcodone-acetaminophen (NORCO) 5-325 mg per tablet Take 1 tablet by mouth every 8 (eight) hours as needed for Pain.    isosorbide mononitrate (IMDUR) 30 MG 24 hr tablet Take 1 tablet (30 mg total) by mouth once daily.    levothyroxine (SYNTHROID) 88 MCG tablet Take 1 tablet (88 mcg total) by mouth before breakfast.    lisinopriL (PRINIVIL,ZESTRIL) 40 MG tablet Take 0.5 tablets (20 mg total) by mouth once daily.    metFORMIN (GLUCOPHAGE) 500 MG tablet Take 1 tablet (500 mg total) by mouth daily with breakfast.    NIFEdipine (PROCARDIA-XL) 30 MG (OSM) 24 hr tablet Take 1 tablet (30 mg total) by mouth once daily.    pantoprazole (PROTONIX) 40 MG tablet     ranolazine (RANEXA) 500 MG Tb12 Take 1 tablet (500 mg total) by mouth 2 (two) times daily.    trazodone (DESYREL) 100 MG tablet     acetaminophen (TYLENOL) 650 MG TbSR Take 1 tablet (650 mg total) by mouth every 8 (eight) hours.    clobetasol (TEMOVATE) 0.05 % cream Apply thin layer to affected area (vulva) nightly for 2 months, then twice weekly at bedtime.    nitroGLYCERIN (NITROSTAT) 0.4 MG SL tablet Place 1 tablet (0.4 mg total) under the tongue every 5 (five) minutes as needed for Chest pain.    nystatin (MYCOSTATIN) powder Apply topically 2 (two) times daily.    ondansetron (ZOFRAN-ODT) 4 MG TbDL Take 4 mg by mouth every 6 (six) hours as needed.    TRUE METRIX GLUCOSE TEST STRIP Strp     [DISCONTINUED] pravastatin (PRAVACHOL) 40 MG tablet Take 40 mg by mouth once daily.     Family History       Problem Relation (Age of Onset)    Breast cancer Sister    Cancer Maternal Aunt    Heart disease Mother           Tobacco Use    Smoking status: Former Smoker     Types: Cigarettes     Quit date: 11/15/2008     Years since quittin.5    Smokeless tobacco: Never Used   Substance and Sexual Activity    Alcohol use: No    Drug use: No    Sexual activity: Not Currently     Partners: Male     Birth control/protection: Surgical     Comment:      Review of Systems   Constitutional:  Negative for chills, fatigue and fever.   HENT:  Negative for congestion, hearing loss, sinus pressure and sore throat.    Eyes:  Negative for photophobia.   Respiratory:  Negative for cough, choking, chest tightness, shortness of breath and wheezing.    Cardiovascular:  Negative for chest pain and palpitations.   Gastrointestinal:  Negative for blood in stool, nausea and vomiting.   Genitourinary:  Negative for dysuria and hematuria.   Musculoskeletal:  Positive for gait problem. Negative for arthralgias and myalgias.        Recent R ankle surgery and non weight bearing   Skin:  Negative for pallor.   Neurological:  Negative for dizziness and numbness.   Hematological:  Does not bruise/bleed easily.   Psychiatric/Behavioral:  Negative for confusion and suicidal ideas. The patient is not nervous/anxious.    Objective:     Vital Signs (Most Recent):  Temp: 97.8 °F (36.6 °C) (22 0714)  Pulse: (!) 53 (22 1000)  Resp: 16 (22 0714)  BP: (!) 171/72 (22 0714)  SpO2: 97 % (22 0745)   Vital Signs (24h Range):  Temp:  [96.8 °F (36 °C)-98.4 °F (36.9 °C)] 97.8 °F (36.6 °C)  Pulse:  [51-67] 53  Resp:  [16-18] 16  SpO2:  [97 %-98 %] 97 %  BP: (117-176)/(55-72) 171/72     Weight: 94 kg (207 lb 3.7 oz)  Body mass index is 37.9 kg/m².    Physical Exam  Vitals and nursing note reviewed.   Constitutional:       Appearance: She is well-developed.   HENT:      Head: Normocephalic and atraumatic.   Cardiovascular:      Rate and Rhythm: Normal rate and regular rhythm.      Heart sounds: Normal heart sounds.   Pulmonary:       Effort: Pulmonary effort is normal.      Breath sounds: Normal breath sounds.   Abdominal:      General: Bowel sounds are normal.      Palpations: Abdomen is soft.      Tenderness: There is no abdominal tenderness.   Musculoskeletal:      Comments: R leg hard cast ;recent surgery    Skin:     General: Skin is warm and dry.   Neurological:      Mental Status: She is alert and oriented to person, place, and time.   Psychiatric:         Behavior: Behavior normal.         Thought Content: Thought content normal.         Judgment: Judgment normal.           Significant Labs:     Covid negative     Procal 0.04    Lactic acid 1.3    ABGs:   Recent Labs   Lab 05/28/22  1400   PH 7.320*   PCO2 41   HCO3 21.10   POCSATURATED 97.2   BE -4.70*   TOTALHB 9.7*   COHB 2.1   METHB 1.0   PO2 87       Blood Culture:   Recent Labs   Lab 05/28/22  1410 05/28/22  1416   LABBLOO No Growth to date  No Growth to date No Growth to date  No Growth to date       CBC:   Recent Labs   Lab 05/28/22  1411 05/29/22  0552   WBC 7.87 7.19   HGB 9.9* 8.9*   HCT 31.0* 28.2*    239       CMP:   Recent Labs   Lab 05/28/22  1410 05/29/22  0552   * 132*   K 5.4* 4.6    100   CO2 19* 21*   * 352*   BUN 17 25*   CREATININE 1.1 1.3   CALCIUM 8.9 8.5*   PROT 6.9 6.1   ALBUMIN 2.9* 2.6*   BILITOT 0.7 0.6   ALKPHOS 105 73   AST 13 10   ALT 9* 6*   ANIONGAP 12 11   EGFRNONAA 55* 45*     Lab Results   Component Value Date    DDIMER 3.57 (H) 05/28/2022     BNP  Recent Labs   Lab 05/28/22  1410   *       Lactic Acid:   Recent Labs   Lab 05/28/22  1416   LACTATE 1.3         POCT Glucose:   Recent Labs   Lab 05/29/22  1653 05/29/22  1928 05/30/22  0609   POCTGLUCOSE 232* 256* 98       Troponin:   Recent Labs   Lab 05/29/22  1055 05/29/22  1654 05/29/22  2300   TROPONINI 0.108* 0.116* 0.123*       TSH:   Recent Labs   Lab 05/24/22  0455   TSH 4.756*       Urine Studies:   Recent Labs   Lab 05/28/22  1412   COLORU Yellow    APPEARANCEUA Clear   PHUR 6.0   SPECGRAV 1.015   PROTEINUA Negative   GLUCUA Negative   KETONESU Negative   BILIRUBINUA Negative   OCCULTUA Trace*   NITRITE Negative   UROBILINOGEN Negative   LEUKOCYTESUR Negative       Blood cultures No growth to dats   Flu negative   Significant Imaging:     CTA chest   1. No CT evidence to suggest an acute pulmonary embolus.  2. Findings consistent with pulmonary edema with small bilateral pleural effusions and bibasilar dependent atelectasis.  3. Small hiatal hernia.        CXR   1. Interval development of a small nodular infiltrate of the left upper lobe.  Correlate clinically with possible fever and/or elevated white count.  2. Suspected mild underlying interstitial edema.  As deemed clinically necessary, further evaluation may be obtained with dedicated PA and lateral views of the chest.     EKG Sinus rhythm with Fusion complexes   Possible Left atrial enlargement   Nonspecific ST abnormality   Abnormal ECG   When compared with ECG of 13-MAY-2022 02:46,   Fusion complexes are now Present   Nonspecific T wave abnormality no longer evident in Inferior leads   Confirmed by Jefferson TRAYLOR, Jerman MARIE ([id number]) on 5/29/2022 9:58:46 AM        Assessment/Plan:      * Congestive heart failure  Patient is identified as having Combined Systolic and Diastolic heart failure that is Acute on chronic. CHF is currently uncontrolled due to Pulmonary edema/pleural effusion on CXR. Latest ECHO performed and demonstrates- No results found for this or any previous visit.  . Continue Beta Blocker, ACE/ARB, Furosemide and Nitrate/Vasodilator and monitor clinical status closely. Monitor on telemetry. Patient is on CHF pathway.  Monitor strict Is&Os and daily weights.  . Continue to stress to patient importance of self efficacy and  on diet for CHF. Last BNP reviewed- and noted below   Recent Labs   Lab 05/28/22  1410   *   .  pro calcitonin   normal   No fevers; no white count   CT chest no  infiltrate   DC antibiotics     Cont PO lasix per home routine- diuresed 2L since admission. Stable for d/c home    Hyponatremia  Seems chronic > 132 change lasix back to home routine   Watch BMP daily ; with diuresis it may get worse       Closed displaced trimalleolar fracture of right ankle  S/p surgery   Place on Lovenox dvt prophylaxis        CAD (coronary artery disease)  Continue ranolazine  ASA, coreg, isosorbide MN        Hypothyroid  Continue levothyroxine      Hyperlipidemia associated with type 2 diabetes mellitus  Lab Results   Component Value Date    LDLCALC 70.8 02/06/2008     Continue atorvastatin       Diabetes mellitus  Lab Results   Component Value Date    HGBA1C 7.3 (H) 05/11/2022     Correction scale   BS 98 - resume home meds      Hypertension  Uncontrolled   Increase lisinopril 40 mg daily   //72  If echo ok, pt could likely be discharged today.  BP labile, cont to monitor, consider increasing procardia if remains elevated.          VTE Risk Mitigation (From admission, onward)         Ordered     enoxaparin injection 40 mg  Daily         05/29/22 0914     IP VTE HIGH RISK PATIENT  Once         05/28/22 2008     Place sequential compression device  Until discontinued         05/28/22 2008                Discharge Planning   AUGUSTINA:      Code Status: Full Code   Is the patient medically ready for discharge?:     Reason for patient still in hospital (select all that apply): Pending disposition  Discharge Plan A: Home with family                  Jalyn Garcia MD  Department of Hospital Medicine   [facility] (3rd Fl)

## 2022-05-30 NOTE — ASSESSMENT & PLAN NOTE
Seems chronic > 132 change lasix back to home routine   Watch BMP daily ; with diuresis it may get worse

## 2022-05-30 NOTE — SUBJECTIVE & OBJECTIVE
Interval History: looks greAT breathing much improved     Objective:     Vital Signs (Most Recent):  Temp: 97.8 °F (36.6 °C) (05/30/22 0714)  Pulse: (!) 55 (05/30/22 0801)  Resp: 16 (05/30/22 0714)  BP: (!) 171/72 (05/30/22 0714)  SpO2: 97 % (05/30/22 0745)   Vital Signs (24h Range):  Temp:  [96.8 °F (36 °C)-98.4 °F (36.9 °C)] 97.8 °F (36.6 °C)  Pulse:  [51-67] 55  Resp:  [16-18] 16  SpO2:  [97 %-98 %] 97 %  BP: (117-176)/(55-72) 171/72     Weight: 94 kg (207 lb 3.7 oz)  Body mass index is 37.9 kg/m².      Intake/Output Summary (Last 24 hours) at 5/30/2022 0954  Last data filed at 5/30/2022 0901  Gross per 24 hour   Intake 802.21 ml   Output 1100 ml   Net -297.79 ml       Physical Exam  Vitals and nursing note reviewed.   Constitutional:       General: She is not in acute distress.     Appearance: Normal appearance. She is well-developed. She is not ill-appearing, toxic-appearing or diaphoretic.   HENT:      Head: Normocephalic and atraumatic.      Jaw: No trismus.      Right Ear: Hearing, tympanic membrane, ear canal and external ear normal.      Left Ear: Hearing, tympanic membrane, ear canal and external ear normal.      Nose: Nose normal. No nasal deformity, mucosal edema or rhinorrhea.      Right Sinus: No maxillary sinus tenderness or frontal sinus tenderness.      Left Sinus: No maxillary sinus tenderness or frontal sinus tenderness.      Mouth/Throat:      Dentition: Normal dentition.      Pharynx: Uvula midline. No posterior oropharyngeal erythema or uvula swelling.   Eyes:      General: Lids are normal. No scleral icterus.     Conjunctiva/sclera: Conjunctivae normal.      Comments: Sclera clear bilat   Neck:      Trachea: Trachea and phonation normal.   Cardiovascular:      Rate and Rhythm: Normal rate and regular rhythm.      Pulses: Normal pulses.      Heart sounds: Normal heart sounds.   Pulmonary:      Effort: Respiratory distress (mild to moderate) present.      Breath sounds: Decreased air movement  present. Examination of the right-middle field reveals decreased breath sounds and rhonchi. Examination of the left-middle field reveals decreased breath sounds and rhonchi. Examination of the right-lower field reveals decreased breath sounds, rhonchi and rales. Examination of the left-lower field reveals decreased breath sounds, rhonchi and rales. Decreased breath sounds, rhonchi and rales present. No wheezing.   Abdominal:      General: Bowel sounds are normal. There is no distension.      Palpations: Abdomen is soft.      Tenderness: There is no abdominal tenderness.   Musculoskeletal:         General: No deformity. Normal range of motion.      Cervical back: Full passive range of motion without pain and neck supple.   Skin:     General: Skin is warm and dry.      Coloration: Skin is not pale.   Neurological:      Mental Status: She is alert and oriented to person, place, and time.      Motor: No abnormal muscle tone.      Coordination: Coordination normal.   Psychiatric:         Speech: Speech normal.         Behavior: Behavior normal. Behavior is cooperative.         Thought Content: Thought content normal.         Judgment: Judgment normal.       Vents:       Lines/Drains/Airways       Peripheral Intravenous Line  Duration                  Peripheral IV - Single Lumen 05/28/22 1352 18 G Right Hand 1 day         Peripheral IV - Single Lumen 05/28/22 1400 20 G Left Forearm 1 day                    Significant Labs:    CBC/Anemia Profile:  Recent Labs   Lab 05/28/22  1411 05/29/22  0552   WBC 7.87 7.19   HGB 9.9* 8.9*   HCT 31.0* 28.2*    239   MCV 90 90   RDW 15.5* 15.4*        Chemistries:  Recent Labs   Lab 05/28/22  1410 05/29/22  0552   * 132*   K 5.4* 4.6    100   CO2 19* 21*   BUN 17 25*   CREATININE 1.1 1.3   CALCIUM 8.9 8.5*   ALBUMIN 2.9* 2.6*   PROT 6.9 6.1   BILITOT 0.7 0.6   ALKPHOS 105 73   ALT 9* 6*   AST 13 10       All pertinent labs within the past 24 hours have been  reviewed.    Significant Imaging:  I have reviewed all pertinent imaging results/findings within the past 24 hours.

## 2022-05-30 NOTE — SUBJECTIVE & OBJECTIVE
Past Medical History:   Diagnosis Date    Asthma     Coronary artery disease     Diabetes mellitus     Dyslipidemia     GERD (gastroesophageal reflux disease)     Hypertension     Neuropathy        Past Surgical History:   Procedure Laterality Date    ANKLE HARDWARE REMOVAL Left 3/31/2022    Procedure: REMOVAL, HARDWARE, ANKLE;  Surgeon: Alec Pettit MD;  Location: [facility] [place];  Service: Orthopedics;  Laterality: Left;    APPLICATION OF LARGE EXTERNAL FIXATION DEVICE TO TIBIA Left 11/05/2021    Procedure: APPLICATION, EXTERNAL FIXATION DEVICE, LARGE, TIBIA;  Surgeon: Bk Richter MD;  Location: [facility] [place];  Service: Orthopedics;  Laterality: Left;    cardiac stents      thinks 3 total (1st 2 was in 2009 and later 2015 or so)    CHOLECYSTECTOMY      CLOSED REDUCTION Right 5/11/2022    Procedure: CLOSED REDUCTION;  Surgeon: Alec Pettit MD;  Location: [facility] [place];  Service: Orthopedics;  Laterality: Right;  Right Ankle    EXTERNAL FIXATION Right 5/11/2022    Procedure: EXTERNAL FIXATION;  Surgeon: Alec Pettit MD;  Location: [facility] [place];  Service: Orthopedics;  Laterality: Right;  Right Ankle    FEMORAL BYPASS      HYSTERECTOMY      OPEN REDUCTION AND INTERNAL FIXATION (ORIF) OF PILON FRACTURE Left 11/12/2021    Procedure: ORIF, FRACTURE, PILON;  Surgeon: Alec Pettit MD;  Location: [facility] [place];  Service: Orthopedics;  Laterality: Left;    REMOVAL OF EXTERNAL FIXATION DEVICE Left 11/12/2021    Procedure: REMOVAL, EXTERNAL FIXATION DEVICE;  Surgeon: Alec Pettit MD;  Location: [facility] [place];  Service: Orthopedics;  Laterality: Left;    REMOVAL OF EXTERNAL FIXATION DEVICE Right 5/20/2022    Procedure: REMOVAL, EXTERNAL FIXATION DEVICE;  Surgeon: Alec Pettit MD;  Location: [facility] [place];  Service: Orthopedics;  Laterality: Right;    stents to kidney         Review of patient's allergies indicates:   Allergen Reactions    Codeine Nausea Only    Iodine Itching    Pcn [penicillins] Other (See Comments)     Headache and nausea with PO  PCN     Sulfa (sulfonamide antibiotics)     Oxycontin [oxycodone] Nausea And Vomiting       No current facility-administered medications on file prior to encounter.     Current Outpatient Medications on File Prior to Encounter   Medication Sig    albuterol sulfate (PROAIR HFA INHL) Inhale into the lungs.    alendronate (FOSAMAX) 70 MG tablet Take 1 tablet (70 mg total) by mouth every 7 days.    aspirin 81 MG Chew Take 81 mg by mouth once daily.    atorvastatin (LIPITOR) 80 MG tablet Take 1 tablet (80 mg total) by mouth every evening.    BASAGLAR KWIKPEN 100 unit/mL (3 mL) InPn pen 75 Units every evening.     carvedilol (COREG) 25 MG tablet Take 25 mg by mouth 2 (two) times daily with meals.    doxazosin (CARDURA) 4 MG tablet TAKE 1 TABLET ORALLY ONCE A DAY.    fenofibrate 160 MG Tab Take 160 mg by mouth once daily.    fluoxetine (PROZAC) 20 MG capsule TAKE 1 CAPSULE(S) EVERY DAY BY ORAL ROUTE FOR 30 DAYS.    gabapentin (NEURONTIN) 300 MG capsule Take 300 mg by mouth 2 (two) times daily.    HYDROcodone-acetaminophen (NORCO) 5-325 mg per tablet Take 1 tablet by mouth every 8 (eight) hours as needed for Pain.    isosorbide mononitrate (IMDUR) 30 MG 24 hr tablet Take 1 tablet (30 mg total) by mouth once daily.    levothyroxine (SYNTHROID) 88 MCG tablet Take 1 tablet (88 mcg total) by mouth before breakfast.    lisinopriL (PRINIVIL,ZESTRIL) 40 MG tablet Take 0.5 tablets (20 mg total) by mouth once daily.    metFORMIN (GLUCOPHAGE) 500 MG tablet Take 1 tablet (500 mg total) by mouth daily with breakfast.    NIFEdipine (PROCARDIA-XL) 30 MG (OSM) 24 hr tablet Take 1 tablet (30 mg total) by mouth once daily.    pantoprazole (PROTONIX) 40 MG tablet     ranolazine (RANEXA) 500 MG Tb12 Take 1 tablet (500 mg total) by mouth 2 (two) times daily.    trazodone (DESYREL) 100 MG tablet     acetaminophen (TYLENOL) 650 MG TbSR Take 1 tablet (650 mg total) by mouth every 8 (eight) hours.    clobetasol (TEMOVATE) 0.05 % cream Apply thin  layer to affected area (vulva) nightly for 2 months, then twice weekly at bedtime.    nitroGLYCERIN (NITROSTAT) 0.4 MG SL tablet Place 1 tablet (0.4 mg total) under the tongue every 5 (five) minutes as needed for Chest pain.    nystatin (MYCOSTATIN) powder Apply topically 2 (two) times daily.    ondansetron (ZOFRAN-ODT) 4 MG TbDL Take 4 mg by mouth every 6 (six) hours as needed.    TRUE METRIX GLUCOSE TEST STRIP Strp     [DISCONTINUED] pravastatin (PRAVACHOL) 40 MG tablet Take 40 mg by mouth once daily.     Family History       Problem Relation (Age of Onset)    Breast cancer Sister    Cancer Maternal Aunt    Heart disease Mother          Tobacco Use    Smoking status: Former Smoker     Types: Cigarettes     Quit date: 11/15/2008     Years since quittin.5    Smokeless tobacco: Never Used   Substance and Sexual Activity    Alcohol use: No    Drug use: No    Sexual activity: Not Currently     Partners: Male     Birth control/protection: Surgical     Comment:      Review of Systems   Constitutional:  Negative for chills, fatigue and fever.   HENT:  Negative for congestion, hearing loss, sinus pressure and sore throat.    Eyes:  Negative for photophobia.   Respiratory:  Negative for cough, choking, chest tightness, shortness of breath and wheezing.    Cardiovascular:  Negative for chest pain and palpitations.   Gastrointestinal:  Negative for blood in stool, nausea and vomiting.   Genitourinary:  Negative for dysuria and hematuria.   Musculoskeletal:  Positive for gait problem. Negative for arthralgias and myalgias.        Recent R ankle surgery and non weight bearing   Skin:  Negative for pallor.   Neurological:  Negative for dizziness and numbness.   Hematological:  Does not bruise/bleed easily.   Psychiatric/Behavioral:  Negative for confusion and suicidal ideas. The patient is not nervous/anxious.    Objective:     Vital Signs (Most Recent):  Temp: 97.8 °F (36.6 °C) (22 0714)  Pulse: (!) 53  (05/30/22 1000)  Resp: 16 (05/30/22 0714)  BP: (!) 171/72 (05/30/22 0714)  SpO2: 97 % (05/30/22 0745)   Vital Signs (24h Range):  Temp:  [96.8 °F (36 °C)-98.4 °F (36.9 °C)] 97.8 °F (36.6 °C)  Pulse:  [51-67] 53  Resp:  [16-18] 16  SpO2:  [97 %-98 %] 97 %  BP: (117-176)/(55-72) 171/72     Weight: 94 kg (207 lb 3.7 oz)  Body mass index is 37.9 kg/m².    Physical Exam  Vitals and nursing note reviewed.   Constitutional:       Appearance: She is well-developed.   HENT:      Head: Normocephalic and atraumatic.   Cardiovascular:      Rate and Rhythm: Normal rate and regular rhythm.      Heart sounds: Normal heart sounds.   Pulmonary:      Effort: Pulmonary effort is normal.      Breath sounds: Normal breath sounds.   Abdominal:      General: Bowel sounds are normal.      Palpations: Abdomen is soft.      Tenderness: There is no abdominal tenderness.   Musculoskeletal:      Comments: R leg hard cast ;recent surgery    Skin:     General: Skin is warm and dry.   Neurological:      Mental Status: She is alert and oriented to person, place, and time.   Psychiatric:         Behavior: Behavior normal.         Thought Content: Thought content normal.         Judgment: Judgment normal.           Significant Labs:     Covid negative     Procal 0.04    Lactic acid 1.3    ABGs:   Recent Labs   Lab 05/28/22  1400   PH 7.320*   PCO2 41   HCO3 21.10   POCSATURATED 97.2   BE -4.70*   TOTALHB 9.7*   COHB 2.1   METHB 1.0   PO2 87       Blood Culture:   Recent Labs   Lab 05/28/22  1410 05/28/22  1416   LABBLOO No Growth to date  No Growth to date No Growth to date  No Growth to date       CBC:   Recent Labs   Lab 05/28/22  1411 05/29/22  0552   WBC 7.87 7.19   HGB 9.9* 8.9*   HCT 31.0* 28.2*    239       CMP:   Recent Labs   Lab 05/28/22  1410 05/29/22  0552   * 132*   K 5.4* 4.6    100   CO2 19* 21*   * 352*   BUN 17 25*   CREATININE 1.1 1.3   CALCIUM 8.9 8.5*   PROT 6.9 6.1   ALBUMIN 2.9* 2.6*   BILITOT 0.7 0.6    ALKPHOS 105 73   AST 13 10   ALT 9* 6*   ANIONGAP 12 11   EGFRNONAA 55* 45*     Lab Results   Component Value Date    DDIMER 3.57 (H) 05/28/2022     BNP  Recent Labs   Lab 05/28/22  1410   *       Lactic Acid:   Recent Labs   Lab 05/28/22  1416   LACTATE 1.3         POCT Glucose:   Recent Labs   Lab 05/29/22  1653 05/29/22  1928 05/30/22  0609   POCTGLUCOSE 232* 256* 98       Troponin:   Recent Labs   Lab 05/29/22  1055 05/29/22  1654 05/29/22  2300   TROPONINI 0.108* 0.116* 0.123*       TSH:   Recent Labs   Lab 05/24/22  0455   TSH 4.756*       Urine Studies:   Recent Labs   Lab 05/28/22  1412   COLORU Yellow   APPEARANCEUA Clear   PHUR 6.0   SPECGRAV 1.015   PROTEINUA Negative   GLUCUA Negative   KETONESU Negative   BILIRUBINUA Negative   OCCULTUA Trace*   NITRITE Negative   UROBILINOGEN Negative   LEUKOCYTESUR Negative       Blood cultures No growth to dats   Flu negative   Significant Imaging:     CTA chest   1. No CT evidence to suggest an acute pulmonary embolus.  2. Findings consistent with pulmonary edema with small bilateral pleural effusions and bibasilar dependent atelectasis.  3. Small hiatal hernia.        CXR   1. Interval development of a small nodular infiltrate of the left upper lobe.  Correlate clinically with possible fever and/or elevated white count.  2. Suspected mild underlying interstitial edema.  As deemed clinically necessary, further evaluation may be obtained with dedicated PA and lateral views of the chest.     EKG Sinus rhythm with Fusion complexes   Possible Left atrial enlargement   Nonspecific ST abnormality   Abnormal ECG   When compared with ECG of 13-MAY-2022 02:46,   Fusion complexes are now Present   Nonspecific T wave abnormality no longer evident in Inferior leads   Confirmed by Jerman Paulino MD ([id number]) on 5/29/2022 9:58:46 AM

## 2022-05-30 NOTE — ASSESSMENT & PLAN NOTE
Lab Results   Component Value Date    HGBA1C 7.3 (H) 05/11/2022     Correction scale   BS 98 - resume home meds

## 2022-05-30 NOTE — HOSPITAL COURSE
5/30 Admitted for pulm edema. Has questionable infitrate in CXR was started on vanc and aztreonam. This was stopped yesterday no fever or elevated WBC. CT did not show infiltrate. Did show some pulm edmea. With elevated BNP was gioven diuresis. Lasix 40mg IV in ER and ordered 40mg IV BID yesterday. She report that she is voiding a lot. Cardiology consulted. Echo ordered today. Out -2L since admission.

## 2022-05-30 NOTE — ASSESSMENT & PLAN NOTE
Patient is identified as having Combined Systolic and Diastolic heart failure that is Acute on chronic. CHF is currently uncontrolled due to Pulmonary edema/pleural effusion on CXR. Latest ECHO performed and demonstrates- No results found for this or any previous visit.  . Continue Beta Blocker, ACE/ARB, Furosemide and Nitrate/Vasodilator and monitor clinical status closely. Monitor on telemetry. Patient is on CHF pathway.  Monitor strict Is&Os and daily weights.  . Continue to stress to patient importance of self efficacy and  on diet for CHF. Last BNP reviewed- and noted below   Recent Labs   Lab 05/28/22  1410   *   .  pro calcitonin   normal   No fevers; no white count   CT chest no infiltrate   DC antibiotics     Cont PO lasix per home routine- diuresed 2L since admission. Stable for d/c home

## 2022-05-30 NOTE — ASSESSMENT & PLAN NOTE
Uncontrolled   Increase lisinopril 40 mg daily   //72  If echo ok, pt could likely be discharged today.  BP labile, cont to monitor, consider increasing procardia if remains elevated.

## 2022-05-31 ENCOUNTER — PATIENT MESSAGE (OUTPATIENT)
Dept: ADMINISTRATIVE | Facility: CLINIC | Age: 62
End: 2022-05-31
Payer: MEDICAID

## 2022-05-31 ENCOUNTER — PATIENT OUTREACH (OUTPATIENT)
Dept: ADMINISTRATIVE | Facility: CLINIC | Age: 62
End: 2022-05-31
Payer: MEDICAID

## 2022-05-31 NOTE — PLAN OF CARE
[facility] - Med Surg (3rd Fl)  Discharge Final Note    Primary Care Provider: Bianca Godoy MD    Expected Discharge Date: 5/30/2022    Final Discharge Note (most recent)     Final Note - 05/31/22 1018        Final Note    Assessment Type Final Discharge Note     Anticipated Discharge Disposition Home or Self Care     What phone number can be called within the next 1-3 days to see how you are doing after discharge? [phone]     Hospital Resources/Appts/Education Provided Appointments scheduled and added to AVS        Post-Acute Status    Post-Acute Authorization Other     Other Status No Post-Acute Service Needs     Discharge Delays None known at this time                 Important Message from Medicare             Contact Info     Bianca Godoy MD   Specialty: Family Medicine   Relationship: PCP - General    [address]  [facility] [address]   Phone: [phone]       Next Steps: Go on 6/2/2022    Instructions: Hospital Follow-up at 10:15am

## 2022-05-31 NOTE — PROGRESS NOTES
C3 nurse attempted to contact Merna Regalado for a TCC post hospital discharge follow up call. Left voicemail for patient. Message sent via [facility] portal for Post Discharge Attempt with callback information. The patient's daughter is unable to conduct the call @ this time. The patient's daughter requested a callback.    The patient has a scheduled HOSFU appointment with Bianca Godoy MD on 6/2/2022 @ 1015 - per AVS. PCP not within [place].

## 2022-06-01 NOTE — PROGRESS NOTES
C3 nurse attempted to contact Merna Regalado for a TCC post hospital discharge follow up call. The patient is unable to conduct the call @ this time. The patient requested a callback.    The patient has a scheduled HOSFU appointment with Bianca Godoy MD on 6/2/2022 @ 1015. PCP not within [place].

## 2022-06-02 DIAGNOSIS — J44.89 OBSTRUCTIVE CHRONIC BRONCHITIS WITHOUT EXACERBATION: Primary | ICD-10-CM

## 2022-06-02 LAB
BACTERIA BLD CULT: NORMAL
BACTERIA BLD CULT: NORMAL

## 2022-06-02 NOTE — PROGRESS NOTES
C3 nurse spoke with Merna Regalado for a TCC post hospital discharge follow up call. The patient has a scheduled HOSFU appointment with Bianca Godoy MD  on 6/3/2022 @ 1115 per patient.  PCP not within [place]      Patient states that she is taking norco 5-325 mg for pain, melatonin 5 mg and novolog SSI.

## 2022-07-27 ENCOUNTER — HOSPITAL ENCOUNTER (OUTPATIENT)
Facility: HOSPITAL | Age: 62
Discharge: HOME | End: 2022-07-28
Attending: STUDENT IN AN ORGANIZED HEALTH CARE EDUCATION/TRAINING PROGRAM | Admitting: FAMILY MEDICINE
Payer: MEDICAID

## 2022-07-27 DIAGNOSIS — S01.01XA LACERATION OF SCALP, INITIAL ENCOUNTER: Primary | ICD-10-CM
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
## 2022-07-27 DIAGNOSIS — E87.5 HYPERKALEMIA: ICD-10-CM

## 2022-07-27 DIAGNOSIS — R73.9 HYPERGLYCEMIA: ICD-10-CM

## 2022-07-27 LAB
ALBUMIN SERPL BCP-MCNC: 3.3 G/DL (ref 3.5–5.2)
ALP SERPL-CCNC: 57 U/L (ref 55–135)
ALT SERPL W/O P-5'-P-CCNC: 6 U/L (ref 10–44)
AMPHET+METHAMPHET UR QL: NEGATIVE
ANION GAP SERPL CALC-SCNC: 10 MMOL/L (ref 8–16)
ANION GAP SERPL CALC-SCNC: 8 MMOL/L (ref 8–16)
AST SERPL-CCNC: 9 U/L (ref 10–40)
BACTERIA #/AREA URNS HPF: ABNORMAL /HPF
BARBITURATES UR QL SCN>200 NG/ML: NEGATIVE
BASOPHILS # BLD AUTO: 0.03 K/UL (ref 0–0.2)
BASOPHILS NFR BLD: 0.4 % (ref 0–1.9)
BENZODIAZ UR QL SCN>200 NG/ML: NEGATIVE
BILIRUB SERPL-MCNC: 0.5 MG/DL (ref 0.1–1)
BILIRUB UR QL STRIP: NEGATIVE
BUN SERPL-MCNC: 25 MG/DL (ref 6–20)
BUN SERPL-MCNC: 27 MG/DL (ref 6–20)
BZE UR QL SCN: NEGATIVE
CALCIUM SERPL-MCNC: 8.6 MG/DL (ref 8.7–10.5)
CALCIUM SERPL-MCNC: 9.1 MG/DL (ref 8.7–10.5)
CANNABINOIDS UR QL SCN: NEGATIVE
CHLORIDE SERPL-SCNC: 102 MMOL/L (ref 95–110)
CHLORIDE SERPL-SCNC: 108 MMOL/L (ref 95–110)
CLARITY UR: CLEAR
CO2 SERPL-SCNC: 21 MMOL/L (ref 23–29)
CO2 SERPL-SCNC: 23 MMOL/L (ref 23–29)
COLOR UR: YELLOW
CREAT SERPL-MCNC: 1.2 MG/DL (ref 0.5–1.4)
CREAT SERPL-MCNC: 1.5 MG/DL (ref 0.5–1.4)
CREAT UR-MCNC: 18.3 MG/DL (ref 15–325)
DIFFERENTIAL METHOD: ABNORMAL
EOSINOPHIL # BLD AUTO: 0.1 K/UL (ref 0–0.5)
EOSINOPHIL NFR BLD: 0.8 % (ref 0–8)
ERYTHROCYTE [DISTWIDTH] IN BLOOD BY AUTOMATED COUNT: 13.1 % (ref 11.5–14.5)
EST. GFR  (AFRICAN AMERICAN): 44 ML/MIN/1.73 M^2
EST. GFR  (AFRICAN AMERICAN): 57 ML/MIN/1.73 M^2
EST. GFR  (NON AFRICAN AMERICAN): 38 ML/MIN/1.73 M^2
EST. GFR  (NON AFRICAN AMERICAN): 50 ML/MIN/1.73 M^2
GLUCOSE SERPL-MCNC: 258 MG/DL (ref 70–110)
GLUCOSE SERPL-MCNC: 419 MG/DL (ref 70–110)
GLUCOSE UR QL STRIP: ABNORMAL
HCT VFR BLD AUTO: 30.1 % (ref 37–48.5)
HGB BLD-MCNC: 10 G/DL (ref 12–16)
HGB UR QL STRIP: ABNORMAL
HYALINE CASTS #/AREA URNS LPF: 1 /LPF
IMM GRANULOCYTES # BLD AUTO: 0.03 K/UL (ref 0–0.04)
IMM GRANULOCYTES NFR BLD AUTO: 0.4 % (ref 0–0.5)
KETONES UR QL STRIP: NEGATIVE
LEUKOCYTE ESTERASE UR QL STRIP: ABNORMAL
LYMPHOCYTES # BLD AUTO: 1.5 K/UL (ref 1–4.8)
LYMPHOCYTES NFR BLD: 18.9 % (ref 18–48)
MCH RBC QN AUTO: 28.9 PG (ref 27–31)
MCHC RBC AUTO-ENTMCNC: 33.2 G/DL (ref 32–36)
MCV RBC AUTO: 87 FL (ref 82–98)
METHADONE UR QL SCN>300 NG/ML: NEGATIVE
MICROSCOPIC COMMENT: ABNORMAL
MONOCYTES # BLD AUTO: 0.6 K/UL (ref 0.3–1)
MONOCYTES NFR BLD: 7.4 % (ref 4–15)
NEUTROPHILS # BLD AUTO: 5.7 K/UL (ref 1.8–7.7)
NEUTROPHILS NFR BLD: 72.1 % (ref 38–73)
NITRITE UR QL STRIP: NEGATIVE
NRBC BLD-RTO: 0 /100 WBC
OPIATES UR QL SCN: NEGATIVE
PCP UR QL SCN>25 NG/ML: NEGATIVE
PH UR STRIP: 6 [PH] (ref 5–8)
PLATELET # BLD AUTO: 181 K/UL (ref 150–450)
PMV BLD AUTO: 10.2 FL (ref 9.2–12.9)
POCT GLUCOSE: 326 MG/DL (ref 70–110)
POTASSIUM SERPL-SCNC: 4.7 MMOL/L (ref 3.5–5.1)
POTASSIUM SERPL-SCNC: 5.8 MMOL/L (ref 3.5–5.1)
PROT SERPL-MCNC: 6.4 G/DL (ref 6–8.4)
PROT UR QL STRIP: NEGATIVE
RBC # BLD AUTO: 3.46 M/UL (ref 4–5.4)
RBC #/AREA URNS HPF: 5 /HPF (ref 0–4)
SODIUM SERPL-SCNC: 133 MMOL/L (ref 136–145)
SODIUM SERPL-SCNC: 139 MMOL/L (ref 136–145)
SP GR UR STRIP: 1.01 (ref 1–1.03)
SQUAMOUS #/AREA URNS HPF: 6 /HPF
TOXICOLOGY INFORMATION: NORMAL
URN SPEC COLLECT METH UR: ABNORMAL
UROBILINOGEN UR STRIP-ACNC: NEGATIVE EU/DL
WBC # BLD AUTO: 7.88 K/UL (ref 3.9–12.7)
WBC #/AREA URNS HPF: 50 /HPF (ref 0–5)

## 2022-07-27 PROCEDURE — 82962 GLUCOSE BLOOD TEST: CPT

## 2022-07-27 PROCEDURE — 93010 EKG 12-LEAD: ICD-10-PCS | Mod: ,,, | Performed by: INTERNAL MEDICINE

## 2022-07-27 PROCEDURE — 80048 BASIC METABOLIC PNL TOTAL CA: CPT | Mod: XB | Performed by: STUDENT IN AN ORGANIZED HEALTH CARE EDUCATION/TRAINING PROGRAM

## 2022-07-27 PROCEDURE — 87086 URINE CULTURE/COLONY COUNT: CPT | Performed by: STUDENT IN AN ORGANIZED HEALTH CARE EDUCATION/TRAINING PROGRAM

## 2022-07-27 PROCEDURE — 36415 COLL VENOUS BLD VENIPUNCTURE: CPT | Performed by: STUDENT IN AN ORGANIZED HEALTH CARE EDUCATION/TRAINING PROGRAM

## 2022-07-27 PROCEDURE — 93010 ELECTROCARDIOGRAM REPORT: CPT | Mod: ,,, | Performed by: INTERNAL MEDICINE

## 2022-07-27 PROCEDURE — 96375 TX/PRO/DX INJ NEW DRUG ADDON: CPT

## 2022-07-27 PROCEDURE — 80053 COMPREHEN METABOLIC PANEL: CPT | Performed by: STUDENT IN AN ORGANIZED HEALTH CARE EDUCATION/TRAINING PROGRAM

## 2022-07-27 PROCEDURE — 12001 RPR S/N/AX/GEN/TRNK 2.5CM/<: CPT

## 2022-07-27 PROCEDURE — 85025 COMPLETE CBC W/AUTO DIFF WBC: CPT | Performed by: STUDENT IN AN ORGANIZED HEALTH CARE EDUCATION/TRAINING PROGRAM

## 2022-07-27 PROCEDURE — 96361 HYDRATE IV INFUSION ADD-ON: CPT | Mod: 59

## 2022-07-27 PROCEDURE — 81000 URINALYSIS NONAUTO W/SCOPE: CPT | Mod: 59 | Performed by: STUDENT IN AN ORGANIZED HEALTH CARE EDUCATION/TRAINING PROGRAM

## 2022-07-27 PROCEDURE — 80307 DRUG TEST PRSMV CHEM ANLYZR: CPT | Performed by: STUDENT IN AN ORGANIZED HEALTH CARE EDUCATION/TRAINING PROGRAM

## 2022-07-27 PROCEDURE — 63600175 PHARM REV CODE 636 W HCPCS: Performed by: STUDENT IN AN ORGANIZED HEALTH CARE EDUCATION/TRAINING PROGRAM

## 2022-07-27 PROCEDURE — 25000003 PHARM REV CODE 250: Performed by: STUDENT IN AN ORGANIZED HEALTH CARE EDUCATION/TRAINING PROGRAM

## 2022-07-27 PROCEDURE — 99285 EMERGENCY DEPT VISIT HI MDM: CPT | Mod: 25

## 2022-07-27 PROCEDURE — 93005 ELECTROCARDIOGRAM TRACING: CPT

## 2022-07-27 RX ORDER — CLOPIDOGREL BISULFATE 75 MG/1
75 TABLET ORAL DAILY
COMMUNITY
Start: 2022-07-13 | End: 2022-08-16

## 2022-07-27 RX ORDER — FAMOTIDINE 40 MG/1
TABLET, FILM COATED ORAL
COMMUNITY
Start: 2022-06-23 | End: 2022-07-28

## 2022-07-27 RX ORDER — BACLOFEN 20 MG/1
20 TABLET ORAL 2 TIMES DAILY PRN
COMMUNITY
Start: 2022-05-27 | End: 2022-07-28

## 2022-07-27 RX ORDER — ERGOCALCIFEROL 1.25 MG/1
50000 CAPSULE ORAL
Status: ON HOLD | COMMUNITY
Start: 2022-07-13 | End: 2023-10-08 | Stop reason: CLARIF

## 2022-07-27 RX ORDER — CIPROFLOXACIN 2 MG/ML
400 INJECTION, SOLUTION INTRAVENOUS
Status: COMPLETED | OUTPATIENT
Start: 2022-07-28 | End: 2022-07-28

## 2022-07-27 RX ORDER — LIDOCAINE HYDROCHLORIDE AND EPINEPHRINE 10; 10 MG/ML; UG/ML
10 INJECTION, SOLUTION INFILTRATION; PERINEURAL
Status: COMPLETED | OUTPATIENT
Start: 2022-07-27 | End: 2022-07-27

## 2022-07-27 RX ADMIN — SODIUM ZIRCONIUM CYCLOSILICATE 10 G: 10 POWDER, FOR SUSPENSION ORAL at 08:07

## 2022-07-27 RX ADMIN — CIPROFLOXACIN 400 MG: 2 INJECTION, SOLUTION INTRAVENOUS at 11:07

## 2022-07-27 RX ADMIN — SODIUM CHLORIDE 2000 ML: 0.9 INJECTION, SOLUTION INTRAVENOUS at 08:07

## 2022-07-27 RX ADMIN — INSULIN HUMAN 4 UNITS: 100 INJECTION, SOLUTION PARENTERAL at 09:07

## 2022-07-27 RX ADMIN — LIDOCAINE HYDROCHLORIDE,EPINEPHRINE BITARTRATE 10 ML: 10; .01 INJECTION, SOLUTION INFILTRATION; PERINEURAL at 07:07

## 2022-07-27 NOTE — ED PROVIDER NOTES
Encounter Date: 7/27/2022    This document was partially completed using speech recognition software and may contain misspellings, grammatical errors, and/or unexpected word substitutions.       History     Chief Complaint   Patient presents with    Fall     [facility].  Pt A&Ox3, denies LOC,  Pt reports being anxious.  Pt states she did not LOC, or hit head.  Laceration noted to rear of head.  Pt denies N/V, CO, SOB.     61 year old female with a PMHx of CAD, DM, HTN presents to the ED with her daughter after 3 falls today. Patient reports while standing, her left knee shaquille and gives out causing her to fall 3 times. States her left knee has been giving out randomly for months, causing her to fall. States she's seen orthopedics and was told to exercise - no images done. The 1st fall,  caught her. 2nd fall, the  caught her but she hit her head on a wooden board. 3rd fall, she fell but didn't hit anything. States she has never lost consciousness. Presents with left knee pain, 2cm horizontal laceration to the occiput, thoracic back pain (reports known back fractures, was suppose to f/u with neurosurgery in [place] but didn't want to go that far; pain isn't worse than typical). Daughter states her memory has been off.     She is alert, oriented x 3. But her history of falls has changed a few times and needed clarification multiple times.        Review of patient's allergies indicates:   Allergen Reactions    Codeine Nausea Only    Iodine Itching    Pcn [penicillins] Other (See Comments)     Headache and nausea with PO PCN     Sulfa (sulfonamide antibiotics)     Oxycontin [oxycodone] Nausea And Vomiting     Past Medical History:   Diagnosis Date    Asthma     Coronary artery disease     Diabetes mellitus     Dyslipidemia     GERD (gastroesophageal reflux disease)     Hypertension     Neuropathy      Past Surgical History:   Procedure Laterality Date     ANKLE HARDWARE REMOVAL Left 3/31/2022    Procedure: REMOVAL, HARDWARE, ANKLE;  Surgeon: Alec Pettit MD;  Location: [facility];  Service: Orthopedics;  Laterality: Left;    APPLICATION OF LARGE EXTERNAL FIXATION DEVICE TO TIBIA Left 2021    Procedure: APPLICATION, EXTERNAL FIXATION DEVICE, LARGE, TIBIA;  Surgeon: Bk Richter MD;  Location: [facility];  Service: Orthopedics;  Laterality: Left;    cardiac stents      thinks 3 total (1st 2 was in  and later  or so)    CHOLECYSTECTOMY      CLOSED REDUCTION Right 2022    Procedure: CLOSED REDUCTION;  Surgeon: Alec Pettit MD;  Location: [facility];  Service: Orthopedics;  Laterality: Right;  Right Ankle    EXTERNAL FIXATION Right 2022    Procedure: EXTERNAL FIXATION;  Surgeon: Alec Pettit MD;  Location: [facility];  Service: Orthopedics;  Laterality: Right;  Right Ankle    FEMORAL BYPASS      HYSTERECTOMY      OPEN REDUCTION AND INTERNAL FIXATION (ORIF) OF PILON FRACTURE Left 2021    Procedure: ORIF, FRACTURE, PILON;  Surgeon: Alec Pettit MD;  Location: [facility];  Service: Orthopedics;  Laterality: Left;    REMOVAL OF EXTERNAL FIXATION DEVICE Left 2021    Procedure: REMOVAL, EXTERNAL FIXATION DEVICE;  Surgeon: Alec Pettit MD;  Location: [facility];  Service: Orthopedics;  Laterality: Left;    REMOVAL OF EXTERNAL FIXATION DEVICE Right 2022    Procedure: REMOVAL, EXTERNAL FIXATION DEVICE;  Surgeon: Alec Pettit MD;  Location: [facility];  Service: Orthopedics;  Laterality: Right;    stents to kidney       Family History   Problem Relation Age of Onset    Breast cancer Sister     Cancer Maternal Aunt     Heart disease Mother     Colon cancer Neg Hx     Ovarian cancer Neg Hx      Social History     Tobacco Use    Smoking status: Former Smoker     Types: Cigarettes     Quit date: 11/15/2008     Years since quittin.7    Smokeless tobacco: Never Used   Substance Use Topics    Alcohol use: No    Drug use:  No     Review of Systems   Constitutional: Negative for chills and fever.   HENT: Negative for congestion, rhinorrhea and sneezing.    Eyes: Negative for discharge and redness.   Respiratory: Negative for cough and shortness of breath.    Cardiovascular: Negative for chest pain and palpitations.   Gastrointestinal: Negative for abdominal pain, diarrhea, nausea and vomiting.   Genitourinary: Negative for dysuria, frequency, vaginal bleeding and vaginal discharge.   Musculoskeletal: Positive for arthralgias and back pain. Negative for neck pain.   Skin: Positive for wound. Negative for rash.   Neurological: Negative for weakness, numbness and headaches.       Physical Exam     Initial Vitals [07/27/22 1856]   BP Pulse Resp Temp SpO2   132/83 80 18 98.5 °F (36.9 °C) 100 %      MAP       --         Physical Exam    Nursing note and vitals reviewed.  Constitutional: She appears well-developed. She is not diaphoretic. No distress.   HENT:   Head: Normocephalic. Head is with laceration (2.5cm horizontal to occiput).       Right Ear: External ear normal.   Left Ear: External ear normal.   Eyes: Right eye exhibits no discharge. Left eye exhibits no discharge. No scleral icterus.   Neck: Neck supple.   Cardiovascular: Normal rate and regular rhythm.   Pulmonary/Chest: Breath sounds normal. No stridor. No respiratory distress. She has no wheezes. She has no rhonchi. She has no rales.   Abdominal: Abdomen is soft. There is no abdominal tenderness. There is no guarding.   Musculoskeletal:         General: No edema.      Cervical back: Neck supple. No bony tenderness. Normal range of motion.      Thoracic back: Tenderness and bony tenderness present.      Lumbar back: No tenderness or bony tenderness.      Right hip: No tenderness or bony tenderness. Normal range of motion.      Left hip: No tenderness or bony tenderness. Normal range of motion.      Right knee: No bony tenderness. Normal range of motion. No tenderness.      Left  knee: Bony tenderness present. Decreased range of motion. Tenderness present.      Right lower leg: No tenderness or bony tenderness.      Left lower leg: No tenderness or bony tenderness.      Comments: Right lower extremity scar noted, healing well     Neurological: She is alert and oriented to person, place, and time. She has normal strength. No cranial nerve deficit or sensory deficit. GCS eye subscore is 4. GCS verbal subscore is 5. GCS motor subscore is 6.   Skin: Skin is warm and dry. Capillary refill takes less than 2 seconds.   Psychiatric: She has a normal mood and affect.         ED Course   Lac Repair    Date/Time: 7/27/2022 7:59 PM  Performed by: Charly Hsieh DO  Authorized by: Charly Hsieh DO     Consent:     Consent obtained:  Verbal    Consent given by:  Patient    Risks, benefits, and alternatives were discussed: yes      Risks discussed:  Infection, need for additional repair, poor cosmetic result, poor wound healing and pain  Universal protocol:     Imaging studies available: yes      Immediately prior to procedure, a time out was called: yes      Patient identity confirmed:  Verbally with patient  Anesthesia:     Anesthesia method:  Local infiltration    Local anesthetic:  Lidocaine 1% WITH epi  Laceration details:     Location:  Scalp    Scalp location:  Occipital    Length (cm):  2.5    Depth (mm):  4  Pre-procedure details:     Preparation:  Patient was prepped and draped in usual sterile fashion  Exploration:     Limited defect created (wound extended): no      Imaging outcome: foreign body not noted      Wound exploration: entire depth of wound visualized      Wound extent: no foreign bodies/material noted      Contaminated: no    Treatment:     Area cleansed with:  Saline    Amount of cleaning:  Standard    Irrigation solution:  Sterile saline    Irrigation volume:  250cc    Debridement:  None    Undermining:  None    Scar revision: no    Skin repair:     Repair method:  Staples    Number  of staples:  4  Approximation:     Approximation:  Close  Repair type:     Repair type:  Simple  Post-procedure details:     Dressing:  Open (no dressing)    Procedure completion:  Tolerated well, no immediate complications      Labs Reviewed   CBC W/ AUTO DIFFERENTIAL - Abnormal; Notable for the following components:       Result Value    RBC 3.46 (*)     Hemoglobin 10.0 (*)     Hematocrit 30.1 (*)     All other components within normal limits   COMPREHENSIVE METABOLIC PANEL - Abnormal; Notable for the following components:    Sodium 133 (*)     Potassium 5.8 (*)     Glucose 419 (*)     BUN 27 (*)     Creatinine 1.5 (*)     Albumin 3.3 (*)     AST 9 (*)     ALT 6 (*)     eGFR if  44 (*)     eGFR if non  38 (*)     All other components within normal limits   URINALYSIS, REFLEX TO URINE CULTURE     EKG Readings: (Independently Interpreted)   Previous EKG: Compared with most recent EKG Previous EKG Date: 5/29/2022.   NSR at 70 bpm. Normal axis. Normal intervals. No STEMI.        Imaging Results          CT Thoracic Spine Without Contrast (In process)  Result time 07/27/22 20:07:44               CT Cervical Spine Without Contrast (Final result)  Result time 07/27/22 20:24:06    Final result by Adolfo Mitchell MD (07/27/22 20:24:06)                 Impression:      1. No acute abnormality  2. Multilevel chronic degenerative changes.      Electronically signed by: Adolfo Mitchell  Date:    07/27/2022  Time:    20:24             Narrative:    EXAMINATION:  CT CERVICAL SPINE WITHOUT CONTRAST    CLINICAL HISTORY:  fall, questionable memory per daughter;    TECHNIQUE:  Low dose axial CT images through the cervical spine, with sagittal and coronal reformations.  Contrast was not administered.    COMPARISON:  None    FINDINGS:  The vertebral bodies are normal in height and morphology without evidence of fracture or osseous destructive process.  Normal sagittal alignment is preserved.    Facet  arthropathy at C2-3 on the left and C3-4 on the right.    Moderate disc space narrowing at C5-6, C3-4 and C2-3.    Mild diffuse posterior disc osteophyte complex also.    Severe foraminal narrowing at C3-4 left greater than right.    Central canal is adequately maintained.    Limited evaluation of the intraspinal contents demonstrates no hematoma or mass.Paraspinal soft tissues exhibit no acute abnormalities.                               CT Head Without Contrast (Final result)  Result time 07/27/22 20:11:41    Final result by Adolfo Mitchell MD (07/27/22 20:11:41)                 Impression:      1. No acute intracranial process.  2. Involutional changes with chronic microvascular ischemic changes      Electronically signed by: Adolfo Mitchell  Date:    07/27/2022  Time:    20:11             Narrative:    EXAMINATION:  CT HEAD WITHOUT CONTRAST    CLINICAL HISTORY:  Head trauma, abnormal mental status (Age 19-64y);fall with head trauma x 2, questionable memory per daughter at bedside;    TECHNIQUE:  Low dose axial CT images obtained throughout the head without intravenous contrast. Sagittal and coronal reconstructions were performed.    COMPARISON:  05/11/2022    FINDINGS:  Intracranial compartment:    Ventricles and sulci are normal in size for age without evidence of hydrocephalus. No extra-axial blood or fluid collections.    Moderate involutional changes and chronic microvascular ischemic changes in the periventricular white matter.  No parenchymal mass, hemorrhage, edema or major vascular distribution infarct.    Skull/extracranial contents (limited evaluation): No fracture. Mastoid air cells and paranasal sinuses are essentially clear.    No significant change.                                X-Ray Knee 1 or 2 View Left (Final result)  Result time 07/27/22 20:17:30    Final result by Adolfo Mitchell MD (07/27/22 20:17:30)                 Impression:      Incompletely healed nondisplaced fracture of the proximal  fibula.  Recommend follow-up.    This report was flagged in Epic as abnormal.      Electronically signed by: Adolfo Lea  Date:    07/27/2022  Time:    20:17             Narrative:    EXAMINATION:  XR KNEE 1 OR 2 VIEW LEFT    CLINICAL HISTORY:  Fall, fracture 05/11/2022    TECHNIQUE:  AP and lateral views of the left knee    COMPARISON:  05/11/2022    FINDINGS:  Incompletely healed nondisplaced fracture of the proximal fibular shaft..  No significant displacement.  Mild sclerotic changes at the margins with mild periosteal reaction.  Recommend clinical correlation.    No significant arthropathy.  Minimal osteophytic spurring of the superior patella.  No significant joint effusion or hemarthrosis.  Joint spaces appear adequately maintained.  No fractures elsewhere.                                 Medications   Tdap (BOOSTRIX) vaccine injection 0.5 mL (has no administration in time range)   sodium chloride 0.9% bolus 2,000 mL (2,000 mLs Intravenous New Bag 7/27/22 2015)   LIDOcaine-EPINEPHrine 1%-1:100,000 injection 10 mL (10 mLs Intradermal Given 7/27/22 1945)   sodium zirconium cyclosilicate packet 10 g (10 g Oral Given 7/27/22 2037)     Medical Decision Making:   Differential Diagnosis:   Differential considerations include (in no particular order): cardiac dysrhythmia (WPW, prolonged QT, Brugada, AV block, ARVD, ventricular tachycardia, HOCOM), ACS, anemia, seizure, hypoglycemia, head bleed, skull fx, cspine fx, tspine fx, lspine fx, hip fx, knee fx  ED Management:  Based on the patient's evaluation - patient appears stable. Hyperglycemia without an anion gap - doubt DKA, HHS. HyperK without ECG changes - given fluids, lokelma. Lac repaired with staples in the ED. Tdap updated. Given fluids, will repeat BMP and if hyperK and improving Cr - can discharge home.     Patient signed out Dr. Boyd 830pm.                      Clinical Impression:   Final diagnoses:  [W19.XXXA] Fall  [S01.01XA] Laceration of scalp,  initial encounter (Primary)  [E87.5] Hyperkalemia  [R73.9] Hyperglycemia                 Charly Hsieh DO  07/27/22 2040

## 2022-07-28 VITALS
HEART RATE: 58 BPM | DIASTOLIC BLOOD PRESSURE: 73 MMHG | OXYGEN SATURATION: 99 % | HEIGHT: 62 IN | BODY MASS INDEX: 34.23 KG/M2 | RESPIRATION RATE: 19 BRPM | SYSTOLIC BLOOD PRESSURE: 162 MMHG | TEMPERATURE: 99 F | WEIGHT: 186 LBS

## 2022-07-28 PROBLEM — E87.5 HYPERKALEMIA: Status: ACTIVE | Noted: 2022-07-28

## 2022-07-28 LAB
ANION GAP SERPL CALC-SCNC: 10 MMOL/L (ref 8–16)
BUN SERPL-MCNC: 24 MG/DL (ref 6–20)
CALCIUM SERPL-MCNC: 8.7 MG/DL (ref 8.7–10.5)
CHLORIDE SERPL-SCNC: 108 MMOL/L (ref 95–110)
CO2 SERPL-SCNC: 21 MMOL/L (ref 23–29)
CREAT SERPL-MCNC: 1.1 MG/DL (ref 0.5–1.4)
EST. GFR  (AFRICAN AMERICAN): >60 ML/MIN/1.73 M^2
EST. GFR  (NON AFRICAN AMERICAN): 55 ML/MIN/1.73 M^2
GLUCOSE SERPL-MCNC: 256 MG/DL (ref 70–110)
POCT GLUCOSE: 246 MG/DL (ref 70–110)
POCT GLUCOSE: 354 MG/DL (ref 70–110)
POTASSIUM SERPL-SCNC: 4.5 MMOL/L (ref 3.5–5.1)
SARS-COV-2 RDRP RESP QL NAA+PROBE: NEGATIVE
SODIUM SERPL-SCNC: 139 MMOL/L (ref 136–145)

## 2022-07-28 PROCEDURE — 99234 HOSP IP/OBS SM DT SF/LOW 45: CPT | Mod: ,,, | Performed by: FAMILY MEDICINE

## 2022-07-28 PROCEDURE — 96365 THER/PROPH/DIAG IV INF INIT: CPT

## 2022-07-28 PROCEDURE — 25000003 PHARM REV CODE 250: Performed by: STUDENT IN AN ORGANIZED HEALTH CARE EDUCATION/TRAINING PROGRAM

## 2022-07-28 PROCEDURE — 36415 COLL VENOUS BLD VENIPUNCTURE: CPT | Performed by: STUDENT IN AN ORGANIZED HEALTH CARE EDUCATION/TRAINING PROGRAM

## 2022-07-28 PROCEDURE — 80048 BASIC METABOLIC PNL TOTAL CA: CPT | Performed by: STUDENT IN AN ORGANIZED HEALTH CARE EDUCATION/TRAINING PROGRAM

## 2022-07-28 PROCEDURE — 63600175 PHARM REV CODE 636 W HCPCS: Performed by: STUDENT IN AN ORGANIZED HEALTH CARE EDUCATION/TRAINING PROGRAM

## 2022-07-28 PROCEDURE — 99234 PR OBSERV/HOSP SAME DATE,LEVL III: ICD-10-PCS | Mod: ,,, | Performed by: FAMILY MEDICINE

## 2022-07-28 PROCEDURE — 25000003 PHARM REV CODE 250: Performed by: FAMILY MEDICINE

## 2022-07-28 PROCEDURE — 82962 GLUCOSE BLOOD TEST: CPT

## 2022-07-28 PROCEDURE — 96372 THER/PROPH/DIAG INJ SC/IM: CPT | Mod: 59 | Performed by: STUDENT IN AN ORGANIZED HEALTH CARE EDUCATION/TRAINING PROGRAM

## 2022-07-28 PROCEDURE — 96367 TX/PROPH/DG ADDL SEQ IV INF: CPT

## 2022-07-28 PROCEDURE — U0002 COVID-19 LAB TEST NON-CDC: HCPCS | Performed by: STUDENT IN AN ORGANIZED HEALTH CARE EDUCATION/TRAINING PROGRAM

## 2022-07-28 RX ORDER — HYDRALAZINE HYDROCHLORIDE 25 MG/1
25 TABLET, FILM COATED ORAL
Status: COMPLETED | OUTPATIENT
Start: 2022-07-28 | End: 2022-07-28

## 2022-07-28 RX ORDER — IBUPROFEN 200 MG
16 TABLET ORAL
Status: DISCONTINUED | OUTPATIENT
Start: 2022-07-28 | End: 2022-07-28 | Stop reason: HOSPADM

## 2022-07-28 RX ORDER — DOXAZOSIN 2 MG/1
4 TABLET ORAL DAILY
Status: DISCONTINUED | OUTPATIENT
Start: 2022-07-28 | End: 2022-07-28 | Stop reason: HOSPADM

## 2022-07-28 RX ORDER — LEVOTHYROXINE SODIUM 88 UG/1
88 TABLET ORAL
Status: DISCONTINUED | OUTPATIENT
Start: 2022-07-28 | End: 2022-07-28 | Stop reason: HOSPADM

## 2022-07-28 RX ORDER — FUROSEMIDE 20 MG/1
20 TABLET ORAL DAILY
Status: DISCONTINUED | OUTPATIENT
Start: 2022-07-28 | End: 2022-07-28 | Stop reason: HOSPADM

## 2022-07-28 RX ORDER — IBUPROFEN 200 MG
24 TABLET ORAL
Status: DISCONTINUED | OUTPATIENT
Start: 2022-07-28 | End: 2022-07-28 | Stop reason: HOSPADM

## 2022-07-28 RX ORDER — NIFEDIPINE 60 MG/1
60 TABLET, EXTENDED RELEASE ORAL DAILY
Status: DISCONTINUED | OUTPATIENT
Start: 2022-07-28 | End: 2022-07-28

## 2022-07-28 RX ORDER — HYDROCODONE BITARTRATE AND ACETAMINOPHEN 7.5; 325 MG/1; MG/1
1 TABLET ORAL EVERY 6 HOURS PRN
Status: DISCONTINUED | OUTPATIENT
Start: 2022-07-28 | End: 2022-07-28 | Stop reason: HOSPADM

## 2022-07-28 RX ORDER — INSULIN ASPART 100 [IU]/ML
1-10 INJECTION, SOLUTION INTRAVENOUS; SUBCUTANEOUS
Status: DISCONTINUED | OUTPATIENT
Start: 2022-07-28 | End: 2022-07-28 | Stop reason: HOSPADM

## 2022-07-28 RX ORDER — CARVEDILOL 3.12 MG/1
12.5 TABLET ORAL 2 TIMES DAILY WITH MEALS
Status: DISCONTINUED | OUTPATIENT
Start: 2022-07-28 | End: 2022-07-28 | Stop reason: HOSPADM

## 2022-07-28 RX ORDER — LISINOPRIL 20 MG/1
40 TABLET ORAL DAILY
Status: DISCONTINUED | OUTPATIENT
Start: 2022-07-28 | End: 2022-07-28 | Stop reason: HOSPADM

## 2022-07-28 RX ORDER — SODIUM CHLORIDE 0.9 % (FLUSH) 0.9 %
10 SYRINGE (ML) INJECTION
Status: DISCONTINUED | OUTPATIENT
Start: 2022-07-28 | End: 2022-07-28 | Stop reason: HOSPADM

## 2022-07-28 RX ORDER — CIPROFLOXACIN 500 MG/1
500 TABLET ORAL 2 TIMES DAILY
Qty: 14 TABLET | Refills: 0 | Status: SHIPPED | OUTPATIENT
Start: 2022-07-28 | End: 2022-08-04

## 2022-07-28 RX ORDER — GABAPENTIN 300 MG/1
300 CAPSULE ORAL 2 TIMES DAILY
Status: DISCONTINUED | OUTPATIENT
Start: 2022-07-28 | End: 2022-07-28 | Stop reason: HOSPADM

## 2022-07-28 RX ORDER — CLONIDINE HYDROCHLORIDE 0.1 MG/1
0.1 TABLET ORAL
Status: COMPLETED | OUTPATIENT
Start: 2022-07-28 | End: 2022-07-28

## 2022-07-28 RX ORDER — TALC
6 POWDER (GRAM) TOPICAL NIGHTLY PRN
Status: DISCONTINUED | OUTPATIENT
Start: 2022-07-28 | End: 2022-07-28 | Stop reason: HOSPADM

## 2022-07-28 RX ORDER — LISINOPRIL 20 MG/1
20 TABLET ORAL DAILY
Status: DISCONTINUED | OUTPATIENT
Start: 2022-07-28 | End: 2022-07-28

## 2022-07-28 RX ORDER — CIPROFLOXACIN 2 MG/ML
400 INJECTION, SOLUTION INTRAVENOUS
Status: DISCONTINUED | OUTPATIENT
Start: 2022-07-28 | End: 2022-07-28 | Stop reason: HOSPADM

## 2022-07-28 RX ORDER — GLUCAGON 1 MG
1 KIT INJECTION
Status: DISCONTINUED | OUTPATIENT
Start: 2022-07-28 | End: 2022-07-28 | Stop reason: HOSPADM

## 2022-07-28 RX ADMIN — LISINOPRIL 40 MG: 20 TABLET ORAL at 11:07

## 2022-07-28 RX ADMIN — HYDRALAZINE HYDROCHLORIDE 25 MG: 25 TABLET, FILM COATED ORAL at 01:07

## 2022-07-28 RX ADMIN — CIPROFLOXACIN 400 MG: 2 INJECTION, SOLUTION INTRAVENOUS at 11:07

## 2022-07-28 RX ADMIN — DOXAZOSIN 4 MG: 2 TABLET ORAL at 09:07

## 2022-07-28 RX ADMIN — LEVOTHYROXINE SODIUM 88 MCG: 88 TABLET ORAL at 09:07

## 2022-07-28 RX ADMIN — INSULIN ASPART 10 UNITS: 100 INJECTION, SOLUTION INTRAVENOUS; SUBCUTANEOUS at 11:07

## 2022-07-28 RX ADMIN — CARVEDILOL 12.5 MG: 3.12 TABLET, FILM COATED ORAL at 09:07

## 2022-07-28 RX ADMIN — GABAPENTIN 300 MG: 300 CAPSULE ORAL at 11:07

## 2022-07-28 RX ADMIN — CLONIDINE HYDROCHLORIDE 0.1 MG: 0.1 TABLET ORAL at 02:07

## 2022-07-28 RX ADMIN — FUROSEMIDE 20 MG: 20 TABLET ORAL at 09:07

## 2022-07-28 NOTE — NURSING
Dr Contreras requesting recent UA and UC results from Dr. Godoy's office. Spoke to Nyla [phone]. Will fax results

## 2022-07-28 NOTE — HPI
61 year old female comes in to the Er after a couple of falls. She reports that she woke up yesterday morning and had had an episode of incontinence. She had to go to the bathroom multiple times. She felt weak and shaky and had pain in her left knee after going to the bathroom. She apparently fell and did not have LOC. She was seen 3 weeks ago at [facility] and was started on macrobid - which she claims to have completed. On 7/21 she had a repeat dip which seemed to be normal. No cultures available.

## 2022-07-28 NOTE — NURSING
PATIENT REFUSED BOOT - STATES HAS A BOOT AT HOME. TRANSFERRED TO WHEELCHAIR FROM BED WITHOUT ASSISTANCE. DISCHARGED FROM HOSPITAL ACCOMPANIED BY .

## 2022-07-28 NOTE — DISCHARGE SUMMARY
[facility] Emergency Dept  [facility]  Discharge Summary      Patient Name: Merna Regalado  MRN: [id number]  Patient Class: OP- Observation  Admission Date: 7/27/2022  Hospital Length of Stay: 0 days  Discharge Date and Time:  07/28/2022 2:58 PM  Attending Physician: No att. providers found   Discharging Provider: Karen Hutchins MD  Primary Care Provider: Bianca Godoy MD      HPI:   61 year old female comes in to the Er after a couple of falls. She reports that she woke up yesterday morning and had had an episode of incontinence. She had to go to the bathroom multiple times. She felt weak and shaky and had pain in her left knee after going to the bathroom. She apparently fell and did not have LOC. She was seen 3 weeks ago at [facility] and was started on macrobid - which she claims to have completed. On 7/21 she had a repeat dip which seemed to be normal. No cultures available.      * No surgery found *      Hospital Course:   Hyperkalemia resolved. Patient is hemodynamically stable. Has some pain in the medial side of the left knee only. Denies any other issues. Dysuria improved and no more incontinence. Spoke to daughter at length and has notified me that jono has a holter monitor with dr. Hunter and has had some glucose monitoring though has not had a CGM. She has also been trying to get her in for a neurology work up/eval.       Goals of Care Treatment Preferences:  Code Status: Full Code      Consults:   Consults (From admission, onward)        Status Ordering Provider     IP consult to case management  Once        Provider:  (Not yet assigned)    Completed KAREN HUTCHINS          Acute cystitis with hematuria  Recently completed macrobid script.  Will send home on cipro but will f/u culture.      Diabetes mellitus  Patient's FSGs are controlled on current medication regimen.  Last A1c reviewed-   Lab Results   Component Value Date    HGBA1C 7.3 (H) 05/11/2022     Most recent fingerstick glucose  reviewed-   Recent Labs   Lab 07/27/22  2137 07/28/22  0724 07/28/22  1132   POCTGLUCOSE 326* 246* 354*     Current correctional scale  Low  Maintain anti-hyperglycemic dose as follows-   Antihyperglycemics (From admission, onward)            Start     Stop Route Frequency Ordered    07/28/22 1024  insulin aspart U-100 pen 1-10 Units         -- SubQ Before meals & nightly PRN 07/28/22 0925        Hold Oral hypoglycemics while patient is in the hospital.    Was on 75 U of insulin at home 40 here.    Resume home meds at d/c. Should consider going to endo for CGM      Final Active Diagnoses:    Diagnosis Date Noted POA    PRINCIPAL PROBLEM:  Hyperkalemia [E87.5] 07/28/2022 Yes    Closed displaced trimalleolar fracture of right ankle [S82.851A] 05/11/2022 Yes    Fibula fracture [S82.409A] 05/11/2022 Yes    Acute cystitis with hematuria [N30.01] 11/01/2021 Yes    Diabetes mellitus [E11.9] 09/05/2017 Yes      Problems Resolved During this Admission:       Discharged Condition: fair    Disposition: Home or Self Care    Follow Up:   Follow-up Information     Bianca Godoy MD. Schedule an appointment as soon as possible for a visit in 1 week.    Specialty: Family Medicine  Why: As needed  Contact information:  [address] [place] [id number] [phone]             [facility]. Schedule an appointment as soon as possible for a visit in 1 week.    Specialty: Orthopedic Surgery  Contact information:  [id number] [facility]  [address] [place] [address] [phone]                       Patient Instructions:   No discharge procedures on file.    Significant Diagnostic Studies: Labs:   BMP:   Recent Labs   Lab 07/27/22 1912 07/27/22  2259 07/28/22  0012   * 258* 256*   * 139 139   K 5.8* 4.7 4.5    108 108   CO2 23 21* 21*   BUN 27* 25* 24*   CREATININE 1.5* 1.2 1.1   CALCIUM 9.1 8.6* 8.7    and CBC   Recent Labs   Lab 07/27/22 1912   WBC 7.88   HGB 10.0*   HCT 30.1*         Microbiology:   Blood Culture   Lab Results   Component Value Date    LABBLOO No growth after 5 days. 05/28/2022    and Urine Culture    Lab Results   Component Value Date    LABURIN  05/11/2022     Multiple organisms isolated. None in predominance.  Repeat if    LABURIN clinically necessary. 05/11/2022       FINDINGS:  Very minimal loss of height of the anterior superior endplate T1 with small fracture through the anterior superior corner of the T1 vertebral body.  I favor this to be chronic though better visualized on today's study compared to image 158 of series 601 on the prior study, a CTA chest dated 05/28/2022.     No significant abnormality elsewhere in the thoracic spine.     No comminution or retropulsion.     Stable mild compression fracture superior endplate of L2.     Mild degenerative disc space narrowing in the midthoracic spine.     No significant disc bulge or protrusion.     Posterior elements appear intact.     No evidence of spondylolisthesis or spondylolysis.     Bilateral renal artery stents noted.     Impression:     1. No acute abnormality is identified.  2. Minimal loss of height of the anterior superior endplate of T1 with small fracture through the anterior superior corner of the T1 vertebral body.  The finding appears chronic and similar to the prior study 05/28/2022, though better visualized on today's study.  Recommend clinical correlation.  3. Stable mild compression fracture of the superior endplate of L2.    CT:    Intracranial compartment:     Ventricles and sulci are normal in size for age without evidence of hydrocephalus. No extra-axial blood or fluid collections.     Moderate involutional changes and chronic microvascular ischemic changes in the periventricular white matter.  No parenchymal mass, hemorrhage, edema or major vascular distribution infarct.     Skull/extracranial contents (limited evaluation): No fracture. Mastoid air cells and paranasal sinuses are essentially  clear.     No significant change.     Impression:     1. No acute intracranial process.  2. Involutional changes with chronic microvascular ischemic changes    FINDINGS:  Incompletely healed nondisplaced fracture of the proximal fibular shaft..  No significant displacement.  Mild sclerotic changes at the margins with mild periosteal reaction.  Recommend clinical correlation.     No significant arthropathy.  Minimal osteophytic spurring of the superior patella.  No significant joint effusion or hemarthrosis.  Joint spaces appear adequately maintained.  No fractures elsewhere.     Impression:     Incompletely healed nondisplaced fracture of the proximal fibula.  Recommend follow-up.     This report was flagged in Epic as abnormal.    Pending Diagnostic Studies:     None         Medications:  Reconciled Home Medications:      Medication List      START taking these medications    ciprofloxacin HCl 500 MG tablet  Commonly known as: CIPRO  Take 1 tablet (500 mg total) by mouth 2 (two) times daily. for 7 days        CONTINUE taking these medications    alendronate 70 MG tablet  Commonly known as: FOSAMAX  Take 1 tablet (70 mg total) by mouth every 7 days.     aspirin 81 MG Chew  Take 81 mg by mouth once daily.     atorvastatin 80 MG tablet  Commonly known as: LIPITOR  Take 1 tablet (80 mg total) by mouth every evening.     BASAGLAR KWIKPEN U-100 INSULIN glargine 100 units/mL SubQ pen  Generic drug: insulin  75 Units every evening.     carvediloL 12.5 MG tablet  Commonly known as: COREG  Take 1 tablet (12.5 mg total) by mouth 2 (two) times daily with meals.     clopidogreL 75 mg tablet  Commonly known as: PLAVIX  Take 75 mg by mouth once daily.     doxazosin 4 MG tablet  Commonly known as: CARDURA  TAKE 1 TABLET ORALLY ONCE A DAY.     ergocalciferol 50,000 unit Cap  Commonly known as: ERGOCALCIFEROL  Take 50,000 Units by mouth every 7 days.     FLUoxetine 20 MG capsule  TAKE 1 CAPSULE(S) EVERY DAY BY ORAL ROUTE FOR 30  DAYS.     furosemide 20 MG tablet  Commonly known as: LASIX  Take 1 tablet (20 mg total) by mouth once daily.     gabapentin 300 MG capsule  Commonly known as: NEURONTIN  Take 300 mg by mouth 2 (two) times daily.     HYDROcodone-acetaminophen 7.5-325 mg per tablet  Commonly known as: NORCO  Take 1 tablet by mouth every 6 (six) hours as needed for Pain.     levothyroxine 88 MCG tablet  Commonly known as: SYNTHROID  Take 1 tablet (88 mcg total) by mouth before breakfast.     lisinopriL 40 MG tablet  Commonly known as: PRINIVIL,ZESTRIL  Take 0.5 tablets (20 mg total) by mouth once daily.     metFORMIN 500 MG tablet  Commonly known as: GLUCOPHAGE  Take 1 tablet (500 mg total) by mouth daily with breakfast.     nitroGLYCERIN 0.4 MG SL tablet  Commonly known as: NITROSTAT  Place 1 tablet (0.4 mg total) under the tongue every 5 (five) minutes as needed for Chest pain.     ondansetron 4 MG Tbdl  Commonly known as: ZOFRAN-ODT  Take 4 mg by mouth every 6 (six) hours as needed.     PROAIR HFA INHL  Inhale into the lungs.        STOP taking these medications    pravastatin 40 MG tablet  Commonly known as: PRAVACHOL     TRUE METRIX GLUCOSE TEST STRIP Strp  Generic drug: blood sugar diagnostic        ASK your doctor about these medications    traZODone 100 MG tablet  Commonly known as: DESYREL            Indwelling Lines/Drains at time of discharge:   Lines/Drains/Airways     None                 Time spent on the discharge of patient: 40 minutes         Karen Contreras MD  Department of Hospital Medicine  [facility] - Emergency Dept

## 2022-07-28 NOTE — HOSPITAL COURSE
Hyperkalemia resolved. Patient is hemodynamically stable. Has some pain in the medial side of the left knee only. Denies any other issues. Dysuria improved and no more incontinence. Spoke to daughter at length and has notified me that jono has a holter monitor with dr. Hunter and has had some glucose monitoring though has not had a CGM. She has also been trying to get her in for a neurology work up/eval.

## 2022-07-28 NOTE — H&P
[facility]  History & Physical    Patient Name: Merna Regalado  MRN: [id number]  Patient Class: OP- Observation  Admission Date: 7/27/2022  Attending Physician: No att. providers found   Primary Care Provider: Bianca Godoy MD         Patient information was obtained from patient and ER records.     Subjective:     Principal Problem:Hyperkalemia    Chief Complaint:   Chief Complaint   Patient presents with    Fall     Pt brought in by [facility] with c/c of multiple falls.  Pt A&Ox3, denies LOC,  Pt reports being anxious.  Pt states she did not LOC, or hit head.  Laceration noted to rear of head.  Pt denies N/V, CO, SOB.        HPI: 61 year old female comes in to the Er after a couple of falls. She reports that she woke up yesterday morning and had had an episode of incontinence. She had to go to the bathroom multiple times. She felt weak and shaky and had pain in her left knee after going to the bathroom. She apparently fell and did not have LOC. She was seen 3 weeks ago at [facility] and was started on macrobid - which she claims to have completed. On 7/21 she had a repeat dip which seemed to be normal. No cultures available.      Past Medical History:   Diagnosis Date    Asthma     Coronary artery disease     Diabetes mellitus     Dyslipidemia     GERD (gastroesophageal reflux disease)     Hypertension     Neuropathy        Past Surgical History:   Procedure Laterality Date    ANKLE HARDWARE REMOVAL Left 3/31/2022    Procedure: REMOVAL, HARDWARE, ANKLE;  Surgeon: Alec Pettit MD;  Location: [facility];  Service: Orthopedics;  Laterality: Left;    APPLICATION OF LARGE EXTERNAL FIXATION DEVICE TO TIBIA Left 11/05/2021    Procedure: APPLICATION, EXTERNAL FIXATION DEVICE, LARGE, TIBIA;  Surgeon: Bk Richter MD;  Location: [facility];  Service: Orthopedics;  Laterality: Left;    cardiac stents      thinks 3 total (1st 2 was in 2009 and later 2015 or so)    CHOLECYSTECTOMY       CLOSED REDUCTION Right 5/11/2022    Procedure: CLOSED REDUCTION;  Surgeon: Alec Pettit MD;  Location: [facility];  Service: Orthopedics;  Laterality: Right;  Right Ankle    EXTERNAL FIXATION Right 5/11/2022    Procedure: EXTERNAL FIXATION;  Surgeon: Alec Pettit MD;  Location: [facility];  Service: Orthopedics;  Laterality: Right;  Right Ankle    FEMORAL BYPASS      HYSTERECTOMY      OPEN REDUCTION AND INTERNAL FIXATION (ORIF) OF PILON FRACTURE Left 11/12/2021    Procedure: ORIF, FRACTURE, PILON;  Surgeon: Alec Pettit MD;  Location: [facility] OR;  Service: Orthopedics;  Laterality: Left;    REMOVAL OF EXTERNAL FIXATION DEVICE Left 11/12/2021    Procedure: REMOVAL, EXTERNAL FIXATION DEVICE;  Surgeon: Alec Pettit MD;  Location: [facility] OR;  Service: Orthopedics;  Laterality: Left;    REMOVAL OF EXTERNAL FIXATION DEVICE Right 5/20/2022    Procedure: REMOVAL, EXTERNAL FIXATION DEVICE;  Surgeon: Alec Pettit MD;  Location: [facility];  Service: Orthopedics;  Laterality: Right;    stents to kidney         Review of patient's allergies indicates:   Allergen Reactions    Codeine Nausea Only    Iodine Itching    Pcn [penicillins] Other (See Comments)     Headache and nausea with PO PCN     Sulfa (sulfonamide antibiotics)     Oxycontin [oxycodone] Nausea And Vomiting       No current facility-administered medications on file prior to encounter.     Current Outpatient Medications on File Prior to Encounter   Medication Sig    albuterol sulfate (PROAIR HFA INHL) Inhale into the lungs.    alendronate (FOSAMAX) 70 MG tablet Take 1 tablet (70 mg total) by mouth every 7 days.    aspirin 81 MG Chew Take 81 mg by mouth once daily.    atorvastatin (LIPITOR) 80 MG tablet Take 1 tablet (80 mg total) by mouth every evening.    carvediloL (COREG) 12.5 MG tablet Take 1 tablet (12.5 mg total) by mouth 2 (two) times daily with meals.    ondansetron (ZOFRAN-ODT) 4 MG TbDL Take 4 mg by mouth every 6 (six) hours as needed.     BASAGLAR KWIKPEN 100 unit/mL (3 mL) InPn pen 75 Units every evening.     clopidogreL (PLAVIX) 75 mg tablet Take 75 mg by mouth once daily.    doxazosin (CARDURA) 4 MG tablet TAKE 1 TABLET ORALLY ONCE A DAY.    ergocalciferol (ERGOCALCIFEROL) 50,000 unit Cap Take 50,000 Units by mouth every 7 days.    fluoxetine (PROZAC) 20 MG capsule TAKE 1 CAPSULE(S) EVERY DAY BY ORAL ROUTE FOR 30 DAYS.    furosemide (LASIX) 20 MG tablet Take 1 tablet (20 mg total) by mouth once daily.    gabapentin (NEURONTIN) 300 MG capsule Take 300 mg by mouth 2 (two) times daily.    HYDROcodone-acetaminophen (NORCO) 7.5-325 mg per tablet Take 1 tablet by mouth every 6 (six) hours as needed for Pain.    levothyroxine (SYNTHROID) 88 MCG tablet Take 1 tablet (88 mcg total) by mouth before breakfast.    lisinopriL (PRINIVIL,ZESTRIL) 40 MG tablet Take 0.5 tablets (20 mg total) by mouth once daily.    metFORMIN (GLUCOPHAGE) 500 MG tablet Take 1 tablet (500 mg total) by mouth daily with breakfast.    nitroGLYCERIN (NITROSTAT) 0.4 MG SL tablet Place 1 tablet (0.4 mg total) under the tongue every 5 (five) minutes as needed for Chest pain.    trazodone (DESYREL) 100 MG tablet     TRUE METRIX GLUCOSE TEST STRIP Strp     [DISCONTINUED] acetaminophen (TYLENOL) 650 MG TbSR Take 1 tablet (650 mg total) by mouth every 8 (eight) hours.    [DISCONTINUED] baclofen (LIORESAL) 20 MG tablet Take 20 mg by mouth 2 (two) times daily as needed.    [DISCONTINUED] clobetasol (TEMOVATE) 0.05 % cream Apply thin layer to affected area (vulva) nightly for 2 months, then twice weekly at bedtime.    [DISCONTINUED] famotidine (PEPCID) 40 MG tablet     [DISCONTINUED] fenofibrate 160 MG Tab Take 160 mg by mouth once daily.    [DISCONTINUED] isosorbide mononitrate (IMDUR) 30 MG 24 hr tablet Take 1 tablet (30 mg total) by mouth once daily.    [DISCONTINUED] NIFEdipine (PROCARDIA-XL) 60 MG (OSM) 24 hr tablet Take 1 tablet (60 mg total) by mouth once daily.     [DISCONTINUED] nystatin (MYCOSTATIN) powder Apply topically 2 (two) times daily.    [DISCONTINUED] pantoprazole (PROTONIX) 40 MG tablet     [DISCONTINUED] pravastatin (PRAVACHOL) 40 MG tablet Take 40 mg by mouth once daily.    [DISCONTINUED] ranolazine (RANEXA) 500 MG Tb12 Take 1 tablet (500 mg total) by mouth 2 (two) times daily.     Family History       Problem Relation (Age of Onset)    Breast cancer Sister    Cancer Maternal Aunt    Heart disease Mother          Tobacco Use    Smoking status: Former Smoker     Types: Cigarettes     Quit date: 11/15/2008     Years since quittin.7    Smokeless tobacco: Never Used   Substance and Sexual Activity    Alcohol use: No    Drug use: No    Sexual activity: Not Currently     Partners: Male     Birth control/protection: Surgical     Comment:      Review of Systems   Constitutional:  Positive for fatigue. Negative for chills and fever.   HENT:  Negative for congestion, ear pain, postnasal drip, rhinorrhea, sore throat and trouble swallowing.    Eyes:  Negative for redness and itching.   Respiratory:  Negative for cough, shortness of breath and wheezing.    Cardiovascular:  Negative for chest pain and palpitations.   Gastrointestinal:  Negative for abdominal pain, diarrhea, nausea and vomiting.   Genitourinary:  Negative for dysuria and frequency.   Musculoskeletal:  Positive for arthralgias.   Skin:  Negative for rash.   Neurological:  Negative for weakness and headaches.   Objective:     Vital Signs (Most Recent):  Temp: 98.7 °F (37.1 °C) (22 1200)  Pulse: 64 (22 0800)  Resp: (!) 24 (22 0800)  BP: (!) 148/62 (22 1147)  SpO2: 100 % (22 0800)   Vital Signs (24h Range):  Temp:  [98.4 °F (36.9 °C)-98.7 °F (37.1 °C)] 98.7 °F (37.1 °C)  Pulse:  [56-80] 64  Resp:  [18-28] 24  SpO2:  [97 %-100 %] 100 %  BP: (132-225)/() 148/62     Weight: 84.4 kg (186 lb)  Body mass index is 34.02 kg/m².    Physical Exam  Vitals and nursing  note reviewed.   Constitutional:       General: She is not in acute distress.     Appearance: She is well-developed.   HENT:      Head: Normocephalic and atraumatic.   Eyes:      Conjunctiva/sclera: Conjunctivae normal.      Pupils: Pupils are equal, round, and reactive to light.   Neck:      Thyroid: No thyromegaly.   Cardiovascular:      Rate and Rhythm: Normal rate and regular rhythm.      Heart sounds: Normal heart sounds.   Pulmonary:      Effort: Pulmonary effort is normal. No respiratory distress.      Breath sounds: Normal breath sounds. No wheezing.   Abdominal:      General: Bowel sounds are normal.      Palpations: Abdomen is soft.      Tenderness: There is no abdominal tenderness.   Musculoskeletal:         General: Normal range of motion.      Cervical back: Normal range of motion and neck supple.      Comments: Right ankle swelling.  Left ankle bruising.   Lymphadenopathy:      Cervical: No cervical adenopathy.   Skin:     General: Skin is warm and dry.      Findings: No rash.      Comments: lac   Neurological:      Mental Status: She is alert and oriented to person, place, and time.   Psychiatric:         Behavior: Behavior normal.         CRANIAL NERVES     CN III, IV, VI   Pupils are equal, round, and reactive to light.     Significant Labs: All pertinent labs within the past 24 hours have been reviewed.  CBC:   Recent Labs   Lab 07/27/22 1912   WBC 7.88   HGB 10.0*   HCT 30.1*        CMP:   Recent Labs   Lab 07/27/22 1912 07/27/22  2259 07/28/22  0012   * 139 139   K 5.8* 4.7 4.5    108 108   CO2 23 21* 21*   * 258* 256*   BUN 27* 25* 24*   CREATININE 1.5* 1.2 1.1   CALCIUM 9.1 8.6* 8.7   PROT 6.4  --   --    ALBUMIN 3.3*  --   --    BILITOT 0.5  --   --    ALKPHOS 57  --   --    AST 9*  --   --    ALT 6*  --   --    ANIONGAP 8 10 10   EGFRNONAA 38* 50* 55*     Urine Culture: No results for input(s): LABURIN in the last 48 hours.  Urine Studies:   Recent Labs   Lab  07/27/22  2209   COLORU Yellow   APPEARANCEUA Clear   PHUR 6.0   SPECGRAV 1.010   PROTEINUA Negative   GLUCUA 2+*   KETONESU Negative   BILIRUBINUA Negative   OCCULTUA Trace*   NITRITE Negative   UROBILINOGEN Negative   LEUKOCYTESUR 1+*   RBCUA 5*   WBCUA 50*   BACTERIA Moderate*   SQUAMEPITHEL 6   HYALINECASTS 1       Significant Imaging: I have reviewed all pertinent imaging results/findings within the past 24 hours.    Assessment/Plan:     * Hyperkalemia  Resolved with treatment in Er.      Fibula fracture  Has recently had several falls.   Needs to have a holter monitor.  Per daughter her sugar and her blood pressures have been normal.  May consider using a CGM.      Acute cystitis with hematuria  Recently completed macrobid script.  Will send home on cipro but will f/u culture.      Diabetes mellitus  Patient's FSGs are controlled on current medication regimen.  Last A1c reviewed-   Lab Results   Component Value Date    HGBA1C 7.3 (H) 05/11/2022     Most recent fingerstick glucose reviewed-   Recent Labs   Lab 07/27/22  2137 07/28/22  0724 07/28/22  1132   POCTGLUCOSE 326* 246* 354*     Current correctional scale  Low  Maintain anti-hyperglycemic dose as follows-   Antihyperglycemics (From admission, onward)            Start     Stop Route Frequency Ordered    07/28/22 1024  insulin aspart U-100 pen 1-10 Units         -- SubQ Before meals & nightly PRN 07/28/22 0925        Hold Oral hypoglycemics while patient is in the hospital.    Was on 75 U of insulin at home 40 here.      VTE Risk Mitigation (From admission, onward)         Ordered     IP VTE HIGH RISK PATIENT  Once         07/28/22 0647     Place sequential compression device  Until discontinued         07/28/22 0647                   Karen Contreras MD  Department of Hospital Medicine   [facility] - Emergency Dept

## 2022-07-28 NOTE — PLAN OF CARE
[facility] - Emergency Dept  Initial Discharge Assessment       Primary Care Provider: Bianca Godoy MD    Admission Diagnosis: Laceration of scalp, initial encounter [S01.01XA]    Admission Date: 7/27/2022  Expected Discharge Date:     Discharge Barriers Identified: None    Payor: MEDICAID / Plan: [facility] COMMUNITY PLAN Arav Jobzella ([place]) / Product Type: Managed Medicaid /     Extended Emergency Contact Information  Primary Emergency Contact: OlegAyah   [place]  Mobile Phone: [phone]  Relation: Daughter  Secondary Emergency Contact: jone aponte  Mobile Phone: [phone]  Relation: Spouse   needed? No    Discharge Plan A: Home  Discharge Plan B: Home with family      [facility] [address] [address]  Phone: [phone] Fax: [phone]    [facility] [address] [place] [address]  Phone: [phone] Fax: [phone]      Initial Assessment (most recent)     Adult Discharge Assessment - 07/28/22 1308        Discharge Assessment    Assessment Type Discharge Planning Assessment     Confirmed/corrected address, phone number and insurance Yes     Confirmed Demographics Correct on Facesheet     Source of Information patient     If unable to respond/provide information was family/caregiver contacted? --   na    When was your last doctors appointment? --   two months ago to the best of her recollection.    Does patient/caregiver understand observation status Yes     Communicated AUGUSTINA with patient/caregiver Yes     Reason For Admission hyperkalemia     Lives With spouse     Facility Arrived From: home     Do you expect to return to your current living situation? Yes     Do you have help at home or someone to help you manage your care at home? Yes     Who are your caregiver(s) and their phone number(s)? Ayah DejesusAoripwm-difavzly-[phone]    Jone Cabrerary-spouse  [phone]      Prior to hospitilization cognitive status: Alert/Oriented     Current cognitive status: Alert/Oriented     Walking or Climbing Stairs Difficulty ambulation difficulty, requires equipment   uses rolling walker    Dressing/Bathing Difficulty none     Home Accessibility wheelchair accessible     Home Layout Able to live on 1st floor     Equipment Currently Used at Home cane, straight;walker, rolling;bedside commode;shower chair;glucometer     Readmission within 30 days? No     Patient currently being followed by outpatient case management? No     Do you currently have service(s) that help you manage your care at home? No     Do you take prescription medications? Yes     Do you have prescription coverage? Yes     Coverage medicaid     Do you have any problems affording any of your prescribed medications? No     Is the patient taking medications as prescribed? yes     Who is going to help you get home at discharge? family     How do you get to doctors appointments? family or friend will provide     Are you on dialysis? No     Do you take coumadin? No     Discharge Plan A Home     Discharge Plan B Home with family     DME Needed Upon Discharge  none     Discharge Plan discussed with: Spouse/sig other     Discharge Barriers Identified None                 Ms Regalado plans to discharge to her home when stable. She identifies no post acute needs today.

## 2022-07-28 NOTE — ASSESSMENT & PLAN NOTE
Patient's FSGs are controlled on current medication regimen.  Last A1c reviewed-   Lab Results   Component Value Date    HGBA1C 7.3 (H) 05/11/2022     Most recent fingerstick glucose reviewed-   Recent Labs   Lab 07/27/22  2137 07/28/22  0724 07/28/22  1132   POCTGLUCOSE 326* 246* 354*     Current correctional scale  Low  Maintain anti-hyperglycemic dose as follows-   Antihyperglycemics (From admission, onward)            Start     Stop Route Frequency Ordered    07/28/22 1024  insulin aspart U-100 pen 1-10 Units         -- SubQ Before meals & nightly PRN 07/28/22 0925        Hold Oral hypoglycemics while patient is in the hospital.    Was on 75 U of insulin at home 40 here.    Resume home meds at d/c. Should consider going to endo for CGM

## 2022-07-28 NOTE — SUBJECTIVE & OBJECTIVE
Past Medical History:   Diagnosis Date    Asthma     Coronary artery disease     Diabetes mellitus     Dyslipidemia     GERD (gastroesophageal reflux disease)     Hypertension     Neuropathy        Past Surgical History:   Procedure Laterality Date    ANKLE HARDWARE REMOVAL Left 3/31/2022    Procedure: REMOVAL, HARDWARE, ANKLE;  Surgeon: Alec Pettit MD;  Location: [facility];  Service: Orthopedics;  Laterality: Left;    APPLICATION OF LARGE EXTERNAL FIXATION DEVICE TO TIBIA Left 11/05/2021    Procedure: APPLICATION, EXTERNAL FIXATION DEVICE, LARGE, TIBIA;  Surgeon: Bk Richter MD;  Location: [facility];  Service: Orthopedics;  Laterality: Left;    cardiac stents      thinks 3 total (1st 2 was in 2009 and later 2015 or so)    CHOLECYSTECTOMY      CLOSED REDUCTION Right 5/11/2022    Procedure: CLOSED REDUCTION;  Surgeon: Alec Pettit MD;  Location: [facility];  Service: Orthopedics;  Laterality: Right;  Right Ankle    EXTERNAL FIXATION Right 5/11/2022    Procedure: EXTERNAL FIXATION;  Surgeon: Alec Pettit MD;  Location: [facility];  Service: Orthopedics;  Laterality: Right;  Right Ankle    FEMORAL BYPASS      HYSTERECTOMY      OPEN REDUCTION AND INTERNAL FIXATION (ORIF) OF PILON FRACTURE Left 11/12/2021    Procedure: ORIF, FRACTURE, PILON;  Surgeon: Alec Pettit MD;  Location: [facility];  Service: Orthopedics;  Laterality: Left;    REMOVAL OF EXTERNAL FIXATION DEVICE Left 11/12/2021    Procedure: REMOVAL, EXTERNAL FIXATION DEVICE;  Surgeon: Alec Pettit MD;  Location: [facility];  Service: Orthopedics;  Laterality: Left;    REMOVAL OF EXTERNAL FIXATION DEVICE Right 5/20/2022    Procedure: REMOVAL, EXTERNAL FIXATION DEVICE;  Surgeon: Alec Pettit MD;  Location: [facility];  Service: Orthopedics;  Laterality: Right;    stents to kidney         Review of patient's allergies indicates:   Allergen Reactions    Codeine Nausea Only    Iodine Itching    Pcn [penicillins] Other (See Comments)     Headache and nausea with PO  PCN     Sulfa (sulfonamide antibiotics)     Oxycontin [oxycodone] Nausea And Vomiting       No current facility-administered medications on file prior to encounter.     Current Outpatient Medications on File Prior to Encounter   Medication Sig    albuterol sulfate (PROAIR HFA INHL) Inhale into the lungs.    alendronate (FOSAMAX) 70 MG tablet Take 1 tablet (70 mg total) by mouth every 7 days.    aspirin 81 MG Chew Take 81 mg by mouth once daily.    atorvastatin (LIPITOR) 80 MG tablet Take 1 tablet (80 mg total) by mouth every evening.    carvediloL (COREG) 12.5 MG tablet Take 1 tablet (12.5 mg total) by mouth 2 (two) times daily with meals.    ondansetron (ZOFRAN-ODT) 4 MG TbDL Take 4 mg by mouth every 6 (six) hours as needed.    BASAGLAR KWIKPEN 100 unit/mL (3 mL) InPn pen 75 Units every evening.     clopidogreL (PLAVIX) 75 mg tablet Take 75 mg by mouth once daily.    doxazosin (CARDURA) 4 MG tablet TAKE 1 TABLET ORALLY ONCE A DAY.    ergocalciferol (ERGOCALCIFEROL) 50,000 unit Cap Take 50,000 Units by mouth every 7 days.    fluoxetine (PROZAC) 20 MG capsule TAKE 1 CAPSULE(S) EVERY DAY BY ORAL ROUTE FOR 30 DAYS.    furosemide (LASIX) 20 MG tablet Take 1 tablet (20 mg total) by mouth once daily.    gabapentin (NEURONTIN) 300 MG capsule Take 300 mg by mouth 2 (two) times daily.    HYDROcodone-acetaminophen (NORCO) 7.5-325 mg per tablet Take 1 tablet by mouth every 6 (six) hours as needed for Pain.    levothyroxine (SYNTHROID) 88 MCG tablet Take 1 tablet (88 mcg total) by mouth before breakfast.    lisinopriL (PRINIVIL,ZESTRIL) 40 MG tablet Take 0.5 tablets (20 mg total) by mouth once daily.    metFORMIN (GLUCOPHAGE) 500 MG tablet Take 1 tablet (500 mg total) by mouth daily with breakfast.    nitroGLYCERIN (NITROSTAT) 0.4 MG SL tablet Place 1 tablet (0.4 mg total) under the tongue every 5 (five) minutes as needed for Chest pain.    trazodone (DESYREL) 100 MG tablet     TRUE METRIX GLUCOSE TEST STRIP Strp      [DISCONTINUED] acetaminophen (TYLENOL) 650 MG TbSR Take 1 tablet (650 mg total) by mouth every 8 (eight) hours.    [DISCONTINUED] baclofen (LIORESAL) 20 MG tablet Take 20 mg by mouth 2 (two) times daily as needed.    [DISCONTINUED] clobetasol (TEMOVATE) 0.05 % cream Apply thin layer to affected area (vulva) nightly for 2 months, then twice weekly at bedtime.    [DISCONTINUED] famotidine (PEPCID) 40 MG tablet     [DISCONTINUED] fenofibrate 160 MG Tab Take 160 mg by mouth once daily.    [DISCONTINUED] isosorbide mononitrate (IMDUR) 30 MG 24 hr tablet Take 1 tablet (30 mg total) by mouth once daily.    [DISCONTINUED] NIFEdipine (PROCARDIA-XL) 60 MG (OSM) 24 hr tablet Take 1 tablet (60 mg total) by mouth once daily.    [DISCONTINUED] nystatin (MYCOSTATIN) powder Apply topically 2 (two) times daily.    [DISCONTINUED] pantoprazole (PROTONIX) 40 MG tablet     [DISCONTINUED] pravastatin (PRAVACHOL) 40 MG tablet Take 40 mg by mouth once daily.    [DISCONTINUED] ranolazine (RANEXA) 500 MG Tb12 Take 1 tablet (500 mg total) by mouth 2 (two) times daily.     Family History       Problem Relation (Age of Onset)    Breast cancer Sister    Cancer Maternal Aunt    Heart disease Mother          Tobacco Use    Smoking status: Former Smoker     Types: Cigarettes     Quit date: 11/15/2008     Years since quittin.7    Smokeless tobacco: Never Used   Substance and Sexual Activity    Alcohol use: No    Drug use: No    Sexual activity: Not Currently     Partners: Male     Birth control/protection: Surgical     Comment:      Review of Systems   Constitutional:  Positive for fatigue. Negative for chills and fever.   HENT:  Negative for congestion, ear pain, postnasal drip, rhinorrhea, sore throat and trouble swallowing.    Eyes:  Negative for redness and itching.   Respiratory:  Negative for cough, shortness of breath and wheezing.    Cardiovascular:  Negative for chest pain and palpitations.   Gastrointestinal:  Negative for  abdominal pain, diarrhea, nausea and vomiting.   Genitourinary:  Negative for dysuria and frequency.   Musculoskeletal:  Positive for arthralgias.   Skin:  Negative for rash.   Neurological:  Negative for weakness and headaches.   Objective:     Vital Signs (Most Recent):  Temp: 98.7 °F (37.1 °C) (07/28/22 1200)  Pulse: 64 (07/28/22 0800)  Resp: (!) 24 (07/28/22 0800)  BP: (!) 148/62 (07/28/22 1147)  SpO2: 100 % (07/28/22 0800)   Vital Signs (24h Range):  Temp:  [98.4 °F (36.9 °C)-98.7 °F (37.1 °C)] 98.7 °F (37.1 °C)  Pulse:  [56-80] 64  Resp:  [18-28] 24  SpO2:  [97 %-100 %] 100 %  BP: (132-225)/() 148/62     Weight: 84.4 kg (186 lb)  Body mass index is 34.02 kg/m².    Physical Exam  Vitals and nursing note reviewed.   Constitutional:       General: She is not in acute distress.     Appearance: She is well-developed.   HENT:      Head: Normocephalic and atraumatic.   Eyes:      Conjunctiva/sclera: Conjunctivae normal.      Pupils: Pupils are equal, round, and reactive to light.   Neck:      Thyroid: No thyromegaly.   Cardiovascular:      Rate and Rhythm: Normal rate and regular rhythm.      Heart sounds: Normal heart sounds.   Pulmonary:      Effort: Pulmonary effort is normal. No respiratory distress.      Breath sounds: Normal breath sounds. No wheezing.   Abdominal:      General: Bowel sounds are normal.      Palpations: Abdomen is soft.      Tenderness: There is no abdominal tenderness.   Musculoskeletal:         General: Normal range of motion.      Cervical back: Normal range of motion and neck supple.      Comments: Right ankle swelling.  Left ankle bruising.   Lymphadenopathy:      Cervical: No cervical adenopathy.   Skin:     General: Skin is warm and dry.      Findings: No rash.      Comments: lac   Neurological:      Mental Status: She is alert and oriented to person, place, and time.   Psychiatric:         Behavior: Behavior normal.         CRANIAL NERVES     CN III, IV, VI   Pupils are equal,  round, and reactive to light.     Significant Labs: All pertinent labs within the past 24 hours have been reviewed.  CBC:   Recent Labs   Lab 07/27/22 1912   WBC 7.88   HGB 10.0*   HCT 30.1*        CMP:   Recent Labs   Lab 07/27/22 1912 07/27/22 2259 07/28/22  0012   * 139 139   K 5.8* 4.7 4.5    108 108   CO2 23 21* 21*   * 258* 256*   BUN 27* 25* 24*   CREATININE 1.5* 1.2 1.1   CALCIUM 9.1 8.6* 8.7   PROT 6.4  --   --    ALBUMIN 3.3*  --   --    BILITOT 0.5  --   --    ALKPHOS 57  --   --    AST 9*  --   --    ALT 6*  --   --    ANIONGAP 8 10 10   EGFRNONAA 38* 50* 55*     Urine Culture: No results for input(s): LABURIN in the last 48 hours.  Urine Studies:   Recent Labs   Lab 07/27/22 2209   COLORU Yellow   APPEARANCEUA Clear   PHUR 6.0   SPECGRAV 1.010   PROTEINUA Negative   GLUCUA 2+*   KETONESU Negative   BILIRUBINUA Negative   OCCULTUA Trace*   NITRITE Negative   UROBILINOGEN Negative   LEUKOCYTESUR 1+*   RBCUA 5*   WBCUA 50*   BACTERIA Moderate*   SQUAMEPITHEL 6   HYALINECASTS 1       Significant Imaging: I have reviewed all pertinent imaging results/findings within the past 24 hours.

## 2022-07-28 NOTE — PLAN OF CARE
[facility] - Emergency Dept  Discharge Final Note    Primary Care Provider: Bianca Godoy MD    Expected Discharge Date: 7/28/2022    Final Discharge Note (most recent)       Final Note - 07/28/22 1507          Final Note    Assessment Type Final Discharge Note (P)      Anticipated Discharge Disposition Home or Self Care (P)      What phone number can be called within the next 1-3 days to see how you are doing after discharge? [phone] (P)         Post-Acute Status    Post-Acute Authorization Other (P)      Coverage [facility] medicaid (P)      Other Status Awaiting f/u Appts (P)      Discharge Delays None known at this time (P)                           Contact Info       Bianca Godoy MD   Specialty: Family Medicine   Relationship: PCP - General    [address] [place] [address]   Phone: [phone]       Next Steps: Go on 8/4/2022    Instructions: at 2:15 PM    [facility]   Specialty: Orthopedic Surgery    [address] [facility]  [address] [place] [address]   Phone: [phone]       Next Steps: Schedule an appointment as soon as possible for a visit in 1 week(s)    Belkis Ogluin NP   Specialty: Neurology    [address] [facility] [address]   Phone: [phone]       Next Steps: Follow up    Toni Allan MD   Specialty: Endocrinology    [address] [facility] [address]   Phone: [phone]       Next Steps: Follow up          Patient will discharge home today. Hospital follow up made and is on AVS with PCP.

## 2022-07-28 NOTE — PLAN OF CARE
07/28/22 1023   ED Admissions Case Approval   ED Admissions Case Approval  Approved       Admission criteria met at OBSERVATION level of care.     InterQual® Criteria   Created DateTime: 7/28/2022 8:32 (CT)  Completed DateTime: 7/28/2022 8:35 (CT)  Created By User: Ellyn Romero  Current Status: Completed    Product: LOC:Acute Adult  Subset: Electrolyte or Mineral Imbalance  Version: ScaleArcQual 2022, Apr. 2022 Release  Episode Day: Episode Day 1         [X] Select Day, One:        [X] Episode Day 1, One:           [X] OBSERVATION, >= One:              [X] Hyperkalemia, All:                 [X] Potassium 5.6-6.0 mEq/L(5.6-6.0 mmol/L)                 [X] No electrocardiogram (ECG) changes                 [X] Intervention, >= One:                    [X] Sodium zirconium cyclosilicate (includes PO)

## 2022-07-28 NOTE — ASSESSMENT & PLAN NOTE
Patient's FSGs are controlled on current medication regimen.  Last A1c reviewed-   Lab Results   Component Value Date    HGBA1C 7.3 (H) 05/11/2022     Most recent fingerstick glucose reviewed-   Recent Labs   Lab 07/27/22  2137 07/28/22  0724 07/28/22  1132   POCTGLUCOSE 326* 246* 354*     Current correctional scale  Low  Maintain anti-hyperglycemic dose as follows-   Antihyperglycemics (From admission, onward)            Start     Stop Route Frequency Ordered    07/28/22 1024  insulin aspart U-100 pen 1-10 Units         -- SubQ Before meals & nightly PRN 07/28/22 0925        Hold Oral hypoglycemics while patient is in the hospital.    Was on 75 U of insulin at home 40 here.

## 2022-07-28 NOTE — ASSESSMENT & PLAN NOTE
Has recently had several falls.   Needs to have a holter monitor.  Per daughter her sugar and her blood pressures have been normal.  May consider using a CGM.

## 2022-07-28 NOTE — DISCHARGE INSTRUCTIONS
Follow-up Information           Bianca Godoy MD. Schedule an appointment as soon as possible for a visit in 1 week.    Specialty: Family Medicine  Why: As needed  Contact information:  [address] [place] [address] [phone]                       [facility]. Schedule an appointment as soon as possible for a visit in 1 week.    Specialty: Orthopedic Surgery  Contact information:  [address] [facility]  [address]  [facility] [address]  [phone]

## 2022-07-28 NOTE — PROVIDER PROGRESS NOTES - EMERGENCY DEPT.
Encounter Date: 7/27/2022    ED Physician Progress Notes        Physician Note:   Sign-out received from Dr. Charly Hsieh.  Patient presenting with hyperkalemia which is being treated with look, and insulin.  Pending repeat BMP.  Patient had a fall today, CT negative.  Spinal CT shows old fracture.  Of note, patient complaining of medial left knee pain.  X-ray shows no acute fracture, but does show a healing fracture of the proximal fibula.  Patient has absolutely no tenderness to palpation at this site.  Vital stable.  Will discharge once hyperkalemia improves.    Patient found to have UTI.  While in the emergency room patient became excessively sleepy.  Alert and oriented x3 when conversing, but would fall asleep between up with periods of conversation.  Will observe patient overnight, give IV antibiotics.  Admitted to Dr. Gao

## 2022-07-29 LAB
BACTERIA UR CULT: NORMAL
BACTERIA UR CULT: NORMAL

## 2022-08-10 RX ORDER — CIPROFLOXACIN 500 MG/1
TABLET ORAL
Qty: 14 TABLET | Refills: 0 | OUTPATIENT
Start: 2022-08-10

## 2022-08-10 NOTE — TELEPHONE ENCOUNTER
Refill Decision Note   Merna Regalado  is requesting a refill authorization.  Brief Assessment and Rationale for Refill:  Quick Discontinue     Medication Therapy Plan:       Medication Reconciliation Completed: No   Comments:     No Care Gaps recommended.     Note composed:12:38 PM 08/10/2022

## 2022-11-21 PROBLEM — N17.9 AKI (ACUTE KIDNEY INJURY): Status: RESOLVED | Noted: 2021-11-04 | Resolved: 2022-11-21

## 2023-02-01 ENCOUNTER — SPECIALTY PHARMACY (OUTPATIENT)
Dept: PHARMACY | Facility: CLINIC | Age: 63
End: 2023-02-01
Payer: MEDICAID

## 2023-02-01 NOTE — TELEPHONE ENCOUNTER
Otezla order received. Magdi PA not required.     Benefits Investigation   Insurance: Medicaid  Copay: $0.00    Pending for initial consult.

## 2023-02-02 ENCOUNTER — SPECIALTY PHARMACY (OUTPATIENT)
Dept: PHARMACY | Facility: CLINIC | Age: 63
End: 2023-02-02
Payer: MEDICAID

## 2023-02-02 DIAGNOSIS — L40.50 PSORIATIC ARTHRITIS: ICD-10-CM

## 2023-02-02 NOTE — TELEPHONE ENCOUNTER
Specialty Pharmacy - Initial Clinical Assessment    Specialty Medication Orders Linked to Encounter      Flowsheet Row Most Recent Value   Medication #1 apremilast (OTEZLA STARTER) 10 mg (4)-20 mg (4)-30 mg (47) DsPk (Order#[id number], Rx#[id number])   Medication #2 apremilast (OTEZLA) 30 mg Tab (Order#[id number], Rx#[id number])          Patient Diagnosis   L40.50 - Psoriatic arthritis    Subjective    Merna Regalado is a 62 y.o. female, who is followed by the specialty pharmacy service for management and education.    Recent Encounters       Date Type Provider Description    02/02/2023 Specialty Pharmacy Thania Hsieh, Lana Initial Clinical Assessment    02/01/2023 Specialty Pharmacy Thania Hsieh, Lana Referral Authorization          Clinical call attempts since last clinical assessment   No call attempts found.     Current Outpatient Medications   Medication Sig    albuterol sulfate (PROAIR HFA INHL) Inhale into the lungs.    alendronate (FOSAMAX) 70 MG tablet Take 1 tablet (70 mg total) by mouth every 7 days.    apremilast (OTEZLA STARTER) 10 mg (4)-20 mg (4)-30 mg (47) DsPk Disp- 1 linden. Take as instructed on the dose linden.    apremilast (OTEZLA) 30 mg Tab Take 1 tablet (30 mg total) by mouth 2 (two) times daily.    aspirin 81 MG Chew Take 81 mg by mouth once daily.    atorvastatin (LIPITOR) 80 MG tablet Take 1 tablet (80 mg total) by mouth every evening.    BASAGLAR KWIKPEN 100 unit/mL (3 mL) InPn pen 40 Units every evening.    carvediloL (COREG) 12.5 MG tablet Take 1 tablet (12.5 mg total) by mouth 2 (two) times daily with meals.    ergocalciferol (ERGOCALCIFEROL) 50,000 unit Cap Take 50,000 Units by mouth every 7 days.    fluoxetine (PROZAC) 20 MG capsule TAKE 1 CAPSULE(S) EVERY DAY BY ORAL ROUTE FOR 30 DAYS.    gabapentin (NEURONTIN) 300 MG capsule Take 300 mg by mouth 2 (two) times daily.    HYDROcodone-acetaminophen (NORCO) 7.5-325 mg per tablet Take 1 tablet by mouth every 6 to 8 hours as needed for  Pain.    levothyroxine (SYNTHROID) 88 MCG tablet Take 1 tablet (88 mcg total) by mouth before breakfast.    nitroGLYCERIN (NITROSTAT) 0.4 MG SL tablet Place 1 tablet (0.4 mg total) under the tongue every 5 (five) minutes as needed for Chest pain.    ondansetron (ZOFRAN-ODT) 4 MG TbDL Take 4 mg by mouth every 6 (six) hours as needed.    trazodone (DESYREL) 100 MG tablet     doxazosin (CARDURA) 4 MG tablet TAKE 1 TABLET ORALLY ONCE A DAY.    lisinopriL (PRINIVIL,ZESTRIL) 40 MG tablet Take 0.5 tablets (20 mg total) by mouth once daily.    metFORMIN (GLUCOPHAGE) 500 MG tablet Take 1 tablet (500 mg total) by mouth daily with breakfast.   Last reviewed on 2/2/2023  4:11 PM by Thania Hsieh, PharmD    Review of patient's allergies indicates:   Allergen Reactions    Codeine Nausea Only    Iodine Itching    Pcn [penicillins] Other (See Comments)     Headache and nausea with PO PCN     Sulfa (sulfonamide antibiotics)     Oxycontin [oxycodone] Nausea And Vomiting   Last reviewed on  2/2/2023 4:07 PM by Thania Hsieh    Drug Interactions    Drug interactions evaluated: yes  Clinically relevant drug interactions identified: no  Provided the patient with educational material regarding drug interactions: not applicable         Adverse Effects    Pruritus: Pos  Rash: Pos  Arthralgias: Pos  Back pain: Pos  Joint swelling: Pos       Assessment Questions - Documented Responses      Flowsheet Row Most Recent Value   Assessment    Medication Reconciliation completed for patient Yes   During the past 4 weeks, has patient missed any activities due to condition or medication? No   During the past 4 weeks, did patient have any of the following urgent care visits? None   Goals of Therapy Status Discussed (new start)   Status of the patients ability to self-administer: Is Able   All education points have been covered with patient? Yes, supplemental printed education provided   Welcome packet contents reviewed and discussed with patient? Yes  "  Assesment completed? Yes   Plan Therapy being initiated   Do you need to open a clinical intervention (i-vent)? No   Do you want to schedule first shipment? Yes          Refill Questions - Documented Responses      Flowsheet Row Most Recent Value   Patient Availability and HIPAA Verification    Does patient want to proceed with activity? Yes   HIPAA/medical authority confirmed? Yes   Relationship to patient of person spoken to? Self   Refill Screening Questions    When does the patient need to receive the medication? 02/06/23   Refill Delivery Questions    How will the patient receive the medication? MEDRx   When does the patient need to receive the medication? 02/06/23   Shipping Address Home   Address in [facility] confirmed and updated if neccessary? Yes   Expected Copay ($) 0   Is the patient able to afford the medication copay? Yes   Payment Method zero copay   Days supply of Refill 28   Supplies needed? No supplies needed   Shipment/Pickup Date: 02/03/23            Objective    She has a past medical history of Asthma, Coronary artery disease, Diabetes mellitus, Dyslipidemia, GERD (gastroesophageal reflux disease), Hypertension, Neuropathy, and Thyroid disease.    Tried/failed medications: N/A    BP Readings from Last 4 Encounters:   01/19/23 (!) 144/65   01/09/23 105/64   11/28/22 137/62   09/28/22 (!) 170/66     Ht Readings from Last 4 Encounters:   01/19/23 5' 2" (1.575 m)   01/09/23 5' 2" (1.575 m)   11/28/22 5' 2" (1.575 m)   09/28/22 5' 2" (1.575 m)     Wt Readings from Last 4 Encounters:   01/19/23 84.4 kg (186 lb)   08/22/22 83 kg (182 lb 15.7 oz)   08/18/22 83 kg (183 lb)   08/16/22 84.4 kg (186 lb)     No results for input(s): CREATININE, ALT, AST, HEPBSAG, HEPBSAB, HEPBCAB in the last 2160 hours.  The goals of prescribed drug therapy management include:  Supporting patient to meet the prescriber's medical treatment objectives  Improving or maintaining quality of life  Maintaining optimal " therapy adherence  Minimizing and managing side effects      Goals of Therapy Status: Discussed (new start)    Assessment/Plan  Patient plans to start therapy on 02/06/23      Indication, dosage, appropriateness, effectiveness, safety and convenience of her specialty medication(s) were reviewed today.     Patient Education   Patient received education on the following:   Expectations and possible outcomes of therapy  Proper use, timely administration, and missed dose management  Duration of therapy  Side effects, including prevention, minimization, and management  Contraindications and safety precautions  New or changed medications, including prescribe and over the counter medications and supplements  Reviews recommended vaccinations, as appropriate  Storage, safe handling, and disposal      Tasks added this encounter   2/27/2023 - Refill Call (Auto Added)  11/2/2023 - Clinical - Follow Up Assesement (Annual)   Tasks due within next 3 months   No tasks due.     Thania Hsieh, PharmD  [facility]  [address] [place], [place] [address]  [facility] [address]  Phone: [phone]  Fax: [phone]

## 2023-02-27 ENCOUNTER — PATIENT MESSAGE (OUTPATIENT)
Dept: PHARMACY | Facility: CLINIC | Age: 63
End: 2023-02-27
Payer: MEDICAID

## 2023-03-02 ENCOUNTER — SPECIALTY PHARMACY (OUTPATIENT)
Dept: PHARMACY | Facility: CLINIC | Age: 63
End: 2023-03-02
Payer: MEDICAID

## 2023-03-02 NOTE — TELEPHONE ENCOUNTER
Specialty Pharmacy - Refill Coordination    Specialty Medication Orders Linked to Encounter      Flowsheet Row Most Recent Value   Medication #1 apremilast (OTEZLA) 30 mg Tab (Order#[id number], Rx#[id number])          Refill Questions - Documented Responses      Flowsheet Row Most Recent Value   Patient Availability and HIPAA Verification    Does patient want to proceed with activity? Yes   HIPAA/medical authority confirmed? Yes   Relationship to patient of person spoken to? Self   Refill Screening Questions    Changes to allergies? No   Changes to medications? No   New conditions since last clinic visit? No   Unplanned office visit, urgent care, ED, or hospital admission in the last 4 weeks? No   How does patient/caregiver feel medication is working? Good   Financial problems or insurance changes? No   How many doses of your specialty medications were missed in the last 4 weeks? 0   Would patient like to speak to a pharmacist? No   When does the patient need to receive the medication? 03/06/23   Refill Delivery Questions    How will the patient receive the medication? MEDRx   When does the patient need to receive the medication? 03/06/23   Shipping Address Home   Address in [facility] confirmed and updated if neccessary? Yes   Expected Copay ($) 0   Is the patient able to afford the medication copay? Yes   Payment Method zero copay   Days supply of Refill 30   Supplies needed? No supplies needed   Refill activity plan Refill scheduled   Shipment/Pickup Date: 03/03/23            Current Outpatient Medications   Medication Sig    albuterol sulfate (PROAIR HFA INHL) Inhale into the lungs.    alendronate (FOSAMAX) 70 MG tablet Take 1 tablet (70 mg total) by mouth every 7 days.    apremilast (OTEZLA STARTER) 10 mg (4)-20 mg (4)-30 mg (47) DsPk Take by mouth as instructed on package directions.    apremilast (OTEZLA) 30 mg Tab Take 1 tablet (30 mg total) by mouth 2 (two) times daily.    aspirin 81 MG Chew Take 81  mg by mouth once daily.    atorvastatin (LIPITOR) 80 MG tablet Take 1 tablet (80 mg total) by mouth every evening.    BASAGLAR KWIKPEN 100 unit/mL (3 mL) InPn pen 40 Units every evening.    carvediloL (COREG) 12.5 MG tablet Take 1 tablet (12.5 mg total) by mouth 2 (two) times daily with meals.    doxazosin (CARDURA) 4 MG tablet TAKE 1 TABLET ORALLY ONCE A DAY.    ergocalciferol (ERGOCALCIFEROL) 50,000 unit Cap Take 50,000 Units by mouth every 7 days.    fluoxetine (PROZAC) 20 MG capsule TAKE 1 CAPSULE(S) EVERY DAY BY ORAL ROUTE FOR 30 DAYS.    gabapentin (NEURONTIN) 300 MG capsule Take 300 mg by mouth 2 (two) times daily.    HYDROcodone-acetaminophen (NORCO) 7.5-325 mg per tablet Take 1 tablet by mouth every 6 to 8 hours as needed for Pain.    levothyroxine (SYNTHROID) 88 MCG tablet Take 1 tablet (88 mcg total) by mouth before breakfast.    lisinopriL (PRINIVIL,ZESTRIL) 40 MG tablet Take 0.5 tablets (20 mg total) by mouth once daily.    metFORMIN (GLUCOPHAGE) 500 MG tablet Take 1 tablet (500 mg total) by mouth daily with breakfast.    nitroGLYCERIN (NITROSTAT) 0.4 MG SL tablet Place 1 tablet (0.4 mg total) under the tongue every 5 (five) minutes as needed for Chest pain.    ondansetron (ZOFRAN-ODT) 4 MG TbDL Take 4 mg by mouth every 6 (six) hours as needed.    trazodone (DESYREL) 100 MG tablet    Last reviewed on 3/1/2023  1:36 PM by Nely Cardona    Review of patient's allergies indicates:   Allergen Reactions    Codeine Nausea Only    Iodine Itching    Pcn [penicillins] Other (See Comments)     Headache and nausea with PO PCN     Sulfa (sulfonamide antibiotics)     Oxycontin [oxycodone] Nausea And Vomiting    Last reviewed on  3/1/2023 1:36 PM by Nely Cardona      Tasks added this encounter   3/29/2023 - Refill Call (Auto Added)   Tasks due within next 3 months   No tasks due.     Jose Smith, PharmD  [facility]  [address] [address]  [facility]  [address]  Phone: [phone]  Fax: [phone]

## 2023-04-13 ENCOUNTER — SPECIALTY PHARMACY (OUTPATIENT)
Dept: PHARMACY | Facility: CLINIC | Age: 63
End: 2023-04-13
Payer: MEDICAID

## 2023-04-13 NOTE — TELEPHONE ENCOUNTER
Specialty Pharmacy - Refill Coordination    Specialty Medication Orders Linked to Encounter      Flowsheet Row Most Recent Value   Medication #1 apremilast (OTEZLA) 30 mg Tab (Order#[id number], Rx#[id number])          Refill Questions - Documented Responses      Flowsheet Row Most Recent Value   Patient Availability and HIPAA Verification    Does patient want to proceed with activity? Unable to Reach          We have had multiple attempts to the patient and have been unsuccessful to reach the patient. We will stop reaching out to the patient but in the event that the patient needs the med and contacts us, we will communicate and begin dispensing for the patient. At your next visit with the patient, please review the importance of being in contact with our specialty pharmacy as a part of our care team.    Interventions added this encounter   Closed: [facility] Provider Intervention - Drug therapy adherence: apremilast (OTEZLA) 30 mg Tab     Thania Hsieh, PharmD  [facility]  [address] [place] [address]  Phone: [phone]  Fax: [phone]

## 2023-05-22 ENCOUNTER — HOSPITAL ENCOUNTER (OUTPATIENT)
Facility: HOSPITAL | Age: 63
Discharge: HOME | DRG: 689 | End: 2023-05-26
Attending: STUDENT IN AN ORGANIZED HEALTH CARE EDUCATION/TRAINING PROGRAM | Admitting: INTERNAL MEDICINE
Payer: MEDICAID

## 2023-05-22 DIAGNOSIS — R55 SYNCOPE AND COLLAPSE: Primary | ICD-10-CM

## 2023-05-22 DIAGNOSIS — R55 SYNCOPE: ICD-10-CM

## 2023-05-22 DIAGNOSIS — S82.851G CLOSED DISPLACED TRIMALLEOLAR FRACTURE OF RIGHT ANKLE WITH DELAYED HEALING, SUBSEQUENT ENCOUNTER: ICD-10-CM

## 2023-05-22 DIAGNOSIS — G93.41 ACUTE METABOLIC ENCEPHALOPATHY: ICD-10-CM

## 2023-05-22 DIAGNOSIS — N39.0 URINARY TRACT INFECTION WITHOUT HEMATURIA, SITE UNSPECIFIED: ICD-10-CM

## 2023-05-22 LAB
POCT GLUCOSE: 315 MG/DL (ref 70–110)
POCT GLUCOSE: 327 MG/DL (ref 70–110)

## 2023-05-22 PROCEDURE — 96361 HYDRATE IV INFUSION ADD-ON: CPT

## 2023-05-22 PROCEDURE — 11000001 HC ACUTE MED/SURG PRIVATE ROOM

## 2023-05-22 PROCEDURE — 63600175 PHARM REV CODE 636 W HCPCS: Performed by: STUDENT IN AN ORGANIZED HEALTH CARE EDUCATION/TRAINING PROGRAM

## 2023-05-22 PROCEDURE — 99285 EMERGENCY DEPT VISIT HI MDM: CPT | Mod: 25,27

## 2023-05-22 PROCEDURE — 36415 COLL VENOUS BLD VENIPUNCTURE: CPT | Performed by: INTERNAL MEDICINE

## 2023-05-22 PROCEDURE — G0378 HOSPITAL OBSERVATION PER HR: HCPCS

## 2023-05-22 PROCEDURE — 96365 THER/PROPH/DIAG IV INF INIT: CPT

## 2023-05-22 PROCEDURE — 96372 THER/PROPH/DIAG INJ SC/IM: CPT | Performed by: INTERNAL MEDICINE

## 2023-05-22 PROCEDURE — 25000003 PHARM REV CODE 250: Performed by: INTERNAL MEDICINE

## 2023-05-22 PROCEDURE — 25000003 PHARM REV CODE 250: Performed by: STUDENT IN AN ORGANIZED HEALTH CARE EDUCATION/TRAINING PROGRAM

## 2023-05-22 PROCEDURE — 82962 GLUCOSE BLOOD TEST: CPT

## 2023-05-22 PROCEDURE — 94761 N-INVAS EAR/PLS OXIMETRY MLT: CPT

## 2023-05-22 PROCEDURE — 63600175 PHARM REV CODE 636 W HCPCS: Performed by: INTERNAL MEDICINE

## 2023-05-22 PROCEDURE — 83036 HEMOGLOBIN GLYCOSYLATED A1C: CPT | Performed by: INTERNAL MEDICINE

## 2023-05-22 RX ORDER — HYDROCODONE BITARTRATE AND ACETAMINOPHEN 5; 325 MG/1; MG/1
1 TABLET ORAL EVERY 6 HOURS PRN
Status: DISCONTINUED | OUTPATIENT
Start: 2023-05-23 | End: 2023-05-26 | Stop reason: HOSPADM

## 2023-05-22 RX ORDER — TALC
6 POWDER (GRAM) TOPICAL NIGHTLY PRN
Status: DISCONTINUED | OUTPATIENT
Start: 2023-05-22 | End: 2023-05-26 | Stop reason: HOSPADM

## 2023-05-22 RX ORDER — DEXTROSE 40 %
30 GEL (GRAM) ORAL
Status: DISCONTINUED | OUTPATIENT
Start: 2023-05-22 | End: 2023-05-26 | Stop reason: HOSPADM

## 2023-05-22 RX ORDER — NITROGLYCERIN 0.4 MG/1
0.4 TABLET SUBLINGUAL EVERY 5 MIN PRN
Status: DISCONTINUED | OUTPATIENT
Start: 2023-05-22 | End: 2023-05-26 | Stop reason: HOSPADM

## 2023-05-22 RX ORDER — SODIUM CHLORIDE 0.9 % (FLUSH) 0.9 %
10 SYRINGE (ML) INJECTION
Status: DISCONTINUED | OUTPATIENT
Start: 2023-05-22 | End: 2023-05-26 | Stop reason: HOSPADM

## 2023-05-22 RX ORDER — GLUCAGON 1 MG
1 KIT INJECTION
Status: DISCONTINUED | OUTPATIENT
Start: 2023-05-22 | End: 2023-05-26 | Stop reason: HOSPADM

## 2023-05-22 RX ORDER — LEVOTHYROXINE SODIUM 88 UG/1
88 TABLET ORAL
Status: DISCONTINUED | OUTPATIENT
Start: 2023-05-23 | End: 2023-05-26 | Stop reason: HOSPADM

## 2023-05-22 RX ORDER — FLUOXETINE 10 MG/1
10 CAPSULE ORAL DAILY
Status: DISCONTINUED | OUTPATIENT
Start: 2023-05-23 | End: 2023-05-23

## 2023-05-22 RX ORDER — RANOLAZINE 500 MG/1
500 TABLET, EXTENDED RELEASE ORAL 2 TIMES DAILY
Status: DISCONTINUED | OUTPATIENT
Start: 2023-05-22 | End: 2023-05-26 | Stop reason: HOSPADM

## 2023-05-22 RX ORDER — LISINOPRIL 20 MG/1
20 TABLET ORAL DAILY
Status: DISCONTINUED | OUTPATIENT
Start: 2023-05-23 | End: 2023-05-23

## 2023-05-22 RX ORDER — RANOLAZINE 500 MG/1
500 TABLET, EXTENDED RELEASE ORAL 2 TIMES DAILY
COMMUNITY

## 2023-05-22 RX ORDER — CLOPIDOGREL BISULFATE 75 MG/1
75 TABLET ORAL DAILY
Status: ON HOLD | COMMUNITY
End: 2023-10-11 | Stop reason: HOSPADM

## 2023-05-22 RX ORDER — FAMOTIDINE 40 MG/1
40 TABLET, FILM COATED ORAL DAILY
COMMUNITY
End: 2023-09-18

## 2023-05-22 RX ORDER — INSULIN LISPRO 100 [IU]/ML
16 INJECTION, SOLUTION INTRAVENOUS; SUBCUTANEOUS
COMMUNITY
End: 2023-09-18

## 2023-05-22 RX ORDER — DEXTROSE 40 %
15 GEL (GRAM) ORAL
Status: DISCONTINUED | OUTPATIENT
Start: 2023-05-22 | End: 2023-05-26 | Stop reason: HOSPADM

## 2023-05-22 RX ORDER — BACLOFEN 20 MG/1
20 TABLET ORAL 2 TIMES DAILY
COMMUNITY
End: 2023-09-21

## 2023-05-22 RX ORDER — INSULIN ASPART 100 [IU]/ML
1-10 INJECTION, SOLUTION INTRAVENOUS; SUBCUTANEOUS
Status: DISCONTINUED | OUTPATIENT
Start: 2023-05-22 | End: 2023-05-26 | Stop reason: HOSPADM

## 2023-05-22 RX ORDER — IBUPROFEN 200 MG
24 TABLET ORAL
Status: DISCONTINUED | OUTPATIENT
Start: 2023-05-22 | End: 2023-05-26 | Stop reason: HOSPADM

## 2023-05-22 RX ORDER — CLONIDINE HYDROCHLORIDE 0.2 MG/1
0.2 TABLET ORAL EVERY 6 HOURS PRN
Status: DISCONTINUED | OUTPATIENT
Start: 2023-05-22 | End: 2023-05-26 | Stop reason: HOSPADM

## 2023-05-22 RX ORDER — ISOSORBIDE DINITRATE 30 MG/1
60 TABLET ORAL DAILY
COMMUNITY

## 2023-05-22 RX ORDER — NIFEDIPINE 30 MG/1
60 TABLET, EXTENDED RELEASE ORAL DAILY
COMMUNITY
End: 2023-09-18

## 2023-05-22 RX ORDER — CIPROFLOXACIN 2 MG/ML
400 INJECTION, SOLUTION INTRAVENOUS
Status: DISCONTINUED | OUTPATIENT
Start: 2023-05-22 | End: 2023-05-26 | Stop reason: HOSPADM

## 2023-05-22 RX ORDER — ATORVASTATIN CALCIUM 80 MG/1
80 TABLET, FILM COATED ORAL NIGHTLY
Status: DISCONTINUED | OUTPATIENT
Start: 2023-05-22 | End: 2023-05-26 | Stop reason: HOSPADM

## 2023-05-22 RX ORDER — CARVEDILOL 12.5 MG/1
12.5 TABLET ORAL 2 TIMES DAILY WITH MEALS
Status: DISCONTINUED | OUTPATIENT
Start: 2023-05-22 | End: 2023-05-26 | Stop reason: HOSPADM

## 2023-05-22 RX ORDER — CLOPIDOGREL BISULFATE 75 MG/1
75 TABLET ORAL DAILY
Status: DISCONTINUED | OUTPATIENT
Start: 2023-05-23 | End: 2023-05-26 | Stop reason: HOSPADM

## 2023-05-22 RX ORDER — FAMOTIDINE 20 MG/1
40 TABLET, FILM COATED ORAL DAILY
Status: DISCONTINUED | OUTPATIENT
Start: 2023-05-23 | End: 2023-05-26 | Stop reason: HOSPADM

## 2023-05-22 RX ORDER — IBUPROFEN 200 MG
16 TABLET ORAL
Status: DISCONTINUED | OUTPATIENT
Start: 2023-05-22 | End: 2023-05-26 | Stop reason: HOSPADM

## 2023-05-22 RX ORDER — SODIUM CHLORIDE 9 MG/ML
INJECTION, SOLUTION INTRAVENOUS CONTINUOUS
Status: DISCONTINUED | OUTPATIENT
Start: 2023-05-22 | End: 2023-05-24

## 2023-05-22 RX ORDER — FUROSEMIDE 20 MG/1
20 TABLET ORAL DAILY
Status: ON HOLD | COMMUNITY
End: 2023-05-26 | Stop reason: HOSPADM

## 2023-05-22 RX ORDER — NAPROXEN SODIUM 220 MG/1
81 TABLET, FILM COATED ORAL DAILY
Status: DISCONTINUED | OUTPATIENT
Start: 2023-05-23 | End: 2023-05-26 | Stop reason: HOSPADM

## 2023-05-22 RX ADMIN — CIPROFLOXACIN 400 MG: 2 INJECTION, SOLUTION INTRAVENOUS at 06:05

## 2023-05-22 RX ADMIN — ATORVASTATIN CALCIUM 80 MG: 80 TABLET, FILM COATED ORAL at 08:05

## 2023-05-22 RX ADMIN — HYDROCODONE BITARTRATE AND ACETAMINOPHEN 1 TABLET: 5; 325 TABLET ORAL at 11:05

## 2023-05-22 RX ADMIN — INSULIN ASPART 8 UNITS: 100 INJECTION, SOLUTION INTRAVENOUS; SUBCUTANEOUS at 07:05

## 2023-05-22 RX ADMIN — SODIUM CHLORIDE: 9 INJECTION, SOLUTION INTRAVENOUS at 06:05

## 2023-05-22 RX ADMIN — SODIUM CHLORIDE 1000 ML: 9 INJECTION, SOLUTION INTRAVENOUS at 04:05

## 2023-05-22 RX ADMIN — RANOLAZINE 500 MG: 500 TABLET, EXTENDED RELEASE ORAL at 08:05

## 2023-05-22 RX ADMIN — CARVEDILOL 12.5 MG: 12.5 TABLET, FILM COATED ORAL at 08:05

## 2023-05-22 NOTE — ED PROVIDER NOTES
Encounter Date: 5/22/2023       History     Chief Complaint   Patient presents with    Weakness     62-year-old female with history of diabetes and hypertension presenting with a UTI and altered mental status.  Patient was seen earlier in the day by this physician for facial trauma.  Patient was week getting out of bed, and hit her nose on the bedpost.  No LOC or anticoagulation.  Patient was found to have bilateral nasal bone fracture, and a urinary tract infection.  Patient was given IV antibiotics, and discharged home.  At this time patient was alert and oriented x3.  Family report the patient has been less responsive than usual, and they concerned about her.     Review of patient's allergies indicates:   Allergen Reactions    Codeine Nausea Only    Iodine Itching    Pcn [penicillins] Other (See Comments)     Headache and nausea with PO PCN     Sulfa (sulfonamide antibiotics)     Oxycontin [oxycodone] Nausea And Vomiting     Past Medical History:   Diagnosis Date    Asthma     Coronary artery disease     Diabetes mellitus     Dyslipidemia     GERD (gastroesophageal reflux disease)     Hypertension     Neuropathy     Thyroid disease      Past Surgical History:   Procedure Laterality Date    ANKLE HARDWARE REMOVAL Left 3/31/2022    Procedure: REMOVAL, HARDWARE, ANKLE;  Surgeon: Alec Pettit MD;  Location: [facility];  Service: Orthopedics;  Laterality: Left;    ANKLE HARDWARE REMOVAL Right 8/18/2022    Procedure: REMOVAL, HARDWARE, ANKLE;  Surgeon: Alec Pettit MD;  Location: [facility];  Service: Orthopedics;  Laterality: Right;    APPLICATION OF LARGE EXTERNAL FIXATION DEVICE TO TIBIA Left 11/05/2021    Procedure: APPLICATION, EXTERNAL FIXATION DEVICE, LARGE, TIBIA;  Surgeon: Bk Richter MD;  Location: [facility];  Service: Orthopedics;  Laterality: Left;    APPLICATION OF WOUND VACUUM-ASSISTED CLOSURE DEVICE Right 8/18/2022    Procedure: APPLICATION, WOUND VAC;  Surgeon: Alec Pettit MD;  Location: [facility] OR;   Service: Orthopedics;  Laterality: Right;    cardiac stents      thinks 3 total (1st 2 was in  and later  or so)    CHOLECYSTECTOMY      CLOSED REDUCTION Right 2022    Procedure: CLOSED REDUCTION;  Surgeon: Alec Pettit MD;  Location: [facility];  Service: Orthopedics;  Laterality: Right;  Right Ankle    CLOSURE OF WOUND Right 2022    Procedure: CLOSURE, WOUND;  Surgeon: Alec Pettit MD;  Location: [facility];  Service: Orthopedics;  Laterality: Right;    EXTERNAL FIXATION Right 2022    Procedure: EXTERNAL FIXATION;  Surgeon: Alec Pettit MD;  Location: [facility];  Service: Orthopedics;  Laterality: Right;  Right Ankle    FEMORAL BYPASS      HYSTERECTOMY      IRRIGATION AND DEBRIDEMENT OF LOWER EXTREMITY Right 2022    Procedure: IRRIGATION AND DEBRIDEMENT, LOWER EXTREMITY;  Surgeon: Alec Pettit MD;  Location: [facility];  Service: Orthopedics;  Laterality: Right;    OPEN REDUCTION AND INTERNAL FIXATION (ORIF) OF PILON FRACTURE Left 2021    Procedure: ORIF, FRACTURE, PILON;  Surgeon: Alec Pettit MD;  Location: [facility];  Service: Orthopedics;  Laterality: Left;    REMOVAL OF EXTERNAL FIXATION DEVICE Left 2021    Procedure: REMOVAL, EXTERNAL FIXATION DEVICE;  Surgeon: Alec Pettit MD;  Location: [facility];  Service: Orthopedics;  Laterality: Left;    REMOVAL OF EXTERNAL FIXATION DEVICE Right 2022    Procedure: REMOVAL, EXTERNAL FIXATION DEVICE;  Surgeon: Alec Pettit MD;  Location: [facility];  Service: Orthopedics;  Laterality: Right;    stents to kidney       Family History   Problem Relation Age of Onset    Breast cancer Sister     Cancer Maternal Aunt     Heart disease Mother     Colon cancer Neg Hx     Ovarian cancer Neg Hx      Social History     Tobacco Use    Smoking status: Former     Types: Cigarettes     Quit date: 11/15/2008     Years since quittin.5    Smokeless tobacco: Never   Substance Use Topics    Alcohol use: No    Drug use: No     Review of Systems    Unable to perform ROS: Mental status change     Physical Exam     Initial Vitals [05/22/23 1518]   BP Pulse Resp Temp SpO2   139/62 72 20 99.6 °F (37.6 °C) 95 %      MAP       --         Physical Exam    Nursing note and vitals reviewed.  Constitutional: She appears well-developed.   HENT:   Head: Normocephalic.   Extensive ecchymosis to bilateral nose and around bilateral eyes.  There is sparing of the tarsal lids.  No tenderness to palpation to maxilla.  No blood in posterior oropharynx.  No blood in nose or septal hematoma.  Pupils 3 mm reactive bilaterally.   Eyes: Pupils are equal, round, and reactive to light.   Neck:   Normal range of motion.  Cardiovascular:            No murmur heard.  Pulmonary/Chest: No respiratory distress.   Abdominal: Abdomen is soft.   Musculoskeletal:         General: No edema.      Cervical back: Normal range of motion.     Neurological: She is alert.   Patient occasionally able to follow commands and answer questions, but then will have episodes of being less responsive.    Skin: Skin is warm.   Psychiatric: She has a normal mood and affect.       ED Course   Procedures  Labs Reviewed - No data to display       Imaging Results    None          Medications   sodium chloride 0.9% bolus 1,000 mL 1,000 mL (has no administration in time range)     Medical Decision Making:   Differential Diagnosis:   DDX:  AMS.  Possibly secondary to urinary tract infection.  Given history of recent trauma, will redo CT of head to rule out intracranial bleed.  Do not suspect seizure given the patient is intermittently able to answer questions, and there is no apparent postictal period.  DX:  CT head.  Glucose.  TX:  Pending studies  Dispo:  Admit                            Clinical Impression:   Final diagnoses:  [N39.0] Urinary tract infection without hematuria, site unspecified (Primary)               Shaquille Boyd MD  05/22/23 4020

## 2023-05-22 NOTE — NURSING
Clarification with [facility] pharmacist @ [facility] in [place]. Patient RX is Lantus Insulin 48 units SQ daily. Humalog Insulin  100 units/ml inject 16 units three times daily with meals.

## 2023-05-23 PROBLEM — S02.2XXD CLOSED FRACTURE OF NASAL BONE WITH ROUTINE HEALING: Status: ACTIVE | Noted: 2023-05-23

## 2023-05-23 PROBLEM — R55 SYNCOPE AND COLLAPSE: Status: ACTIVE | Noted: 2023-05-23

## 2023-05-23 PROBLEM — J10.1 INFLUENZA A: Status: RESOLVED | Noted: 2019-04-08 | Resolved: 2023-05-23

## 2023-05-23 PROBLEM — G93.41 ACUTE METABOLIC ENCEPHALOPATHY: Status: ACTIVE | Noted: 2023-05-23

## 2023-05-23 LAB
ALBUMIN SERPL BCP-MCNC: 3.4 G/DL (ref 3.5–5.2)
ALP SERPL-CCNC: 62 U/L (ref 55–135)
ALT SERPL W/O P-5'-P-CCNC: 7 U/L (ref 10–44)
ANION GAP SERPL CALC-SCNC: 11 MMOL/L (ref 8–16)
AST SERPL-CCNC: 14 U/L (ref 10–40)
BASOPHILS # BLD AUTO: 0.05 K/UL (ref 0–0.2)
BASOPHILS NFR BLD: 0.5 % (ref 0–1.9)
BILIRUB SERPL-MCNC: 0.5 MG/DL (ref 0.1–1)
BUN SERPL-MCNC: 20 MG/DL (ref 6–20)
CALCIUM SERPL-MCNC: 9.1 MG/DL (ref 8.7–10.5)
CHLORIDE SERPL-SCNC: 104 MMOL/L (ref 95–110)
CO2 SERPL-SCNC: 20 MMOL/L (ref 23–29)
CREAT SERPL-MCNC: 1.1 MG/DL (ref 0.5–1.4)
DIFFERENTIAL METHOD: ABNORMAL
EOSINOPHIL # BLD AUTO: 0.1 K/UL (ref 0–0.5)
EOSINOPHIL NFR BLD: 0.7 % (ref 0–8)
ERYTHROCYTE [DISTWIDTH] IN BLOOD BY AUTOMATED COUNT: 13 % (ref 11.5–14.5)
EST. GFR  (NO RACE VARIABLE): 58 ML/MIN/1.73 M^2
ESTIMATED AVG GLUCOSE: 134 MG/DL (ref 68–131)
GLUCOSE SERPL-MCNC: 320 MG/DL (ref 70–110)
HBA1C MFR BLD: 6.3 % (ref 4–5.6)
HCT VFR BLD AUTO: 33.3 % (ref 37–48.5)
HGB BLD-MCNC: 10.8 G/DL (ref 12–16)
IMM GRANULOCYTES # BLD AUTO: 0.06 K/UL (ref 0–0.04)
IMM GRANULOCYTES NFR BLD AUTO: 0.6 % (ref 0–0.5)
LYMPHOCYTES # BLD AUTO: 1.8 K/UL (ref 1–4.8)
LYMPHOCYTES NFR BLD: 17.3 % (ref 18–48)
MCH RBC QN AUTO: 29 PG (ref 27–31)
MCHC RBC AUTO-ENTMCNC: 32.4 G/DL (ref 32–36)
MCV RBC AUTO: 90 FL (ref 82–98)
MONOCYTES # BLD AUTO: 0.7 K/UL (ref 0.3–1)
MONOCYTES NFR BLD: 7 % (ref 4–15)
NEUTROPHILS # BLD AUTO: 7.6 K/UL (ref 1.8–7.7)
NEUTROPHILS NFR BLD: 73.9 % (ref 38–73)
NRBC BLD-RTO: 0 /100 WBC
PLATELET # BLD AUTO: 156 K/UL (ref 150–450)
PMV BLD AUTO: 10.2 FL (ref 9.2–12.9)
POCT GLUCOSE: 167 MG/DL (ref 70–110)
POCT GLUCOSE: 267 MG/DL (ref 70–110)
POCT GLUCOSE: 321 MG/DL (ref 70–110)
POCT GLUCOSE: 378 MG/DL (ref 70–110)
POTASSIUM SERPL-SCNC: 4.7 MMOL/L (ref 3.5–5.1)
PROT SERPL-MCNC: 6.5 G/DL (ref 6–8.4)
RBC # BLD AUTO: 3.72 M/UL (ref 4–5.4)
SODIUM SERPL-SCNC: 135 MMOL/L (ref 136–145)
TSH SERPL DL<=0.005 MIU/L-ACNC: 0.42 UIU/ML (ref 0.4–4)
WBC # BLD AUTO: 10.31 K/UL (ref 3.9–12.7)

## 2023-05-23 PROCEDURE — 11000001 HC ACUTE MED/SURG PRIVATE ROOM

## 2023-05-23 PROCEDURE — 93010 EKG 12-LEAD: ICD-10-PCS | Mod: ,,, | Performed by: INTERNAL MEDICINE

## 2023-05-23 PROCEDURE — 99223 1ST HOSP IP/OBS HIGH 75: CPT | Mod: ,,, | Performed by: INTERNAL MEDICINE

## 2023-05-23 PROCEDURE — 25000003 PHARM REV CODE 250: Performed by: INTERNAL MEDICINE

## 2023-05-23 PROCEDURE — 94761 N-INVAS EAR/PLS OXIMETRY MLT: CPT

## 2023-05-23 PROCEDURE — 25000003 PHARM REV CODE 250: Performed by: NURSE PRACTITIONER

## 2023-05-23 PROCEDURE — G0378 HOSPITAL OBSERVATION PER HR: HCPCS

## 2023-05-23 PROCEDURE — 99223 PR INITIAL HOSPITAL CARE,LEVL III: ICD-10-PCS | Mod: ,,, | Performed by: INTERNAL MEDICINE

## 2023-05-23 PROCEDURE — 96361 HYDRATE IV INFUSION ADD-ON: CPT

## 2023-05-23 PROCEDURE — 84443 ASSAY THYROID STIM HORMONE: CPT | Performed by: INTERNAL MEDICINE

## 2023-05-23 PROCEDURE — 93010 ELECTROCARDIOGRAM REPORT: CPT | Mod: ,,, | Performed by: INTERNAL MEDICINE

## 2023-05-23 PROCEDURE — 36415 COLL VENOUS BLD VENIPUNCTURE: CPT | Performed by: INTERNAL MEDICINE

## 2023-05-23 PROCEDURE — 80053 COMPREHEN METABOLIC PANEL: CPT | Performed by: INTERNAL MEDICINE

## 2023-05-23 PROCEDURE — 63600175 PHARM REV CODE 636 W HCPCS: Performed by: STUDENT IN AN ORGANIZED HEALTH CARE EDUCATION/TRAINING PROGRAM

## 2023-05-23 PROCEDURE — 96372 THER/PROPH/DIAG INJ SC/IM: CPT | Performed by: INTERNAL MEDICINE

## 2023-05-23 PROCEDURE — 85025 COMPLETE CBC W/AUTO DIFF WBC: CPT | Performed by: INTERNAL MEDICINE

## 2023-05-23 PROCEDURE — 96366 THER/PROPH/DIAG IV INF ADDON: CPT

## 2023-05-23 PROCEDURE — 93005 ELECTROCARDIOGRAM TRACING: CPT

## 2023-05-23 RX ORDER — DOXAZOSIN 2 MG/1
8 TABLET ORAL DAILY
Status: DISCONTINUED | OUTPATIENT
Start: 2023-05-23 | End: 2023-05-26 | Stop reason: HOSPADM

## 2023-05-23 RX ORDER — BACLOFEN 10 MG/1
10 TABLET ORAL NIGHTLY
Status: DISCONTINUED | OUTPATIENT
Start: 2023-05-23 | End: 2023-05-26 | Stop reason: HOSPADM

## 2023-05-23 RX ORDER — ISOSORBIDE MONONITRATE 30 MG/1
30 TABLET, EXTENDED RELEASE ORAL DAILY
Status: DISCONTINUED | OUTPATIENT
Start: 2023-05-23 | End: 2023-05-25

## 2023-05-23 RX ORDER — TRAZODONE HYDROCHLORIDE 50 MG/1
100 TABLET ORAL NIGHTLY
Status: DISCONTINUED | OUTPATIENT
Start: 2023-05-23 | End: 2023-05-26 | Stop reason: HOSPADM

## 2023-05-23 RX ORDER — FUROSEMIDE 20 MG/1
20 TABLET ORAL DAILY
Status: DISCONTINUED | OUTPATIENT
Start: 2023-05-23 | End: 2023-05-25

## 2023-05-23 RX ORDER — LISINOPRIL 20 MG/1
40 TABLET ORAL DAILY
Status: DISCONTINUED | OUTPATIENT
Start: 2023-05-24 | End: 2023-05-26 | Stop reason: HOSPADM

## 2023-05-23 RX ORDER — GLUCAGON 1 MG
1 KIT INJECTION
Status: DISCONTINUED | OUTPATIENT
Start: 2023-05-23 | End: 2023-05-26 | Stop reason: HOSPADM

## 2023-05-23 RX ADMIN — FUROSEMIDE 20 MG: 20 TABLET ORAL at 02:05

## 2023-05-23 RX ADMIN — INSULIN ASPART 2 UNITS: 100 INJECTION, SOLUTION INTRAVENOUS; SUBCUTANEOUS at 04:05

## 2023-05-23 RX ADMIN — DOXAZOSIN 8 MG: 2 TABLET ORAL at 02:05

## 2023-05-23 RX ADMIN — ISOSORBIDE MONONITRATE 30 MG: 30 TABLET, EXTENDED RELEASE ORAL at 02:05

## 2023-05-23 RX ADMIN — CLOPIDOGREL BISULFATE 75 MG: 75 TABLET ORAL at 08:05

## 2023-05-23 RX ADMIN — CARVEDILOL 12.5 MG: 12.5 TABLET, FILM COATED ORAL at 07:05

## 2023-05-23 RX ADMIN — BACLOFEN 10 MG: 10 TABLET ORAL at 08:05

## 2023-05-23 RX ADMIN — LEVOTHYROXINE SODIUM 88 MCG: 88 TABLET ORAL at 06:05

## 2023-05-23 RX ADMIN — INSULIN ASPART 10 UNITS: 100 INJECTION, SOLUTION INTRAVENOUS; SUBCUTANEOUS at 09:05

## 2023-05-23 RX ADMIN — HYDROCODONE BITARTRATE AND ACETAMINOPHEN 1 TABLET: 5; 325 TABLET ORAL at 04:05

## 2023-05-23 RX ADMIN — LISINOPRIL 20 MG: 20 TABLET ORAL at 08:05

## 2023-05-23 RX ADMIN — HYDROCODONE BITARTRATE AND ACETAMINOPHEN 1 TABLET: 5; 325 TABLET ORAL at 07:05

## 2023-05-23 RX ADMIN — HYDROCODONE BITARTRATE AND ACETAMINOPHEN 1 TABLET: 5; 325 TABLET ORAL at 11:05

## 2023-05-23 RX ADMIN — TRAZODONE HYDROCHLORIDE 100 MG: 50 TABLET ORAL at 08:05

## 2023-05-23 RX ADMIN — RANOLAZINE 500 MG: 500 TABLET, EXTENDED RELEASE ORAL at 08:05

## 2023-05-23 RX ADMIN — CIPROFLOXACIN 400 MG: 2 INJECTION, SOLUTION INTRAVENOUS at 06:05

## 2023-05-23 RX ADMIN — INSULIN ASPART 3 UNITS: 100 INJECTION, SOLUTION INTRAVENOUS; SUBCUTANEOUS at 08:05

## 2023-05-23 RX ADMIN — CARVEDILOL 12.5 MG: 12.5 TABLET, FILM COATED ORAL at 04:05

## 2023-05-23 RX ADMIN — INSULIN ASPART 8 UNITS: 100 INJECTION, SOLUTION INTRAVENOUS; SUBCUTANEOUS at 12:05

## 2023-05-23 RX ADMIN — SODIUM CHLORIDE: 9 INJECTION, SOLUTION INTRAVENOUS at 04:05

## 2023-05-23 RX ADMIN — INSULIN DETEMIR 20 UNITS: 100 INJECTION, SOLUTION SUBCUTANEOUS at 12:05

## 2023-05-23 RX ADMIN — FAMOTIDINE 40 MG: 20 TABLET ORAL at 08:05

## 2023-05-23 RX ADMIN — ATORVASTATIN CALCIUM 80 MG: 80 TABLET, FILM COATED ORAL at 08:05

## 2023-05-23 NOTE — CONSULTS
[place] - [facility] ([facility])  Department of Hospital Medicine  Consult Note    Patient Name: Merna Regalado  MRN: [id number]  Admission Date: 5/22/2023  Hospital Length of Stay: 0 days  Attending Physician: La Nena Vasquez MD  Primary Care Provider: Bianca Godoy MD     Consults  Subjective:     Principal Problem: <principal problem not specified>    Chief Complaint   Patient presents with    Weakness        HPI: 62-year-old female with history of CAD, carotid artery stenosis, dyslipidemia, DM2 can present to emergency department with nasal fracture after sustaining a fall while attempting to get out of bed.  LOC unknown.  Patient also noted to have altered mental status as well as acute cystitis.  Cis consulted for syncope.      Past Medical History:   Diagnosis Date    Asthma     Coronary artery disease     Diabetes mellitus     Dyslipidemia     GERD (gastroesophageal reflux disease)     Hypertension     Neuropathy     Thyroid disease        Past Surgical History:   Procedure Laterality Date    ANKLE HARDWARE REMOVAL Left 3/31/2022    Procedure: REMOVAL, HARDWARE, ANKLE;  Surgeon: Alec Pettit MD;  Location: [facility];  Service: Orthopedics;  Laterality: Left;    ANKLE HARDWARE REMOVAL Right 8/18/2022    Procedure: REMOVAL, HARDWARE, ANKLE;  Surgeon: Alec Pettit MD;  Location: [facility];  Service: Orthopedics;  Laterality: Right;    APPLICATION OF LARGE EXTERNAL FIXATION DEVICE TO TIBIA Left 11/05/2021    Procedure: APPLICATION, EXTERNAL FIXATION DEVICE, LARGE, TIBIA;  Surgeon: Bk Richter MD;  Location: [facility];  Service: Orthopedics;  Laterality: Left;    APPLICATION OF WOUND VACUUM-ASSISTED CLOSURE DEVICE Right 8/18/2022    Procedure: APPLICATION, WOUND VAC;  Surgeon: Alec Pettit MD;  Location: [facility];  Service: Orthopedics;  Laterality: Right;    cardiac stents      thinks 3 total (1st 2 was in 2009 and later 2015 or so)    CHOLECYSTECTOMY      CLOSED REDUCTION Right 5/11/2022    Procedure:  CLOSED REDUCTION;  Surgeon: Alec Pettit MD;  Location: [facility];  Service: Orthopedics;  Laterality: Right;  Right Ankle    CLOSURE OF WOUND Right 8/18/2022    Procedure: CLOSURE, WOUND;  Surgeon: Alec Pettit MD;  Location: [facility];  Service: Orthopedics;  Laterality: Right;    EXTERNAL FIXATION Right 5/11/2022    Procedure: EXTERNAL FIXATION;  Surgeon: Alec Pettit MD;  Location: [facility] OR;  Service: Orthopedics;  Laterality: Right;  Right Ankle    FEMORAL BYPASS      HYSTERECTOMY      IRRIGATION AND DEBRIDEMENT OF LOWER EXTREMITY Right 8/18/2022    Procedure: IRRIGATION AND DEBRIDEMENT, LOWER EXTREMITY;  Surgeon: Alec Pettit MD;  Location: [facility] OR;  Service: Orthopedics;  Laterality: Right;    OPEN REDUCTION AND INTERNAL FIXATION (ORIF) OF PILON FRACTURE Left 11/12/2021    Procedure: ORIF, FRACTURE, PILON;  Surgeon: Alec Pettit MD;  Location: [facility];  Service: Orthopedics;  Laterality: Left;    REMOVAL OF EXTERNAL FIXATION DEVICE Left 11/12/2021    Procedure: REMOVAL, EXTERNAL FIXATION DEVICE;  Surgeon: Alec Pettit MD;  Location: [facility];  Service: Orthopedics;  Laterality: Left;    REMOVAL OF EXTERNAL FIXATION DEVICE Right 5/20/2022    Procedure: REMOVAL, EXTERNAL FIXATION DEVICE;  Surgeon: Alec Pettit MD;  Location: [facility];  Service: Orthopedics;  Laterality: Right;    stents to kidney         Review of patient's allergies indicates:   Allergen Reactions    Codeine Nausea Only    Iodine Itching    Pcn [penicillins] Other (See Comments)     Headache and nausea with PO PCN     Sulfa (sulfonamide antibiotics)     Oxycontin [oxycodone] Nausea And Vomiting       No current facility-administered medications on file prior to encounter.     Current Outpatient Medications on File Prior to Encounter   Medication Sig    albuterol sulfate (PROAIR HFA INHL) Inhale into the lungs.    aspirin 81 MG Chew Take 81 mg by mouth once daily.    atorvastatin (LIPITOR) 80 MG tablet Take 1 tablet (80 mg total) by  mouth every evening.    baclofen (LIORESAL) 20 MG tablet Take 20 mg by mouth 2 (two) times daily.    BASAGLAR KWIKPEN 100 unit/mL (3 mL) InPn pen 40 Units every evening.    carvediloL (COREG) 12.5 MG tablet Take 1 tablet (12.5 mg total) by mouth 2 (two) times daily with meals.    clopidogreL (PLAVIX) 75 mg tablet Take 75 mg by mouth once daily.    fluoxetine (PROZAC) 20 MG capsule TAKE 1 CAPSULE(S) EVERY DAY BY ORAL ROUTE FOR 30 DAYS.    gabapentin (NEURONTIN) 300 MG capsule Take 300 mg by mouth 2 (two) times daily.    isosorbide dinitrate (ISORDIL) 30 MG Tab Take 30 mg by mouth once daily. Take 1 and 1/2 half tablets by mouth once daily    levothyroxine (SYNTHROID) 88 MCG tablet Take 1 tablet (88 mcg total) by mouth before breakfast.    alendronate (FOSAMAX) 70 MG tablet Take 1 tablet (70 mg total) by mouth every 7 days.    apremilast (OTEZLA STARTER) 10 mg (4)-20 mg (4)-30 mg (47) DsPk Take by mouth as instructed on package directions.    apremilast (OTEZLA) 30 mg Tab Take 1 tablet (30 mg total) by mouth 2 (two) times daily.    ciprofloxacin HCl (CIPRO) 500 MG tablet Take 1 tablet (500 mg total) by mouth 2 (two) times daily. for 10 days    doxazosin (CARDURA) 4 MG tablet TAKE 1 TABLET ORALLY ONCE A DAY.    ergocalciferol (ERGOCALCIFEROL) 50,000 unit Cap Take 50,000 Units by mouth every 7 days.    famotidine (PEPCID) 40 MG tablet Take 40 mg by mouth once daily.    furosemide (LASIX) 20 MG tablet Take 20 mg by mouth once daily.    HYDROcodone-acetaminophen (NORCO) 7.5-325 mg per tablet Take 1 tablet by mouth every 6 to 8 hours as needed for Pain.    insulin lispro 100 unit/mL injection Inject 16 Units into the skin 3 (three) times daily before meals.    lisinopriL (PRINIVIL,ZESTRIL) 40 MG tablet Take 0.5 tablets (20 mg total) by mouth once daily.    metFORMIN (GLUCOPHAGE) 500 MG tablet Take 1 tablet (500 mg total) by mouth daily with breakfast.    NIFEdipine (PROCARDIA-XL) 30 MG (OSM) 24 hr tablet Take 60 mg by  mouth once daily.    nitroGLYCERIN (NITROSTAT) 0.4 MG SL tablet Place 1 tablet (0.4 mg total) under the tongue every 5 (five) minutes as needed for Chest pain.    ondansetron (ZOFRAN-ODT) 4 MG TbDL Take 4 mg by mouth every 6 (six) hours as needed.    ranolazine (RANEXA) 500 MG Tb12 Take 500 mg by mouth 2 (two) times daily.    trazodone (DESYREL) 100 MG tablet     [DISCONTINUED] pravastatin (PRAVACHOL) 40 MG tablet Take 40 mg by mouth once daily.     Family History       Problem Relation (Age of Onset)    Breast cancer Sister    Cancer Maternal Aunt    Heart disease Mother          Tobacco Use    Smoking status: Former     Types: Cigarettes     Quit date: 11/15/2008     Years since quittin.5    Smokeless tobacco: Never   Substance and Sexual Activity    Alcohol use: No    Drug use: No    Sexual activity: Not Currently     Partners: Male     Birth control/protection: Surgical     Comment:      Review of Systems   Constitutional:  Positive for fatigue.   HENT: Negative.     Eyes: Negative.    Respiratory: Negative.     Cardiovascular:         Syncope   Gastrointestinal: Negative.    Endocrine: Negative.    Genitourinary: Negative.    Musculoskeletal: Negative.    Skin: Negative.    Allergic/Immunologic: Negative.    Neurological:  Positive for syncope.   Hematological: Negative.    Psychiatric/Behavioral:          AMS   Objective:     Vital Signs (Most Recent):  Temp: 97.8 °F (36.6 °C) (23 1119)  Pulse: 71 (23 1119)  Resp: 20 (23 1119)  BP: (!) 159/81 (23 1119)  SpO2: 95 % (23 1119) Vital Signs (24h Range):  Temp:  [97.7 °F (36.5 °C)-99.6 °F (37.6 °C)] 97.8 °F (36.6 °C)  Pulse:  [71-87] 71  Resp:  [18-20] 20  SpO2:  [93 %-96 %] 95 %  BP: (118-170)/(62-97) 159/81     Weight: 94.3 kg (207 lb 14.3 oz)  Body mass index is 38.02 kg/m².       Physical Exam  Constitutional:       General: She is not in acute distress.     Appearance: She is not ill-appearing, toxic-appearing or  diaphoretic.   Cardiovascular:      Rate and Rhythm: Normal rate and regular rhythm.      Pulses: Normal pulses.      Heart sounds: Normal heart sounds. No murmur heard.    No friction rub. No gallop.   Pulmonary:      Effort: Pulmonary effort is normal.      Breath sounds: Normal breath sounds.   Abdominal:      General: Abdomen is flat. Bowel sounds are normal.      Palpations: Abdomen is soft.   Musculoskeletal:         General: Normal range of motion.      Cervical back: Normal range of motion and neck supple.      Right lower leg: No edema.      Left lower leg: No edema.   Skin:     General: Skin is warm and dry.      Capillary Refill: Capillary refill takes less than 2 seconds.   Neurological:      Mental Status: She is alert.          Significant Labs: All pertinent labs within the past 24 hours have been reviewed.  Recent Lab Results  (Last 5 results in the past 24 hours)        05/23/23  1114   05/23/23  0706   05/23/23  0605   05/22/23  1909   05/22/23  1834        Albumin     3.4           Alkaline Phosphatase     62           ALT     7           Anion Gap     11           AST     14           Baso #     0.05           Basophil %     0.5           BILIRUBIN TOTAL     0.5  Comment: For infants and newborns, interpretation of results should be based  on gestational age, weight and in agreement with clinical  observations.    Premature Infant recommended reference ranges:  Up to 24 hours.............<8.0 mg/dL  Up to 48 hours............<12.0 mg/dL  3-5 days..................<15.0 mg/dL  6-29 days.................<15.0 mg/dL             BUN     20           Calcium     9.1           Chloride     104           CO2     20           Creatinine     1.1           Differential Method     Automated           eGFR     58           Eos #     0.1           Eosinophil %     0.7           Estimated Avg Glucose       134         Glucose     320           Gran # (ANC)     7.6           Gran %     73.9           Hematocrit      33.3           Hemoglobin     10.8           Hemoglobin A1C External       6.3  Comment: ADA Screening Guidelines:  5.7-6.4%  Consistent with prediabetes  >or=6.5%  Consistent with diabetes    High levels of fetal hemoglobin interfere with the HbA1C  assay. Heterozygous hemoglobin variants (HbS, HgC, etc)do  not significantly interfere with this assay.   However, presence of multiple variants may affect accuracy.           Immature Grans (Abs)     0.06  Comment: Mild elevation in immature granulocytes is non specific and   can be seen in a variety of conditions including stress response,   acute inflammation, trauma and pregnancy. Correlation with other   laboratory and clinical findings is essential.             Immature Granulocytes     0.6           Lymph #     1.8           Lymph %     17.3           MCH     29.0           MCHC     32.4           MCV     90           Mono #     0.7           Mono %     7.0           MPV     10.2           nRBC     0           Platelets     156           POCT Glucose 321   378       327       Potassium     4.7           PROTEIN TOTAL     6.5           RBC     3.72           RDW     13.0           Sodium     135           WBC     10.31                                      Assessment/Plan:     No new Assessment & Plan notes have been filed under this hospital service since the last note was generated.  Service: Cardiology    Current Facility-Administered Medications   Medication    0.9%  NaCl infusion    aspirin chewable tablet 81 mg    atorvastatin tablet 80 mg    baclofen tablet 10 mg    carvediloL tablet 12.5 mg    ciprofloxacin (CIPRO)400mg/200ml D5W IVPB 400 mg    cloNIDine tablet 0.2 mg    clopidogreL tablet 75 mg    dextrose 10% bolus 125 mL 125 mL    dextrose 10% bolus 125 mL 125 mL    dextrose 10% bolus 250 mL 250 mL    dextrose 10% bolus 250 mL 250 mL    dextrose 40 % gel 15,000 mg    dextrose 40 % gel 30,000 mg    famotidine tablet 40 mg    glucagon (human  recombinant) injection 1 mg    glucagon (human recombinant) injection 1 mg    glucose chewable tablet 16 g    glucose chewable tablet 24 g    HYDROcodone-acetaminophen 5-325 mg per tablet 1 tablet    insulin aspart U-100 pen 1-10 Units    insulin detemir U-100 (Levemir) pen 20 Units    levothyroxine tablet 88 mcg    [START ON 5/24/2023] lisinopriL tablet 40 mg    melatonin tablet 6 mg    nitroGLYCERIN SL tablet 0.4 mg    ranolazine 12 hr tablet 500 mg    sodium chloride 0.9% flush 10 mL    traZODone tablet 100 mg   :   Carotid US 3/2023: 60-79% stenosis in the proximal right internal carotid artery.  40-59% stenosis in the proximal left internal carotid artery with greater than 50% in the right and left external carotid artery.  Antegrade right vertebral artery flow .  Left vertebral artery shows retrograde flow consistent with subclavian artery stenosis.    MPI 06/28/2022: Probably normal with no evidence of ischemia    Telemetry monitor 06/2022: Predominantly normal sinus rhythm.    Minimum heart rate 47 beats per minute, maximum rate 121 beats per minute, mean heart rate 60 beats per minute.    No evidence AV block.    There PACs noted.    No evidence of SVT noted.    Rare PVCs noted.    No pauses were noted.    One 3 beat run WCT, possibly VT at 125 beats per minute.      DX: 2 brief episodes of Syncope of unclear etiology with facial trauma; difficult historian to establish accurate sequence of events. No evidence of arrhythmia so far.  H/o syncope s/p ankle fracture, needs surgery soon  UTI/weakness  AMS  CAD, MPI normal 6/22 s/p PCI 2009  HTN, EF 65%, normal LV diastolic function, PASP 32 mm Hg 5/22  HLD  Carotid artery stenosis 70% ISABELLA stenosis, 50% left ICA stenosis 3/23  DM2  Severe PAD s/p leg bypass and PCI  CT- for PE 11/21      Plan:telemetry, ekg, orthostatics  Continue Home asa, atorvastatin, carvedilol 12.5, clopidogrel, Ranexa 500  Restart Doxazosin 8 mg, lasix 20 Imdur 30, Lisinopril 40,  nifedipine 60 as tolerated        Thank you for your consult. I will follow-up with patient. Please contact us if you have any additional questions.      Scribed by  Zi Gao NP for Dr NOHEMI Hunter  Department of Hospital Medicine  [place] - Med Surg (3rd Fl)  I attest that I have personally seen and examined this patient. I have reviewed and discussed the management in detail as outlined above.

## 2023-05-23 NOTE — NURSING
Report handoff to LEIA Alejo to let her know admit assessment incomplete. Patient unable to answer cognitively impaired. Unable to follow commands for Neuro assessment. Daughter here on admit only able to answer some things about her Mother. Informed her that patient's  was going home to get Insulin paper log.

## 2023-05-23 NOTE — HPI
62-year-old female with history of CAD, carotid artery stenosis, dyslipidemia, DM2 can present to emergency department with nasal fracture after sustaining a fall while attempting to get out of bed.  LOC unknown.  Patient also noted to have altered mental status as well as acute cystitis.  Cis consulted for syncope.

## 2023-05-23 NOTE — PLAN OF CARE
Problem: Adult Inpatient Plan of Care  Goal: Plan of Care Review  Outcome: Ongoing, Progressing  Goal: Patient-Specific Goal (Individualized)  Outcome: Ongoing, Progressing  Goal: Absence of Hospital-Acquired Illness or Injury  Outcome: Ongoing, Progressing  Goal: Optimal Comfort and Wellbeing  Outcome: Ongoing, Progressing  Goal: Readiness for Transition of Care  Outcome: Ongoing, Progressing     Problem: Diabetes Comorbidity  Goal: Blood Glucose Level Within Targeted Range  Outcome: Ongoing, Progressing     Problem: Skin Injury Risk Increased  Goal: Skin Health and Integrity  Outcome: Ongoing, Progressing     Problem: UTI (Urinary Tract Infection)  Goal: Improved Infection Symptoms  Outcome: Ongoing, Progressing     Problem: Fall Injury Risk  Goal: Absence of Fall and Fall-Related Injury  Outcome: Ongoing, Progressing

## 2023-05-23 NOTE — ASSESSMENT & PLAN NOTE
Patient's FSGs are controlled on current medication regimen.  Last A1c reviewed-   Lab Results   Component Value Date    HGBA1C 6.3 (H) 05/22/2023     Most recent fingerstick glucose reviewed-   Recent Labs   Lab 05/22/23  1556 05/22/23  1834 05/23/23  0706 05/23/23  1114   POCTGLUCOSE 315* 327* 378* 321*     Current correctional scale  Medium  Maintain anti-hyperglycemic dose as follows-   Antihyperglycemics (From admission, onward)    Start     Stop Route Frequency Ordered    05/23/23 1130  insulin detemir U-100 (Levemir) pen 20 Units         -- SubQ Daily 05/23/23 1030    05/22/23 1950  insulin aspart U-100 pen 1-10 Units         -- SubQ Before meals & nightly PRN 05/22/23 1851        Hold Oral hypoglycemics while patient is in the hospital.    Resume basal insulin at 20 units daily with SSI. On basal insulin and metformin at home.  Diabetic diet

## 2023-05-23 NOTE — NURSING
Unable to complete patient's admit profile. Patient unable to answer questions; appears very sleepy. Occasional tremors noted. Daughter at bedside.

## 2023-05-23 NOTE — NURSING
Picture documentation obtained on admit of multiple skin tears to Right forearm from frequent falls at home as reported by daughter here on admit. Picture documentation of abrasion and extensive swelling bruising to face/nose. Daughter reports her Mother fell and hit bedpost. Patient with round bruise reddish color to Left knee. Walking boot noted to RLE. A flea was noted crawling on patient's neck; removed and disposed of. Daughter reports she has dogs!

## 2023-05-23 NOTE — H&P
[facility] ([address] [place] [address] Medicine  History & Physical    Patient Name: Merna Regalado  MRN: [id number]  Patient Class: OP- Observation  Admission Date: 5/22/2023  Attending Physician: La Nena Vasquez MD   Primary Care Provider: Bianca Godoy MD         Patient information was obtained from patient, spouse/SO and ER records.     Subjective:     Principal Problem:Acute metabolic encephalopathy    Chief Complaint:   Chief Complaint   Patient presents with    Weakness        HPI: Patient presented to ER with falls and weaknes at home. She had a fall at home 3x prior to admission. She has a nasal fracture and has a few lacs on her head. CT head negative for acute intracranial pathology. She reports she had loss of consciousness but family reports she was responding to them. She reports for initial fall she was sitting on toilet and when she went to pull herself up she blacked out and slide to the floor and hit her nose. Denies chest pains, palpitations, SOB. She had a fall 1 year ago and broke her right ankle but her left ankle is hurting and this is new. During our interview she keeps calling out in pain and tells me both are hurting and she is concerning she has a new fracture on the right side; right in walking boot. She is having pressure with urination, sx started 1 day ago. No fevers. She had some confusion last night in ER but this morning she is awake, alert and oriented to person, place time and situation. However, she is a very poor historian and her  (they are ) helps provide some of the history.       Past Medical History:   Diagnosis Date    Asthma     Coronary artery disease     Diabetes mellitus     Dyslipidemia     GERD (gastroesophageal reflux disease)     Hypertension     Neuropathy     Thyroid disease        Past Surgical History:   Procedure Laterality Date    ANKLE HARDWARE REMOVAL Left 3/31/2022    Procedure: REMOVAL, HARDWARE, ANKLE;  Surgeon: Alec SUN  MD Wilver;  Location: [facility];  Service: Orthopedics;  Laterality: Left;    ANKLE HARDWARE REMOVAL Right 8/18/2022    Procedure: REMOVAL, HARDWARE, ANKLE;  Surgeon: Alec Pettit MD;  Location: [facility];  Service: Orthopedics;  Laterality: Right;    APPLICATION OF LARGE EXTERNAL FIXATION DEVICE TO TIBIA Left 11/05/2021    Procedure: APPLICATION, EXTERNAL FIXATION DEVICE, LARGE, TIBIA;  Surgeon: Bk Richter MD;  Location: [facility] OR;  Service: Orthopedics;  Laterality: Left;    APPLICATION OF WOUND VACUUM-ASSISTED CLOSURE DEVICE Right 8/18/2022    Procedure: APPLICATION, WOUND VAC;  Surgeon: Alec Pettit MD;  Location: [facility];  Service: Orthopedics;  Laterality: Right;    cardiac stents      thinks 3 total (1st 2 was in 2009 and later 2015 or so)    CHOLECYSTECTOMY      CLOSED REDUCTION Right 5/11/2022    Procedure: CLOSED REDUCTION;  Surgeon: Alec Pettit MD;  Location: [facility];  Service: Orthopedics;  Laterality: Right;  Right Ankle    CLOSURE OF WOUND Right 8/18/2022    Procedure: CLOSURE, WOUND;  Surgeon: Alec Pettit MD;  Location: [facility];  Service: Orthopedics;  Laterality: Right;    EXTERNAL FIXATION Right 5/11/2022    Procedure: EXTERNAL FIXATION;  Surgeon: Alec Pettit MD;  Location: [facility];  Service: Orthopedics;  Laterality: Right;  Right Ankle    FEMORAL BYPASS      HYSTERECTOMY      IRRIGATION AND DEBRIDEMENT OF LOWER EXTREMITY Right 8/18/2022    Procedure: IRRIGATION AND DEBRIDEMENT, LOWER EXTREMITY;  Surgeon: Alec Pettit MD;  Location: [facility];  Service: Orthopedics;  Laterality: Right;    OPEN REDUCTION AND INTERNAL FIXATION (ORIF) OF PILON FRACTURE Left 11/12/2021    Procedure: ORIF, FRACTURE, PILON;  Surgeon: Alec Pettit MD;  Location: [facility];  Service: Orthopedics;  Laterality: Left;    REMOVAL OF EXTERNAL FIXATION DEVICE Left 11/12/2021    Procedure: REMOVAL, EXTERNAL FIXATION DEVICE;  Surgeon: Alec Pettit MD;  Location: [facility];  Service: Orthopedics;   Laterality: Left;    REMOVAL OF EXTERNAL FIXATION DEVICE Right 5/20/2022    Procedure: REMOVAL, EXTERNAL FIXATION DEVICE;  Surgeon: Alec Pettit MD;  Location: [facility];  Service: Orthopedics;  Laterality: Right;    stents to kidney         Review of patient's allergies indicates:   Allergen Reactions    Codeine Nausea Only    Iodine Itching    Pcn [penicillins] Other (See Comments)     Headache and nausea with PO PCN     Sulfa (sulfonamide antibiotics)     Oxycontin [oxycodone] Nausea And Vomiting       No current facility-administered medications on file prior to encounter.     Current Outpatient Medications on File Prior to Encounter   Medication Sig    albuterol sulfate (PROAIR HFA INHL) Inhale into the lungs.    aspirin 81 MG Chew Take 81 mg by mouth once daily.    atorvastatin (LIPITOR) 80 MG tablet Take 1 tablet (80 mg total) by mouth every evening.    baclofen (LIORESAL) 20 MG tablet Take 20 mg by mouth 2 (two) times daily.    BASAGLAR KWIKPEN 100 unit/mL (3 mL) InPn pen 40 Units every evening.    carvediloL (COREG) 12.5 MG tablet Take 1 tablet (12.5 mg total) by mouth 2 (two) times daily with meals.    clopidogreL (PLAVIX) 75 mg tablet Take 75 mg by mouth once daily.    fluoxetine (PROZAC) 20 MG capsule TAKE 1 CAPSULE(S) EVERY DAY BY ORAL ROUTE FOR 30 DAYS.    gabapentin (NEURONTIN) 300 MG capsule Take 300 mg by mouth 2 (two) times daily.    isosorbide dinitrate (ISORDIL) 30 MG Tab Take 30 mg by mouth once daily. Take 1 and 1/2 half tablets by mouth once daily    levothyroxine (SYNTHROID) 88 MCG tablet Take 1 tablet (88 mcg total) by mouth before breakfast.    alendronate (FOSAMAX) 70 MG tablet Take 1 tablet (70 mg total) by mouth every 7 days.    apremilast (OTEZLA STARTER) 10 mg (4)-20 mg (4)-30 mg (47) DsPk Take by mouth as instructed on package directions.    apremilast (OTEZLA) 30 mg Tab Take 1 tablet (30 mg total) by mouth 2 (two) times daily.    ciprofloxacin HCl (CIPRO) 500 MG  tablet Take 1 tablet (500 mg total) by mouth 2 (two) times daily. for 10 days    doxazosin (CARDURA) 4 MG tablet TAKE 1 TABLET ORALLY ONCE A DAY.    ergocalciferol (ERGOCALCIFEROL) 50,000 unit Cap Take 50,000 Units by mouth every 7 days.    famotidine (PEPCID) 40 MG tablet Take 40 mg by mouth once daily.    furosemide (LASIX) 20 MG tablet Take 20 mg by mouth once daily.    HYDROcodone-acetaminophen (NORCO) 7.5-325 mg per tablet Take 1 tablet by mouth every 6 to 8 hours as needed for Pain.    insulin lispro 100 unit/mL injection Inject 16 Units into the skin 3 (three) times daily before meals.    lisinopriL (PRINIVIL,ZESTRIL) 40 MG tablet Take 0.5 tablets (20 mg total) by mouth once daily.    metFORMIN (GLUCOPHAGE) 500 MG tablet Take 1 tablet (500 mg total) by mouth daily with breakfast.    NIFEdipine (PROCARDIA-XL) 30 MG (OSM) 24 hr tablet Take 60 mg by mouth once daily.    nitroGLYCERIN (NITROSTAT) 0.4 MG SL tablet Place 1 tablet (0.4 mg total) under the tongue every 5 (five) minutes as needed for Chest pain.    ondansetron (ZOFRAN-ODT) 4 MG TbDL Take 4 mg by mouth every 6 (six) hours as needed.    ranolazine (RANEXA) 500 MG Tb12 Take 500 mg by mouth 2 (two) times daily.    trazodone (DESYREL) 100 MG tablet     [DISCONTINUED] pravastatin (PRAVACHOL) 40 MG tablet Take 40 mg by mouth once daily.     Family History       Problem Relation (Age of Onset)    Breast cancer Sister    Cancer Maternal Aunt    Heart disease Mother          Tobacco Use    Smoking status: Former     Types: Cigarettes     Quit date: 11/15/2008     Years since quittin.5    Smokeless tobacco: Never   Substance and Sexual Activity    Alcohol use: No    Drug use: No    Sexual activity: Not Currently     Partners: Male     Birth control/protection: Surgical     Comment:      Review of Systems   Constitutional:  Negative for activity change, fatigue, fever and unexpected weight change.   HENT:  Positive for facial  "swelling. Negative for congestion, ear pain, hearing loss, rhinorrhea and sore throat.    Eyes:  Negative for redness and visual disturbance.   Respiratory:  Negative for cough, shortness of breath and wheezing.    Cardiovascular:  Negative for chest pain, palpitations and leg swelling.   Gastrointestinal:  Negative for abdominal pain, constipation, diarrhea, nausea and vomiting.   Genitourinary:  Positive for frequency (pressure in baldder). Negative for dysuria and urgency.   Musculoskeletal:  Positive for arthralgias and back pain (known "broken bones" in lower back). Negative for joint swelling and neck pain.   Skin:  Negative for color change, rash and wound.   Neurological:  Positive for syncope (possibly). Negative for dizziness, tremors, weakness, light-headedness and headaches.   Objective:     Vital Signs (Most Recent):  Temp: 97.8 °F (36.6 °C) (05/23/23 1119)  Pulse: 71 (05/23/23 1119)  Resp: 20 (05/23/23 1119)  BP: (!) 159/81 (05/23/23 1119)  SpO2: 95 % (05/23/23 1119) Vital Signs (24h Range):  Temp:  [97.7 °F (36.5 °C)-99.6 °F (37.6 °C)] 97.8 °F (36.6 °C)  Pulse:  [71-87] 71  Resp:  [18-20] 20  SpO2:  [93 %-96 %] 95 %  BP: (118-170)/(62-97) 159/81     Weight: 94.3 kg (207 lb 14.3 oz)  Body mass index is 38.02 kg/m².     Physical Exam  Vitals and nursing note reviewed.   Constitutional:       General: She is not in acute distress.     Appearance: She is well-developed. She is obese.   HENT:      Head: Normocephalic.      Comments: Diffuse bruising and small lacs to face/nose     Right Ear: External ear normal.      Left Ear: External ear normal.      Nose: Nose normal.   Eyes:      Extraocular Movements: Extraocular movements intact.      Pupils: Pupils are equal, round, and reactive to light.   Neck:      Thyroid: No thyromegaly.   Cardiovascular:      Rate and Rhythm: Normal rate and regular rhythm.      Heart sounds: No murmur heard.  Pulmonary:      Effort: Pulmonary effort is normal. No respiratory " distress.      Breath sounds: Normal breath sounds. No wheezing.   Abdominal:      General: Bowel sounds are normal.      Palpations: Abdomen is soft.      Tenderness: There is no abdominal tenderness.   Musculoskeletal:         General: Tenderness (b/l lower extremities, she yells out for all touching; right ankle wrapped and in walking boot) present.      Right lower leg: No edema.      Left lower leg: No edema.   Skin:     General: Skin is warm and dry.   Neurological:      Mental Status: She is alert and oriented to person, place, and time.      Cranial Nerves: No cranial nerve deficit.   Psychiatric:         Behavior: Behavior normal.         Thought Content: Thought content normal.            CRANIAL NERVES     CN III, IV, VI   Pupils are equal, round, and reactive to light.     Significant Labs: All pertinent labs within the past 24 hours have been reviewed.  A1C:   Recent Labs   Lab 01/12/23  0709 05/22/23  1909   HGBA1C 8.0* 6.3*     Blood Culture:   Recent Labs   Lab 05/22/23  0700 05/22/23  0701   LABBLOO No Growth to date  No Growth to date No Growth to date  No Growth to date     CBC:   Recent Labs   Lab 05/22/23  0700 05/23/23  0605   WBC 8.76 10.31   HGB 10.8* 10.8*   HCT 34.0* 33.3*    156     CMP:   Recent Labs   Lab 05/22/23  0700 05/23/23  0605    135*   K 5.1 4.7    104   CO2 22* 20*   * 320*   BUN 28* 20   CREATININE 1.7* 1.1   CALCIUM 8.9 9.1   PROT 6.9 6.5   ALBUMIN 3.6 3.4*   BILITOT 0.3 0.5   ALKPHOS 96 62   AST 11 14   ALT 7* 7*   ANIONGAP 10 11     Cardiac Markers: No results for input(s): CKMB, MYOGLOBIN, BNP, TROPISTAT in the last 48 hours.  Lactic Acid:   Recent Labs   Lab 05/22/23  0700   LACTATE 1.2     Lipid Panel: No results for input(s): CHOL, HDL, LDLCALC, TRIG, CHOLHDL in the last 48 hours.  Troponin: No results for input(s): TROPONINI, TROPONINIHS in the last 48 hours.  TSH:   Recent Labs   Lab 05/23/23  0604   TSH 0.416     Urine Culture:   Recent  Labs   Lab 05/22/23  0653   LABURIN GRAM NEGATIVE STEPHANIE  >100,000 cfu/ml  Identification and susceptibility pending  *     Urine Studies:   Recent Labs   Lab 05/22/23  0653   COLORU Yellow   APPEARANCEUA Cloudy*   PHUR 6.0   SPECGRAV 1.020   PROTEINUA 1+*   GLUCUA 2+*   KETONESU Negative   BILIRUBINUA Negative   OCCULTUA 1+*   NITRITE Negative   UROBILINOGEN Negative   LEUKOCYTESUR 2+*   RBCUA 10*   WBCUA >100*   BACTERIA Few*   SQUAMEPITHEL 0   HYALINECASTS 0       Significant Imaging: I have reviewed all pertinent imaging results/findings within the past 24 hours.    Assessment/Plan:     * Acute metabolic encephalopathy  Confused yesterday but today she is alert and oriented x4  Resolved    Likely some dehydration and UTI induced. Cont antibx and follow up urine culture. Likely can d/c home with PO antibx soon pending syncope eval today      Closed fracture of nasal bone with routine healing  New problem s/p fall at home  Radiology reports reviewed  Can follow up ENT outpatient      Syncope and collapse  Questionable  Unclear on history if she felt weak when trying to stand on her legs due to pain or if had true loss of consciousness; not a great historian  Adjusting BP meds as noted in HTN and CAD plan  Cis consulted given extensive cardiac history  Telemetry  Electrolytes ok  IVF were given  Treating UTI       Congestive heart failure  Unclear history here. She is not able to provide much. TTE is normal 5/2023.  Hold her lasix for now  Cont ACEi  [facility]IS consulted    Echo    Interpretation Summary  · The left ventricle is normal in size with normal systolic function.  · The estimated ejection fraction is 65%.  · Normal right ventricular size with normal right ventricular systolic function.  · Normal left ventricular diastolic function.  · Normal central venous pressure (3 mmHg).  · The estimated PA systolic pressure is 32 mmHg.      S/P ORIF (open reduction internal fixation) fracture  Right ankle wrapped and in  "walking boot  She also reports h/o left ankle fracture years ago  Was planing right ankle fixation with ortho  Most of our intervention she is yelling out in pain and is very concerned she "re-broke" her ankles. No swelling or deformity noted on exam but exam is limited as she yells every time I touch her. xrays ordered to confirm stability of hardware and no new fractures  PT ordered to ensure safe to send home given she had 3 falls prior to admit. Reports she was using walker prior to admit      CAD (coronary artery disease)  Her home medications are a bit unclear. Reviewed medicine bottles she has and she is on a lot of medications. Maybe was having medication induced hypotension prior to admit contributing to weakness, falls and possible syncope  For not cont Ranexa, Coreg, ASA, statin, Plavix. CHF history noted so continue ACEi as well, looks like [facility]IS increased dose to 40mg so will continue that for now. Hold nifedipine and Imdur and resume as needed  CIS consulted--extensive cardiac history in setting of possible syncope      Acute cystitis with hematuria  Started on cipro  Urine cx GNR  Blood cx NGTD thus far      Hypothyroid  Resume home synthroid  TSH normal      Hyperlipidemia associated with type 2 diabetes mellitus  Resume home atorvastatin    Diabetes mellitus  Patient's FSGs are controlled on current medication regimen.  Last A1c reviewed-   Lab Results   Component Value Date    HGBA1C 6.3 (H) 05/22/2023     Most recent fingerstick glucose reviewed-   Recent Labs   Lab 05/22/23  1556 05/22/23  1834 05/23/23  0706 05/23/23  1114   POCTGLUCOSE 315* 327* 378* 321*     Current correctional scale  Medium  Maintain anti-hyperglycemic dose as follows-   Antihyperglycemics (From admission, onward)    Start     Stop Route Frequency Ordered    05/23/23 1130  insulin detemir U-100 (Levemir) pen 20 Units         -- SubQ Daily 05/23/23 1030    05/22/23 1950  insulin aspart U-100 pen 1-10 Units         -- SubQ " Before meals & nightly PRN 05/22/23 1851        Hold Oral hypoglycemics while patient is in the hospital.    Resume basal insulin at 20 units daily with SSI. On basal insulin and metformin at home.  Diabetic diet    Hypertension  Hold nifedipine, imdur and lasix for now  Resume coreg, lisinopril, ranexa  BP currently stable  Possible med induced hypotension at home causing syncope  Med history and dosages a bit unclear and she sees [facility] who managed so consulted as well given syncope        VTE Risk Mitigation (From admission, onward)         Ordered     IP VTE HIGH RISK PATIENT  Once         05/22/23 1712     Place sequential compression device  Until discontinued         05/22/23 1712                   On 05/23/2023, patient should be placed in hospital observation services under my care.        Jalyn Garcia MD  Department of Hospital Medicine  [facility] - Med Surg (3rd Fl)

## 2023-05-23 NOTE — SUBJECTIVE & OBJECTIVE
Past Medical History:   Diagnosis Date    Asthma     Coronary artery disease     Diabetes mellitus     Dyslipidemia     GERD (gastroesophageal reflux disease)     Hypertension     Neuropathy     Thyroid disease        Past Surgical History:   Procedure Laterality Date    ANKLE HARDWARE REMOVAL Left 3/31/2022    Procedure: REMOVAL, HARDWARE, ANKLE;  Surgeon: Alec Pettit MD;  Location: [facility];  Service: Orthopedics;  Laterality: Left;    ANKLE HARDWARE REMOVAL Right 8/18/2022    Procedure: REMOVAL, HARDWARE, ANKLE;  Surgeon: Alec Pettit MD;  Location: [facility];  Service: Orthopedics;  Laterality: Right;    APPLICATION OF LARGE EXTERNAL FIXATION DEVICE TO TIBIA Left 11/05/2021    Procedure: APPLICATION, EXTERNAL FIXATION DEVICE, LARGE, TIBIA;  Surgeon: Bk Richter MD;  Location: [facility];  Service: Orthopedics;  Laterality: Left;    APPLICATION OF WOUND VACUUM-ASSISTED CLOSURE DEVICE Right 8/18/2022    Procedure: APPLICATION, WOUND VAC;  Surgeon: Alec Pettit MD;  Location: [facility];  Service: Orthopedics;  Laterality: Right;    cardiac stents      thinks 3 total (1st 2 was in 2009 and later 2015 or so)    CHOLECYSTECTOMY      CLOSED REDUCTION Right 5/11/2022    Procedure: CLOSED REDUCTION;  Surgeon: Alec Pettit MD;  Location: [facility];  Service: Orthopedics;  Laterality: Right;  Right Ankle    CLOSURE OF WOUND Right 8/18/2022    Procedure: CLOSURE, WOUND;  Surgeon: Alec Pettit MD;  Location: [facility];  Service: Orthopedics;  Laterality: Right;    EXTERNAL FIXATION Right 5/11/2022    Procedure: EXTERNAL FIXATION;  Surgeon: Alec Pettit MD;  Location: [facility];  Service: Orthopedics;  Laterality: Right;  Right Ankle    FEMORAL BYPASS      HYSTERECTOMY      IRRIGATION AND DEBRIDEMENT OF LOWER EXTREMITY Right 8/18/2022    Procedure: IRRIGATION AND DEBRIDEMENT, LOWER EXTREMITY;  Surgeon: Alec Pettit MD;  Location: [facility];  Service: Orthopedics;  Laterality: Right;    OPEN REDUCTION AND INTERNAL  FIXATION (ORIF) OF PILON FRACTURE Left 11/12/2021    Procedure: ORIF, FRACTURE, PILON;  Surgeon: Alec Pettit MD;  Location: [facility];  Service: Orthopedics;  Laterality: Left;    REMOVAL OF EXTERNAL FIXATION DEVICE Left 11/12/2021    Procedure: REMOVAL, EXTERNAL FIXATION DEVICE;  Surgeon: Alec Pettit MD;  Location: [facility];  Service: Orthopedics;  Laterality: Left;    REMOVAL OF EXTERNAL FIXATION DEVICE Right 5/20/2022    Procedure: REMOVAL, EXTERNAL FIXATION DEVICE;  Surgeon: Alec Pettit MD;  Location: [facility] OR;  Service: Orthopedics;  Laterality: Right;    stents to kidney         Review of patient's allergies indicates:   Allergen Reactions    Codeine Nausea Only    Iodine Itching    Pcn [penicillins] Other (See Comments)     Headache and nausea with PO PCN     Sulfa (sulfonamide antibiotics)     Oxycontin [oxycodone] Nausea And Vomiting       No current facility-administered medications on file prior to encounter.     Current Outpatient Medications on File Prior to Encounter   Medication Sig    albuterol sulfate (PROAIR HFA INHL) Inhale into the lungs.    aspirin 81 MG Chew Take 81 mg by mouth once daily.    atorvastatin (LIPITOR) 80 MG tablet Take 1 tablet (80 mg total) by mouth every evening.    baclofen (LIORESAL) 20 MG tablet Take 20 mg by mouth 2 (two) times daily.    BASAGLAR KWIKPEN 100 unit/mL (3 mL) InPn pen 40 Units every evening.    carvediloL (COREG) 12.5 MG tablet Take 1 tablet (12.5 mg total) by mouth 2 (two) times daily with meals.    clopidogreL (PLAVIX) 75 mg tablet Take 75 mg by mouth once daily.    fluoxetine (PROZAC) 20 MG capsule TAKE 1 CAPSULE(S) EVERY DAY BY ORAL ROUTE FOR 30 DAYS.    gabapentin (NEURONTIN) 300 MG capsule Take 300 mg by mouth 2 (two) times daily.    isosorbide dinitrate (ISORDIL) 30 MG Tab Take 30 mg by mouth once daily. Take 1 and 1/2 half tablets by mouth once daily    levothyroxine (SYNTHROID) 88 MCG tablet Take 1 tablet (88 mcg total) by mouth before breakfast.     alendronate (FOSAMAX) 70 MG tablet Take 1 tablet (70 mg total) by mouth every 7 days.    apremilast (OTEZLA STARTER) 10 mg (4)-20 mg (4)-30 mg (47) DsPk Take by mouth as instructed on package directions.    apremilast (OTEZLA) 30 mg Tab Take 1 tablet (30 mg total) by mouth 2 (two) times daily.    ciprofloxacin HCl (CIPRO) 500 MG tablet Take 1 tablet (500 mg total) by mouth 2 (two) times daily. for 10 days    doxazosin (CARDURA) 4 MG tablet TAKE 1 TABLET ORALLY ONCE A DAY.    ergocalciferol (ERGOCALCIFEROL) 50,000 unit Cap Take 50,000 Units by mouth every 7 days.    famotidine (PEPCID) 40 MG tablet Take 40 mg by mouth once daily.    furosemide (LASIX) 20 MG tablet Take 20 mg by mouth once daily.    HYDROcodone-acetaminophen (NORCO) 7.5-325 mg per tablet Take 1 tablet by mouth every 6 to 8 hours as needed for Pain.    insulin lispro 100 unit/mL injection Inject 16 Units into the skin 3 (three) times daily before meals.    lisinopriL (PRINIVIL,ZESTRIL) 40 MG tablet Take 0.5 tablets (20 mg total) by mouth once daily.    metFORMIN (GLUCOPHAGE) 500 MG tablet Take 1 tablet (500 mg total) by mouth daily with breakfast.    NIFEdipine (PROCARDIA-XL) 30 MG (OSM) 24 hr tablet Take 60 mg by mouth once daily.    nitroGLYCERIN (NITROSTAT) 0.4 MG SL tablet Place 1 tablet (0.4 mg total) under the tongue every 5 (five) minutes as needed for Chest pain.    ondansetron (ZOFRAN-ODT) 4 MG TbDL Take 4 mg by mouth every 6 (six) hours as needed.    ranolazine (RANEXA) 500 MG Tb12 Take 500 mg by mouth 2 (two) times daily.    trazodone (DESYREL) 100 MG tablet     [DISCONTINUED] pravastatin (PRAVACHOL) 40 MG tablet Take 40 mg by mouth once daily.     Family History       Problem Relation (Age of Onset)    Breast cancer Sister    Cancer Maternal Aunt    Heart disease Mother          Tobacco Use    Smoking status: Former     Types: Cigarettes     Quit date: 11/15/2008     Years since quittin.5    Smokeless tobacco: Never   Substance and  "Sexual Activity    Alcohol use: No    Drug use: No    Sexual activity: Not Currently     Partners: Male     Birth control/protection: Surgical     Comment:      Review of Systems   Constitutional:  Negative for activity change, fatigue, fever and unexpected weight change.   HENT:  Positive for facial swelling. Negative for congestion, ear pain, hearing loss, rhinorrhea and sore throat.    Eyes:  Negative for redness and visual disturbance.   Respiratory:  Negative for cough, shortness of breath and wheezing.    Cardiovascular:  Negative for chest pain, palpitations and leg swelling.   Gastrointestinal:  Negative for abdominal pain, constipation, diarrhea, nausea and vomiting.   Genitourinary:  Positive for frequency (pressure in baldder). Negative for dysuria and urgency.   Musculoskeletal:  Positive for arthralgias and back pain (known "broken bones" in lower back). Negative for joint swelling and neck pain.   Skin:  Negative for color change, rash and wound.   Neurological:  Positive for syncope (possibly). Negative for dizziness, tremors, weakness, light-headedness and headaches.   Objective:     Vital Signs (Most Recent):  Temp: 97.8 °F (36.6 °C) (05/23/23 1119)  Pulse: 71 (05/23/23 1119)  Resp: 20 (05/23/23 1119)  BP: (!) 159/81 (05/23/23 1119)  SpO2: 95 % (05/23/23 1119) Vital Signs (24h Range):  Temp:  [97.7 °F (36.5 °C)-99.6 °F (37.6 °C)] 97.8 °F (36.6 °C)  Pulse:  [71-87] 71  Resp:  [18-20] 20  SpO2:  [93 %-96 %] 95 %  BP: (118-170)/(62-97) 159/81     Weight: 94.3 kg (207 lb 14.3 oz)  Body mass index is 38.02 kg/m².     Physical Exam  Vitals and nursing note reviewed.   Constitutional:       General: She is not in acute distress.     Appearance: She is well-developed. She is obese.   HENT:      Head: Normocephalic.      Comments: Diffuse bruising and small lacs to face/nose     Right Ear: External ear normal.      Left Ear: External ear normal.      Nose: Nose normal.   Eyes:      Extraocular " Movements: Extraocular movements intact.      Pupils: Pupils are equal, round, and reactive to light.   Neck:      Thyroid: No thyromegaly.   Cardiovascular:      Rate and Rhythm: Normal rate and regular rhythm.      Heart sounds: No murmur heard.  Pulmonary:      Effort: Pulmonary effort is normal. No respiratory distress.      Breath sounds: Normal breath sounds. No wheezing.   Abdominal:      General: Bowel sounds are normal.      Palpations: Abdomen is soft.      Tenderness: There is no abdominal tenderness.   Musculoskeletal:         General: Tenderness (b/l lower extremities, she yells out for all touching; right ankle wrapped and in walking boot) present.      Right lower leg: No edema.      Left lower leg: No edema.   Skin:     General: Skin is warm and dry.   Neurological:      Mental Status: She is alert and oriented to person, place, and time.      Cranial Nerves: No cranial nerve deficit.   Psychiatric:         Behavior: Behavior normal.         Thought Content: Thought content normal.            CRANIAL NERVES     CN III, IV, VI   Pupils are equal, round, and reactive to light.     Significant Labs: All pertinent labs within the past 24 hours have been reviewed.  A1C:   Recent Labs   Lab 01/12/23  0709 05/22/23  1909   HGBA1C 8.0* 6.3*     Blood Culture:   Recent Labs   Lab 05/22/23  0700 05/22/23  0701   LABBLOO No Growth to date  No Growth to date No Growth to date  No Growth to date     CBC:   Recent Labs   Lab 05/22/23  0700 05/23/23  0605   WBC 8.76 10.31   HGB 10.8* 10.8*   HCT 34.0* 33.3*    156     CMP:   Recent Labs   Lab 05/22/23  0700 05/23/23  0605    135*   K 5.1 4.7    104   CO2 22* 20*   * 320*   BUN 28* 20   CREATININE 1.7* 1.1   CALCIUM 8.9 9.1   PROT 6.9 6.5   ALBUMIN 3.6 3.4*   BILITOT 0.3 0.5   ALKPHOS 96 62   AST 11 14   ALT 7* 7*   ANIONGAP 10 11     Cardiac Markers: No results for input(s): CKMB, MYOGLOBIN, BNP, TROPISTAT in the last 48 hours.  Lactic  Acid:   Recent Labs   Lab 05/22/23  0700   LACTATE 1.2     Lipid Panel: No results for input(s): CHOL, HDL, LDLCALC, TRIG, CHOLHDL in the last 48 hours.  Troponin: No results for input(s): TROPONINI, TROPONINIHS in the last 48 hours.  TSH:   Recent Labs   Lab 05/23/23  0604   TSH 0.416     Urine Culture:   Recent Labs   Lab 05/22/23  0653   LABURIN GRAM NEGATIVE STEPHANIE  >100,000 cfu/ml  Identification and susceptibility pending  *     Urine Studies:   Recent Labs   Lab 05/22/23  0653   COLORU Yellow   APPEARANCEUA Cloudy*   PHUR 6.0   SPECGRAV 1.020   PROTEINUA 1+*   GLUCUA 2+*   KETONESU Negative   BILIRUBINUA Negative   OCCULTUA 1+*   NITRITE Negative   UROBILINOGEN Negative   LEUKOCYTESUR 2+*   RBCUA 10*   WBCUA >100*   BACTERIA Few*   SQUAMEPITHEL 0   HYALINECASTS 0       Significant Imaging: I have reviewed all pertinent imaging results/findings within the past 24 hours.

## 2023-05-23 NOTE — PLAN OF CARE
Problem: Adult Inpatient Plan of Care  Goal: Plan of Care Review  5/22/2023 2007 by Geovanna Regalado RN  Outcome: Ongoing, Progressing  5/22/2023 2006 by Geovanna Regalado RN  Outcome: Ongoing, Progressing  Goal: Patient-Specific Goal (Individualized)  5/22/2023 2007 by Geovanna Regalado RN  Outcome: Ongoing, Progressing  5/22/2023 2006 by Geovanna Regalado RN  Outcome: Ongoing, Progressing  Goal: Absence of Hospital-Acquired Illness or Injury  5/22/2023 2007 by Geovanna Regalado RN  Outcome: Ongoing, Progressing  5/22/2023 2006 by Geovanna Regalado RN  Outcome: Ongoing, Progressing  Goal: Optimal Comfort and Wellbeing  5/22/2023 2007 by Geovanna Regalado RN  Outcome: Ongoing, Progressing  5/22/2023 2006 by Geovanna Regalado RN  Outcome: Ongoing, Progressing  Goal: Readiness for Transition of Care  5/22/2023 2007 by Geovanna Regalado RN  Outcome: Ongoing, Progressing  5/22/2023 2006 by Geovanna Regalado RN  Outcome: Ongoing, Progressing     Problem: Diabetes Comorbidity  Goal: Blood Glucose Level Within Targeted Range  5/22/2023 2007 by Geovanna Regalado RN  Outcome: Ongoing, Progressing  5/22/2023 2006 by Geovanna Regalado RN  Outcome: Ongoing, Progressing     Problem: Skin Injury Risk Increased  Goal: Skin Health and Integrity  5/22/2023 2007 by Geovanna Regalado RN  Outcome: Ongoing, Progressing  5/22/2023 2006 by Geovanna Regalado RN  Outcome: Ongoing, Progressing     Problem: UTI (Urinary Tract Infection)  Goal: Improved Infection Symptoms  5/22/2023 2007 by Geovanna Regalado RN  Outcome: Ongoing, Progressing  5/22/2023 2006 by Geovanna Regalado RN  Outcome: Ongoing, Progressing     Problem: Fall Injury Risk  Goal: Absence of Fall and Fall-Related Injury  Outcome: Ongoing, Progressing

## 2023-05-23 NOTE — PLAN OF CARE
[facility] - Med Surg (3rd Fl)  Initial Discharge Assessment       Primary Care Provider: Bianca Godoy MD    Admission Diagnosis: Urinary tract infection without hematuria, site unspecified [N39.0]    Admission Date: 5/22/2023  Expected Discharge Date:     Transition of Care Barriers: None    Payor: MEDICAID / Plan: [facility] COMMUNITY PLAN [facility] ([place] MEDICAID) / Product Type: Managed Medicaid /     Extended Emergency Contact Information  Primary Emergency Contact: Oleg,Ayah   [place]  Mobile Phone: [phone]  Relation: Daughter  Secondary Emergency Contact: FabricioAnnie  Mobile Phone: [phone]  Relation: Daughter  Preferred language: English   needed? No    Discharge Plan A: Home with family  Discharge Plan B: Home with family      [facility] [address]  Phone: [phone] Fax: [phone]    [address] [address]  Phone: [phone] Fax: [phone]      Initial Assessment (most recent)       Adult Discharge Assessment - 05/23/23 1041          Discharge Assessment    Assessment Type Discharge Planning Assessment     Confirmed/corrected address, phone number and insurance Yes     Confirmed Demographics Correct on Facesheet     Source of Information patient;family     Communicated AUGUSTINA with patient/caregiver Date not available/Unable to determine     Reason For Admission UTI     People in Home spouse     Facility Arrived From: Home     Do you expect to return to your current living situation? Yes     Do you have help at home or someone to help you manage your care at home? Yes     Who are your caregiver(s) and their phone number(s)? Ayah Dejesus (Daughter) [phone]     Prior to hospitilization cognitive status: Unable to Assess     Current cognitive status: Alert/Oriented     Equipment Currently Used at Home  rollator;wheelchair;glucometer;bedside commode;other (see comments)   Boot    Readmission within 30 days? No     Patient currently being followed by outpatient case management? No     Do you currently have service(s) that help you manage your care at home? No     Do you take prescription medications? Yes     Do you have prescription coverage? Yes     Coverage [facility] Medicaid     Do you have any problems affording any of your prescribed medications? No     How do you get to doctors appointments? family or friend will provide     Are you on dialysis? No     Do you take coumadin? No     Discharge Plan A Home with family     Discharge Plan B Home with family     DME Needed Upon Discharge  other (see comments)   TBD    Discharge Plan discussed with: Patient;Spouse/sig other     Transition of Care Barriers None                      Discharge assessment completed with patient and ex-spouse at the bedside. Patient resides with her ex- in a camper at the address listed on her Face Sheet. States that she has family who reside very close who assist when needed. Denies any post-acute care needs. SW to remain available.

## 2023-05-23 NOTE — SUBJECTIVE & OBJECTIVE
Past Medical History:   Diagnosis Date    Asthma     Coronary artery disease     Diabetes mellitus     Dyslipidemia     GERD (gastroesophageal reflux disease)     Hypertension     Neuropathy     Thyroid disease        Past Surgical History:   Procedure Laterality Date    ANKLE HARDWARE REMOVAL Left 3/31/2022    Procedure: REMOVAL, HARDWARE, ANKLE;  Surgeon: Alec Pettit MD;  Location: [facility];  Service: Orthopedics;  Laterality: Left;    ANKLE HARDWARE REMOVAL Right 8/18/2022    Procedure: REMOVAL, HARDWARE, ANKLE;  Surgeon: Alec Pettit MD;  Location: [facility];  Service: Orthopedics;  Laterality: Right;    APPLICATION OF LARGE EXTERNAL FIXATION DEVICE TO TIBIA Left 11/05/2021    Procedure: APPLICATION, EXTERNAL FIXATION DEVICE, LARGE, TIBIA;  Surgeon: Bk Richter MD;  Location: [facility];  Service: Orthopedics;  Laterality: Left;    APPLICATION OF WOUND VACUUM-ASSISTED CLOSURE DEVICE Right 8/18/2022    Procedure: APPLICATION, WOUND VAC;  Surgeon: Alec Pettit MD;  Location: [facility];  Service: Orthopedics;  Laterality: Right;    cardiac stents      thinks 3 total (1st 2 was in 2009 and later 2015 or so)    CHOLECYSTECTOMY      CLOSED REDUCTION Right 5/11/2022    Procedure: CLOSED REDUCTION;  Surgeon: Alec Pettit MD;  Location: [facility];  Service: Orthopedics;  Laterality: Right;  Right Ankle    CLOSURE OF WOUND Right 8/18/2022    Procedure: CLOSURE, WOUND;  Surgeon: Alec Pettit MD;  Location: [facility];  Service: Orthopedics;  Laterality: Right;    EXTERNAL FIXATION Right 5/11/2022    Procedure: EXTERNAL FIXATION;  Surgeon: Alec Pettit MD;  Location: [facility];  Service: Orthopedics;  Laterality: Right;  Right Ankle    FEMORAL BYPASS      HYSTERECTOMY      IRRIGATION AND DEBRIDEMENT OF LOWER EXTREMITY Right 8/18/2022    Procedure: IRRIGATION AND DEBRIDEMENT, LOWER EXTREMITY;  Surgeon: Alec Pettit MD;  Location: [facility];  Service: Orthopedics;  Laterality: Right;    OPEN REDUCTION AND INTERNAL  FIXATION (ORIF) OF PILON FRACTURE Left 11/12/2021    Procedure: ORIF, FRACTURE, PILON;  Surgeon: Alec Pettit MD;  [facility];  Service: Orthopedics;  Laterality: Left;    REMOVAL OF EXTERNAL FIXATION DEVICE Left 11/12/2021    Procedure: REMOVAL, EXTERNAL FIXATION DEVICE;  Surgeon: Alec Pettit MD;  Location: [facility];  Service: Orthopedics;  Laterality: Left;    REMOVAL OF EXTERNAL FIXATION DEVICE Right 5/20/2022    Procedure: REMOVAL, EXTERNAL FIXATION DEVICE;  Surgeon: Alec Pettit MD;  Location: [facility];  Service: Orthopedics;  Laterality: Right;    stents to kidney         Review of patient's allergies indicates:   Allergen Reactions    Codeine Nausea Only    Iodine Itching    Pcn [penicillins] Other (See Comments)     Headache and nausea with PO PCN     Sulfa (sulfonamide antibiotics)     Oxycontin [oxycodone] Nausea And Vomiting       No current facility-administered medications on file prior to encounter.     Current Outpatient Medications on File Prior to Encounter   Medication Sig    albuterol sulfate (PROAIR HFA INHL) Inhale into the lungs.    aspirin 81 MG Chew Take 81 mg by mouth once daily.    atorvastatin (LIPITOR) 80 MG tablet Take 1 tablet (80 mg total) by mouth every evening.    baclofen (LIORESAL) 20 MG tablet Take 20 mg by mouth 2 (two) times daily.    BASAGLAR KWIKPEN 100 unit/mL (3 mL) InPn pen 40 Units every evening.    carvediloL (COREG) 12.5 MG tablet Take 1 tablet (12.5 mg total) by mouth 2 (two) times daily with meals.    clopidogreL (PLAVIX) 75 mg tablet Take 75 mg by mouth once daily.    fluoxetine (PROZAC) 20 MG capsule TAKE 1 CAPSULE(S) EVERY DAY BY ORAL ROUTE FOR 30 DAYS.    gabapentin (NEURONTIN) 300 MG capsule Take 300 mg by mouth 2 (two) times daily.    isosorbide dinitrate (ISORDIL) 30 MG Tab Take 30 mg by mouth once daily. Take 1 and 1/2 half tablets by mouth once daily    levothyroxine (SYNTHROID) 88 MCG tablet Take 1 tablet (88 mcg total) by mouth before breakfast.     alendronate (FOSAMAX) 70 MG tablet Take 1 tablet (70 mg total) by mouth every 7 days.    apremilast (OTEZLA STARTER) 10 mg (4)-20 mg (4)-30 mg (47) DsPk Take by mouth as instructed on package directions.    apremilast (OTEZLA) 30 mg Tab Take 1 tablet (30 mg total) by mouth 2 (two) times daily.    ciprofloxacin HCl (CIPRO) 500 MG tablet Take 1 tablet (500 mg total) by mouth 2 (two) times daily. for 10 days    doxazosin (CARDURA) 4 MG tablet TAKE 1 TABLET ORALLY ONCE A DAY.    ergocalciferol (ERGOCALCIFEROL) 50,000 unit Cap Take 50,000 Units by mouth every 7 days.    famotidine (PEPCID) 40 MG tablet Take 40 mg by mouth once daily.    furosemide (LASIX) 20 MG tablet Take 20 mg by mouth once daily.    HYDROcodone-acetaminophen (NORCO) 7.5-325 mg per tablet Take 1 tablet by mouth every 6 to 8 hours as needed for Pain.    insulin lispro 100 unit/mL injection Inject 16 Units into the skin 3 (three) times daily before meals.    lisinopriL (PRINIVIL,ZESTRIL) 40 MG tablet Take 0.5 tablets (20 mg total) by mouth once daily.    metFORMIN (GLUCOPHAGE) 500 MG tablet Take 1 tablet (500 mg total) by mouth daily with breakfast.    NIFEdipine (PROCARDIA-XL) 30 MG (OSM) 24 hr tablet Take 60 mg by mouth once daily.    nitroGLYCERIN (NITROSTAT) 0.4 MG SL tablet Place 1 tablet (0.4 mg total) under the tongue every 5 (five) minutes as needed for Chest pain.    ondansetron (ZOFRAN-ODT) 4 MG TbDL Take 4 mg by mouth every 6 (six) hours as needed.    ranolazine (RANEXA) 500 MG Tb12 Take 500 mg by mouth 2 (two) times daily.    trazodone (DESYREL) 100 MG tablet     [DISCONTINUED] pravastatin (PRAVACHOL) 40 MG tablet Take 40 mg by mouth once daily.     Family History       Problem Relation (Age of Onset)    Breast cancer Sister    Cancer Maternal Aunt    Heart disease Mother          Tobacco Use    Smoking status: Former     Types: Cigarettes     Quit date: 11/15/2008     Years since quittin.5    Smokeless tobacco: Never   Substance and  Sexual Activity    Alcohol use: No    Drug use: No    Sexual activity: Not Currently     Partners: Male     Birth control/protection: Surgical     Comment:      Review of Systems   Constitutional:  Positive for fatigue.   HENT: Negative.     Eyes: Negative.    Respiratory: Negative.     Cardiovascular:         Syncope   Gastrointestinal: Negative.    Endocrine: Negative.    Genitourinary: Negative.    Musculoskeletal: Negative.    Skin: Negative.    Allergic/Immunologic: Negative.    Neurological:  Positive for syncope.   Hematological: Negative.    Psychiatric/Behavioral:          AMS   Objective:     Vital Signs (Most Recent):  Temp: 97.8 °F (36.6 °C) (05/23/23 1119)  Pulse: 71 (05/23/23 1119)  Resp: 20 (05/23/23 1119)  BP: (!) 159/81 (05/23/23 1119)  SpO2: 95 % (05/23/23 1119) Vital Signs (24h Range):  Temp:  [97.7 °F (36.5 °C)-99.6 °F (37.6 °C)] 97.8 °F (36.6 °C)  Pulse:  [71-87] 71  Resp:  [18-20] 20  SpO2:  [93 %-96 %] 95 %  BP: (118-170)/(62-97) 159/81     Weight: 94.3 kg (207 lb 14.3 oz)  Body mass index is 38.02 kg/m².       Physical Exam  Constitutional:       General: She is not in acute distress.     Appearance: She is not ill-appearing, toxic-appearing or diaphoretic.   Cardiovascular:      Rate and Rhythm: Normal rate and regular rhythm.      Pulses: Normal pulses.      Heart sounds: Normal heart sounds. No murmur heard.    No friction rub. No gallop.   Pulmonary:      Effort: Pulmonary effort is normal.      Breath sounds: Normal breath sounds.   Abdominal:      General: Abdomen is flat. Bowel sounds are normal.      Palpations: Abdomen is soft.   Musculoskeletal:         General: Normal range of motion.      Cervical back: Normal range of motion and neck supple.      Right lower leg: No edema.      Left lower leg: No edema.   Skin:     General: Skin is warm and dry.      Capillary Refill: Capillary refill takes less than 2 seconds.   Neurological:      Mental Status: She is alert.           Significant Labs: All pertinent labs within the past 24 hours have been reviewed.  Recent Lab Results  (Last 5 results in the past 24 hours)        05/23/23  1114   05/23/23  0706   05/23/23  0605   05/22/23  1909   05/22/23  1834        Albumin     3.4           Alkaline Phosphatase     62           ALT     7           Anion Gap     11           AST     14           Baso #     0.05           Basophil %     0.5           BILIRUBIN TOTAL     0.5  Comment: For infants and newborns, interpretation of results should be based  on gestational age, weight and in agreement with clinical  observations.    Premature Infant recommended reference ranges:  Up to 24 hours.............<8.0 mg/dL  Up to 48 hours............<12.0 mg/dL  3-5 days..................<15.0 mg/dL  6-29 days.................<15.0 mg/dL             BUN     20           Calcium     9.1           Chloride     104           CO2     20           Creatinine     1.1           Differential Method     Automated           eGFR     58           Eos #     0.1           Eosinophil %     0.7           Estimated Avg Glucose       134         Glucose     320           Gran # (ANC)     7.6           Gran %     73.9           Hematocrit     33.3           Hemoglobin     10.8           Hemoglobin A1C External       6.3  Comment: ADA Screening Guidelines:  5.7-6.4%  Consistent with prediabetes  >or=6.5%  Consistent with diabetes    High levels of fetal hemoglobin interfere with the HbA1C  assay. Heterozygous hemoglobin variants (HbS, HgC, etc)do  not significantly interfere with this assay.   However, presence of multiple variants may affect accuracy.           Immature Grans (Abs)     0.06  Comment: Mild elevation in immature granulocytes is non specific and   can be seen in a variety of conditions including stress response,   acute inflammation, trauma and pregnancy. Correlation with other   laboratory and clinical findings is essential.             Immature  Granulocytes     0.6           Lymph #     1.8           Lymph %     17.3           MCH     29.0           MCHC     32.4           MCV     90           Mono #     0.7           Mono %     7.0           MPV     10.2           nRBC     0           Platelets     156           POCT Glucose 321   378       327       Potassium     4.7           PROTEIN TOTAL     6.5           RBC     3.72           RDW     13.0           Sodium     135           WBC     10.31

## 2023-05-23 NOTE — NURSING
Patient arrived via stretcher from ED. Patient appears very sleepy, lethargic.patient responded to painful stimuli with transfer from stretcher to bed. Patient with walking boot to Right foot.  Bedside handoff with LEIA Maxwell. Daughter at bedside.

## 2023-05-23 NOTE — ASSESSMENT & PLAN NOTE
Questionable  Unclear on history if she felt weak when trying to stand on her legs due to pain or if had true loss of consciousness; not a great historian  Adjusting BP meds as noted in HTN and CAD plan  Cis consulted given extensive cardiac history  Telemetry  Electrolytes ok  IVF were given  Treating UTI

## 2023-05-23 NOTE — HPI
Patient presented to ER with falls and weaknes at home. She had a fall at home 3x prior to admission. She has a nasal fracture and has a few lacs on her head. CT head negative for acute intracranial pathology. She reports she had loss of consciousness but family reports she was responding to them. She reports for initial fall she was sitting on toilet and when she went to pull herself up she blacked out and slide to the floor and hit her nose. Denies chest pains, palpitations, SOB. She had a fall 1 year ago and broke her right ankle but her left ankle is hurting and this is new. During our interview she keeps calling out in pain and tells me both are hurting and she is concerning she has a new fracture on the right side; right in walking boot. She is having pressure with urination, sx started 1 day ago. No fevers. She had some confusion last night in ER but this morning she is awake, alert and oriented to person, place time and situation. However, she is a very poor historian and her  (they are ) helps provide some of the history.

## 2023-05-23 NOTE — ASSESSMENT & PLAN NOTE
Unclear history here. She is not able to provide much. TTE is normal 5/2023.  Hold her lasix for now  Cont ACEi  CIS consulted    Echo    Interpretation Summary  · The left ventricle is normal in size with normal systolic function.  · The estimated ejection fraction is 65%.  · Normal right ventricular size with normal right ventricular systolic function.  · Normal left ventricular diastolic function.  · Normal central venous pressure (3 mmHg).  · The estimated PA systolic pressure is 32 mmHg.

## 2023-05-23 NOTE — ASSESSMENT & PLAN NOTE
Her home medications are a bit unclear. Reviewed medicine bottles she has and she is on a lot of medications. Maybe was having medication induced hypotension prior to admit contributing to weakness, falls and possible syncope  For not cont Ranexa, Coreg, ASA, statin, Plavix. CHF history noted so continue ACEi as well, looks like CIS increased dose to 40mg so will continue that for now. Hold nifedipine and Imdur and resume as needed  CIS consulted--extensive cardiac history in setting of possible syncope

## 2023-05-23 NOTE — ASSESSMENT & PLAN NOTE
"Right ankle wrapped and in walking boot  She also reports h/o left ankle fracture years ago  Was planing right ankle fixation with ortho  Most of our intervention she is yelling out in pain and is very concerned she "re-broke" her ankles. No swelling or deformity noted on exam but exam is limited as she yells every time I touch her. xrays ordered to confirm stability of hardware and no new fractures  PT ordered to ensure safe to send home given she had 3 falls prior to admit. Reports she was using walker prior to admit    "

## 2023-05-23 NOTE — PLAN OF CARE
Problem: Adult Inpatient Plan of Care  Goal: Plan of Care Review  Outcome: Ongoing, Progressing  Flowsheets (Taken 5/23/2023 2967)  Plan of Care Reviewed With:   patient   spouse   daughter  Goal: Optimal Comfort and Wellbeing  Outcome: Ongoing, Progressing     Problem: Diabetes Comorbidity  Goal: Blood Glucose Level Within Targeted Range  Outcome: Ongoing, Progressing     Problem: Skin Injury Risk Increased  Goal: Skin Health and Integrity  Outcome: Ongoing, Progressing     Problem: UTI (Urinary Tract Infection)  Goal: Improved Infection Symptoms  Outcome: Ongoing, Progressing     Problem: Fall Injury Risk  Goal: Absence of Fall and Fall-Related Injury  Outcome: Ongoing, Progressing

## 2023-05-23 NOTE — ASSESSMENT & PLAN NOTE
Hold nifedipine, imdur and lasix for now  Resume coreg, lisinopril, ranexa  BP currently stable  Possible med induced hypotension at home causing syncope  Med history and dosages a bit unclear and she sees CIS who managed so consulted as well given syncope

## 2023-05-23 NOTE — ASSESSMENT & PLAN NOTE
Confused yesterday but today she is alert and oriented x4  Resolved    Likely some dehydration and UTI induced. Cont antibx and follow up urine culture. Likely can d/c home with PO antibx soon pending syncope eval today

## 2023-05-24 LAB
POCT GLUCOSE: 234 MG/DL (ref 70–110)
POCT GLUCOSE: 273 MG/DL (ref 70–110)
POCT GLUCOSE: 281 MG/DL (ref 70–110)
POCT GLUCOSE: 303 MG/DL (ref 70–110)

## 2023-05-24 PROCEDURE — 97162 PT EVAL MOD COMPLEX 30 MIN: CPT

## 2023-05-24 PROCEDURE — 25000003 PHARM REV CODE 250: Performed by: INTERNAL MEDICINE

## 2023-05-24 PROCEDURE — 96361 HYDRATE IV INFUSION ADD-ON: CPT

## 2023-05-24 PROCEDURE — 93010 EKG 12-LEAD: ICD-10-PCS | Mod: ,,, | Performed by: INTERNAL MEDICINE

## 2023-05-24 PROCEDURE — 25000003 PHARM REV CODE 250: Performed by: NURSE PRACTITIONER

## 2023-05-24 PROCEDURE — 63600175 PHARM REV CODE 636 W HCPCS: Performed by: FAMILY MEDICINE

## 2023-05-24 PROCEDURE — 99233 PR SUBSEQUENT HOSPITAL CARE,LEVL III: ICD-10-PCS | Mod: ,,, | Performed by: FAMILY MEDICINE

## 2023-05-24 PROCEDURE — 21400001 HC TELEMETRY ROOM

## 2023-05-24 PROCEDURE — 97530 THERAPEUTIC ACTIVITIES: CPT

## 2023-05-24 PROCEDURE — 93005 ELECTROCARDIOGRAM TRACING: CPT

## 2023-05-24 PROCEDURE — 99233 SBSQ HOSP IP/OBS HIGH 50: CPT | Mod: ,,, | Performed by: FAMILY MEDICINE

## 2023-05-24 PROCEDURE — G0378 HOSPITAL OBSERVATION PER HR: HCPCS

## 2023-05-24 PROCEDURE — 93010 ELECTROCARDIOGRAM REPORT: CPT | Mod: ,,, | Performed by: INTERNAL MEDICINE

## 2023-05-24 PROCEDURE — 94761 N-INVAS EAR/PLS OXIMETRY MLT: CPT

## 2023-05-24 PROCEDURE — 96366 THER/PROPH/DIAG IV INF ADDON: CPT

## 2023-05-24 PROCEDURE — 99900035 HC TECH TIME PER 15 MIN (STAT)

## 2023-05-24 PROCEDURE — 63600175 PHARM REV CODE 636 W HCPCS: Performed by: STUDENT IN AN ORGANIZED HEALTH CARE EDUCATION/TRAINING PROGRAM

## 2023-05-24 RX ORDER — NIFEDIPINE 30 MG/1
60 TABLET, EXTENDED RELEASE ORAL DAILY
Status: DISCONTINUED | OUTPATIENT
Start: 2023-05-24 | End: 2023-05-25

## 2023-05-24 RX ORDER — ONDANSETRON 2 MG/ML
4 INJECTION INTRAMUSCULAR; INTRAVENOUS EVERY 8 HOURS PRN
Status: DISCONTINUED | OUTPATIENT
Start: 2023-05-24 | End: 2023-05-26 | Stop reason: HOSPADM

## 2023-05-24 RX ADMIN — INSULIN ASPART 3 UNITS: 100 INJECTION, SOLUTION INTRAVENOUS; SUBCUTANEOUS at 08:05

## 2023-05-24 RX ADMIN — CARVEDILOL 12.5 MG: 12.5 TABLET, FILM COATED ORAL at 04:05

## 2023-05-24 RX ADMIN — ISOSORBIDE MONONITRATE 30 MG: 30 TABLET, EXTENDED RELEASE ORAL at 08:05

## 2023-05-24 RX ADMIN — NIFEDIPINE 60 MG: 30 TABLET, FILM COATED, EXTENDED RELEASE ORAL at 11:05

## 2023-05-24 RX ADMIN — ATORVASTATIN CALCIUM 80 MG: 80 TABLET, FILM COATED ORAL at 08:05

## 2023-05-24 RX ADMIN — FAMOTIDINE 40 MG: 20 TABLET ORAL at 08:05

## 2023-05-24 RX ADMIN — CLONIDINE HYDROCHLORIDE 0.2 MG: 0.2 TABLET ORAL at 04:05

## 2023-05-24 RX ADMIN — CARVEDILOL 12.5 MG: 12.5 TABLET, FILM COATED ORAL at 07:05

## 2023-05-24 RX ADMIN — LISINOPRIL 40 MG: 20 TABLET ORAL at 08:05

## 2023-05-24 RX ADMIN — INSULIN ASPART 4 UNITS: 100 INJECTION, SOLUTION INTRAVENOUS; SUBCUTANEOUS at 04:05

## 2023-05-24 RX ADMIN — ASPIRIN 81 MG 81 MG: 81 TABLET ORAL at 08:05

## 2023-05-24 RX ADMIN — TRAZODONE HYDROCHLORIDE 100 MG: 50 TABLET ORAL at 08:05

## 2023-05-24 RX ADMIN — DOXAZOSIN 8 MG: 2 TABLET ORAL at 08:05

## 2023-05-24 RX ADMIN — HYDROCODONE BITARTRATE AND ACETAMINOPHEN 1 TABLET: 5; 325 TABLET ORAL at 08:05

## 2023-05-24 RX ADMIN — RANOLAZINE 500 MG: 500 TABLET, EXTENDED RELEASE ORAL at 08:05

## 2023-05-24 RX ADMIN — CIPROFLOXACIN 400 MG: 2 INJECTION, SOLUTION INTRAVENOUS at 06:05

## 2023-05-24 RX ADMIN — ONDANSETRON HYDROCHLORIDE 4 MG: 2 SOLUTION INTRAMUSCULAR; INTRAVENOUS at 02:05

## 2023-05-24 RX ADMIN — LEVOTHYROXINE SODIUM 88 MCG: 88 TABLET ORAL at 06:05

## 2023-05-24 RX ADMIN — INSULIN ASPART 8 UNITS: 100 INJECTION, SOLUTION INTRAVENOUS; SUBCUTANEOUS at 07:05

## 2023-05-24 RX ADMIN — INSULIN DETEMIR 20 UNITS: 100 INJECTION, SOLUTION SUBCUTANEOUS at 08:05

## 2023-05-24 RX ADMIN — CLOPIDOGREL BISULFATE 75 MG: 75 TABLET ORAL at 08:05

## 2023-05-24 RX ADMIN — BACLOFEN 10 MG: 10 TABLET ORAL at 08:05

## 2023-05-24 RX ADMIN — INSULIN ASPART 6 UNITS: 100 INJECTION, SOLUTION INTRAVENOUS; SUBCUTANEOUS at 11:05

## 2023-05-24 RX ADMIN — FUROSEMIDE 20 MG: 20 TABLET ORAL at 08:05

## 2023-05-24 NOTE — PLAN OF CARE
Problem: Fall Injury Risk  Goal: Absence of Fall and Fall-Related Injury  Outcome: Ongoing, Progressing     Problem: UTI (Urinary Tract Infection)  Goal: Improved Infection Symptoms  Outcome: Ongoing, Progressing     Problem: Skin Injury Risk Increased  Goal: Skin Health and Integrity  Outcome: Ongoing, Progressing     Problem: Diabetes Comorbidity  Goal: Blood Glucose Level Within Targeted Range  Outcome: Ongoing, Progressing

## 2023-05-24 NOTE — PT/OT/SLP EVAL
"Physical Therapy Evaluation    Patient Name:  Merna Regalado   MRN:  [id number]    Recommendations:     Discharge Recommendations: home with home health, home health PT   Discharge Equipment Recommendations: none   Barriers to discharge: Decreased caregiver support and recurent falls.    Assessment:     Merna Regalado is a 62 y.o. female admitted with a medical diagnosis of Acute metabolic encephalopathy.  She presents with the following impairments/functional limitations: weakness, impaired endurance, impaired self care skills, impaired functional mobility, gait instability, impaired balance, decreased lower extremity function, decreased ROM, pain, orthopedic precautions. Spoke to patient to  contact family to bring the other LE orthosis( cam boot ) to her. Patient on bed wearing the other cam boot on the right foot. Patient tolerated siting activity at edge of the bed. Unable to perform out of bed activity at this time. Patient will benefit from skilled PT tx to inc safety.  increase mobility and to return to previous level of functions.     Rehab Prognosis: Fair; patient would benefit from acute skilled PT services to address these deficits and reach maximum level of function.    Recent Surgery: * No surgery found *      Plan:     During this hospitalization, patient to be seen daily to address the identified rehab impairments via gait training, therapeutic activities, therapeutic exercises and progress toward the following goals:    Plan of Care Expires:       Subjective     Chief Complaint: right ankle and right hip pain  Patient/Family Comments/goals: "to decrease pain and move better".  Pain/Comfort:  Pain Rating 1: 10/10  Location - Side 1: Right  Location 1: ankle  Pain Addressed 1: Cessation of Activity, Reposition, Nurse notified, Other (see comments)  Pain Rating Post-Intervention 1: 10/10  Pain Rating 2: 9/10  Location - Side 2: Right  Location - Orientation 2: upper  Location 2: hip  Pain Addressed " 2: Reposition, Nurse notified, Cessation of Activity  Pain Rating Post-Intervention 2: 9/10    Patients cultural, spiritual, Gnosticism conflicts given the current situation: no    Living Environment:  Patient lives with spouse and daughter laura camper home with ramp access to enter  Prior to admission, patients level of function was Modified Independent with basic ADL's  and gait functions using RW at house hold distances.  Equipment used at home: rollator, walker, rolling, wheelchair, shower chair, other (see comments), glucometer (2 cam boot).  DME owned (not currently used): none.  Upon discharge, patient will have assistance from  and daughter.    Objective:     Communicated with patient prior to session.  Patient found HOB elevated with PureWick, peripheral IV, telemetry  upon PT entry to room.    General Precautions: Standard, fall  Orthopedic Precautions: (uses bliateral LE orthosis( cam boot) when out of bed.)   Braces:  (uses bliateral LE orthosis( cam boot) when out of bed)  Respiratory Status: Room air    Exams:  Cognitive Exam:  Patient is oriented to Person, Place, and Time  Fine Motor Coordination:    -       Intact  Gross Motor Coordination:  WFL  Postural Exam:  Patient presented with the following abnormalities:    -       Rounded shoulders  -       Forward head  Skin Integrity/Edema:      -       facial bruises; facial and right upper arm skin lacerations  -       Skin integrity: Visible skin intact  RUE ROM: WFL  RUE Strength: WFL  LUE ROM: WFL  LUE Strength: WFL  RLE ROM: WFL except right ankle limited with pain  RLE Strength: 3-/5 except right ankle not tested  LLE ROM: WFL except decreased left ankle  LLE Strength: 3-/5 except left ankle not tested    Functional Mobility:  Bed Mobility:     Rolling Left:  minimum assistance  Rolling Right: minimum assistance  Scooting: minimum assistance  Supine to Sit: minimum assistance  Sit to Supine: minimum assistance  Transfers: unable at this  time; awaiting for the other foot orthosis(cam boot)    Gait: unable at this time      AM-PAC 6 CLICK MOBILITY  Total Score:8       Treatment & Education:  PT eval. Educated patient the role of PT and safety measures with mobility. Initiated sitting activity at edge of the bed.    Patient left HOB elevated with all lines intact, call button in reach, bed alarm on, and nursing notified.    GOALS:   Multidisciplinary Problems       Physical Therapy Goals          Problem: Physical Therapy    Goal Priority Disciplines Outcome Goal Variances Interventions   Physical Therapy Goal     PT, PT/OT      Description:   Patient will increase functional independence with mobility by performin. Supine <> sit with Modified Independent  2. Sit <>Stand  with Standby/ Modified Independent using bilateral  LE orthosis(cam boot)  and RW  3. Bed to chair transfer with Standby Assistance with bilateral orthosis(cam boot)  and rolling walker using Step Transfer TECHNIQUE  4. Gait  x ~50  feet with Contact Guard/Standby  Assistance with bilateral LE orthosis(cam boot)   and with  rolling walker  5. Lower extremity exercise program x10 reps per handout, with assistance as needed                          History:     Past Medical History:   Diagnosis Date    Asthma     Coronary artery disease     Diabetes mellitus     Dyslipidemia     GERD (gastroesophageal reflux disease)     Hypertension     Neuropathy     Thyroid disease        Past Surgical History:   Procedure Laterality Date    ANKLE HARDWARE REMOVAL Left 3/31/2022    Procedure: REMOVAL, HARDWARE, ANKLE;  Surgeon: Alec Pettit MD;  Location: [facility];  Service: Orthopedics;  Laterality: Left;    ANKLE HARDWARE REMOVAL Right 2022    Procedure: REMOVAL, HARDWARE, ANKLE;  Surgeon: Alec Pettit MD;  Location: [facility];  Service: Orthopedics;  Laterality: Right;    APPLICATION OF LARGE EXTERNAL FIXATION DEVICE TO TIBIA Left 2021    Procedure: APPLICATION, EXTERNAL  FIXATION DEVICE, LARGE, TIBIA;  Surgeon: Bk Richter MD;  Location: [facility];  Service: Orthopedics;  Laterality: Left;    APPLICATION OF WOUND VACUUM-ASSISTED CLOSURE DEVICE Right 8/18/2022    Procedure: APPLICATION, WOUND VAC;  Surgeon: Alec Pettit MD;  Location: [facility];  Service: Orthopedics;  Laterality: Right;    cardiac stents      thinks 3 total (1st 2 was in 2009 and later 2015 or so)    CHOLECYSTECTOMY      CLOSED REDUCTION Right 5/11/2022    Procedure: CLOSED REDUCTION;  Surgeon: Alec Pettit MD;  Location: [facility];  Service: Orthopedics;  Laterality: Right;  Right Ankle    CLOSURE OF WOUND Right 8/18/2022    Procedure: CLOSURE, WOUND;  Surgeon: Alec Pettit MD;  Location: [facility];  Service: Orthopedics;  Laterality: Right;    EXTERNAL FIXATION Right 5/11/2022    Procedure: EXTERNAL FIXATION;  Surgeon: Alec Pettit MD;  Location: [facility];  Service: Orthopedics;  Laterality: Right;  Right Ankle    FEMORAL BYPASS      HYSTERECTOMY      IRRIGATION AND DEBRIDEMENT OF LOWER EXTREMITY Right 8/18/2022    Procedure: IRRIGATION AND DEBRIDEMENT, LOWER EXTREMITY;  Surgeon: Alec Pettit MD;  Location: [facility];  Service: Orthopedics;  Laterality: Right;    OPEN REDUCTION AND INTERNAL FIXATION (ORIF) OF PILON FRACTURE Left 11/12/2021    Procedure: ORIF, FRACTURE, PILON;  Surgeon: Alec Pettit MD;  Location: [facility];  Service: Orthopedics;  Laterality: Left;    REMOVAL OF EXTERNAL FIXATION DEVICE Left 11/12/2021    Procedure: REMOVAL, EXTERNAL FIXATION DEVICE;  Surgeon: Alec Pettit MD;  Location: [facility];  Service: Orthopedics;  Laterality: Left;    REMOVAL OF EXTERNAL FIXATION DEVICE Right 5/20/2022    Procedure: REMOVAL, EXTERNAL FIXATION DEVICE;  Surgeon: Alec Pettit MD;  Location: [facility];  Service: Orthopedics;  Laterality: Right;    stents to kidney         Time Tracking:     PT Received On: 05/24/23  PT Start Time: 0933     PT Stop Time: 1000  PT Total Time (min): 27 min     Billable  Minutes: Evaluation 15 and Therapeutic Activity 12      05/24/2023

## 2023-05-24 NOTE — PROGRESS NOTES
[facility] ([facility])  [facility] Medicine  Progress Note    Patient Name: Merna Regalado  MRN: [id number]  Patient Class: IP- Inpatient   Admission Date: 5/22/2023  Length of Stay: 0 days  Attending Physician: Nathan Panchal MD  Primary Care Provider: Bianca Godoy MD        Subjective:     Principal Problem:Acute metabolic encephalopathy        HPI:  Patient presented to ER with falls and weaknes at home. She had a fall at home 3x prior to admission. She has a nasal fracture and has a few lacs on her head. CT head negative for acute intracranial pathology. She reports she had loss of consciousness but family reports she was responding to them. She reports for initial fall she was sitting on toilet and when she went to pull herself up she blacked out and slide to the floor and hit her nose. Denies chest pains, palpitations, SOB. She had a fall 1 year ago and broke her right ankle but her left ankle is hurting and this is new. During our interview she keeps calling out in pain and tells me both are hurting and she is concerning she has a new fracture on the right side; right in walking boot. She is having pressure with urination, sx started 1 day ago. No fevers. She had some confusion last night in ER but this morning she is awake, alert and oriented to person, place time and situation. However, she is a very poor historian and her  (they are ) helps provide some of the history.       Overview/Hospital Course:  Patient admitted for near-syncope causing facial contusion with nasal fractures, urinary tract infection currently receiving IV Cipro.  Cardiologist evaluated patient and recommends continuation of her home blood pressure medications and 1 month Holter monitor upon discharge.  She is currently in normal sinus rhythm on telemetry.  Other than having facial bruising and soreness she is doing okay.  Another problem is the fact that she has a severe right ankle and tib-fib fracture that  needs ORIF.  She had this done on her left ankle in the past.  She is able to ambulate if she is wearing her orthopedic boots in both legs.  Physical therapy is working with the patient.  She is a definite fall risk.      Past Medical History:   Diagnosis Date    Asthma     Coronary artery disease     Diabetes mellitus     Dyslipidemia     GERD (gastroesophageal reflux disease)     Hypertension     Neuropathy     Thyroid disease        Past Surgical History:   Procedure Laterality Date    ANKLE HARDWARE REMOVAL Left 3/31/2022    Procedure: REMOVAL, HARDWARE, ANKLE;  Surgeon: Alec Pettit MD;  Location: [facility];  Service: Orthopedics;  Laterality: Left;    ANKLE HARDWARE REMOVAL Right 8/18/2022    Procedure: REMOVAL, HARDWARE, ANKLE;  Surgeon: Alec Pettit MD;  Location: [facility];  Service: Orthopedics;  Laterality: Right;    APPLICATION OF LARGE EXTERNAL FIXATION DEVICE TO TIBIA Left 11/05/2021    Procedure: APPLICATION, EXTERNAL FIXATION DEVICE, LARGE, TIBIA;  Surgeon: Bk Richter MD;  Location: [facility];  Service: Orthopedics;  Laterality: Left;    APPLICATION OF WOUND VACUUM-ASSISTED CLOSURE DEVICE Right 8/18/2022    Procedure: APPLICATION, WOUND VAC;  Surgeon: Alec Pettit MD;  Location: [facility];  Service: Orthopedics;  Laterality: Right;    cardiac stents      thinks 3 total (1st 2 was in 2009 and later 2015 or so)    CHOLECYSTECTOMY      CLOSED REDUCTION Right 5/11/2022    Procedure: CLOSED REDUCTION;  Surgeon: Alec Pettit MD;  Location: [facility];  Service: Orthopedics;  Laterality: Right;  Right Ankle    CLOSURE OF WOUND Right 8/18/2022    Procedure: CLOSURE, WOUND;  Surgeon: Alec Pettit MD;  Location: [facility];  Service: Orthopedics;  Laterality: Right;    EXTERNAL FIXATION Right 5/11/2022    Procedure: EXTERNAL FIXATION;  Surgeon: Alec Pettit MD;  Location: [facility];  Service: Orthopedics;  Laterality: Right;  Right Ankle    FEMORAL BYPASS      HYSTERECTOMY       IRRIGATION AND DEBRIDEMENT OF LOWER EXTREMITY Right 8/18/2022    Procedure: IRRIGATION AND DEBRIDEMENT, LOWER EXTREMITY;  Surgeon: Alec Pettit MD;  Location: [facility] OR;  Service: Orthopedics;  Laterality: Right;    OPEN REDUCTION AND INTERNAL FIXATION (ORIF) OF PILON FRACTURE Left 11/12/2021    Procedure: ORIF, FRACTURE, PILON;  Surgeon: Alec Pettit MD;  Location: [facility] OR;  Service: Orthopedics;  Laterality: Left;    REMOVAL OF EXTERNAL FIXATION DEVICE Left 11/12/2021    Procedure: REMOVAL, EXTERNAL FIXATION DEVICE;  Surgeon: Alec Pettit MD;  Location: [facility] OR;  Service: Orthopedics;  Laterality: Left;    REMOVAL OF EXTERNAL FIXATION DEVICE Right 5/20/2022    Procedure: REMOVAL, EXTERNAL FIXATION DEVICE;  Surgeon: Alec Pettit MD;  Location: [facility] OR;  Service: Orthopedics;  Laterality: Right;    stents to kidney         Review of patient's allergies indicates:   Allergen Reactions    Codeine Nausea Only    Iodine Itching    Pcn [penicillins] Other (See Comments)     Headache and nausea with PO PCN     Sulfa (sulfonamide antibiotics)     Oxycontin [oxycodone] Nausea And Vomiting       No current facility-administered medications on file prior to encounter.     Current Outpatient Medications on File Prior to Encounter   Medication Sig    albuterol sulfate (PROAIR HFA INHL) Inhale into the lungs.    aspirin 81 MG Chew Take 81 mg by mouth once daily.    atorvastatin (LIPITOR) 80 MG tablet Take 1 tablet (80 mg total) by mouth every evening.    baclofen (LIORESAL) 20 MG tablet Take 20 mg by mouth 2 (two) times daily.    BASAGLAR KWIKPEN 100 unit/mL (3 mL) InPn pen 40 Units every evening.    carvediloL (COREG) 12.5 MG tablet Take 1 tablet (12.5 mg total) by mouth 2 (two) times daily with meals.    clopidogreL (PLAVIX) 75 mg tablet Take 75 mg by mouth once daily.    fluoxetine (PROZAC) 20 MG capsule TAKE 1 CAPSULE(S) EVERY DAY BY ORAL ROUTE FOR 30 DAYS.    gabapentin (NEURONTIN) 300 MG capsule  Take 300 mg by mouth 2 (two) times daily.    isosorbide dinitrate (ISORDIL) 30 MG Tab Take 30 mg by mouth once daily. Take 1 and 1/2 half tablets by mouth once daily    levothyroxine (SYNTHROID) 88 MCG tablet Take 1 tablet (88 mcg total) by mouth before breakfast.    alendronate (FOSAMAX) 70 MG tablet Take 1 tablet (70 mg total) by mouth every 7 days.    apremilast (OTEZLA STARTER) 10 mg (4)-20 mg (4)-30 mg (47) DsPk Take by mouth as instructed on package directions.    apremilast (OTEZLA) 30 mg Tab Take 1 tablet (30 mg total) by mouth 2 (two) times daily.    ciprofloxacin HCl (CIPRO) 500 MG tablet Take 1 tablet (500 mg total) by mouth 2 (two) times daily. for 10 days    doxazosin (CARDURA) 4 MG tablet TAKE 1 TABLET ORALLY ONCE A DAY.    ergocalciferol (ERGOCALCIFEROL) 50,000 unit Cap Take 50,000 Units by mouth every 7 days.    famotidine (PEPCID) 40 MG tablet Take 40 mg by mouth once daily.    furosemide (LASIX) 20 MG tablet Take 20 mg by mouth once daily.    HYDROcodone-acetaminophen (NORCO) 7.5-325 mg per tablet Take 1 tablet by mouth every 6 to 8 hours as needed for Pain.    insulin lispro 100 unit/mL injection Inject 16 Units into the skin 3 (three) times daily before meals.    lisinopriL (PRINIVIL,ZESTRIL) 40 MG tablet Take 0.5 tablets (20 mg total) by mouth once daily.    metFORMIN (GLUCOPHAGE) 500 MG tablet Take 1 tablet (500 mg total) by mouth daily with breakfast.    NIFEdipine (PROCARDIA-XL) 30 MG (OSM) 24 hr tablet Take 60 mg by mouth once daily.    nitroGLYCERIN (NITROSTAT) 0.4 MG SL tablet Place 1 tablet (0.4 mg total) under the tongue every 5 (five) minutes as needed for Chest pain.    ondansetron (ZOFRAN-ODT) 4 MG TbDL Take 4 mg by mouth every 6 (six) hours as needed.    ranolazine (RANEXA) 500 MG Tb12 Take 500 mg by mouth 2 (two) times daily.    trazodone (DESYREL) 100 MG tablet     [DISCONTINUED] pravastatin (PRAVACHOL) 40 MG tablet Take 40 mg by mouth once daily.     Family  "History       Problem Relation (Age of Onset)    Breast cancer Sister    Cancer Maternal Aunt    Heart disease Mother          Tobacco Use    Smoking status: Former     Types: Cigarettes     Quit date: 11/15/2008     Years since quittin.5    Smokeless tobacco: Never   Substance and Sexual Activity    Alcohol use: No    Drug use: No    Sexual activity: Not Currently     Partners: Male     Birth control/protection: Surgical     Comment:      Review of Systems   Constitutional:  Negative for activity change, fatigue, fever and unexpected weight change.   HENT:  Positive for facial swelling. Negative for congestion, ear pain, hearing loss, rhinorrhea and sore throat.    Eyes:  Negative for redness and visual disturbance.   Respiratory:  Negative for cough, shortness of breath and wheezing.    Cardiovascular:  Negative for chest pain, palpitations and leg swelling.   Gastrointestinal:  Negative for abdominal pain, constipation, diarrhea, nausea and vomiting.   Genitourinary:  Positive for frequency (pressure in baldder). Negative for dysuria and urgency.   Musculoskeletal:  Positive for arthralgias and back pain (known "broken bones" in lower back). Negative for joint swelling and neck pain.   Skin:  Negative for color change, rash and wound.   Neurological:  Positive for syncope (possibly). Negative for dizziness, tremors, weakness, light-headedness and headaches.   Objective:     Vital Signs (Most Recent):  Temp: 99 °F (37.2 °C) (23 1134)  Pulse: 65 (23 1200)  Resp: 12 (23 1133)  BP: (!) 174/88 (23 1143)  SpO2: 95 % (23 1133) Vital Signs (24h Range):  Temp:  [97.3 °F (36.3 °C)-99 °F (37.2 °C)] 99 °F (37.2 °C)  Pulse:  [60-77] 65  Resp:  [12-20] 12  SpO2:  [94 %-96 %] 95 %  BP: (137-204)/(60-88) 174/88     Weight: 96.1 kg (211 lb 13.8 oz)  Body mass index is 38.75 kg/m².     Physical Exam  Vitals and nursing note reviewed.   Constitutional:       General: She is not in " acute distress.     Appearance: She is well-developed. She is obese.   HENT:      Head: Normocephalic.      Comments: Diffuse bruising and small lacs to face/nose     Right Ear: External ear normal.      Left Ear: External ear normal.      Nose: Nose normal.   Eyes:      Extraocular Movements: Extraocular movements intact.      Pupils: Pupils are equal, round, and reactive to light.   Neck:      Thyroid: No thyromegaly.   Cardiovascular:      Rate and Rhythm: Normal rate and regular rhythm.      Heart sounds: No murmur heard.  Pulmonary:      Effort: Pulmonary effort is normal. No respiratory distress.      Breath sounds: Normal breath sounds. No wheezing.   Abdominal:      General: Bowel sounds are normal.      Palpations: Abdomen is soft.      Tenderness: There is no abdominal tenderness. There is no right CVA tenderness or left CVA tenderness.   Musculoskeletal:         General: Tenderness (b/l lower extremities, she yells out for all touching; right ankle wrapped and in walking boot) present.      Right lower leg: No edema.      Left lower leg: No edema.   Skin:     General: Skin is warm and dry.   Neurological:      General: No focal deficit present.      Mental Status: She is alert and oriented to person, place, and time.      Cranial Nerves: No cranial nerve deficit.      Gait: Gait abnormal.   Psychiatric:         Behavior: Behavior normal.         Thought Content: Thought content normal.            CRANIAL NERVES     CN III, IV, VI   Pupils are equal, round, and reactive to light.     Significant Labs: All pertinent labs within the past 24 hours have been reviewed.  A1C:   Recent Labs   Lab 01/12/23  0709 05/22/23  1909   HGBA1C 8.0* 6.3*       Blood Culture:   No results for input(s): LABBLOO in the last 48 hours.    CBC:   Recent Labs   Lab 05/23/23  0605   WBC 10.31   HGB 10.8*   HCT 33.3*          CMP:   Recent Labs   Lab 05/23/23  0605   *   K 4.7      CO2 20*   *   BUN 20    CREATININE 1.1   CALCIUM 9.1   PROT 6.5   ALBUMIN 3.4*   BILITOT 0.5   ALKPHOS 62   AST 14   ALT 7*   ANIONGAP 11       Cardiac Markers: No results for input(s): CKMB, MYOGLOBIN, BNP, TROPISTAT in the last 48 hours.  Lactic Acid:   No results for input(s): LACTATE in the last 48 hours.    Lipid Panel: No results for input(s): CHOL, HDL, LDLCALC, TRIG, CHOLHDL in the last 48 hours.  Troponin: No results for input(s): TROPONINI, TROPONINIHS in the last 48 hours.  TSH:   Recent Labs   Lab 05/23/23  0604   TSH 0.416       Urine Culture:   No results for input(s): LABURIN in the last 48 hours.    Urine Studies:   No results for input(s): COLORU, APPEARANCEUA, PHUR, SPECGRAV, PROTEINUA, GLUCUA, KETONESU, BILIRUBINUA, OCCULTUA, NITRITE, UROBILINOGEN, LEUKOCYTESUR, RBCUA, WBCUA, BACTERIA, SQUAMEPITHEL, HYALINECASTS in the last 48 hours.    Invalid input(s): WRIGHTSUR      Significant Imaging: I have reviewed all pertinent imaging results/findings within the past 24 hours.      Assessment/Plan:      * Acute metabolic encephalopathy  Confused yesterday but today she is alert and oriented x4  Resolved  Likely some dehydration and UTI induced. Cont antibx and follow up urine culture.       Closed fracture of nasal bone with routine healing  New problem s/p fall at home  Radiology reports reviewed  Can follow up ENT outpatient      Syncope and collapse  Questionable  Unclear on history if she felt weak when trying to stand on her legs due to pain or if had true loss of consciousness; not a great historian  Adjusting BP meds as noted in HTN and CAD plan  Cis consulted given extensive cardiac history  Telemetry  Electrolytes ok  IVF were given  Treating UTI       Congestive heart failure  Unclear history here. She is not able to provide much. TTE is normal 5/2023.  EF is preserved   Continue Procardia XL 60 mg daily   Continue lisinopril 40 mg daily   Continue doxazosin 8 mg daily   Continue Lasix 20 mg daily   Continue  "isosorbide 30 mg daily  Continue Plavix 75 mg daily  Continue aspirin 81 mg daily  Continue Ranexa 500 mg twice daily  Continue carvedilol 12.5 mg twice daily  [facility] consulted    Echo    Interpretation Summary  · The left ventricle is normal in size with normal systolic function.  · The estimated ejection fraction is 65%.  · Normal right ventricular size with normal right ventricular systolic function.  · Normal left ventricular diastolic function.  · Normal central venous pressure (3 mmHg).  · The estimated PA systolic pressure is 32 mmHg.      S/P ORIF (open reduction internal fixation) fracture  Right ankle wrapped and in walking boot  She also reports h/o left ankle fracture years ago  Was planing right ankle fixation with ortho  Most of our intervention she is yelling out in pain and is very concerned she "re-broke" her ankles. No swelling or deformity noted on exam but exam is limited as she yells every time I touch her. xrays ordered to confirm stability of hardware and no new fractures  PT ordered to ensure safe to send home given she had 3 falls prior to admit. Reports she was using walker prior to admit.  She needs to get both of her boots so she can safely walk with physical therapy by tomorrow.    When she is able to resume normal activity she can be discharged.  Hopefully tomorrow.      CAD (coronary artery disease)  Her home medications are a bit unclear. Reviewed medicine bottles she has and she is on a lot of medications. Maybe was having medication induced hypotension prior to admit contributing to weakness, falls and possible syncope  For not cont Ranexa, Coreg, ASA, statin, Plavix. CHF history noted so continue ACEi as well, looks like [facility]IS increased dose to 40mg so will continue that for now.  Cardiology wants to resume nifedipine and Imdur and resume as needed  [facility] consulted--extensive cardiac history in setting of possible syncope.  They would like to do a 1 month Holter monitor upon " discharge.      Acute cystitis with hematuria  Continue cipro  Urine cx growing Klebsiella sensitive to everything  Blood cx NGTD thus far      Hypothyroid  Resume home synthroid  TSH normal      Hyperlipidemia associated with type 2 diabetes mellitus  Resume home atorvastatin    Diabetes mellitus  Patient's FSGs are controlled on current medication regimen.  Last A1c reviewed-   Lab Results   Component Value Date    HGBA1C 6.3 (H) 05/22/2023     Most recent fingerstick glucose reviewed-   Recent Labs   Lab 05/23/23  1625 05/23/23 2014 05/24/23  0714 05/24/23  1134   POCTGLUCOSE 167* 267* 303* 273*     Current correctional scale  Medium  Maintain anti-hyperglycemic dose as follows-   Antihyperglycemics (From admission, onward)    Start     Stop Route Frequency Ordered    05/23/23 1130  insulin detemir U-100 (Levemir) pen 20 Units         -- SubQ Daily 05/23/23 1030    05/22/23 1950  insulin aspart U-100 pen 1-10 Units         -- SubQ Before meals & nightly PRN 05/22/23 1851        Hold Oral hypoglycemics while patient is in the hospital.    Resume basal insulin at 20 units daily with SSI. On basal insulin and metformin at home.  Diabetic diet    Hypertension  Evaluated by Cardiology this morning   He wants to resume the Procardia XL 60 mg daily, doxazosin, Lasix.  Resume coreg, lisinopril, ranexa  BP currently stable  Possible med induced hypotension at home causing syncope  Med history and dosages a bit unclear and she sees [facility] who managed so consulted as well given syncope        VTE Risk Mitigation (From admission, onward)         Ordered     IP VTE HIGH RISK PATIENT  Once         05/22/23 1712     Place sequential compression device  Until discontinued         05/22/23 1712                Discharge Planning   [place]:      Code Status: Full Code   Is the patient medically ready for discharge?:     Reason for patient still in hospital (select all that apply): Patient trending condition, Treatment and Pending  disposition  Discharge Plan A: Home with family                  Nathan Panchal MD  Department of Hospital Medicine   [place] [facility] (3rd Fl)

## 2023-05-24 NOTE — HOSPITAL COURSE
Patient admitted for near-syncope causing facial contusion with nasal fractures, urinary tract infection currently receiving IV Cipro.  Cardiologist evaluated patient and recommends continuation of her home blood pressure medications and 1 month Holter monitor upon discharge.  She is currently in normal sinus rhythm on telemetry.  Other than having facial bruising and soreness she is doing okay.  Another problem is the fact that she has a severe right ankle and tib-fib fracture that needs ORIF.  She had this done on her left ankle in the past.  She is able to ambulate if she is wearing her orthopedic boots in both legs.  Physical therapy is working with the patient.  She is a definite fall risk.  This morning she obtain both of her walking boots and put them on.  She was able to transfer her weight from the bed to a chair.  She still feels weak and unstable.  She would like 1 more day of therapy.      5/26/23  She looks better ;  She walked with PT   I discussed with her the need close follow up for uti and orthopedics

## 2023-05-24 NOTE — ASSESSMENT & PLAN NOTE
Evaluated by Cardiology this morning   He wants to resume the Procardia XL 60 mg daily, doxazosin, Lasix.  Resume coreg, lisinopril, ranexa  BP currently stable  Possible med induced hypotension at home causing syncope  Med history and dosages a bit unclear and she sees [facility] who managed so consulted as well given syncope

## 2023-05-24 NOTE — ASSESSMENT & PLAN NOTE
Patient's FSGs are controlled on current medication regimen.  Last A1c reviewed-   Lab Results   Component Value Date    HGBA1C 6.3 (H) 05/22/2023     Most recent fingerstick glucose reviewed-   Recent Labs   Lab 05/23/23  1625 05/23/23 2014 05/24/23  0714 05/24/23  1134   POCTGLUCOSE 167* 267* 303* 273*     Current correctional scale  Medium  Maintain anti-hyperglycemic dose as follows-   Antihyperglycemics (From admission, onward)    Start     Stop Route Frequency Ordered    05/23/23 1130  insulin detemir U-100 (Levemir) pen 20 Units         -- SubQ Daily 05/23/23 1030    05/22/23 1950  insulin aspart U-100 pen 1-10 Units         -- SubQ Before meals & nightly PRN 05/22/23 1851        Hold Oral hypoglycemics while patient is in the hospital.    Resume basal insulin at 20 units daily with SSI. On basal insulin and metformin at home.  Diabetic diet

## 2023-05-24 NOTE — ASSESSMENT & PLAN NOTE
"Right ankle wrapped and in walking boot  She also reports h/o left ankle fracture years ago  Was planing right ankle fixation with ortho  Most of our intervention she is yelling out in pain and is very concerned she "re-broke" her ankles. No swelling or deformity noted on exam but exam is limited as she yells every time I touch her. xrays ordered to confirm stability of hardware and no new fractures  PT ordered to ensure safe to send home given she had 3 falls prior to admit. Reports she was using walker prior to admit.  She needs to get both of her boots so she can safely walk with physical therapy by tomorrow.    When she is able to resume normal activity she can be discharged.  Hopefully tomorrow.    "

## 2023-05-24 NOTE — PROGRESS NOTES
[facility] ([facility])  Cardiology  Progress Note    Patient Name: Merna Regalado  MRN: [id number]  Admission Date: 5/22/2023  Hospital Length of Stay: 0 days  Code Status: Full Code   Attending Physician: Nathan Panchal MD   Primary Care Physician: Bianca Godoy MD  Expected Discharge Date:   Principal Problem:Acute metabolic encephalopathy    Subjective:        HPI: 62-year-old female with history of CAD, carotid artery stenosis, dyslipidemia, DM2 can present to emergency department with nasal fracture after sustaining a fall while attempting to get out of bed.  LOC unknown.  Patient also noted to have altered mental status as well as acute cystitis.  Cis consulted for syncope.    ROS  Review of Systems   Constitutional:  Positive for fatigue.   HENT: Negative.     Eyes: Negative.    Respiratory: Negative.     Cardiovascular:         Syncope   Gastrointestinal: Negative.    Endocrine: Negative.    Genitourinary: Negative.    Musculoskeletal: Negative.    Skin: Negative.    Allergic/Immunologic: Negative.    Neurological:  Positive for syncope.   Hematological: Negative.    Psychiatric/Behavioral:          AMS   Objective:     Vital Signs (Most Recent):  Temp: 98.4 °F (36.9 °C) (05/24/23 0727)  Pulse: 77 (05/24/23 0727)  Resp: 20 (05/24/23 0727)  BP: (!) 163/76 (05/24/23 0727)  SpO2: 95 % (05/24/23 0728) Vital Signs (24h Range):  Temp:  [97.3 °F (36.3 °C)-98.9 °F (37.2 °C)] 98.4 °F (36.9 °C)  Pulse:  [60-77] 77  Resp:  [17-20] 20  SpO2:  [94 %-96 %] 95 %  BP: (137-181)/(60-82) 163/76     Weight: 96.1 kg (211 lb 13.8 oz)  Body mass index is 38.75 kg/m².    SpO2: 95 %         Intake/Output Summary (Last 24 hours) at 5/24/2023 0815  Last data filed at 5/24/2023 0645  Gross per 24 hour   Intake 3101.93 ml   Output 2000 ml   Net 1101.93 ml       Lines/Drains/Airways       Drain  Duration             Female External Urinary Catheter 05/22/23 1745 1 day              Peripheral Intravenous Line  Duration                   Peripheral IV - Single Lumen 05/22/23 1559 20 G Left;Posterior Hand 1 day                    Physical Exam  Constitutional:       General: She is not in acute distress.     Appearance: She is not ill-appearing, toxic-appearing or diaphoretic.   Cardiovascular:      Rate and Rhythm: Normal rate and regular rhythm.      Pulses: Normal pulses.      Heart sounds: Normal heart sounds. No murmur heard.    No friction rub. No gallop.   Pulmonary:      Effort: Pulmonary effort is normal.      Breath sounds: Normal breath sounds.   Abdominal:      General: Abdomen is flat. Bowel sounds are normal.      Palpations: Abdomen is soft.   Musculoskeletal:         General: Normal range of motion.      Cervical back: Normal range of motion and neck supple.      Right lower leg: No edema.      Left lower leg: No edema.   Skin:     General: Skin is warm and dry.      Capillary Refill: Capillary refill takes less than 2 seconds.   Neurological:      Mental Status: She is alert    Significant Labs:   Recent Lab Results         05/24/23  0714   05/23/23  2014   05/23/23  1625   05/23/23  1114        POCT Glucose 303   267   167   321               Significant Imaging: CT scan: CT ABDOMEN PELVIS WITH CONTRAST: No results found for this visit on 05/22/23. and CT ABDOMEN PELVIS WITHOUT CONTRAST: No results found for this visit on 05/22/23., Echocardiogram: 2D echo with color flow doppler: No results found for this or any previous visit. and Transthoracic echo (TTE) complete (Cupid Only):   Results for orders placed or performed during the hospital encounter of 05/28/22   Echo   Result Value Ref Range    AV mean gradient 4 mmHg    Ao peak campos 1.41 m/s    Ao VTI 37.32 cm    IVRT 83.04 msec    IVS 0.82 0.6 - 1.1 cm    LA size 3.86 cm    Left Atrium Major Axis 4.97 cm    LVIDd 4.97 3.5 - 6.0 cm    LVIDs 3.28 2.1 - 4.0 cm    LVOT diameter 2.00 cm    LVOT peak VTI 31.69 cm    Posterior Wall 0.86 0.6 - 1.1 cm    MV Peak A Campos 0.59 m/s     E wave deceleration time 218.65 msec    MV Peak E Campos 1.18 m/s    PV Peak D Campos 0.70 m/s    PV Peak S Campos 0.73 m/s    RA Major Axis 4.67 cm    RVDD 2.37 cm    TAPSE 2.70 cm    TR Max Campos 2.70 m/s    LA WIDTH 5.27 cm    Ao root annulus 2.27 cm    PV PEAK VELOCITY 0.63 cm/s    MV stenosis pressure 1/2 time 63.41 ms    LV Diastolic Volume 116.61 mL    LV Systolic Volume 43.53 mL    LVOT peak campos 1.17 m/s    Mr max campos 0.04 m/s    LA volume (mod) 34.40 cm3    FS 34 %    LV mass 143.14 g    Left Ventricle Relative Wall Thickness 0.35 cm    AV valve area 2.67 cm2    AV Velocity Ratio 0.83     AV index (prosthetic) 0.85     MV valve area p 1/2 method 3.47 cm2    E/A ratio 2.00     Pulm vein S/D ratio 1.04     LVOT area 3.1 cm2    LVOT stroke volume 99.51 cm3    AV peak gradient 8 mmHg    LV Systolic Volume Index 22.4 mL/m2    LV Diastolic Volume Index 60.11 mL/m2    LV Mass Index 74 g/m2    Triscuspid Valve Regurgitation Peak Gradient 29 mmHg    LA Volume Index (Mod) 17.7 mL/m2    BSA 2.03 m2    Right Atrial Pressure (from IVC) 3 mmHg    EF 65 %    TV rest pulmonary artery pressure 32 mmHg    Narrative    · The left ventricle is normal in size with normal systolic function.  · The estimated ejection fraction is 65%.  · Normal right ventricular size with normal right ventricular systolic   function.  · Normal left ventricular diastolic function.  · Normal central venous pressure (3 mmHg).  · The estimated PA systolic pressure is 32 mmHg.      , and X-Ray: CXR: X-Ray Chest 1 View (CXR): No results found for this visit on 05/22/23. and X-Ray Chest PA and Lateral (CXR): No results found for this visit on 05/22/23. and KUB: X-Ray Abdomen AP 1 View (KUB): No results found for this visit on 05/22/23.  Assessment and Plan:         Active Diagnoses:    Diagnosis Date Noted POA    PRINCIPAL PROBLEM:  Acute metabolic encephalopathy [G93.41] 05/23/2023 Yes    Syncope and collapse [R55] 05/23/2023 Yes    Closed fracture of nasal bone  with routine healing [S02.2XXD] 05/23/2023 Not Applicable    Congestive heart failure [I50.9] 05/29/2022 Yes    S/P ORIF (open reduction internal fixation) fracture [Z98.890, Z87.81] 03/31/2022 Not Applicable    Acute cystitis with hematuria [N30.01] 11/01/2021 Yes    CAD (coronary artery disease) [I25.10] 11/01/2021 Yes    Hypothyroid [E03.9] 04/08/2019 Yes    Diabetes mellitus [E11.9] 09/05/2017 Yes    Hyperlipidemia associated with type 2 diabetes mellitus [E11.69, E78.5] 09/05/2017 Yes     Chronic    Hypertension [I10] 09/05/2017 Yes     Chronic      Problems Resolved During this Admission:       VTE Risk Mitigation (From admission, onward)           Ordered     IP VTE HIGH RISK PATIENT  Once         05/22/23 1712     Place sequential compression device  Until discontinued         05/22/23 1712                  Current Facility-Administered Medications   Medication    0.9%  NaCl infusion    aspirin chewable tablet 81 mg    atorvastatin tablet 80 mg    baclofen tablet 10 mg    carvediloL tablet 12.5 mg    ciprofloxacin (CIPRO)400mg/200ml D5W IVPB 400 mg    cloNIDine tablet 0.2 mg    clopidogreL tablet 75 mg    dextrose 10% bolus 125 mL 125 mL    dextrose 10% bolus 125 mL 125 mL    dextrose 10% bolus 250 mL 250 mL    dextrose 10% bolus 250 mL 250 mL    dextrose 40 % gel 15,000 mg    dextrose 40 % gel 30,000 mg    doxazosin tablet 8 mg    famotidine tablet 40 mg    furosemide tablet 20 mg    glucagon (human recombinant) injection 1 mg    glucagon (human recombinant) injection 1 mg    glucose chewable tablet 16 g    glucose chewable tablet 24 g    HYDROcodone-acetaminophen 5-325 mg per tablet 1 tablet    insulin aspart U-100 pen 1-10 Units    insulin detemir U-100 (Levemir) pen 20 Units    isosorbide mononitrate 24 hr tablet 30 mg    levothyroxine tablet 88 mcg    lisinopriL tablet 40 mg    melatonin tablet 6 mg    nitroGLYCERIN SL tablet 0.4 mg    ranolazine 12 hr tablet 500 mg    sodium chloride 0.9% flush 10 mL     traZODone tablet 100 mg      Carotid US 3/2023: 60-79% stenosis in the proximal right internal carotid artery.  40-59% stenosis in the proximal left internal carotid artery with greater than 50% in the right and left external carotid artery.  Antegrade right vertebral artery flow .  Left vertebral artery shows retrograde flow consistent with subclavian artery stenosis.     MPI 06/28/2022: Probably normal with no evidence of ischemia     Telemetry monitor 06/2022: Predominantly normal sinus rhythm.    Minimum heart rate 47 beats per minute, maximum rate 121 beats per minute, mean heart rate 60 beats per minute.    No evidence AV block.    There PACs noted.    No evidence of SVT noted.    Rare PVCs noted.    No pauses were noted.    One 3 beat run WCT, possibly VT at 125 beats per minute.        DX: 2 brief episodes of Syncope of unclear etiology with facial trauma; difficult historian to establish accurate sequence of events. No evidence of arrhythmia so far.  H/o syncope s/p ankle fracture, needs surgery soon  UTI/weakness with falls  AMS  CAD, MPI normal 6/22 s/p PCI 2009  HTN, EF 65%, normal LV diastolic function, PASP 32 mm Hg 5/22  HLD  Carotid artery stenosis 70% ISABELLA stenosis, 50% left ICA stenosis 3/23  DM2  Severe PAD s/p leg bypass and PCI  CT- for PE 11/21      Plan:telemetry, ekg NSR  Restart Nifedipine 60 qd today fopr BP  Continue Home asa, atorvastatin, carvedilol 12.5, clopidogrel, Ranexa 500  adjust Doxazosin 8 mg, lasix 20 Imdur 30, Lisinopril 40, nifedipine 60 as needed for BP        Scribed by  Zi Gao NP for Dr NOHEMI Hunter  Cardiology  [place] - Med [facility] (3rd Fl)  I attest that I have personally seen and examined this patient. I have reviewed and discussed the management in detail as outlined above.

## 2023-05-24 NOTE — ASSESSMENT & PLAN NOTE
Her home medications are a bit unclear. Reviewed medicine bottles she has and she is on a lot of medications. Maybe was having medication induced hypotension prior to admit contributing to weakness, falls and possible syncope  For not cont Ranexa, Coreg, ASA, statin, Plavix. CHF history noted so continue ACEi as well, looks like CIS increased dose to 40mg so will continue that for now.  Cardiology wants to resume nifedipine and Imdur and resume as needed  CIS consulted--extensive cardiac history in setting of possible syncope.  They would like to do a 1 month Holter monitor upon discharge.

## 2023-05-24 NOTE — SUBJECTIVE & OBJECTIVE
Past Medical History:   Diagnosis Date    Asthma     Coronary artery disease     Diabetes mellitus     Dyslipidemia     GERD (gastroesophageal reflux disease)     Hypertension     Neuropathy     Thyroid disease        Past Surgical History:   Procedure Laterality Date    ANKLE HARDWARE REMOVAL Left 3/31/2022    Procedure: REMOVAL, HARDWARE, ANKLE;  Surgeon: Alec Pettit MD;  Location: [facility];  Service: Orthopedics;  Laterality: Left;    ANKLE HARDWARE REMOVAL Right 8/18/2022    Procedure: REMOVAL, HARDWARE, ANKLE;  Surgeon: Alec Pettit MD;  Location: [facility];  Service: Orthopedics;  Laterality: Right;    APPLICATION OF LARGE EXTERNAL FIXATION DEVICE TO TIBIA Left 11/05/2021    Procedure: APPLICATION, EXTERNAL FIXATION DEVICE, LARGE, TIBIA;  Surgeon: Bk Richter MD;  Location: [facility];  Service: Orthopedics;  Laterality: Left;    APPLICATION OF WOUND VACUUM-ASSISTED CLOSURE DEVICE Right 8/18/2022    Procedure: APPLICATION, WOUND VAC;  Surgeon: Alec Pettit MD;  Location: [facility];  Service: Orthopedics;  Laterality: Right;    cardiac stents      thinks 3 total (1st 2 was in 2009 and later 2015 or so)    CHOLECYSTECTOMY      CLOSED REDUCTION Right 5/11/2022    Procedure: CLOSED REDUCTION;  Surgeon: Alec Pettit MD;  Location: [facility];  Service: Orthopedics;  Laterality: Right;  Right Ankle    CLOSURE OF WOUND Right 8/18/2022    Procedure: CLOSURE, WOUND;  Surgeon: Alec Pettit MD;  Location: [facility];  Service: Orthopedics;  Laterality: Right;    EXTERNAL FIXATION Right 5/11/2022    Procedure: EXTERNAL FIXATION;  Surgeon: Alec Pettit MD;  Location: [facility];  Service: Orthopedics;  Laterality: Right;  Right Ankle    FEMORAL BYPASS      HYSTERECTOMY      IRRIGATION AND DEBRIDEMENT OF LOWER EXTREMITY Right 8/18/2022    Procedure: IRRIGATION AND DEBRIDEMENT, LOWER EXTREMITY;  Surgeon: Alec Pettit MD;  Location: [facility];  Service: Orthopedics;  Laterality: Right;    OPEN REDUCTION AND INTERNAL  FIXATION (ORIF) OF PILON FRACTURE Left 11/12/2021    Procedure: ORIF, FRACTURE, PILON;  Surgeon: Alec Pettit MD;  Location: [facility];  Service: Orthopedics;  Laterality: Left;    REMOVAL OF EXTERNAL FIXATION DEVICE Left 11/12/2021    Procedure: REMOVAL, EXTERNAL FIXATION DEVICE;  Surgeon: Alec Pettit MD;  Location: [facility] OR;  Service: Orthopedics;  Laterality: Left;    REMOVAL OF EXTERNAL FIXATION DEVICE Right 5/20/2022    Procedure: REMOVAL, EXTERNAL FIXATION DEVICE;  Surgeon: Alec Pettit MD;  Location: [facility] OR;  Service: Orthopedics;  Laterality: Right;    stents to kidney         Review of patient's allergies indicates:   Allergen Reactions    Codeine Nausea Only    Iodine Itching    Pcn [penicillins] Other (See Comments)     Headache and nausea with PO PCN     Sulfa (sulfonamide antibiotics)     Oxycontin [oxycodone] Nausea And Vomiting       No current facility-administered medications on file prior to encounter.     Current Outpatient Medications on File Prior to Encounter   Medication Sig    albuterol sulfate (PROAIR HFA INHL) Inhale into the lungs.    aspirin 81 MG Chew Take 81 mg by mouth once daily.    atorvastatin (LIPITOR) 80 MG tablet Take 1 tablet (80 mg total) by mouth every evening.    baclofen (LIORESAL) 20 MG tablet Take 20 mg by mouth 2 (two) times daily.    BASAGLAR KWIKPEN 100 unit/mL (3 mL) InPn pen 40 Units every evening.    carvediloL (COREG) 12.5 MG tablet Take 1 tablet (12.5 mg total) by mouth 2 (two) times daily with meals.    clopidogreL (PLAVIX) 75 mg tablet Take 75 mg by mouth once daily.    fluoxetine (PROZAC) 20 MG capsule TAKE 1 CAPSULE(S) EVERY DAY BY ORAL ROUTE FOR 30 DAYS.    gabapentin (NEURONTIN) 300 MG capsule Take 300 mg by mouth 2 (two) times daily.    isosorbide dinitrate (ISORDIL) 30 MG Tab Take 30 mg by mouth once daily. Take 1 and 1/2 half tablets by mouth once daily    levothyroxine (SYNTHROID) 88 MCG tablet Take 1 tablet (88 mcg total) by mouth before breakfast.     alendronate (FOSAMAX) 70 MG tablet Take 1 tablet (70 mg total) by mouth every 7 days.    apremilast (OTEZLA STARTER) 10 mg (4)-20 mg (4)-30 mg (47) DsPk Take by mouth as instructed on package directions.    apremilast (OTEZLA) 30 mg Tab Take 1 tablet (30 mg total) by mouth 2 (two) times daily.    ciprofloxacin HCl (CIPRO) 500 MG tablet Take 1 tablet (500 mg total) by mouth 2 (two) times daily. for 10 days    doxazosin (CARDURA) 4 MG tablet TAKE 1 TABLET ORALLY ONCE A DAY.    ergocalciferol (ERGOCALCIFEROL) 50,000 unit Cap Take 50,000 Units by mouth every 7 days.    famotidine (PEPCID) 40 MG tablet Take 40 mg by mouth once daily.    furosemide (LASIX) 20 MG tablet Take 20 mg by mouth once daily.    HYDROcodone-acetaminophen (NORCO) 7.5-325 mg per tablet Take 1 tablet by mouth every 6 to 8 hours as needed for Pain.    insulin lispro 100 unit/mL injection Inject 16 Units into the skin 3 (three) times daily before meals.    lisinopriL (PRINIVIL,ZESTRIL) 40 MG tablet Take 0.5 tablets (20 mg total) by mouth once daily.    metFORMIN (GLUCOPHAGE) 500 MG tablet Take 1 tablet (500 mg total) by mouth daily with breakfast.    NIFEdipine (PROCARDIA-XL) 30 MG (OSM) 24 hr tablet Take 60 mg by mouth once daily.    nitroGLYCERIN (NITROSTAT) 0.4 MG SL tablet Place 1 tablet (0.4 mg total) under the tongue every 5 (five) minutes as needed for Chest pain.    ondansetron (ZOFRAN-ODT) 4 MG TbDL Take 4 mg by mouth every 6 (six) hours as needed.    ranolazine (RANEXA) 500 MG Tb12 Take 500 mg by mouth 2 (two) times daily.    trazodone (DESYREL) 100 MG tablet     [DISCONTINUED] pravastatin (PRAVACHOL) 40 MG tablet Take 40 mg by mouth once daily.     Family History       Problem Relation (Age of Onset)    Breast cancer Sister    Cancer Maternal Aunt    Heart disease Mother          Tobacco Use    Smoking status: Former     Types: Cigarettes     Quit date: 11/15/2008     Years since quittin.5    Smokeless tobacco: Never   Substance and  "Sexual Activity    Alcohol use: No    Drug use: No    Sexual activity: Not Currently     Partners: Male     Birth control/protection: Surgical     Comment:      Review of Systems   Constitutional:  Negative for activity change, fatigue, fever and unexpected weight change.   HENT:  Positive for facial swelling. Negative for congestion, ear pain, hearing loss, rhinorrhea and sore throat.    Eyes:  Negative for redness and visual disturbance.   Respiratory:  Negative for cough, shortness of breath and wheezing.    Cardiovascular:  Negative for chest pain, palpitations and leg swelling.   Gastrointestinal:  Negative for abdominal pain, constipation, diarrhea, nausea and vomiting.   Genitourinary:  Positive for frequency (pressure in baldder). Negative for dysuria and urgency.   Musculoskeletal:  Positive for arthralgias and back pain (known "broken bones" in lower back). Negative for joint swelling and neck pain.   Skin:  Negative for color change, rash and wound.   Neurological:  Positive for syncope (possibly). Negative for dizziness, tremors, weakness, light-headedness and headaches.   Objective:     Vital Signs (Most Recent):  Temp: 99 °F (37.2 °C) (05/24/23 1134)  Pulse: 65 (05/24/23 1200)  Resp: 12 (05/24/23 1133)  BP: (!) 174/88 (05/24/23 1143)  SpO2: 95 % (05/24/23 1133) Vital Signs (24h Range):  Temp:  [97.3 °F (36.3 °C)-99 °F (37.2 °C)] 99 °F (37.2 °C)  Pulse:  [60-77] 65  Resp:  [12-20] 12  SpO2:  [94 %-96 %] 95 %  BP: (137-204)/(60-88) 174/88     Weight: 96.1 kg (211 lb 13.8 oz)  Body mass index is 38.75 kg/m².     Physical Exam  Vitals and nursing note reviewed.   Constitutional:       General: She is not in acute distress.     Appearance: She is well-developed. She is obese.   HENT:      Head: Normocephalic.      Comments: Diffuse bruising and small lacs to face/nose     Right Ear: External ear normal.      Left Ear: External ear normal.      Nose: Nose normal.   Eyes:      Extraocular Movements: " Extraocular movements intact.      Pupils: Pupils are equal, round, and reactive to light.   Neck:      Thyroid: No thyromegaly.   Cardiovascular:      Rate and Rhythm: Normal rate and regular rhythm.      Heart sounds: No murmur heard.  Pulmonary:      Effort: Pulmonary effort is normal. No respiratory distress.      Breath sounds: Normal breath sounds. No wheezing.   Abdominal:      General: Bowel sounds are normal.      Palpations: Abdomen is soft.      Tenderness: There is no abdominal tenderness. There is no right CVA tenderness or left CVA tenderness.   Musculoskeletal:         General: Tenderness (b/l lower extremities, she yells out for all touching; right ankle wrapped and in walking boot) present.      Right lower leg: No edema.      Left lower leg: No edema.   Skin:     General: Skin is warm and dry.   Neurological:      General: No focal deficit present.      Mental Status: She is alert and oriented to person, place, and time.      Cranial Nerves: No cranial nerve deficit.      Gait: Gait abnormal.   Psychiatric:         Behavior: Behavior normal.         Thought Content: Thought content normal.            CRANIAL NERVES     CN III, IV, VI   Pupils are equal, round, and reactive to light.     Significant Labs: All pertinent labs within the past 24 hours have been reviewed.  A1C:   Recent Labs   Lab 01/12/23  0709 05/22/23  1909   HGBA1C 8.0* 6.3*       Blood Culture:   No results for input(s): LABBLOO in the last 48 hours.    CBC:   Recent Labs   Lab 05/23/23  0605   WBC 10.31   HGB 10.8*   HCT 33.3*          CMP:   Recent Labs   Lab 05/23/23  0605   *   K 4.7      CO2 20*   *   BUN 20   CREATININE 1.1   CALCIUM 9.1   PROT 6.5   ALBUMIN 3.4*   BILITOT 0.5   ALKPHOS 62   AST 14   ALT 7*   ANIONGAP 11       Cardiac Markers: No results for input(s): CKMB, MYOGLOBIN, BNP, TROPISTAT in the last 48 hours.  Lactic Acid:   No results for input(s): LACTATE in the last 48 hours.    Lipid  Panel: No results for input(s): CHOL, HDL, LDLCALC, TRIG, CHOLHDL in the last 48 hours.  Troponin: No results for input(s): TROPONINI, TROPONINIHS in the last 48 hours.  TSH:   Recent Labs   Lab 05/23/23  0604   TSH 0.416       Urine Culture:   No results for input(s): LABURIN in the last 48 hours.    Urine Studies:   No results for input(s): COLORU, APPEARANCEUA, PHUR, SPECGRAV, PROTEINUA, GLUCUA, KETONESU, BILIRUBINUA, OCCULTUA, NITRITE, UROBILINOGEN, LEUKOCYTESUR, RBCUA, WBCUA, BACTERIA, SQUAMEPITHEL, HYALINECASTS in the last 48 hours.    Invalid input(s): SHELDON      Significant Imaging: I have reviewed all pertinent imaging results/findings within the past 24 hours.

## 2023-05-24 NOTE — ASSESSMENT & PLAN NOTE
Confused yesterday but today she is alert and oriented x4  Resolved  Likely some dehydration and UTI induced. Cont antibx and follow up urine culture.

## 2023-05-24 NOTE — ASSESSMENT & PLAN NOTE
Unclear history here. She is not able to provide much. TTE is normal 5/2023.  EF is preserved   Continue Procardia XL 60 mg daily   Continue lisinopril 40 mg daily   Continue doxazosin 8 mg daily   Continue Lasix 20 mg daily   Continue isosorbide 30 mg daily  Continue Plavix 75 mg daily  Continue aspirin 81 mg daily  Continue Ranexa 500 mg twice daily  Continue carvedilol 12.5 mg twice daily  CIS consulted    Echo    Interpretation Summary  · The left ventricle is normal in size with normal systolic function.  · The estimated ejection fraction is 65%.  · Normal right ventricular size with normal right ventricular systolic function.  · Normal left ventricular diastolic function.  · Normal central venous pressure (3 mmHg).  · The estimated PA systolic pressure is 32 mmHg.

## 2023-05-25 LAB
ANION GAP SERPL CALC-SCNC: 8 MMOL/L (ref 8–16)
BACTERIA #/AREA URNS HPF: ABNORMAL /HPF
BASOPHILS # BLD AUTO: 0.07 K/UL (ref 0–0.2)
BASOPHILS NFR BLD: 1 % (ref 0–1.9)
BILIRUB UR QL STRIP: NEGATIVE
BUN SERPL-MCNC: 25 MG/DL (ref 6–20)
CALCIUM SERPL-MCNC: 8.2 MG/DL (ref 8.7–10.5)
CHLORIDE SERPL-SCNC: 100 MMOL/L (ref 95–110)
CLARITY UR: CLEAR
CO2 SERPL-SCNC: 22 MMOL/L (ref 23–29)
COLOR UR: YELLOW
CREAT SERPL-MCNC: 1.3 MG/DL (ref 0.5–1.4)
DIFFERENTIAL METHOD: ABNORMAL
EOSINOPHIL # BLD AUTO: 0.3 K/UL (ref 0–0.5)
EOSINOPHIL NFR BLD: 4 % (ref 0–8)
ERYTHROCYTE [DISTWIDTH] IN BLOOD BY AUTOMATED COUNT: 12.7 % (ref 11.5–14.5)
EST. GFR  (NO RACE VARIABLE): 47 ML/MIN/1.73 M^2
GLUCOSE SERPL-MCNC: 195 MG/DL (ref 70–110)
GLUCOSE UR QL STRIP: ABNORMAL
HCT VFR BLD AUTO: 27.6 % (ref 37–48.5)
HGB BLD-MCNC: 9.1 G/DL (ref 12–16)
HGB UR QL STRIP: ABNORMAL
IMM GRANULOCYTES # BLD AUTO: 0.02 K/UL (ref 0–0.04)
IMM GRANULOCYTES NFR BLD AUTO: 0.3 % (ref 0–0.5)
KETONES UR QL STRIP: NEGATIVE
LEUKOCYTE ESTERASE UR QL STRIP: ABNORMAL
LYMPHOCYTES # BLD AUTO: 2.5 K/UL (ref 1–4.8)
LYMPHOCYTES NFR BLD: 35.6 % (ref 18–48)
MCH RBC QN AUTO: 29.1 PG (ref 27–31)
MCHC RBC AUTO-ENTMCNC: 33 G/DL (ref 32–36)
MCV RBC AUTO: 88 FL (ref 82–98)
MICROSCOPIC COMMENT: ABNORMAL
MONOCYTES # BLD AUTO: 0.7 K/UL (ref 0.3–1)
MONOCYTES NFR BLD: 10.1 % (ref 4–15)
NEUTROPHILS # BLD AUTO: 3.5 K/UL (ref 1.8–7.7)
NEUTROPHILS NFR BLD: 49 % (ref 38–73)
NITRITE UR QL STRIP: NEGATIVE
NRBC BLD-RTO: 0 /100 WBC
PH UR STRIP: 6 [PH] (ref 5–8)
PLATELET # BLD AUTO: 143 K/UL (ref 150–450)
PMV BLD AUTO: 10 FL (ref 9.2–12.9)
POCT GLUCOSE: 167 MG/DL (ref 70–110)
POCT GLUCOSE: 243 MG/DL (ref 70–110)
POCT GLUCOSE: 272 MG/DL (ref 70–110)
POCT GLUCOSE: 283 MG/DL (ref 70–110)
POTASSIUM SERPL-SCNC: 4.3 MMOL/L (ref 3.5–5.1)
PROT UR QL STRIP: NEGATIVE
RBC # BLD AUTO: 3.13 M/UL (ref 4–5.4)
RBC #/AREA URNS HPF: 1 /HPF (ref 0–4)
SODIUM SERPL-SCNC: 130 MMOL/L (ref 136–145)
SP GR UR STRIP: 1.01 (ref 1–1.03)
SQUAMOUS #/AREA URNS HPF: 3 /HPF
URN SPEC COLLECT METH UR: ABNORMAL
UROBILINOGEN UR STRIP-ACNC: NEGATIVE EU/DL
WBC # BLD AUTO: 7.06 K/UL (ref 3.9–12.7)
WBC #/AREA URNS HPF: 80 /HPF (ref 0–5)

## 2023-05-25 PROCEDURE — 87086 URINE CULTURE/COLONY COUNT: CPT | Performed by: FAMILY MEDICINE

## 2023-05-25 PROCEDURE — 99900035 HC TECH TIME PER 15 MIN (STAT)

## 2023-05-25 PROCEDURE — 99232 PR SUBSEQUENT HOSPITAL CARE,LEVL II: ICD-10-PCS | Mod: ,,, | Performed by: FAMILY MEDICINE

## 2023-05-25 PROCEDURE — 93005 ELECTROCARDIOGRAM TRACING: CPT

## 2023-05-25 PROCEDURE — 97530 THERAPEUTIC ACTIVITIES: CPT

## 2023-05-25 PROCEDURE — 25000003 PHARM REV CODE 250: Performed by: INTERNAL MEDICINE

## 2023-05-25 PROCEDURE — 81000 URINALYSIS NONAUTO W/SCOPE: CPT | Performed by: FAMILY MEDICINE

## 2023-05-25 PROCEDURE — 85025 COMPLETE CBC W/AUTO DIFF WBC: CPT | Performed by: FAMILY MEDICINE

## 2023-05-25 PROCEDURE — 94761 N-INVAS EAR/PLS OXIMETRY MLT: CPT

## 2023-05-25 PROCEDURE — 93010 ELECTROCARDIOGRAM REPORT: CPT | Mod: ,,, | Performed by: INTERNAL MEDICINE

## 2023-05-25 PROCEDURE — 99232 SBSQ HOSP IP/OBS MODERATE 35: CPT | Mod: ,,, | Performed by: FAMILY MEDICINE

## 2023-05-25 PROCEDURE — 97116 GAIT TRAINING THERAPY: CPT

## 2023-05-25 PROCEDURE — G0378 HOSPITAL OBSERVATION PER HR: HCPCS

## 2023-05-25 PROCEDURE — 96366 THER/PROPH/DIAG IV INF ADDON: CPT

## 2023-05-25 PROCEDURE — 36415 COLL VENOUS BLD VENIPUNCTURE: CPT | Performed by: FAMILY MEDICINE

## 2023-05-25 PROCEDURE — 25000003 PHARM REV CODE 250: Performed by: NURSE PRACTITIONER

## 2023-05-25 PROCEDURE — 63600175 PHARM REV CODE 636 W HCPCS: Performed by: STUDENT IN AN ORGANIZED HEALTH CARE EDUCATION/TRAINING PROGRAM

## 2023-05-25 PROCEDURE — 21400001 HC TELEMETRY ROOM

## 2023-05-25 PROCEDURE — 80048 BASIC METABOLIC PNL TOTAL CA: CPT | Performed by: FAMILY MEDICINE

## 2023-05-25 PROCEDURE — 93010 EKG 12-LEAD: ICD-10-PCS | Mod: ,,, | Performed by: INTERNAL MEDICINE

## 2023-05-25 RX ORDER — NIFEDIPINE 30 MG/1
90 TABLET, EXTENDED RELEASE ORAL DAILY
Status: DISCONTINUED | OUTPATIENT
Start: 2023-05-25 | End: 2023-05-26 | Stop reason: HOSPADM

## 2023-05-25 RX ORDER — ISOSORBIDE MONONITRATE 60 MG/1
60 TABLET, EXTENDED RELEASE ORAL DAILY
Status: DISCONTINUED | OUTPATIENT
Start: 2023-05-25 | End: 2023-05-26 | Stop reason: HOSPADM

## 2023-05-25 RX ADMIN — LEVOTHYROXINE SODIUM 88 MCG: 88 TABLET ORAL at 06:05

## 2023-05-25 RX ADMIN — NIFEDIPINE 90 MG: 30 TABLET, FILM COATED, EXTENDED RELEASE ORAL at 09:05

## 2023-05-25 RX ADMIN — RANOLAZINE 500 MG: 500 TABLET, EXTENDED RELEASE ORAL at 09:05

## 2023-05-25 RX ADMIN — CLONIDINE HYDROCHLORIDE 0.2 MG: 0.2 TABLET ORAL at 07:05

## 2023-05-25 RX ADMIN — CIPROFLOXACIN 400 MG: 2 INJECTION, SOLUTION INTRAVENOUS at 06:05

## 2023-05-25 RX ADMIN — CARVEDILOL 12.5 MG: 12.5 TABLET, FILM COATED ORAL at 07:05

## 2023-05-25 RX ADMIN — FUROSEMIDE 20 MG: 20 TABLET ORAL at 09:05

## 2023-05-25 RX ADMIN — ATORVASTATIN CALCIUM 80 MG: 80 TABLET, FILM COATED ORAL at 08:05

## 2023-05-25 RX ADMIN — INSULIN ASPART 3 UNITS: 100 INJECTION, SOLUTION INTRAVENOUS; SUBCUTANEOUS at 08:05

## 2023-05-25 RX ADMIN — ISOSORBIDE MONONITRATE 60 MG: 60 TABLET, EXTENDED RELEASE ORAL at 09:05

## 2023-05-25 RX ADMIN — BACLOFEN 10 MG: 10 TABLET ORAL at 08:05

## 2023-05-25 RX ADMIN — CARVEDILOL 12.5 MG: 12.5 TABLET, FILM COATED ORAL at 06:05

## 2023-05-25 RX ADMIN — FAMOTIDINE 40 MG: 20 TABLET ORAL at 09:05

## 2023-05-25 RX ADMIN — INSULIN ASPART 6 UNITS: 100 INJECTION, SOLUTION INTRAVENOUS; SUBCUTANEOUS at 12:05

## 2023-05-25 RX ADMIN — ASPIRIN 81 MG 81 MG: 81 TABLET ORAL at 09:05

## 2023-05-25 RX ADMIN — HYDROCODONE BITARTRATE AND ACETAMINOPHEN 1 TABLET: 5; 325 TABLET ORAL at 09:05

## 2023-05-25 RX ADMIN — LISINOPRIL 40 MG: 20 TABLET ORAL at 09:05

## 2023-05-25 RX ADMIN — HYDROCODONE BITARTRATE AND ACETAMINOPHEN 1 TABLET: 5; 325 TABLET ORAL at 08:05

## 2023-05-25 RX ADMIN — INSULIN ASPART 4 UNITS: 100 INJECTION, SOLUTION INTRAVENOUS; SUBCUTANEOUS at 08:05

## 2023-05-25 RX ADMIN — TRAZODONE HYDROCHLORIDE 100 MG: 50 TABLET ORAL at 08:05

## 2023-05-25 RX ADMIN — CLOPIDOGREL BISULFATE 75 MG: 75 TABLET ORAL at 09:05

## 2023-05-25 RX ADMIN — DOXAZOSIN 8 MG: 2 TABLET ORAL at 09:05

## 2023-05-25 RX ADMIN — RANOLAZINE 500 MG: 500 TABLET, EXTENDED RELEASE ORAL at 08:05

## 2023-05-25 RX ADMIN — INSULIN DETEMIR 20 UNITS: 100 INJECTION, SOLUTION SUBCUTANEOUS at 08:05

## 2023-05-25 NOTE — SUBJECTIVE & OBJECTIVE
Past Medical History:   Diagnosis Date    Asthma     Coronary artery disease     Diabetes mellitus     Dyslipidemia     GERD (gastroesophageal reflux disease)     Hypertension     Neuropathy     Thyroid disease        Past Surgical History:   Procedure Laterality Date    ANKLE HARDWARE REMOVAL Left 3/31/2022    Procedure: REMOVAL, HARDWARE, ANKLE;  Surgeon: Alec Pettit MD;  Location: [facility];  Service: Orthopedics;  Laterality: Left;    ANKLE HARDWARE REMOVAL Right 8/18/2022    Procedure: REMOVAL, HARDWARE, ANKLE;  Surgeon: Alec Pettit MD;  Location: [facility];  Service: Orthopedics;  Laterality: Right;    APPLICATION OF LARGE EXTERNAL FIXATION DEVICE TO TIBIA Left 11/05/2021    Procedure: APPLICATION, EXTERNAL FIXATION DEVICE, LARGE, TIBIA;  Surgeon: Bk Richter MD;  Location: [facility];  Service: Orthopedics;  Laterality: Left;    APPLICATION OF WOUND VACUUM-ASSISTED CLOSURE DEVICE Right 8/18/2022    Procedure: APPLICATION, WOUND VAC;  Surgeon: Alec Pettit MD;  Location: [facility];  Service: Orthopedics;  Laterality: Right;    cardiac stents      thinks 3 total (1st 2 was in 2009 and later 2015 or so)    CHOLECYSTECTOMY      CLOSED REDUCTION Right 5/11/2022    Procedure: CLOSED REDUCTION;  Surgeon: Alec Pettit MD;  Location: [facility];  Service: Orthopedics;  Laterality: Right;  Right Ankle    CLOSURE OF WOUND Right 8/18/2022    Procedure: CLOSURE, WOUND;  Surgeon: Alec Pettit MD;  Location: [facility];  Service: Orthopedics;  Laterality: Right;    EXTERNAL FIXATION Right 5/11/2022    Procedure: EXTERNAL FIXATION;  Surgeon: Alec Pettit MD;  Location: [facility];  Service: Orthopedics;  Laterality: Right;  Right Ankle    FEMORAL BYPASS      HYSTERECTOMY      IRRIGATION AND DEBRIDEMENT OF LOWER EXTREMITY Right 8/18/2022    Procedure: IRRIGATION AND DEBRIDEMENT, LOWER EXTREMITY;  Surgeon: Alec Pettit MD;  Location: [facility];  Service: Orthopedics;  Laterality: Right;    OPEN REDUCTION AND INTERNAL  FIXATION (ORIF) OF PILON FRACTURE Left 11/12/2021    Procedure: ORIF, FRACTURE, PILON;  Surgeon: Alec Pettit MD;  Location: [facility];  Service: Orthopedics;  Laterality: Left;    REMOVAL OF EXTERNAL FIXATION DEVICE Left 11/12/2021    Procedure: REMOVAL, EXTERNAL FIXATION DEVICE;  Surgeon: Alec Pettit MD;  Location: [facility];  Service: Orthopedics;  Laterality: Left;    REMOVAL OF EXTERNAL FIXATION DEVICE Right 5/20/2022    Procedure: REMOVAL, EXTERNAL FIXATION DEVICE;  Surgeon: Alec Pettit MD;  Location: [facility];  Service: Orthopedics;  Laterality: Right;    stents to kidney         Review of patient's allergies indicates:   Allergen Reactions    Codeine Nausea Only    Iodine Itching    Pcn [penicillins] Other (See Comments)     Headache and nausea with PO PCN     Sulfa (sulfonamide antibiotics)     Oxycontin [oxycodone] Nausea And Vomiting       No current facility-administered medications on file prior to encounter.     Current Outpatient Medications on File Prior to Encounter   Medication Sig    albuterol sulfate (PROAIR HFA INHL) Inhale into the lungs.    aspirin 81 MG Chew Take 81 mg by mouth once daily.    atorvastatin (LIPITOR) 80 MG tablet Take 1 tablet (80 mg total) by mouth every evening.    baclofen (LIORESAL) 20 MG tablet Take 20 mg by mouth 2 (two) times daily.    BASAGLAR KWIKPEN 100 unit/mL (3 mL) InPn pen 40 Units every evening.    carvediloL (COREG) 12.5 MG tablet Take 1 tablet (12.5 mg total) by mouth 2 (two) times daily with meals.    clopidogreL (PLAVIX) 75 mg tablet Take 75 mg by mouth once daily.    fluoxetine (PROZAC) 20 MG capsule TAKE 1 CAPSULE(S) EVERY DAY BY ORAL ROUTE FOR 30 DAYS.    gabapentin (NEURONTIN) 300 MG capsule Take 300 mg by mouth 2 (two) times daily.    isosorbide dinitrate (ISORDIL) 30 MG Tab Take 30 mg by mouth once daily. Take 1 and 1/2 half tablets by mouth once daily    levothyroxine (SYNTHROID) 88 MCG tablet Take 1 tablet (88 mcg total) by mouth before breakfast.     alendronate (FOSAMAX) 70 MG tablet Take 1 tablet (70 mg total) by mouth every 7 days.    apremilast (OTEZLA STARTER) 10 mg (4)-20 mg (4)-30 mg (47) DsPk Take by mouth as instructed on package directions.    apremilast (OTEZLA) 30 mg Tab Take 1 tablet (30 mg total) by mouth 2 (two) times daily.    ciprofloxacin HCl (CIPRO) 500 MG tablet Take 1 tablet (500 mg total) by mouth 2 (two) times daily. for 10 days    doxazosin (CARDURA) 4 MG tablet TAKE 1 TABLET ORALLY ONCE A DAY.    ergocalciferol (ERGOCALCIFEROL) 50,000 unit Cap Take 50,000 Units by mouth every 7 days.    famotidine (PEPCID) 40 MG tablet Take 40 mg by mouth once daily.    furosemide (LASIX) 20 MG tablet Take 20 mg by mouth once daily.    HYDROcodone-acetaminophen (NORCO) 7.5-325 mg per tablet Take 1 tablet by mouth every 6 to 8 hours as needed for Pain.    insulin lispro 100 unit/mL injection Inject 16 Units into the skin 3 (three) times daily before meals.    lisinopriL (PRINIVIL,ZESTRIL) 40 MG tablet Take 0.5 tablets (20 mg total) by mouth once daily.    metFORMIN (GLUCOPHAGE) 500 MG tablet Take 1 tablet (500 mg total) by mouth daily with breakfast.    NIFEdipine (PROCARDIA-XL) 30 MG (OSM) 24 hr tablet Take 60 mg by mouth once daily.    nitroGLYCERIN (NITROSTAT) 0.4 MG SL tablet Place 1 tablet (0.4 mg total) under the tongue every 5 (five) minutes as needed for Chest pain.    ondansetron (ZOFRAN-ODT) 4 MG TbDL Take 4 mg by mouth every 6 (six) hours as needed.    ranolazine (RANEXA) 500 MG Tb12 Take 500 mg by mouth 2 (two) times daily.    trazodone (DESYREL) 100 MG tablet     [DISCONTINUED] pravastatin (PRAVACHOL) 40 MG tablet Take 40 mg by mouth once daily.     Family History       Problem Relation (Age of Onset)    Breast cancer Sister    Cancer Maternal Aunt    Heart disease Mother          Tobacco Use    Smoking status: Former     Types: Cigarettes     Quit date: 11/15/2008     Years since quittin.5    Smokeless tobacco: Never   Substance and  "Sexual Activity    Alcohol use: No    Drug use: No    Sexual activity: Not Currently     Partners: Male     Birth control/protection: Surgical     Comment:      Review of Systems   Constitutional:  Negative for activity change, fatigue, fever and unexpected weight change.   HENT:  Positive for facial swelling. Negative for congestion, ear pain, hearing loss, rhinorrhea and sore throat.    Eyes:  Negative for redness and visual disturbance.   Respiratory:  Negative for cough, shortness of breath and wheezing.    Cardiovascular:  Negative for chest pain, palpitations and leg swelling.   Gastrointestinal:  Negative for abdominal pain, constipation, diarrhea, nausea and vomiting.   Genitourinary:  Positive for frequency (pressure in baldder). Negative for dysuria and urgency.   Musculoskeletal:  Positive for arthralgias and back pain (known "broken bones" in lower back). Negative for joint swelling and neck pain.   Skin:  Negative for color change, rash and wound.   Neurological:  Positive for syncope (possibly). Negative for dizziness, tremors, weakness, light-headedness and headaches.   Objective:     Vital Signs (Most Recent):  Temp: 97.6 °F (36.4 °C) (05/25/23 1125)  Pulse: (!) 55 (05/25/23 1125)  Resp: 18 (05/25/23 1125)  BP: (!) 157/69 (05/25/23 1125)  SpO2: 96 % (05/25/23 1125) Vital Signs (24h Range):  Temp:  [97.6 °F (36.4 °C)-98.2 °F (36.8 °C)] 97.6 °F (36.4 °C)  Pulse:  [51-69] 55  Resp:  [14-20] 18  SpO2:  [93 %-97 %] 96 %  BP: (112-207)/(55-88) 157/69     Weight: 96.1 kg (211 lb 13.8 oz)  Body mass index is 38.75 kg/m².     Physical Exam  Vitals and nursing note reviewed.   Constitutional:       General: She is not in acute distress.     Appearance: She is well-developed. She is obese.   HENT:      Head: Normocephalic.      Comments: Diffuse bruising and small lacs to face/nose     Right Ear: External ear normal.      Left Ear: External ear normal.      Nose: Nose normal.   Eyes:      Extraocular " Movements: Extraocular movements intact.      Pupils: Pupils are equal, round, and reactive to light.   Neck:      Thyroid: No thyromegaly.   Cardiovascular:      Rate and Rhythm: Normal rate and regular rhythm.      Heart sounds: No murmur heard.  Pulmonary:      Effort: Pulmonary effort is normal. No respiratory distress.      Breath sounds: Normal breath sounds. No wheezing.   Abdominal:      General: Bowel sounds are normal.      Palpations: Abdomen is soft.      Tenderness: There is no abdominal tenderness. There is no right CVA tenderness or left CVA tenderness.   Musculoskeletal:         General: Tenderness (b/l lower extremities, she yells out for all touching; right ankle wrapped and in walking boot) present.      Right lower leg: No edema.      Left lower leg: No edema.   Skin:     General: Skin is warm and dry.   Neurological:      General: No focal deficit present.      Mental Status: She is alert and oriented to person, place, and time.      Cranial Nerves: No cranial nerve deficit.      Gait: Gait abnormal.   Psychiatric:         Behavior: Behavior normal.         Thought Content: Thought content normal.            CRANIAL NERVES     CN III, IV, VI   Pupils are equal, round, and reactive to light.     Significant Labs: All pertinent labs within the past 24 hours have been reviewed.  A1C:   Recent Labs   Lab 01/12/23  0709 05/22/23  1909   HGBA1C 8.0* 6.3*       Blood Culture:   No results for input(s): LABBLOO in the last 48 hours.    CBC:   Recent Labs   Lab 05/25/23  0545   WBC 7.06   HGB 9.1*   HCT 27.6*   *       CMP:   Recent Labs   Lab 05/25/23  0545   *   K 4.3      CO2 22*   *   BUN 25*   CREATININE 1.3   CALCIUM 8.2*   ANIONGAP 8       Cardiac Markers: No results for input(s): CKMB, MYOGLOBIN, BNP, TROPISTAT in the last 48 hours.  Lactic Acid:   No results for input(s): LACTATE in the last 48 hours.    Lipid Panel: No results for input(s): CHOL, HDL, LDLCALC, TRIG,  CHOLHDL in the last 48 hours.  Troponin: No results for input(s): TROPONINI, TROPONINIHS in the last 48 hours.  TSH:   Recent Labs   Lab 05/23/23  0604   TSH 0.416       Urine Culture:   No results for input(s): LABURIN in the last 48 hours.    Urine Studies:   Recent Labs   Lab 05/25/23  1014   COLORU Yellow   APPEARANCEUA Clear   PHUR 6.0   SPECGRAV 1.015   PROTEINUA Negative   GLUCUA 1+*   KETONESU Negative   BILIRUBINUA Negative   OCCULTUA Trace*   NITRITE Negative   UROBILINOGEN Negative   LEUKOCYTESUR 2+*   RBCUA 1   WBCUA 80*   BACTERIA Few*   SQUAMEPITHEL 3         Significant Imaging: I have reviewed all pertinent imaging results/findings within the past 24 hours.

## 2023-05-25 NOTE — PT/OT/SLP PROGRESS
"Physical Therapy Treatment    Patient Name:  Merna Regalado   MRN:  [id number]    Recommendations:     Discharge Recommendations: home health PT, home with home health  Discharge Equipment Recommendations: none, walker, rolling  Barriers to discharge: None    Assessment:     Merna Regalado is a 62 y.o. female admitted with a medical diagnosis of Acute metabolic encephalopathy.  She presents with the following impairments/functional limitations: weakness, impaired endurance, impaired self care skills, impaired functional mobility, gait instability, impaired balance, decreased lower extremity function, decreased safety awareness, pain, decreased ROM, orthopedic precautions . Patient tolerated gait functions using bilateral LE orthosis( cam boot) and RW x ~50 feet with standby assistance. No complain of increase pain during gait tasks today.    Rehab Prognosis: Fair; patient would benefit from acute skilled PT services to address these deficits and reach maximum level of function.    Recent Surgery: * No surgery found *      Plan:     During this hospitalization, patient to be seen daily to address the identified rehab impairments via gait training, therapeutic activities, therapeutic exercises and progress toward the following goals:    Plan of Care Expires:  05/31/23    Subjective     Chief Complaint: right ankle pain  Patient/Family Comments/goals: "To return home".  Pain/Comfort:  Pain Rating 1: 10/10  Location - Side 1: Right  Location 1: ankle  Pain Addressed 1: Reposition, Cessation of Activity, Other (see comments) (uses cam boot)  Pain Rating Post-Intervention 1: 6/10  Pain Rating 2: 0/10      Objective:     Communicated with patient prior to session.  Patient found HOB elevated with peripheral IV, telemetry upon PT entry to room.     General Precautions: Standard, fall  Orthopedic Precautions:  (uses bliateral LE orthosis( cam boot) when out of bed.)  Braces:  (uses bliateral LE orthosis( cam boot) when " out of bed.)  Respiratory Status: Room air     Functional Mobility:  Bed Mobility:     Rolling Left:  modified independence  Rolling Right: modified independence  Scooting: independence  Supine to Sit: modified independence  Transfers:     Sit to Stand:  stand by assistance with rolling walker  Bed to Chair: stand by assistance with  rolling walker  using  Step Transfer  Gait: Standby Assistance x ~50 feet using bilateral LE orthosis(cam boot) and RW. Dec foot/floor clearance      AM-PAC 6 CLICK MOBILITY  Turning over in bed (including adjusting bedclothes, sheets and blankets)?: 4  Sitting down on and standing up from a chair with arms (e.g., wheelchair, bedside commode, etc.): 3  Moving from lying on back to sitting on the side of the bed?: 4  Moving to and from a bed to a chair (including a wheelchair)?: 3  Need to walk in hospital room?: 3  Climbing 3-5 steps with a railing?: 1  Basic Mobility Total Score: 18       Treatment & Education:  Reviewed and performed home ex program for B LE strengthening ROM ex at supine and at sitting position; worked on safety measures in out of bed activity and gait trng x ~50 feet with B cam Boot and RW.    Patient left up in chair with all lines intact, call button in reach, bed alarm on, and nursing notified..    GOALS:   Multidisciplinary Problems       Physical Therapy Goals          Problem: Physical Therapy    Goal Priority Disciplines Outcome Goal Variances Interventions   Physical Therapy Goal     PT, PT/OT      Description:   Patient will increase functional independence with mobility by performin. Supine <> sit with Modified Independent  2. Sit <>Stand  with Standby/ Modified Independent using bilateral  LE orthosis(cam boot)  and RW  3. Bed to chair transfer with Standby Assistance with bilateral orthosis(cam boot)  and rolling walker using Step Transfer TECHNIQUE  4. Gait  x ~50  feet with Contact Guard/Standby  Assistance with bilateral LE orthosis(cam boot)    and with  rolling walker  5. Lower extremity exercise program x10 reps per handout, with assistance as needed                          Time Tracking:     PT Received On: 05/25/23  PT Start Time: 0847     PT Stop Time: 0915  PT Total Time (min): 28 min     Billable Minutes: Gait Training 13 and Therapeutic Activity 15    Treatment Type: Treatment  PT/PTA: PT           05/25/2023

## 2023-05-25 NOTE — PROGRESS NOTES
[place] [facility] ([facility])  Cardiology  Progress Note    Patient Name: Merna Regalado  MRN: [id number]  Admission Date: 5/22/2023  Hospital Length of Stay: 1 days  Code Status: Full Code   Attending Physician: Nathan Panchal MD   Primary Care Physician: Bianca Godoy MD  Expected Discharge Date:   Principal Problem:Acute metabolic encephalopathy    Subjective:     HPI: 62-year-old female with history of CAD, carotid artery stenosis, dyslipidemia, DM2 can present to emergency department with nasal fracture after sustaining a fall while attempting to get out of bed.  LOC unknown.  Patient also noted to have altered mental status as well as acute cystitis.  Cis consulted for syncope.    ROS  Review of Systems   Constitutional:  Positive for fatigue.   HENT: Negative.     Eyes: Negative.    Respiratory: Negative.     Cardiovascular:         Syncope   Gastrointestinal: Negative.    Endocrine: Negative.    Genitourinary: Negative.    Musculoskeletal: Negative.    Skin: Negative.    Allergic/Immunologic: Negative.    Neurological:  Positive for syncope.   Hematological: Negative.    Psychiatric/Behavioral:      Objective:     Vital Signs (Most Recent):  Temp: 97.7 °F (36.5 °C) (05/25/23 0713)  Pulse: (!) 58 (05/25/23 0713)  Resp: 18 (05/25/23 0713)  BP: (!) 177/78 (05/25/23 0713)  SpO2: (!) 94 % (05/25/23 0715) Vital Signs (24h Range):  Temp:  [97.6 °F (36.4 °C)-99 °F (37.2 °C)] 97.7 °F (36.5 °C)  Pulse:  [51-69] 58  Resp:  [12-20] 18  SpO2:  [93 %-97 %] 94 %  BP: (112-207)/(55-88) 177/78     Weight: 96.1 kg (211 lb 13.8 oz)  Body mass index is 38.75 kg/m².    SpO2: (!) 94 %         Intake/Output Summary (Last 24 hours) at 5/25/2023 0809  Last data filed at 5/25/2023 0623  Gross per 24 hour   Intake 960.69 ml   Output 2800 ml   Net -1839.31 ml       Lines/Drains/Airways       Drain  Duration             Female External Urinary Catheter 05/22/23 1745 2 days              Peripheral Intravenous Line  Duration                   Peripheral IV - Single Lumen 05/22/23 1559 20 G Left;Posterior Hand 2 days                    Physical Exam  Constitutional:       General: She is not in acute distress.     Appearance: She is not ill-appearing, toxic-appearing or diaphoretic.   Cardiovascular:      Rate and Rhythm: Normal rate and regular rhythm.      Pulses: Normal pulses.      Heart sounds: Normal heart sounds. No murmur heard.    No friction rub. No gallop.   Pulmonary:      Effort: Pulmonary effort is normal.      Breath sounds: Normal breath sounds.   Abdominal:      General: Abdomen is flat. Bowel sounds are normal.      Palpations: Abdomen is soft.   Musculoskeletal:         General: Normal range of motion.      Cervical back: Normal range of motion and neck supple.      Right lower leg: No edema.      Left lower leg: No edema.   Skin:     General: Skin is warm and dry.      Capillary Refill: Capillary refill takes less than 2 seconds.   Neurological:      Mental Status: She is alert    Significant Labs:   Recent Lab Results  (Last 5 results in the past 24 hours)        05/25/23  0716   05/25/23  0545   05/24/23  1957   05/24/23  1546   05/24/23  1134        Anion Gap   8             Baso #   0.07             Basophil %   1.0             BUN   25             Calcium   8.2             Chloride   100             CO2   22             Creatinine   1.3             Differential Method   Automated             eGFR   47             Eos #   0.3             Eosinophil %   4.0             Glucose   195             Gran # (ANC)   3.5             Gran %   49.0             Hematocrit   27.6             Hemoglobin   9.1             Immature Grans (Abs)   0.02  Comment: Mild elevation in immature granulocytes is non specific and   can be seen in a variety of conditions including stress response,   acute inflammation, trauma and pregnancy. Correlation with other   laboratory and clinical findings is essential.               Immature Granulocytes    0.3             Lymph #   2.5             Lymph %   35.6             MCH   29.1             MCHC   33.0             MCV   88             Mono #   0.7             Mono %   10.1             MPV   10.0             nRBC   0             Platelets   143             POCT Glucose 243     281   234   273       Potassium   4.3             RBC   3.13             RDW   12.7             Sodium   130             WBC   7.06                                    Significant Imaging: CT scan: CT ABDOMEN PELVIS WITH CONTRAST: No results found for this visit on 05/22/23. and CT ABDOMEN PELVIS WITHOUT CONTRAST: No results found for this visit on 05/22/23., Echocardiogram: 2D echo with color flow doppler: No results found for this or any previous visit. and Transthoracic echo (TTE) complete (Cupid Only):   Results for orders placed or performed during the hospital encounter of 05/28/22   Echo   Result Value Ref Range    AV mean gradient 4 mmHg    Ao peak campos 1.41 m/s    Ao VTI 37.32 cm    IVRT 83.04 msec    IVS 0.82 0.6 - 1.1 cm    LA size 3.86 cm    Left Atrium Major Axis 4.97 cm    LVIDd 4.97 3.5 - 6.0 cm    LVIDs 3.28 2.1 - 4.0 cm    LVOT diameter 2.00 cm    LVOT peak VTI 31.69 cm    Posterior Wall 0.86 0.6 - 1.1 cm    MV Peak A Campos 0.59 m/s    E wave deceleration time 218.65 msec    MV Peak E Campos 1.18 m/s    PV Peak D Campos 0.70 m/s    PV Peak S Campos 0.73 m/s    RA Major Axis 4.67 cm    RVDD 2.37 cm    TAPSE 2.70 cm    TR Max Campos 2.70 m/s    LA WIDTH 5.27 cm    Ao root annulus 2.27 cm    PV PEAK VELOCITY 0.63 cm/s    MV stenosis pressure 1/2 time 63.41 ms    LV Diastolic Volume 116.61 mL    LV Systolic Volume 43.53 mL    LVOT peak campos 1.17 m/s    Mr max campos 0.04 m/s    LA volume (mod) 34.40 cm3    FS 34 %    LV mass 143.14 g    Left Ventricle Relative Wall Thickness 0.35 cm    AV valve area 2.67 cm2    AV Velocity Ratio 0.83     AV index (prosthetic) 0.85     MV valve area p 1/2 method 3.47 cm2    E/A ratio 2.00     Pulm vein S/D ratio  1.04     LVOT area 3.1 cm2    LVOT stroke volume 99.51 cm3    AV peak gradient 8 mmHg    LV Systolic Volume Index 22.4 mL/m2    LV Diastolic Volume Index 60.11 mL/m2    LV Mass Index 74 g/m2    Triscuspid Valve Regurgitation Peak Gradient 29 mmHg    LA Volume Index (Mod) 17.7 mL/m2    BSA 2.03 m2    Right Atrial Pressure (from IVC) 3 mmHg    EF 65 %    TV rest pulmonary artery pressure 32 mmHg    Narrative    · The left ventricle is normal in size with normal systolic function.  · The estimated ejection fraction is 65%.  · Normal right ventricular size with normal right ventricular systolic   function.  · Normal left ventricular diastolic function.  · Normal central venous pressure (3 mmHg).  · The estimated PA systolic pressure is 32 mmHg.      , and X-Ray: CXR: X-Ray Chest 1 View (CXR): No results found for this visit on 05/22/23. and X-Ray Chest PA and Lateral (CXR): No results found for this visit on 05/22/23. and KUB: X-Ray Abdomen AP 1 View (KUB): No results found for this visit on 05/22/23.  Assessment and Plan:         Active Diagnoses:    Diagnosis Date Noted POA    PRINCIPAL PROBLEM:  Acute metabolic encephalopathy [G93.41] 05/23/2023 Yes    Syncope and collapse [R55] 05/23/2023 Yes    Closed fracture of nasal bone with routine healing [S02.2XXD] 05/23/2023 Not Applicable    Congestive heart failure [I50.9] 05/29/2022 Yes    S/P ORIF (open reduction internal fixation) fracture [Z98.890, Z87.81] 03/31/2022 Not Applicable    Acute cystitis with hematuria [N30.01] 11/01/2021 Yes    CAD (coronary artery disease) [I25.10] 11/01/2021 Yes    Hypothyroid [E03.9] 04/08/2019 Yes    Diabetes mellitus [E11.9] 09/05/2017 Yes    Hyperlipidemia associated with type 2 diabetes mellitus [E11.69, E78.5] 09/05/2017 Yes     Chronic    Hypertension [I10] 09/05/2017 Yes     Chronic      Problems Resolved During this Admission:       VTE Risk Mitigation (From admission, onward)           Ordered     IP VTE HIGH RISK PATIENT   Once         05/22/23 1712     Place sequential compression device  Until discontinued         05/22/23 1712                  Current Facility-Administered Medications   Medication    aspirin chewable tablet 81 mg    atorvastatin tablet 80 mg    baclofen tablet 10 mg    carvediloL tablet 12.5 mg    ciprofloxacin (CIPRO)400mg/200ml D5W IVPB 400 mg    cloNIDine tablet 0.2 mg    clopidogreL tablet 75 mg    dextrose 10% bolus 125 mL 125 mL    dextrose 10% bolus 125 mL 125 mL    dextrose 10% bolus 250 mL 250 mL    dextrose 10% bolus 250 mL 250 mL    dextrose 40 % gel 15,000 mg    dextrose 40 % gel 30,000 mg    doxazosin tablet 8 mg    famotidine tablet 40 mg    furosemide tablet 20 mg    glucagon (human recombinant) injection 1 mg    glucagon (human recombinant) injection 1 mg    glucose chewable tablet 16 g    glucose chewable tablet 24 g    HYDROcodone-acetaminophen 5-325 mg per tablet 1 tablet    insulin aspart U-100 pen 1-10 Units    insulin detemir U-100 (Levemir) pen 20 Units    isosorbide mononitrate 24 hr tablet 60 mg    levothyroxine tablet 88 mcg    lisinopriL tablet 40 mg    melatonin tablet 6 mg    NIFEdipine 24 hr tablet 90 mg    nitroGLYCERIN SL tablet 0.4 mg    ondansetron injection 4 mg    ranolazine 12 hr tablet 500 mg    sodium chloride 0.9% flush 10 mL    traZODone tablet 100 mg       Carotid US 3/2023: 60-79% stenosis in the proximal right internal carotid artery.  40-59% stenosis in the proximal left internal carotid artery with greater than 50% in the right and left external carotid artery.  Antegrade right vertebral artery flow .  Left vertebral artery shows retrograde flow consistent with subclavian artery stenosis.     MPI 06/28/2022: Probably normal with no evidence of ischemia     Telemetry monitor 06/2022: Predominantly normal sinus rhythm.    Minimum heart rate 47 beats per minute, maximum rate 121 beats per minute, mean heart rate 60 beats per minute.    No evidence AV block.    There PACs  noted.    No evidence of SVT noted.    Rare PVCs noted.    No pauses were noted.    One 3 beat run WCT, possibly VT at 125 beats per minute.       DX:   BP still elevated today, sunil in RUE (note severe PAD probably involves LUE)  2 brief episodes of Syncope of unclear etiology with facial trauma; difficult historian to establish accurate sequence of events. No evidence of arrhythmia so far.  H/o syncope s/p ankle fracture, needs surgery soon  UTI/weakness with falls  AMS  CAD, MPI normal 6/22 s/p PCI 2009  HTN, EF 65%, normal LV diastolic function, PASP 32 mm Hg 5/22  HLD  Carotid artery stenosis 70% ISABELLA stenosis, 50% left ICA stenosis 3/23  DM2  Severe PAD s/p leg bypass and PCI  CT- for PE 11/21        Plan:Increase Imdur to 60 mg po daily.  Increase Nifedipine to 90 mg qd today for BP  Continue Home asa, atorvastatin, carvedilol 12.5, clopidogrel, Ranexa 500  adjust Doxazosin 8 mg, lasix 20 as needed  continue Lisinopril 40,   PT     Transcribed by  Zi Gao NP for Dr NOHEMI Hunter  Cardiology  [place] [facility] (3rd Fl)  I attest that I have personally seen and examined this patient. I have reviewed and discussed the management in detail as outlined above.

## 2023-05-25 NOTE — PROGRESS NOTES
[facility] ([facility])  [facility]  Progress Note    Patient Name: Merna Regalado  MRN: [id number]  Patient Class: IP- Inpatient   Admission Date: 5/22/2023  Length of Stay: 1 days  Attending Physician: Nathan Panchal MD  Primary Care Provider: Bianca Godoy MD        Subjective:     Principal Problem:Acute metabolic encephalopathy        HPI:  Patient presented to ER with falls and weaknes at home. She had a fall at home 3x prior to admission. She has a nasal fracture and has a few lacs on her head. CT head negative for acute intracranial pathology. She reports she had loss of consciousness but family reports she was responding to them. She reports for initial fall she was sitting on toilet and when she went to pull herself up she blacked out and slide to the floor and hit her nose. Denies chest pains, palpitations, SOB. She had a fall 1 year ago and broke her right ankle but her left ankle is hurting and this is new. During our interview she keeps calling out in pain and tells me both are hurting and she is concerning she has a new fracture on the right side; right in walking boot. She is having pressure with urination, sx started 1 day ago. No fevers. She had some confusion last night in ER but this morning she is awake, alert and oriented to person, place time and situation. However, she is a very poor historian and her  (they are ) helps provide some of the history.       Overview/Hospital Course:  Patient admitted for near-syncope causing facial contusion with nasal fractures, urinary tract infection currently receiving IV Cipro.  Cardiologist evaluated patient and recommends continuation of her home blood pressure medications and 1 month Holter monitor upon discharge.  She is currently in normal sinus rhythm on telemetry.  Other than having facial bruising and soreness she is doing okay.  Another problem is the fact that she has a severe right ankle and tib-fib fracture that  needs ORIF.  She had this done on her left ankle in the past.  She is able to ambulate if she is wearing her orthopedic boots in both legs.  Physical therapy is working with the patient.  She is a definite fall risk.  This morning she obtain both of her walking boots and put them on.  She was able to transfer her weight from the bed to a chair.  She still feels weak and unstable.  She would like 1 more day of therapy.      Past Medical History:   Diagnosis Date    Asthma     Coronary artery disease     Diabetes mellitus     Dyslipidemia     GERD (gastroesophageal reflux disease)     Hypertension     Neuropathy     Thyroid disease        Past Surgical History:   Procedure Laterality Date    ANKLE HARDWARE REMOVAL Left 3/31/2022    Procedure: REMOVAL, HARDWARE, ANKLE;  Surgeon: Alec Pettit MD;  Location: [facility];  Service: Orthopedics;  Laterality: Left;    ANKLE HARDWARE REMOVAL Right 8/18/2022    Procedure: REMOVAL, HARDWARE, ANKLE;  Surgeon: Alec Pettit MD;  Location: [facility];  Service: Orthopedics;  Laterality: Right;    APPLICATION OF LARGE EXTERNAL FIXATION DEVICE TO TIBIA Left 11/05/2021    Procedure: APPLICATION, EXTERNAL FIXATION DEVICE, LARGE, TIBIA;  Surgeon: Bk Richter MD;  Location: [facility];  Service: Orthopedics;  Laterality: Left;    APPLICATION OF WOUND VACUUM-ASSISTED CLOSURE DEVICE Right 8/18/2022    Procedure: APPLICATION, WOUND VAC;  Surgeon: Alec Pettit MD;  Location: [facility];  Service: Orthopedics;  Laterality: Right;    cardiac stents      thinks 3 total (1st 2 was in 2009 and later 2015 or so)    CHOLECYSTECTOMY      CLOSED REDUCTION Right 5/11/2022    Procedure: CLOSED REDUCTION;  Surgeon: Alec Pettit MD;  Location: [facility];  Service: Orthopedics;  Laterality: Right;  Right Ankle    CLOSURE OF WOUND Right 8/18/2022    Procedure: CLOSURE, WOUND;  Surgeon: Alec Pettit MD;  Location: [facility];  Service: Orthopedics;  Laterality: Right;    EXTERNAL FIXATION  Right 5/11/2022    Procedure: EXTERNAL FIXATION;  Surgeon: Alec Pettit MD;  Location: [facility] OR;  Service: Orthopedics;  Laterality: Right;  Right Ankle    FEMORAL BYPASS      HYSTERECTOMY      IRRIGATION AND DEBRIDEMENT OF LOWER EXTREMITY Right 8/18/2022    Procedure: IRRIGATION AND DEBRIDEMENT, LOWER EXTREMITY;  Surgeon: Alec Pettit MD;  Location: [facility] OR;  Service: Orthopedics;  Laterality: Right;    OPEN REDUCTION AND INTERNAL FIXATION (ORIF) OF PILON FRACTURE Left 11/12/2021    Procedure: ORIF, FRACTURE, PILON;  Surgeon: Alec Pettit MD;  Location: [facility] OR;  Service: Orthopedics;  Laterality: Left;    REMOVAL OF EXTERNAL FIXATION DEVICE Left 11/12/2021    Procedure: REMOVAL, EXTERNAL FIXATION DEVICE;  Surgeon: Alec Pettit MD;  Location: [facility] OR;  Service: Orthopedics;  Laterality: Left;    REMOVAL OF EXTERNAL FIXATION DEVICE Right 5/20/2022    Procedure: REMOVAL, EXTERNAL FIXATION DEVICE;  Surgeon: Alec Pettit MD;  Location: [facility] OR;  Service: Orthopedics;  Laterality: Right;    stents to kidney         Review of patient's allergies indicates:   Allergen Reactions    Codeine Nausea Only    Iodine Itching    Pcn [penicillins] Other (See Comments)     Headache and nausea with PO PCN     Sulfa (sulfonamide antibiotics)     Oxycontin [oxycodone] Nausea And Vomiting       No current facility-administered medications on file prior to encounter.     Current Outpatient Medications on File Prior to Encounter   Medication Sig    albuterol sulfate (PROAIR HFA INHL) Inhale into the lungs.    aspirin 81 MG Chew Take 81 mg by mouth once daily.    atorvastatin (LIPITOR) 80 MG tablet Take 1 tablet (80 mg total) by mouth every evening.    baclofen (LIORESAL) 20 MG tablet Take 20 mg by mouth 2 (two) times daily.    BASAGLAR KWIKPEN 100 unit/mL (3 mL) InPn pen 40 Units every evening.    carvediloL (COREG) 12.5 MG tablet Take 1 tablet (12.5 mg total) by mouth 2 (two) times daily with meals.     clopidogreL (PLAVIX) 75 mg tablet Take 75 mg by mouth once daily.    fluoxetine (PROZAC) 20 MG capsule TAKE 1 CAPSULE(S) EVERY DAY BY ORAL ROUTE FOR 30 DAYS.    gabapentin (NEURONTIN) 300 MG capsule Take 300 mg by mouth 2 (two) times daily.    isosorbide dinitrate (ISORDIL) 30 MG Tab Take 30 mg by mouth once daily. Take 1 and 1/2 half tablets by mouth once daily    levothyroxine (SYNTHROID) 88 MCG tablet Take 1 tablet (88 mcg total) by mouth before breakfast.    alendronate (FOSAMAX) 70 MG tablet Take 1 tablet (70 mg total) by mouth every 7 days.    apremilast (OTEZLA STARTER) 10 mg (4)-20 mg (4)-30 mg (47) DsPk Take by mouth as instructed on package directions.    apremilast (OTEZLA) 30 mg Tab Take 1 tablet (30 mg total) by mouth 2 (two) times daily.    ciprofloxacin HCl (CIPRO) 500 MG tablet Take 1 tablet (500 mg total) by mouth 2 (two) times daily. for 10 days    doxazosin (CARDURA) 4 MG tablet TAKE 1 TABLET ORALLY ONCE A DAY.    ergocalciferol (ERGOCALCIFEROL) 50,000 unit Cap Take 50,000 Units by mouth every 7 days.    famotidine (PEPCID) 40 MG tablet Take 40 mg by mouth once daily.    furosemide (LASIX) 20 MG tablet Take 20 mg by mouth once daily.    HYDROcodone-acetaminophen (NORCO) 7.5-325 mg per tablet Take 1 tablet by mouth every 6 to 8 hours as needed for Pain.    insulin lispro 100 unit/mL injection Inject 16 Units into the skin 3 (three) times daily before meals.    lisinopriL (PRINIVIL,ZESTRIL) 40 MG tablet Take 0.5 tablets (20 mg total) by mouth once daily.    metFORMIN (GLUCOPHAGE) 500 MG tablet Take 1 tablet (500 mg total) by mouth daily with breakfast.    NIFEdipine (PROCARDIA-XL) 30 MG (OSM) 24 hr tablet Take 60 mg by mouth once daily.    nitroGLYCERIN (NITROSTAT) 0.4 MG SL tablet Place 1 tablet (0.4 mg total) under the tongue every 5 (five) minutes as needed for Chest pain.    ondansetron (ZOFRAN-ODT) 4 MG TbDL Take 4 mg by mouth every 6 (six) hours as needed.    ranolazine  "(RANEXA) 500 MG Tb12 Take 500 mg by mouth 2 (two) times daily.    trazodone (DESYREL) 100 MG tablet     [DISCONTINUED] pravastatin (PRAVACHOL) 40 MG tablet Take 40 mg by mouth once daily.     Family History       Problem Relation (Age of Onset)    Breast cancer Sister    Cancer Maternal Aunt    Heart disease Mother          Tobacco Use    Smoking status: Former     Types: Cigarettes     Quit date: 11/15/2008     Years since quittin.5    Smokeless tobacco: Never   Substance and Sexual Activity    Alcohol use: No    Drug use: No    Sexual activity: Not Currently     Partners: Male     Birth control/protection: Surgical     Comment:      Review of Systems   Constitutional:  Negative for activity change, fatigue, fever and unexpected weight change.   HENT:  Positive for facial swelling. Negative for congestion, ear pain, hearing loss, rhinorrhea and sore throat.    Eyes:  Negative for redness and visual disturbance.   Respiratory:  Negative for cough, shortness of breath and wheezing.    Cardiovascular:  Negative for chest pain, palpitations and leg swelling.   Gastrointestinal:  Negative for abdominal pain, constipation, diarrhea, nausea and vomiting.   Genitourinary:  Positive for frequency (pressure in baldder). Negative for dysuria and urgency.   Musculoskeletal:  Positive for arthralgias and back pain (known "broken bones" in lower back). Negative for joint swelling and neck pain.   Skin:  Negative for color change, rash and wound.   Neurological:  Positive for syncope (possibly). Negative for dizziness, tremors, weakness, light-headedness and headaches.   Objective:     Vital Signs (Most Recent):  Temp: 97.6 °F (36.4 °C) (23 1125)  Pulse: (!) 55 (23 1125)  Resp: 18 (23 1125)  BP: (!) 157/69 (23 1125)  SpO2: 96 % (23 1125) Vital Signs (24h Range):  Temp:  [97.6 °F (36.4 °C)-98.2 °F (36.8 °C)] 97.6 °F (36.4 °C)  Pulse:  [51-69] 55  Resp:  [14-20] 18  SpO2:  [93 %-97 " %] 96 %  BP: (112-207)/(55-88) 157/69     Weight: 96.1 kg (211 lb 13.8 oz)  Body mass index is 38.75 kg/m².     Physical Exam  Vitals and nursing note reviewed.   Constitutional:       General: She is not in acute distress.     Appearance: She is well-developed. She is obese.   HENT:      Head: Normocephalic.      Comments: Diffuse bruising and small lacs to face/nose     Right Ear: External ear normal.      Left Ear: External ear normal.      Nose: Nose normal.   Eyes:      Extraocular Movements: Extraocular movements intact.      Pupils: Pupils are equal, round, and reactive to light.   Neck:      Thyroid: No thyromegaly.   Cardiovascular:      Rate and Rhythm: Normal rate and regular rhythm.      Heart sounds: No murmur heard.  Pulmonary:      Effort: Pulmonary effort is normal. No respiratory distress.      Breath sounds: Normal breath sounds. No wheezing.   Abdominal:      General: Bowel sounds are normal.      Palpations: Abdomen is soft.      Tenderness: There is no abdominal tenderness. There is no right CVA tenderness or left CVA tenderness.   Musculoskeletal:         General: Tenderness (b/l lower extremities, she yells out for all touching; right ankle wrapped and in walking boot) present.      Right lower leg: No edema.      Left lower leg: No edema.   Skin:     General: Skin is warm and dry.   Neurological:      General: No focal deficit present.      Mental Status: She is alert and oriented to person, place, and time.      Cranial Nerves: No cranial nerve deficit.      Gait: Gait abnormal.   Psychiatric:         Behavior: Behavior normal.         Thought Content: Thought content normal.            CRANIAL NERVES     CN III, IV, VI   Pupils are equal, round, and reactive to light.     Significant Labs: All pertinent labs within the past 24 hours have been reviewed.  A1C:   Recent Labs   Lab 01/12/23  0709 05/22/23  1909   HGBA1C 8.0* 6.3*       Blood Culture:   No results for input(s): LABBLOO in the  last 48 hours.    CBC:   Recent Labs   Lab 05/25/23  0545   WBC 7.06   HGB 9.1*   HCT 27.6*   *       CMP:   Recent Labs   Lab 05/25/23  0545   *   K 4.3      CO2 22*   *   BUN 25*   CREATININE 1.3   CALCIUM 8.2*   ANIONGAP 8       Cardiac Markers: No results for input(s): CKMB, MYOGLOBIN, BNP, TROPISTAT in the last 48 hours.  Lactic Acid:   No results for input(s): LACTATE in the last 48 hours.    Lipid Panel: No results for input(s): CHOL, HDL, LDLCALC, TRIG, CHOLHDL in the last 48 hours.  Troponin: No results for input(s): TROPONINI, TROPONINIHS in the last 48 hours.  TSH:   Recent Labs   Lab 05/23/23  0604   TSH 0.416       Urine Culture:   No results for input(s): LABURIN in the last 48 hours.    Urine Studies:   Recent Labs   Lab 05/25/23  1014   COLORU Yellow   APPEARANCEUA Clear   PHUR 6.0   SPECGRAV 1.015   PROTEINUA Negative   GLUCUA 1+*   KETONESU Negative   BILIRUBINUA Negative   OCCULTUA Trace*   NITRITE Negative   UROBILINOGEN Negative   LEUKOCYTESUR 2+*   RBCUA 1   WBCUA 80*   BACTERIA Few*   SQUAMEPITHEL 3         Significant Imaging: I have reviewed all pertinent imaging results/findings within the past 24 hours.      Assessment/Plan:      * Acute metabolic encephalopathy  Confused yesterday but today she is alert and oriented x4  Resolved  Likely some dehydration and UTI induced. Cont antibx and follow up urine culture.       Closed fracture of nasal bone with routine healing  New problem s/p fall at home  Radiology reports reviewed  Can follow up ENT outpatient      Syncope and collapse  Questionable  Unclear on history if she felt weak when trying to stand on her legs due to pain or if had true loss of consciousness; not a great historian  Adjusting BP meds as noted in HTN and CAD plan  Cis consulted given extensive cardiac history  Telemetry  Electrolytes ok  IVF were given  Treating UTI       Congestive heart failure  Unclear history here. She is not able to provide  "much. TTE is normal 5/2023.  EF is preserved   Continue Procardia XL 60 mg daily   Continue lisinopril 40 mg daily   Continue doxazosin 8 mg daily   Continue Lasix 20 mg daily   Continue isosorbide 30 mg daily  Continue Plavix 75 mg daily  Continue aspirin 81 mg daily  Continue Ranexa 500 mg twice daily  Continue carvedilol 12.5 mg twice daily  [facility] consulted    Echo    Interpretation Summary  · The left ventricle is normal in size with normal systolic function.  · The estimated ejection fraction is 65%.  · Normal right ventricular size with normal right ventricular systolic function.  · Normal left ventricular diastolic function.  · Normal central venous pressure (3 mmHg).  · The estimated PA systolic pressure is 32 mmHg.      S/P ORIF (open reduction internal fixation) fracture  Right ankle wrapped and in walking boot  She also reports h/o left ankle fracture years ago  Was planing right ankle fixation with ortho  Most of our intervention she is yelling out in pain and is very concerned she "re-broke" her ankles. No swelling or deformity noted on exam but exam is limited as she yells every time I touch her. xrays ordered to confirm stability of hardware and no new fractures  PT ordered to ensure safe to send home given she had 3 falls prior to admit. Reports she was using walker prior to admit.  She needs to get both of her boots so she can safely walk with physical therapy by tomorrow.    When she is able to resume normal activity she can be discharged.  Hopefully tomorrow.      CAD (coronary artery disease)  Her home medications are a bit unclear. Reviewed medicine bottles she has and she is on a lot of medications. Maybe was having medication induced hypotension prior to admit contributing to weakness, falls and possible syncope  For not cont Ranexa, Coreg, ASA, statin, Plavix. CHF history noted so continue ACEi as well, looks like [facility]IS increased dose to 40mg so will continue that for now.  Cardiology wants to " resume nifedipine and Imdur and resume as needed  [facility] consulted--extensive cardiac history in setting of possible syncope.  They would like to do a 1 month Holter monitor upon discharge.      Acute cystitis with hematuria  Continue cipro  Urine cx growing Klebsiella sensitive to everything  Blood cx NGTD thus far      Hypothyroid  Resume home synthroid  TSH normal      Hyperlipidemia associated with type 2 diabetes mellitus  Resume home atorvastatin    Diabetes mellitus  Patient's FSGs are controlled on current medication regimen.  Last A1c reviewed-   Lab Results   Component Value Date    HGBA1C 6.3 (H) 05/22/2023     Most recent fingerstick glucose reviewed-   Recent Labs   Lab 05/24/23  1546 05/24/23  1957 05/25/23  0716 05/25/23  1123   POCTGLUCOSE 234* 281* 243* 283*     Current correctional scale  Medium  Maintain anti-hyperglycemic dose as follows-   Antihyperglycemics (From admission, onward)    Start     Stop Route Frequency Ordered    05/23/23 1130  insulin detemir U-100 (Levemir) pen 20 Units         -- SubQ Daily 05/23/23 1030    05/22/23 1950  insulin aspart U-100 pen 1-10 Units         -- SubQ Before meals & nightly PRN 05/22/23 1851        Hold Oral hypoglycemics while patient is in the hospital.    Resume basal insulin at 20 units daily with SSI. On basal insulin and metformin at home.  Diabetic diet    Hypertension  Evaluated by Cardiology this morning   He wants to resume the Procardia XL 60 mg daily, doxazosin, Lasix.  Resume coreg, lisinopril, ranexa  BP currently stable  Possible med induced hypotension at home causing syncope  Med history and dosages a bit unclear and she sees [facility] who managed so consulted as well given syncope        VTE Risk Mitigation (From admission, onward)         Ordered     IP VTE HIGH RISK PATIENT  Once         05/22/23 1712     Place sequential compression device  Until discontinued         05/22/23 1712                Discharge Planning   [place]:      Code Status: Full  Code   Is the patient medically ready for discharge?:     Reason for patient still in hospital (select all that apply): Patient trending condition and Pending disposition  Discharge Plan A: Home with family                  Nathan Panchal MD  Department of Hospital Medicine   [place] [facility] Surg (3rd Fl)

## 2023-05-25 NOTE — ASSESSMENT & PLAN NOTE
Patient's FSGs are controlled on current medication regimen.  Last A1c reviewed-   Lab Results   Component Value Date    HGBA1C 6.3 (H) 05/22/2023     Most recent fingerstick glucose reviewed-   Recent Labs   Lab 05/24/23  1546 05/24/23  1957 05/25/23  0716 05/25/23  1123   POCTGLUCOSE 234* 281* 243* 283*     Current correctional scale  Medium  Maintain anti-hyperglycemic dose as follows-   Antihyperglycemics (From admission, onward)    Start     Stop Route Frequency Ordered    05/23/23 1130  insulin detemir U-100 (Levemir) pen 20 Units         -- SubQ Daily 05/23/23 1030    05/22/23 1950  insulin aspart U-100 pen 1-10 Units         -- SubQ Before meals & nightly PRN 05/22/23 1851        Hold Oral hypoglycemics while patient is in the hospital.    Resume basal insulin at 20 units daily with SSI. On basal insulin and metformin at home.  Diabetic diet

## 2023-05-26 VITALS
BODY MASS INDEX: 38.99 KG/M2 | OXYGEN SATURATION: 98 % | HEIGHT: 62 IN | RESPIRATION RATE: 16 BRPM | SYSTOLIC BLOOD PRESSURE: 129 MMHG | TEMPERATURE: 98 F | DIASTOLIC BLOOD PRESSURE: 60 MMHG | HEART RATE: 47 BPM | WEIGHT: 211.88 LBS

## 2023-05-26 LAB
ANION GAP SERPL CALC-SCNC: 8 MMOL/L (ref 8–16)
BACTERIA UR CULT: NO GROWTH
BASOPHILS # BLD AUTO: 0.07 K/UL (ref 0–0.2)
BASOPHILS NFR BLD: 1.1 % (ref 0–1.9)
BUN SERPL-MCNC: 38 MG/DL (ref 6–20)
CALCIUM SERPL-MCNC: 8.2 MG/DL (ref 8.7–10.5)
CHLORIDE SERPL-SCNC: 100 MMOL/L (ref 95–110)
CO2 SERPL-SCNC: 21 MMOL/L (ref 23–29)
CREAT SERPL-MCNC: 1.6 MG/DL (ref 0.5–1.4)
DIFFERENTIAL METHOD: ABNORMAL
EOSINOPHIL # BLD AUTO: 0.2 K/UL (ref 0–0.5)
EOSINOPHIL NFR BLD: 3.6 % (ref 0–8)
ERYTHROCYTE [DISTWIDTH] IN BLOOD BY AUTOMATED COUNT: 12.8 % (ref 11.5–14.5)
EST. GFR  (NO RACE VARIABLE): 37 ML/MIN/1.73 M^2
GLUCOSE SERPL-MCNC: 224 MG/DL (ref 70–110)
HCT VFR BLD AUTO: 28.5 % (ref 37–48.5)
HGB BLD-MCNC: 9.2 G/DL (ref 12–16)
IMM GRANULOCYTES # BLD AUTO: 0.03 K/UL (ref 0–0.04)
IMM GRANULOCYTES NFR BLD AUTO: 0.5 % (ref 0–0.5)
LYMPHOCYTES # BLD AUTO: 2 K/UL (ref 1–4.8)
LYMPHOCYTES NFR BLD: 29.6 % (ref 18–48)
MCH RBC QN AUTO: 28.6 PG (ref 27–31)
MCHC RBC AUTO-ENTMCNC: 32.3 G/DL (ref 32–36)
MCV RBC AUTO: 89 FL (ref 82–98)
MONOCYTES # BLD AUTO: 0.7 K/UL (ref 0.3–1)
MONOCYTES NFR BLD: 10.3 % (ref 4–15)
NEUTROPHILS # BLD AUTO: 3.6 K/UL (ref 1.8–7.7)
NEUTROPHILS NFR BLD: 54.9 % (ref 38–73)
NRBC BLD-RTO: 0 /100 WBC
PLATELET # BLD AUTO: 150 K/UL (ref 150–450)
PMV BLD AUTO: 10.4 FL (ref 9.2–12.9)
POCT GLUCOSE: 232 MG/DL (ref 70–110)
POCT GLUCOSE: 279 MG/DL (ref 70–110)
POTASSIUM SERPL-SCNC: 4.7 MMOL/L (ref 3.5–5.1)
RBC # BLD AUTO: 3.22 M/UL (ref 4–5.4)
SODIUM SERPL-SCNC: 129 MMOL/L (ref 136–145)
WBC # BLD AUTO: 6.62 K/UL (ref 3.9–12.7)

## 2023-05-26 PROCEDURE — 25000003 PHARM REV CODE 250: Performed by: NURSE PRACTITIONER

## 2023-05-26 PROCEDURE — 93005 ELECTROCARDIOGRAM TRACING: CPT

## 2023-05-26 PROCEDURE — 99239 HOSP IP/OBS DSCHRG MGMT >30: CPT | Mod: ,,, | Performed by: INTERNAL MEDICINE

## 2023-05-26 PROCEDURE — 80048 BASIC METABOLIC PNL TOTAL CA: CPT | Performed by: FAMILY MEDICINE

## 2023-05-26 PROCEDURE — 25000003 PHARM REV CODE 250: Performed by: INTERNAL MEDICINE

## 2023-05-26 PROCEDURE — 36415 COLL VENOUS BLD VENIPUNCTURE: CPT | Performed by: FAMILY MEDICINE

## 2023-05-26 PROCEDURE — G0378 HOSPITAL OBSERVATION PER HR: HCPCS

## 2023-05-26 PROCEDURE — 97530 THERAPEUTIC ACTIVITIES: CPT

## 2023-05-26 PROCEDURE — 96366 THER/PROPH/DIAG IV INF ADDON: CPT

## 2023-05-26 PROCEDURE — 63600175 PHARM REV CODE 636 W HCPCS: Performed by: STUDENT IN AN ORGANIZED HEALTH CARE EDUCATION/TRAINING PROGRAM

## 2023-05-26 PROCEDURE — 97116 GAIT TRAINING THERAPY: CPT

## 2023-05-26 PROCEDURE — 99239 PR HOSPITAL DISCHARGE DAY,>30 MIN: ICD-10-PCS | Mod: ,,, | Performed by: INTERNAL MEDICINE

## 2023-05-26 PROCEDURE — 85025 COMPLETE CBC W/AUTO DIFF WBC: CPT | Performed by: FAMILY MEDICINE

## 2023-05-26 PROCEDURE — 94760 N-INVAS EAR/PLS OXIMETRY 1: CPT

## 2023-05-26 RX ORDER — CIPROFLOXACIN 500 MG/1
500 TABLET ORAL EVERY 12 HOURS
Qty: 20 TABLET | Refills: 0 | Status: SHIPPED | OUTPATIENT
Start: 2023-05-26 | End: 2023-06-05

## 2023-05-26 RX ADMIN — DOXAZOSIN 8 MG: 2 TABLET ORAL at 09:05

## 2023-05-26 RX ADMIN — NIFEDIPINE 90 MG: 30 TABLET, FILM COATED, EXTENDED RELEASE ORAL at 09:05

## 2023-05-26 RX ADMIN — CLOPIDOGREL BISULFATE 75 MG: 75 TABLET ORAL at 09:05

## 2023-05-26 RX ADMIN — RANOLAZINE 500 MG: 500 TABLET, EXTENDED RELEASE ORAL at 09:05

## 2023-05-26 RX ADMIN — INSULIN ASPART 4 UNITS: 100 INJECTION, SOLUTION INTRAVENOUS; SUBCUTANEOUS at 09:05

## 2023-05-26 RX ADMIN — FAMOTIDINE 40 MG: 20 TABLET ORAL at 09:05

## 2023-05-26 RX ADMIN — ASPIRIN 81 MG 81 MG: 81 TABLET ORAL at 09:05

## 2023-05-26 RX ADMIN — CLONIDINE HYDROCHLORIDE 0.2 MG: 0.2 TABLET ORAL at 11:05

## 2023-05-26 RX ADMIN — INSULIN DETEMIR 20 UNITS: 100 INJECTION, SOLUTION SUBCUTANEOUS at 09:05

## 2023-05-26 RX ADMIN — LEVOTHYROXINE SODIUM 88 MCG: 88 TABLET ORAL at 05:05

## 2023-05-26 RX ADMIN — INSULIN ASPART 6 UNITS: 100 INJECTION, SOLUTION INTRAVENOUS; SUBCUTANEOUS at 12:05

## 2023-05-26 RX ADMIN — LISINOPRIL 40 MG: 20 TABLET ORAL at 09:05

## 2023-05-26 RX ADMIN — CARVEDILOL 12.5 MG: 12.5 TABLET, FILM COATED ORAL at 09:05

## 2023-05-26 RX ADMIN — ISOSORBIDE MONONITRATE 60 MG: 60 TABLET, EXTENDED RELEASE ORAL at 09:05

## 2023-05-26 RX ADMIN — CIPROFLOXACIN 400 MG: 2 INJECTION, SOLUTION INTRAVENOUS at 07:05

## 2023-05-26 NOTE — ASSESSMENT & PLAN NOTE
Patient's FSGs are controlled on current medication regimen.  Last A1c reviewed-   Lab Results   Component Value Date    HGBA1C 6.3 (H) 05/22/2023     Most recent fingerstick glucose reviewed-   Recent Labs   Lab 05/25/23  1123 05/25/23  1625 05/25/23 2009 05/26/23  0714   POCTGLUCOSE 283* 167* 272* 232*     Current correctional scale  Medium  Maintain anti-hyperglycemic dose as follows-   Antihyperglycemics (From admission, onward)    Start     Stop Route Frequency Ordered    05/23/23 1130  insulin detemir U-100 (Levemir) pen 20 Units         -- SubQ Daily 05/23/23 1030    05/22/23 1950  insulin aspart U-100 pen 1-10 Units         -- SubQ Before meals & nightly PRN 05/22/23 1851        Hold Oral hypoglycemics while patient is in the hospital.    Resume basal insulin at 20 units daily with SSI. On basal insulin and metformin at home.  Diabetic diet.

## 2023-05-26 NOTE — ASSESSMENT & PLAN NOTE
Her home medications are a bit unclear. Reviewed medicine bottles she has and she is on a lot of medications. Maybe was having medication induced hypotension prior to admit contributing to weakness, falls and possible syncope  For not cont Ranexa, Coreg, ASA, statin, Plavix. CHF history noted so continue ACEi as well, looks like [facility] increased dose to 40mg so will continue that for now.  Cardiology wants to resume nifedipine and Imdur and resume as needed.  [facility] consulted--extensive cardiac history in setting of possible syncope.  They would like to do a 1 month Holter monitor upon discharge.

## 2023-05-26 NOTE — ASSESSMENT & PLAN NOTE
Evaluated by Cardiology this morning   He wants to resume the Procardia XL 60 mg daily, doxazosin, Lasix.  Resume coreg, lisinopril, ranexa  BP currently stable  Possible med induced hypotension at home causing syncope  Med history and dosages a bit unclear and she sees [facility] who managed so consulted as well given syncope.

## 2023-05-26 NOTE — PLAN OF CARE
[facility] - Med Surg (3rd Fl)  Discharge Final Note    Primary Care Provider: Bianca Godoy MD    Expected Discharge Date:     Final Discharge Note (most recent)       Final Note - 05/26/23 1312          Final Note    Assessment Type Final Discharge Note (P)      Anticipated Discharge Disposition Home or Self Care (P)      What phone number can be called within the next 1-3 days to see how you are doing after discharge? [phone] (P)      Hospital Resources/Appts/Education Provided Appointments scheduled and added to AVS;Provided patient/caregiver with written discharge plan information (P)         Post-Acute Status    Post-Acute Authorization Other;HME (P)      HME Status Referrals Sent (P)      Other Status Awaiting f/u Appts (P)      Discharge Delays None known at this time (P)                                   Contact Info       Bianca Godoy MD   Specialty: Family Medicine   Relationship: PCP - General    [address] [place] [id number]   Phone: [phone]       Next Steps: Go on 5/30/2023    Instructions: at 1:00 PM    Alec Pettit MD   Specialty: Orthopedic Surgery    [address]  [place] [address]   Phone: [phone]       Next Steps: Call    Instructions: Please call to make appointment.              Patient will discharge home today. Patient requested rolling walker, which was also recommended by PT, however patient already has a wheelchair and rollator that was paid for by insurance last year. [facility] with [employer] states patient is currently still paying for wheel chair. She is not eligible for rolling walker. Patient aware that if she wants rolling walker it would have to be purchased.  Family in the room with patient and confirms patient has rollator and wheelchair.   Hospital follow up made, and discharge information faxed to PCP office.  CM attempted to make follow up with Ortho, however, I could not. A message was sent by the representative who answered the phone. They should call me  back.  SERGEY also sent in basket message to Dr. Pettit to let him know patient needs follow up.

## 2023-05-26 NOTE — PROGRESS NOTES
[facility] ([facility])  [facility] Medicine  Progress Note    Patient Name: Merna Regalado  MRN: [id number]  Patient Class: IP- Inpatient   Admission Date: 5/22/2023  Length of Stay: 2 days  Attending Physician: Nathan Panchal MD  Primary Care Provider: Bianca Godoy MD        Subjective:     Principal Problem:Acute metabolic encephalopathy        HPI:  Patient presented to ER with falls and weaknes at home. She had a fall at home 3x prior to admission. She has a nasal fracture and has a few lacs on her head. CT head negative for acute intracranial pathology. She reports she had loss of consciousness but family reports she was responding to them. She reports for initial fall she was sitting on toilet and when she went to pull herself up she blacked out and slide to the floor and hit her nose. Denies chest pains, palpitations, SOB. She had a fall 1 year ago and broke her right ankle but her left ankle is hurting and this is new. During our interview she keeps calling out in pain and tells me both are hurting and she is concerning she has a new fracture on the right side; right in walking boot. She is having pressure with urination, sx started 1 day ago. No fevers. She had some confusion last night in ER but this morning she is awake, alert and oriented to person, place time and situation. However, she is a very poor historian and her  (they are ) helps provide some of the history.       Overview/Hospital Course:  Patient admitted for near-syncope causing facial contusion with nasal fractures, urinary tract infection currently receiving IV Cipro.  Cardiologist evaluated patient and recommends continuation of her home blood pressure medications and 1 month Holter monitor upon discharge.  She is currently in normal sinus rhythm on telemetry.  Other than having facial bruising and soreness she is doing okay.  Another problem is the fact that she has a severe right ankle and tib-fib fracture that  "needs ORIF.  She had this done on her left ankle in the past.  She is able to ambulate if she is wearing her orthopedic boots in both legs.  Physical therapy is working with the patient.  She is a definite fall risk.  This morning she obtain both of her walking boots and put them on.  She was able to transfer her weight from the bed to a chair.  She still feels weak and unstable.  She would like 1 more day of therapy.      5/26/23  She looks better ;  She walked with PT   I discussed with her the need close follow up for uti and orthopedics           Review of Systems   Constitutional:  Negative for activity change, fatigue, fever and unexpected weight change.   HENT:  Positive for facial swelling. Negative for congestion, ear pain, hearing loss, rhinorrhea and sore throat.         Bruising abd swelling much improved    Eyes:  Negative for redness and visual disturbance.   Respiratory:  Negative for cough, shortness of breath and wheezing.    Cardiovascular:  Negative for chest pain, palpitations and leg swelling.   Gastrointestinal:  Negative for abdominal pain, constipation, diarrhea, nausea and vomiting.   Genitourinary:  Positive for frequency (pressure in baldder). Negative for dysuria and urgency.   Musculoskeletal:  Positive for arthralgias and back pain (known "broken bones" in lower back). Negative for joint swelling and neck pain.   Skin:  Negative for color change, rash and wound.   Neurological:  Positive for syncope (possibly). Negative for dizziness, tremors, weakness, light-headedness and headaches.   Objective:     Vital Signs (Most Recent):  Temp: 97.6 °F (36.4 °C) (05/26/23 0740)  Pulse: (!) 54 (05/26/23 1000)  Resp: 15 (05/26/23 0740)  BP: (!) 161/69 (05/26/23 0740)  SpO2: 95 % (05/26/23 0740) Vital Signs (24h Range):  Temp:  [97.6 °F (36.4 °C)-98.5 °F (36.9 °C)] 97.6 °F (36.4 °C)  Pulse:  [49-61] 54  Resp:  [15-20] 15  SpO2:  [95 %-98 %] 95 %  BP: (145-185)/(58-77) 161/69     Weight: 96.1 kg (211 " lb 13.8 oz)  Body mass index is 38.75 kg/m².    Intake/Output Summary (Last 24 hours) at 5/26/2023 1058  Last data filed at 5/26/2023 1036  Gross per 24 hour   Intake 1405.41 ml   Output 3600 ml   Net -2194.59 ml         Physical Exam  Vitals and nursing note reviewed.   Constitutional:       General: She is not in acute distress.     Appearance: She is well-developed. She is obese.   HENT:      Head: Normocephalic.      Comments: Diffuse bruising and small lacs to face/nose     Right Ear: External ear normal.      Left Ear: External ear normal.      Nose: Nose normal.   Eyes:      Extraocular Movements: Extraocular movements intact.      Pupils: Pupils are equal, round, and reactive to light.      Comments: Facial swelling and bruising improved    Neck:      Thyroid: No thyromegaly.   Cardiovascular:      Rate and Rhythm: Normal rate and regular rhythm.      Heart sounds: No murmur heard.  Pulmonary:      Effort: Pulmonary effort is normal. No respiratory distress.      Breath sounds: Normal breath sounds. No wheezing.   Abdominal:      General: Bowel sounds are normal.      Palpations: Abdomen is soft.      Tenderness: There is no abdominal tenderness. There is no right CVA tenderness or left CVA tenderness.   Musculoskeletal:         General: Tenderness (b/l lower extremities, she yells out for all touching; right ankle wrapped and in walking boot) present.      Right lower leg: No edema.      Left lower leg: No edema.   Skin:     General: Skin is warm and dry.   Neurological:      General: No focal deficit present.      Mental Status: She is alert and oriented to person, place, and time.      Cranial Nerves: No cranial nerve deficit.      Gait: Gait abnormal.   Psychiatric:         Behavior: Behavior normal.         Thought Content: Thought content normal.           Significant Labs: All pertinent labs within the past 24 hours have been reviewed.  CBC:   Recent Labs   Lab 05/25/23  0545 05/26/23  0609   WBC 7.06  6.62   HGB 9.1* 9.2*   HCT 27.6* 28.5*   * 150     CMP:   Recent Labs   Lab 05/25/23  0545 05/26/23  0609   * 129*   K 4.3 4.7    100   CO2 22* 21*   * 224*   BUN 25* 38*   CREATININE 1.3 1.6*   CALCIUM 8.2* 8.2*   ANIONGAP 8 8     Troponin: No results for input(s): TROPONINI, TROPONINIHS in the last 48 hours.  TSH:   Recent Labs   Lab 05/23/23  0604   TSH 0.416       Assessment/Plan:      * Acute metabolic encephalopathy  Confused yesterday but today she is alert and oriented x4  Resolved  Likely some dehydration and UTI induced. Cont antibx and follow up urine culture.   Send home on cipro 500 mg BID for 5 days     Closed fracture of nasal bone with routine healing  New problem s/p fall at home  Radiology reports reviewed  Can follow up ENT outpatient.        Syncope and collapse  Questionable  Unclear on history if she felt weak when trying to stand on her legs due to pain or if had true loss of consciousness; not a great historian  Adjusting BP meds as noted in HTN and CAD plan  [facility]is consulted given extensive cardiac history  Telemetry  Electrolytes ok  IVF were given  Treating UTI .      Congestive heart failure  Unclear history here. She is not able to provide much. TTE is normal 5/2023.  EF is preserved   Continue Procardia XL 60 mg daily   Continue lisinopril 40 mg daily   Continue doxazosin 8 mg daily   Continue Lasix 20 mg daily   Continue isosorbide 30 mg daily  Continue Plavix 75 mg daily  Continue aspirin 81 mg daily  Continue Ranexa 500 mg twice daily  Continue carvedilol 12.5 mg twice daily  [facility] consulted    Echo    Interpretation Summary  · The left ventricle is normal in size with normal systolic function.  · The estimated ejection fraction is 65%.  · Normal right ventricular size with normal right ventricular systolic function.  · Normal left ventricular diastolic function.  · Normal central venous pressure (3 mmHg).  · The estimated PA systolic pressure is 32  "mmHg.      S/P ORIF (open reduction internal fixation) fracture  Right ankle wrapped and in walking boot  She also reports h/o left ankle fracture years ago  Was planing right ankle fixation with ortho  Most of our intervention she is yelling out in pain and is very concerned she "re-broke" her ankles. No swelling or deformity noted on exam but exam is limited as she yells every time I touch her. xrays ordered to confirm stability of hardware and no new fractures  PT ordered to ensure safe to send home given she had 3 falls prior to admit. Reports she was using walker prior to admit.  She needs to get both of her boots so she can safely walk with physical therapy by tomorrow.    When she is able to resume normal activity she can be discharged.  Hopefully tomorrow.      CAD (coronary artery disease)  Her home medications are a bit unclear. Reviewed medicine bottles she has and she is on a lot of medications. Maybe was having medication induced hypotension prior to admit contributing to weakness, falls and possible syncope  For not cont Ranexa, Coreg, ASA, statin, Plavix. CHF history noted so continue ACEi as well, looks like [facility] increased dose to 40mg so will continue that for now.  Cardiology wants to resume nifedipine and Imdur and resume as needed.  [facility] consulted--extensive cardiac history in setting of possible syncope.  They would like to do a 1 month Holter monitor upon discharge.      Acute cystitis with hematuria  Continue cipro  Urine cx growing Klebsiella sensitive to everything  Blood cx NGTD thus far.      Hypothyroid  Resume home synthroid  TSH normal.    Hyperlipidemia associated with type 2 diabetes mellitus  Resume home atorvastatin.    Diabetes mellitus  Patient's FSGs are controlled on current medication regimen.  Last A1c reviewed-   Lab Results   Component Value Date    HGBA1C 6.3 (H) 05/22/2023     Most recent fingerstick glucose reviewed-   Recent Labs   Lab 05/25/23  1123 05/25/23  1625 " 05/25/23 2009 05/26/23  0714   POCTGLUCOSE 283* 167* 272* 232*     Current correctional scale  Medium  Maintain anti-hyperglycemic dose as follows-   Antihyperglycemics (From admission, onward)    Start     Stop Route Frequency Ordered    05/23/23 1130  insulin detemir U-100 (Levemir) pen 20 Units         -- SubQ Daily 05/23/23 1030    05/22/23 1950  insulin aspart U-100 pen 1-10 Units         -- SubQ Before meals & nightly PRN 05/22/23 1851        Hold Oral hypoglycemics while patient is in the hospital.    Resume basal insulin at 20 units daily with SSI. On basal insulin and metformin at home.  Diabetic diet.    Hypertension  Evaluated by Cardiology this morning   He wants to resume the Procardia XL 60 mg daily, doxazosin, Lasix.  Resume coreg, lisinopril, ranexa  BP currently stable  Possible med induced hypotension at home causing syncope  Med history and dosages a bit unclear and she sees [facility] who managed so consulted as well given syncope.            VTE Risk Mitigation (From admission, onward)         Ordered     IP VTE HIGH RISK PATIENT  Once         05/22/23 1712     Place sequential compression device  Until discontinued         05/22/23 1712                Discharge Planning   AUGUSTINA:      Code Status: Full Code   Is the patient medically ready for discharge?:     Reason for patient still in hospital (select all that apply): Pending disposition  Discharge Plan A: Home with family                  La Nena Vasquez MD  Department of Hospital Medicine   [place] [facility] Surg (3rd Fl)

## 2023-05-26 NOTE — ASSESSMENT & PLAN NOTE
Confused yesterday but today she is alert and oriented x4  Resolved  Likely some dehydration and UTI induced. Cont antibx and follow up urine culture.   Send home on cipro 500 mg BID for 5 days

## 2023-05-26 NOTE — DISCHARGE SUMMARY
[facility] ([facility])  [facility] Medicine  Discharge Summary      Patient Name: Merna Regalado  MRN: [id number]  JAMAR: [id number]  Patient Class: IP- Inpatient  Admission Date: 5/22/2023  Hospital Length of Stay: 2 days  Discharge Date and Time:  05/26/2023 11:14 AM  Attending Physician: Nathan Panchal MD   Discharging Provider: La Nena Vasquez MD  Primary Care Provider: Bianca Godoy MD    Primary Care Team: Networked reference to record PCT     HPI:   Patient presented to ER with falls and weaknes at home. She had a fall at home 3x prior to admission. She has a nasal fracture and has a few lacs on her head. CT head negative for acute intracranial pathology. She reports she had loss of consciousness but family reports she was responding to them. She reports for initial fall she was sitting on toilet and when she went to pull herself up she blacked out and slide to the floor and hit her nose. Denies chest pains, palpitations, SOB. She had a fall 1 year ago and broke her right ankle but her left ankle is hurting and this is new. During our interview she keeps calling out in pain and tells me both are hurting and she is concerning she has a new fracture on the right side; right in walking boot. She is having pressure with urination, sx started 1 day ago. No fevers. She had some confusion last night in ER but this morning she is awake, alert and oriented to person, place time and situation. However, she is a very poor historian and her  (they are ) helps provide some of the history.       * No surgery found *      Hospital Course:   Patient admitted for near-syncope causing facial contusion with nasal fractures, urinary tract infection currently receiving IV Cipro.  Cardiologist evaluated patient and recommends continuation of her home blood pressure medications and 1 month Holter monitor upon discharge.  She is currently in normal sinus rhythm on telemetry.  Other than having facial  bruising and soreness she is doing okay.  Another problem is the fact that she has a severe right ankle and tib-fib fracture that needs ORIF.  She had this done on her left ankle in the past.  She is able to ambulate if she is wearing her orthopedic boots in both legs.  Physical therapy is working with the patient.  She is a definite fall risk.  This morning she obtain both of her walking boots and put them on.  She was able to transfer her weight from the bed to a chair.  She still feels weak and unstable.  She would like 1 more day of therapy.      5/26/23  She looks better ;  She walked with PT   I discussed with her the need close follow up for uti and orthopedics        Goals of Care Treatment Preferences:  Code Status: Full Code      Consults:   Consults (From admission, onward)        Status Ordering Provider     Inpatient consult to Cardiology-[facility]  Once        Provider:  MD Barrett Ortez SARAH          No new Assessment & Plan notes have been filed under this hospital service since the last note was generated.  Service: Hospital Medicine    Final Active Diagnoses:    Diagnosis Date Noted POA    PRINCIPAL PROBLEM:  Acute metabolic encephalopathy [G93.41] 05/23/2023 Yes    Syncope and collapse [R55] 05/23/2023 Yes    Closed fracture of nasal bone with routine healing [S02.2XXD] 05/23/2023 Not Applicable    Congestive heart failure [I50.9] 05/29/2022 Yes    S/P ORIF (open reduction internal fixation) fracture [Z98.890, Z87.81] 03/31/2022 Not Applicable    Acute cystitis with hematuria [N30.01] 11/01/2021 Yes    CAD (coronary artery disease) [I25.10] 11/01/2021 Yes    Hypothyroid [E03.9] 04/08/2019 Yes    Diabetes mellitus [E11.9] 09/05/2017 Yes    Hyperlipidemia associated with type 2 diabetes mellitus [E11.69, E78.5] 09/05/2017 Yes     Chronic    Hypertension [I10] 09/05/2017 Yes     Chronic      Problems Resolved During this Admission:       Discharged Condition:  "fair    Disposition:     Follow Up:   Follow-up Information     Bianca Godoy MD Follow up.    Specialty: Family Medicine  Contact information:  [address]  [place] [id number] [phone]             Alec Pettit MD Follow up.    Specialty: Orthopedic Surgery  Contact information:  [address]  [place] [address]  [phone]                       Patient Instructions:      WALKER FOR HOME USE     Order Specific Question Answer Comments   Type of Walker: Adult (5'4"-6'6")    With wheels? Yes    Height: 5' 2" (1.575 m)    Weight: 96.1 kg (211 lb 13.8 oz)    Length of need (1-99 months): 99    Does patient have medical equipment at home? rollator 2 cam boot / 2 cam boot / 2 cam boot / 2 cam boot / 2 cam boot   Does patient have medical equipment at home? wheelchair    Does patient have medical equipment at home? shower chair    Does patient have medical equipment at home? glucometer    Does patient have medical equipment at home? other (see comments)    Please check all that apply: Patient is unable to safely ambulate without equipment.        Significant Diagnostic Studies: Microbiology:   Urine Culture    Lab Results   Component Value Date    LABURIN KLEBSIELLA PNEUMONIAE  >100,000 cfu/ml   (A) 05/22/2023       Pending Diagnostic Studies:     Procedure Component Value Units Date/Time    EKG 12-lead [[id number]]     Order Status: Sent Lab Status: No result          Medications:  Reconciled Home Medications:      Medication List      CHANGE how you take these medications    OTEZLA 30 mg Tab  Generic drug: apremilast  Take 1 tablet (30 mg total) by mouth 2 (two) times daily.  What changed: Another medication with the same name was removed. Continue taking this medication, and follow the directions you see here.        CONTINUE taking these medications    alendronate 70 MG tablet  Commonly known as: FOSAMAX  Take 1 tablet (70 mg total) by mouth every 7 days.     aspirin 81 MG Chew  Take 81 mg by mouth " once daily.     atorvastatin 80 MG tablet  Commonly known as: LIPITOR  Take 1 tablet (80 mg total) by mouth every evening.     BASAGLAR KWIKPEN U-100 INSULIN glargine 100 units/mL SubQ pen  Generic drug: insulin  40 Units every evening.     carvediloL 12.5 MG tablet  Commonly known as: COREG  Take 1 tablet (12.5 mg total) by mouth 2 (two) times daily with meals.     ciprofloxacin HCl 500 MG tablet  Commonly known as: CIPRO  Take 1 tablet (500 mg total) by mouth 2 (two) times daily. for 10 days     clopidogreL 75 mg tablet  Commonly known as: PLAVIX  Take 75 mg by mouth once daily.     doxazosin 4 MG tablet  Commonly known as: CARDURA  TAKE 1 TABLET ORALLY ONCE A DAY.     ergocalciferol 50,000 unit Cap  Commonly known as: ERGOCALCIFEROL  Take 50,000 Units by mouth every 7 days.     famotidine 40 MG tablet  Commonly known as: PEPCID  Take 40 mg by mouth once daily.     FLUoxetine 20 MG capsule  TAKE 1 CAPSULE(S) EVERY DAY BY ORAL ROUTE FOR 30 DAYS.     gabapentin 300 MG capsule  Commonly known as: NEURONTIN  Take 300 mg by mouth 2 (two) times daily.     HYDROcodone-acetaminophen 7.5-325 mg per tablet  Commonly known as: NORCO  Take 1 tablet by mouth every 6 to 8 hours as needed for Pain.     insulin lispro 100 unit/mL injection  Inject 16 Units into the skin 3 (three) times daily before meals.     isosorbide dinitrate 30 MG Tab  Commonly known as: ISORDIL  Take 30 mg by mouth once daily. Take 1 and 1/2 half tablets by mouth once daily     levothyroxine 88 MCG tablet  Commonly known as: SYNTHROID  Take 1 tablet (88 mcg total) by mouth before breakfast.     lisinopriL 40 MG tablet  Commonly known as: PRINIVIL,ZESTRIL  Take 0.5 tablets (20 mg total) by mouth once daily.     metFORMIN 500 MG tablet  Commonly known as: GLUCOPHAGE  Take 1 tablet (500 mg total) by mouth daily with breakfast.     NIFEdipine 30 MG (OSM) 24 hr tablet  Commonly known as: PROCARDIA-XL  Take 60 mg by mouth once daily.     nitroGLYCERIN 0.4 MG SL  tablet  Commonly known as: NITROSTAT  Place 1 tablet (0.4 mg total) under the tongue every 5 (five) minutes as needed for Chest pain.     ondansetron 4 MG Tbdl  Commonly known as: ZOFRAN-ODT  Take 4 mg by mouth every 6 (six) hours as needed.     PROAIR HFA INHL  Inhale into the lungs.     ranolazine 500 MG Tb12  Commonly known as: RANEXA  Take 500 mg by mouth 2 (two) times daily.     traZODone 100 MG tablet  Commonly known as: DESYREL        STOP taking these medications    furosemide 20 MG tablet  Commonly known as: LASIX        ASK your doctor about these medications    baclofen 20 MG tablet  Commonly known as: LIORESAL  Take 20 mg by mouth 2 (two) times daily.            Indwelling Lines/Drains at time of discharge:   Lines/Drains/Airways     Drain  Duration           Female External Urinary Catheter 05/22/23 1745 3 days                Time spent on the discharge of patient: 37 minutes         La Nena Vasquez MD  Department of Hospital Medicine  [place] - Med Surg (3rd Fl)

## 2023-05-26 NOTE — PROGRESS NOTES
[place] - [facility] ([facility])  Cardiology  Progress Note    Patient Name: Merna Regalado  MRN: [id number]  Admission Date: 5/22/2023  Hospital Length of Stay: 2 days  Code Status: Full Code   Attending Physician: Nathan Panchal MD   Primary Care Physician: Bianca Godoy MD  Expected Discharge Date:   Principal Problem:Acute metabolic encephalopathy    Subjective:     Hospital Course: 62-year-old female with history of CAD, carotid artery stenosis, dyslipidemia, DM2 can present to emergency department with nasal fracture after sustaining a fall while attempting to get out of bed.  LOC unknown.  Patient also noted to have altered mental status as well as acute cystitis.  Cis consulted for syncope.    Interval History: No events overnight, BP stable.     Review of Systems   HENT: Negative.     Respiratory: Negative.     Gastrointestinal: Negative.    Neurological:  Positive for weakness.   Objective:     Vital Signs (Most Recent):  Temp: 97.6 °F (36.4 °C) (05/26/23 0740)  Pulse: (!) 50 (05/26/23 0800)  Resp: 15 (05/26/23 0740)  BP: (!) 161/69 (05/26/23 0740)  SpO2: 95 % (05/26/23 0740) Vital Signs (24h Range):  Temp:  [97.6 °F (36.4 °C)-98.5 °F (36.9 °C)] 97.6 °F (36.4 °C)  Pulse:  [49-61] 50  Resp:  [15-20] 15  SpO2:  [95 %-98 %] 95 %  BP: (145-185)/(58-77) 161/69     Weight: 96.1 kg (211 lb 13.8 oz)  Body mass index is 38.75 kg/m².    SpO2: 95 %         Intake/Output Summary (Last 24 hours) at 5/26/2023 0908  Last data filed at 5/26/2023 0746  Gross per 24 hour   Intake 1357.33 ml   Output 2000 ml   Net -642.67 ml       Lines/Drains/Airways       Drain  Duration             Female External Urinary Catheter 05/22/23 1745 3 days              Peripheral Intravenous Line  Duration                  Peripheral IV - Single Lumen 05/26/23 0710 22 G Right Hand <1 day                    Physical Exam  Constitutional:       Appearance: She is well-developed.   Eyes:      Pupils: Pupils are equal, round, and reactive to  light.   Cardiovascular:      Rate and Rhythm: Normal rate and regular rhythm.   Pulmonary:      Effort: Pulmonary effort is normal.      Breath sounds: Normal breath sounds.   Abdominal:      General: Bowel sounds are normal.      Palpations: Abdomen is soft.   Musculoskeletal:         General: Normal range of motion.      Cervical back: Normal range of motion and neck supple.   Skin:     General: Skin is warm and dry.   Neurological:      Mental Status: She is alert and oriented to person, place, and time.       Significant Labs:   Recent Lab Results  (Last 5 results in the past 24 hours)        05/26/23  0714   05/26/23  0609   05/25/23 2009 05/25/23  1625   05/25/23  1123        Anion Gap   8             Baso #   0.07             Basophil %   1.1             BUN   38             Calcium   8.2             Chloride   100             CO2   21             Creatinine   1.6             Differential Method   Automated             eGFR   37             Eos #   0.2             Eosinophil %   3.6             Glucose   224             Gran # (ANC)   3.6             Gran %   54.9             Hematocrit   28.5             Hemoglobin   9.2             Immature Grans (Abs)   0.03  Comment: Mild elevation in immature granulocytes is non specific and   can be seen in a variety of conditions including stress response,   acute inflammation, trauma and pregnancy. Correlation with other   laboratory and clinical findings is essential.               Immature Granulocytes   0.5             Lymph #   2.0             Lymph %   29.6             MCH   28.6             MCHC   32.3             MCV   89             Mono #   0.7             Mono %   10.3             MPV   10.4             nRBC   0             Platelets   150             POCT Glucose 232     272   167   283       Potassium   4.7             RBC   3.22             RDW   12.8             Sodium   129             WBC   6.62                                    Significant  Imaging:   Assessment and Plan:     Brief HPI:     Active Diagnoses:    Diagnosis Date Noted POA    PRINCIPAL PROBLEM:  Acute metabolic encephalopathy [G93.41] 05/23/2023 Yes    Syncope and collapse [R55] 05/23/2023 Yes    Closed fracture of nasal bone with routine healing [S02.2XXD] 05/23/2023 Not Applicable    Congestive heart failure [I50.9] 05/29/2022 Yes    S/P ORIF (open reduction internal fixation) fracture [Z98.890, Z87.81] 03/31/2022 Not Applicable    Acute cystitis with hematuria [N30.01] 11/01/2021 Yes    CAD (coronary artery disease) [I25.10] 11/01/2021 Yes    Hypothyroid [E03.9] 04/08/2019 Yes    Diabetes mellitus [E11.9] 09/05/2017 Yes    Hyperlipidemia associated with type 2 diabetes mellitus [E11.69, E78.5] 09/05/2017 Yes     Chronic    Hypertension [I10] 09/05/2017 Yes     Chronic      Problems Resolved During this Admission:       VTE Risk Mitigation (From admission, onward)           Ordered     IP VTE HIGH RISK PATIENT  Once         05/22/23 1712     Place sequential compression device  Until discontinued         05/22/23 1712                  Carotid US 3/2023: 60-79% stenosis in the proximal right internal carotid artery.  40-59% stenosis in the proximal left internal carotid artery with greater than 50% in the right and left external carotid artery.  Antegrade right vertebral artery flow .  Left vertebral artery shows retrograde flow consistent with subclavian artery stenosis.     MPI 06/28/2022: Probably normal with no evidence of ischemia     Telemetry monitor 06/2022: Predominantly normal sinus rhythm.    Minimum heart rate 47 beats per minute, maximum rate 121 beats per minute, mean heart rate 60 beats per minute.    No evidence AV block.    There PACs noted.    No evidence of SVT noted.    Rare PVCs noted.    No pauses were noted.    One 3 beat run WCT, possibly VT at 125 beats per minute.        DX: feels well  BP RUE much improved today, sunil in RUE (note severe PAD probably involves  LUE)  2 brief episodes of Syncope of unclear etiology with facial trauma; difficult historian to establish accurate sequence of events. No evidence of arrhythmia so far.  H/o syncope s/p ankle fracture, needs surgery soon  UTI/weakness with falls  AMS  CAD, MPI normal 6/22 s/p PCI 2009  HTN, EF 65%, normal LV diastolic function, PASP 32 mm Hg 5/22  HLD  Carotid artery stenosis 70% ISABELLA stenosis, 50% left ICA stenosis 3/23  DM2  Severe PAD s/p leg bypass and PCI  CT- for PE 11/21        Plan:continue same  Increased Imdur to 60 mg po daily.  Increased Nifedipine to 90 mg qd today for BP  Continue Home asa, atorvastatin, carvedilol 12.5, clopidogrel, Ranexa 500  adjust Doxazosin 8 mg, lasix 20 as needed  continue Lisinopril 40,   PT      Tawny Odonnell, VICKI Scribed for Dr Hunter   Cardiology  [place] - [facility] Surg (3rd Fl)  I attest that I have personally seen and examined this patient. I have reviewed and discussed the management in detail as outlined above.

## 2023-05-26 NOTE — SUBJECTIVE & OBJECTIVE
"    Review of Systems   Constitutional:  Negative for activity change, fatigue, fever and unexpected weight change.   HENT:  Positive for facial swelling. Negative for congestion, ear pain, hearing loss, rhinorrhea and sore throat.         Bruising abd swelling much improved    Eyes:  Negative for redness and visual disturbance.   Respiratory:  Negative for cough, shortness of breath and wheezing.    Cardiovascular:  Negative for chest pain, palpitations and leg swelling.   Gastrointestinal:  Negative for abdominal pain, constipation, diarrhea, nausea and vomiting.   Genitourinary:  Positive for frequency (pressure in baldder). Negative for dysuria and urgency.   Musculoskeletal:  Positive for arthralgias and back pain (known "broken bones" in lower back). Negative for joint swelling and neck pain.   Skin:  Negative for color change, rash and wound.   Neurological:  Positive for syncope (possibly). Negative for dizziness, tremors, weakness, light-headedness and headaches.   Objective:     Vital Signs (Most Recent):  Temp: 97.6 °F (36.4 °C) (05/26/23 0740)  Pulse: (!) 54 (05/26/23 1000)  Resp: 15 (05/26/23 0740)  BP: (!) 161/69 (05/26/23 0740)  SpO2: 95 % (05/26/23 0740) Vital Signs (24h Range):  Temp:  [97.6 °F (36.4 °C)-98.5 °F (36.9 °C)] 97.6 °F (36.4 °C)  Pulse:  [49-61] 54  Resp:  [15-20] 15  SpO2:  [95 %-98 %] 95 %  BP: (145-185)/(58-77) 161/69     Weight: 96.1 kg (211 lb 13.8 oz)  Body mass index is 38.75 kg/m².    Intake/Output Summary (Last 24 hours) at 5/26/2023 1058  Last data filed at 5/26/2023 1036  Gross per 24 hour   Intake 1405.41 ml   Output 3600 ml   Net -2194.59 ml         Physical Exam  Vitals and nursing note reviewed.   Constitutional:       General: She is not in acute distress.     Appearance: She is well-developed. She is obese.   HENT:      Head: Normocephalic.      Comments: Diffuse bruising and small lacs to face/nose     Right Ear: External ear normal.      Left Ear: External ear normal. "      Nose: Nose normal.   Eyes:      Extraocular Movements: Extraocular movements intact.      Pupils: Pupils are equal, round, and reactive to light.      Comments: Facial swelling and bruising improved    Neck:      Thyroid: No thyromegaly.   Cardiovascular:      Rate and Rhythm: Normal rate and regular rhythm.      Heart sounds: No murmur heard.  Pulmonary:      Effort: Pulmonary effort is normal. No respiratory distress.      Breath sounds: Normal breath sounds. No wheezing.   Abdominal:      General: Bowel sounds are normal.      Palpations: Abdomen is soft.      Tenderness: There is no abdominal tenderness. There is no right CVA tenderness or left CVA tenderness.   Musculoskeletal:         General: Tenderness (b/l lower extremities, she yells out for all touching; right ankle wrapped and in walking boot) present.      Right lower leg: No edema.      Left lower leg: No edema.   Skin:     General: Skin is warm and dry.   Neurological:      General: No focal deficit present.      Mental Status: She is alert and oriented to person, place, and time.      Cranial Nerves: No cranial nerve deficit.      Gait: Gait abnormal.   Psychiatric:         Behavior: Behavior normal.         Thought Content: Thought content normal.           Significant Labs: All pertinent labs within the past 24 hours have been reviewed.  CBC:   Recent Labs   Lab 05/25/23  0545 05/26/23  0609   WBC 7.06 6.62   HGB 9.1* 9.2*   HCT 27.6* 28.5*   * 150     CMP:   Recent Labs   Lab 05/25/23  0545 05/26/23  0609   * 129*   K 4.3 4.7    100   CO2 22* 21*   * 224*   BUN 25* 38*   CREATININE 1.3 1.6*   CALCIUM 8.2* 8.2*   ANIONGAP 8 8     Troponin: No results for input(s): TROPONINI, TROPONINIHS in the last 48 hours.  TSH:   Recent Labs   Lab 05/23/23  0604   TSH 0.416

## 2023-05-26 NOTE — PLAN OF CARE
Problem: Physical Therapy  Goal: Physical Therapy Goal  Description:   Patient will increase functional independence with mobility by performin. Supine <> sit with Modified Independent  2. Sit <>Stand  with Standby/ Modified Independent using bilateral  LE orthosis(cam boot)  and RW  3. Bed to chair transfer with Standby Assistance with bilateral orthosis(cam boot)  and rolling walker using Step Transfer TECHNIQUE  4. Gait  x ~50  feet with Contact Guard/Standby  Assistance with bilateral LE orthosis(cam boot)   and with  rolling walker  5. Lower extremity exercise program x10 reps per handout, with assistance as needed     Outcome: Ongoing, Progressing

## 2023-05-26 NOTE — PT/OT/SLP PROGRESS
"Physical Therapy Treatment    Patient Name:  Merna Regalado   MRN:  [id number]    Recommendations:     Discharge Recommendations: home with home health, home health PT  Discharge Equipment Recommendations: walker, rolling  Barriers to discharge: None    Assessment:     Merna Regalado is a 62 y.o. female admitted with a medical diagnosis of Acute metabolic encephalopathy.  She presents with the following impairments/functional limitations: weakness, impaired endurance, impaired self care skills, impaired functional mobility, gait instability, impaired balance, decreased lower extremity function, decreased safety awareness, pain, decreased ROM, orthopedic precautions. Patient tolerated increased gait distance at the hallway x ~120 feet using bilateral LE Orthosis(cam boot)  and RW with Standby Assistance. Noted with greater stability with transfers and gait activity.    Rehab Prognosis: Fair; patient would benefit from acute skilled PT services to address these deficits and reach maximum level of function.    Recent Surgery: * No surgery found *      Plan:     During this hospitalization, patient to be seen daily to address the identified rehab impairments via gait training, therapeutic activities, therapeutic exercises and progress toward the following goals:    Plan of Care Expires:  05/31/23    Subjective     Chief Complaint: dec pain on right ankle/foot today.  Patient/Family Comments/goals: "to return home".  Pain/Comfort:  Pain Rating 1: 5/10  Location - Side 1: Right  Location 1: ankle  Pain Addressed 1: Cessation of Activity, Reposition, Other (see comments) (uses cam boot)  Pain Rating Post-Intervention 1: 5/10      Objective:     Communicated with patient prior to session.  Patient found HOB elevated with PureWick, peripheral IV, telemetry upon PT entry to room.     General Precautions: Standard, fall  Orthopedic Precautions:  (uses bliateral LE orthosis( cam boot) when out of bed)  Braces:  (uses " bliateral LE orthosis( cam boot) when out of bed)  Respiratory Status: Room air     Functional Mobility:  Bed Mobility:     Rolling Left:  independence  Rolling Right: independence  Scooting: independence  Supine to Sit: independence  Sit to Supine: independence  Transfers:     Sit to Stand:  stand by assistance with rolling walker  Bed to Chair: stand by assistance with  rolling walker  using  Step Transfer  Gait: Standby Assistance x ~120 feet using B LE Orthosis(cam boot) and RW.       AM-PAC 6 CLICK MOBILITY  Turning over in bed (including adjusting bedclothes, sheets and blankets)?: 4  Sitting down on and standing up from a chair with arms (e.g., wheelchair, bedside commode, etc.): 4  Moving from lying on back to sitting on the side of the bed?: 4  Moving to and from a bed to a chair (including a wheelchair)?: 3  Need to walk in hospital room?: 3  Climbing 3-5 steps with a railing?: 2  Basic Mobility Total Score: 20       Treatment & Education:  Worked on safety measures in out of bed activity; performed AROM ex and isometric ex on B LE; Gait trng x ~120 feet using B Cam Boot  and RW with SBA    Patient left up in chair with all lines intact, call button in reach, and nursing notified..    GOALS:   Multidisciplinary Problems       Physical Therapy Goals          Problem: Physical Therapy    Goal Priority Disciplines Outcome Goal Variances Interventions   Physical Therapy Goal     PT, PT/OT Ongoing, Progressing     Description:   Patient will increase functional independence with mobility by performin. Supine <> sit with Modified Independent  2. Sit <>Stand  with Standby/ Modified Independent using bilateral  LE orthosis(cam boot)  and RW  3. Bed to chair transfer with Standby Assistance with bilateral orthosis(cam boot)  and rolling walker using Step Transfer TECHNIQUE  4. Gait  x ~50  feet with Contact Guard/Standby  Assistance with bilateral LE orthosis(cam boot)   and with  rolling walker  5. Lower  extremity exercise program x10 reps per handout, with assistance as needed                          Time Tracking:     PT Received On: 05/26/23  PT Start Time: 0938     PT Stop Time: 1005  PT Total Time (min): 27 min     Billable Minutes: Gait Training 15 and Therapeutic Activity 12    Treatment Type: Treatment  PT/PTA: PT           05/26/2023

## 2023-05-26 NOTE — ASSESSMENT & PLAN NOTE
Questionable  Unclear on history if she felt weak when trying to stand on her legs due to pain or if had true loss of consciousness; not a great historian  Adjusting BP meds as noted in HTN and CAD plan  Cis consulted given extensive cardiac history  Telemetry  Electrolytes ok  IVF were given  Treating UTI .

## 2023-05-27 NOTE — PT/OT/SLP DISCHARGE
Physical Therapy Discharge Summary    Name: Merna Regalado  MRN: [id number]   Principal Problem: Acute metabolic encephalopathy     Patient Discharged from acute Physical Therapy on 23.  Please refer to prior PT noted date on 23 for functional status.     Assessment:     Patient appropriate for care in another setting.    Objective:     GOALS:   Multidisciplinary Problems       Physical Therapy Goals          Problem: Physical Therapy    Goal Priority Disciplines Outcome Goal Variances Interventions   Physical Therapy Goal     PT, PT/OT Ongoing, Progressing     Description:   Patient will increase functional independence with mobility by performin. Supine <> sit with Modified Independent  2. Sit <>Stand  with Standby/ Modified Independent using bilateral  LE orthosis(cam boot)  and RW  3. Bed to chair transfer with Standby Assistance with bilateral orthosis(cam boot)  and rolling walker using Step Transfer TECHNIQUE  4. Gait  x ~50  feet with Contact Guard/Standby  Assistance with bilateral LE orthosis(cam boot)   and with  rolling walker  5. Lower extremity exercise program x10 reps per handout, with assistance as needed                          Reasons for Discontinuation of Therapy Services  Transfer to alternate level of care. and Satisfactory goal achievement.      Plan:     Patient Discharged to: Home with Home Health Service.      2023

## 2023-05-30 NOTE — DISCHARGE SUMMARY
Subjective:      Principal Problem:Acute metabolic encephalopathy           HPI:  Patient presented to ER with falls and weaknes at home. She had a fall at home 3x prior to admission. She has a nasal fracture and has a few lacs on her head. CT head negative for acute intracranial pathology. She reports she had loss of consciousness but family reports she was responding to them. She reports for initial fall she was sitting on toilet and when she went to pull herself up she blacked out and slide to the floor and hit her nose. Denies chest pains, palpitations, SOB. She had a fall 1 year ago and broke her right ankle but her left ankle is hurting and this is new. During our interview she keeps calling out in pain and tells me both are hurting and she is concerning she has a new fracture on the right side; right in walking boot. She is having pressure with urination, sx started 1 day ago. No fevers. She had some confusion last night in ER but this morning she is awake, alert and oriented to person, place time and situation. However, she is a very poor historian and her  (they are ) helps provide some of the history.         Overview/Hospital Course:  Patient admitted for near-syncope causing facial contusion with nasal fractures, urinary tract infection currently receiving IV Cipro.  Cardiologist evaluated patient and recommends continuation of her home blood pressure medications and 1 month Holter monitor upon discharge.  She is currently in normal sinus rhythm on telemetry.  Other than having facial bruising and soreness she is doing okay.  Another problem is the fact that she has a severe right ankle and tib-fib fracture that needs ORIF.  She had this done on her left ankle in the past.  She is able to ambulate if she is wearing her orthopedic boots in both legs.  Physical therapy is working with the patient.  She is a definite fall risk.  This morning she obtain both of her walking boots and put them  "on.  She was able to transfer her weight from the bed to a chair.  She still feels weak and unstable.  She would like 1 more day of therapy.        5/26/23  She looks better ;  She walked with PT   I discussed with her the need close follow up for uti and orthopedics               Review of Systems   Constitutional:  Negative for activity change, fatigue, fever and unexpected weight change.   HENT:  Positive for facial swelling. Negative for congestion, ear pain, hearing loss, rhinorrhea and sore throat.         Bruising abd swelling much improved    Eyes:  Negative for redness and visual disturbance.   Respiratory:  Negative for cough, shortness of breath and wheezing.    Cardiovascular:  Negative for chest pain, palpitations and leg swelling.   Gastrointestinal:  Negative for abdominal pain, constipation, diarrhea, nausea and vomiting.   Genitourinary:  Positive for frequency (pressure in baldder). Negative for dysuria and urgency.   Musculoskeletal:  Positive for arthralgias and back pain (known "broken bones" in lower back). Negative for joint swelling and neck pain.   Skin:  Negative for color change, rash and wound.   Neurological:  Positive for syncope (possibly). Negative for dizziness, tremors, weakness, light-headedness and headaches.   Objective:      Vital Signs (Most Recent):  Temp: 97.6 °F (36.4 °C) (05/26/23 0740)  Pulse: (!) 54 (05/26/23 1000)  Resp: 15 (05/26/23 0740)  BP: (!) 161/69 (05/26/23 0740)  SpO2: 95 % (05/26/23 0740) Vital Signs (24h Range):  Temp:  [97.6 °F (36.4 °C)-98.5 °F (36.9 °C)] 97.6 °F (36.4 °C)  Pulse:  [49-61] 54  Resp:  [15-20] 15  SpO2:  [95 %-98 %] 95 %  BP: (145-185)/(58-77) 161/69      Weight: 96.1 kg (211 lb 13.8 oz)  Body mass index is 38.75 kg/m².     Intake/Output Summary (Last 24 hours) at 5/26/2023 1058  Last data filed at 5/26/2023 1036      Gross per 24 hour   Intake 1405.41 ml   Output 3600 ml   Net -2194.59 ml         Physical Exam  Vitals and nursing note " reviewed.   Constitutional:       General: She is not in acute distress.     Appearance: She is well-developed. She is obese.   HENT:      Head: Normocephalic.      Comments: Diffuse bruising and small lacs to face/nose     Right Ear: External ear normal.      Left Ear: External ear normal.      Nose: Nose normal.   Eyes:      Extraocular Movements: Extraocular movements intact.      Pupils: Pupils are equal, round, and reactive to light.      Comments: Facial swelling and bruising improved    Neck:      Thyroid: No thyromegaly.   Cardiovascular:      Rate and Rhythm: Normal rate and regular rhythm.      Heart sounds: No murmur heard.  Pulmonary:      Effort: Pulmonary effort is normal. No respiratory distress.      Breath sounds: Normal breath sounds. No wheezing.   Abdominal:      General: Bowel sounds are normal.      Palpations: Abdomen is soft.      Tenderness: There is no abdominal tenderness. There is no right CVA tenderness or left CVA tenderness.   Musculoskeletal:         General: Tenderness (b/l lower extremities, she yells out for all touching; right ankle wrapped and in walking boot) present.      Right lower leg: No edema.      Left lower leg: No edema.   Skin:     General: Skin is warm and dry.   Neurological:      General: No focal deficit present.      Mental Status: She is alert and oriented to person, place, and time.      Cranial Nerves: No cranial nerve deficit.      Gait: Gait abnormal.   Psychiatric:         Behavior: Behavior normal.         Thought Content: Thought content normal.             Significant Labs: All pertinent labs within the past 24 hours have been reviewed.  CBC:        Recent Labs   Lab 05/25/23  0545 05/26/23  0609   WBC 7.06 6.62   HGB 9.1* 9.2*   HCT 27.6* 28.5*   * 150      CMP:        Recent Labs   Lab 05/25/23  0545 05/26/23  0609   * 129*   K 4.3 4.7    100   CO2 22* 21*   * 224*   BUN 25* 38*   CREATININE 1.3 1.6*   CALCIUM 8.2* 8.2*    ANIONGAP 8 8      Troponin: No results for input(s): TROPONINI, TROPONINIHS in the last 48 hours.  TSH:       Recent Labs   Lab 05/23/23  0604   TSH 0.416         Assessment/Plan:      * Acute metabolic encephalopathy  Confused yesterday but today she is alert and oriented x4  Resolved  Likely some dehydration and UTI induced. Cont antibx and follow up urine culture.   Send home on cipro 500 mg BID for 5 days      Closed fracture of nasal bone with routine healing  New problem s/p fall at home  Radiology reports reviewed  Can follow up ENT outpatient.           Syncope and collapse  Questionable  Unclear on history if she felt weak when trying to stand on her legs due to pain or if had true loss of consciousness; not a great historian  Adjusting BP meds as noted in HTN and CAD plan  Cis consulted given extensive cardiac history  Telemetry  Electrolytes ok  IVF were given  Treating UTI .        Congestive heart failure  Unclear history here. She is not able to provide much. TTE is normal 5/2023.  EF is preserved   Continue Procardia XL 60 mg daily   Continue lisinopril 40 mg daily   Continue doxazosin 8 mg daily   Continue Lasix 20 mg daily   Continue isosorbide 30 mg daily  Continue Plavix 75 mg daily  Continue aspirin 81 mg daily  Continue Ranexa 500 mg twice daily  Continue carvedilol 12.5 mg twice daily  [facility] consulted     Echo     Interpretation Summary  · The left ventricle is normal in size with normal systolic function.  · The estimated ejection fraction is 65%.  · Normal right ventricular size with normal right ventricular systolic function.  · Normal left ventricular diastolic function.  · Normal central venous pressure (3 mmHg).  · The estimated PA systolic pressure is 32 mmHg.        S/P ORIF (open reduction internal fixation) fracture  Right ankle wrapped and in walking boot  She also reports h/o left ankle fracture years ago  Was planing right ankle fixation with ortho  Most of our intervention she is  "yelling out in pain and is very concerned she "re-broke" her ankles. No swelling or deformity noted on exam but exam is limited as she yells every time I touch her. xrays ordered to confirm stability of hardware and no new fractures  PT ordered to ensure safe to send home given she had 3 falls prior to admit. Reports she was using walker prior to admit.  She needs to get both of her boots so she can safely walk with physical therapy by tomorrow.    When she is able to resume normal activity she can be discharged.  Hopefully tomorrow.        CAD (coronary artery disease)  Her home medications are a bit unclear. Reviewed medicine bottles she has and she is on a lot of medications. Maybe was having medication induced hypotension prior to admit contributing to weakness, falls and possible syncope  For not cont Ranexa, Coreg, ASA, statin, Plavix. CHF history noted so continue ACEi as well, looks like [facility] increased dose to 40mg so will continue that for now.  Cardiology wants to resume nifedipine and Imdur and resume as needed.  [facility] consulted--extensive cardiac history in setting of possible syncope.  They would like to do a 1 month Holter monitor upon discharge.        Acute cystitis with hematuria  Continue cipro  Urine cx growing Klebsiella sensitive to everything  Blood cx NGTD thus far.        Hypothyroid  Resume home synthroid  TSH normal.     Hyperlipidemia associated with type 2 diabetes mellitus  Resume home atorvastatin.     Diabetes mellitus  Patient's FSGs are controlled on current medication regimen.  Last A1c reviewed-         Lab Results   Component Value Date     HGBA1C 6.3 (H) 05/22/2023      Most recent fingerstick glucose reviewed-          Recent Labs   Lab 05/25/23  1123 05/25/23  1625 05/25/23 2009 05/26/23  0714   POCTGLUCOSE 283* 167* 272* 232*      Current correctional scale  Medium  Maintain anti-hyperglycemic dose as follows-             Antihyperglycemics (From admission, onward)     Start     " Stop Route Frequency Ordered     05/23/23 1130   insulin detemir U-100 (Levemir) pen 20 Units         -- SubQ Daily 05/23/23 1030     05/22/23 1950   insulin aspart U-100 pen 1-10 Units         -- SubQ Before meals & nightly PRN 05/22/23 1851          Hold Oral hypoglycemics while patient is in the hospital.     Resume basal insulin at 20 units daily with SSI. On basal insulin and metformin at home.  Diabetic diet.     Hypertension  Evaluated by Cardiology this morning   He wants to resume the Procardia XL 60 mg daily, doxazosin, Lasix.  Resume coreg, lisinopril, ranexa  BP currently stable  Possible med induced hypotension at home causing syncope  Med history and dosages a bit unclear and she sees [facility] who managed so consulted as well given syncope.                     VTE Risk Mitigation (From admission, onward)           Ordered       IP VTE HIGH RISK PATIENT  Once         05/22/23 1712       Place sequential compression device  Until discontinued         05/22/23 1712                    Discharge Planning   [place]:      Code Status: Full Code   Is the patient medically ready for discharge?:     Reason for patient still in hospital (select all that apply): Pending disposition  Discharge Plan A: Home with family                     La Nena Vasquez MD  Department of Hospital Medicine   [facility] (3rd Fl)

## 2023-09-28 PROBLEM — Z01.811 PRE-OP CHEST EXAM: Status: ACTIVE | Noted: 2023-09-28

## 2023-10-07 ENCOUNTER — HOSPITAL ENCOUNTER (INPATIENT)
Facility: HOSPITAL | Age: 63
LOS: 4 days | Discharge: HOME HEALTH | DRG: 189 | End: 2023-10-11
Attending: STUDENT IN AN ORGANIZED HEALTH CARE EDUCATION/TRAINING PROGRAM | Admitting: STUDENT IN AN ORGANIZED HEALTH CARE EDUCATION/TRAINING PROGRAM
Payer: MEDICAID

## 2023-10-07 DIAGNOSIS — R06.02 SHORTNESS OF BREATH: ICD-10-CM

## 2023-10-07 DIAGNOSIS — R07.89 CHEST HEAVINESS: ICD-10-CM

## 2023-10-07 DIAGNOSIS — J96.01 ACUTE HYPOXEMIC RESPIRATORY FAILURE: Primary | ICD-10-CM

## 2023-10-07 DIAGNOSIS — Z79.4 TYPE 2 DIABETES MELLITUS WITH DIABETIC POLYNEUROPATHY, WITH LONG-TERM CURRENT USE OF INSULIN: ICD-10-CM

## 2023-10-07 DIAGNOSIS — I25.10 CORONARY ARTERY DISEASE, UNSPECIFIED VESSEL OR LESION TYPE, UNSPECIFIED WHETHER ANGINA PRESENT, UNSPECIFIED WHETHER NATIVE OR TRANSPLANTED HEART: ICD-10-CM

## 2023-10-07 DIAGNOSIS — E11.42 TYPE 2 DIABETES MELLITUS WITH DIABETIC POLYNEUROPATHY, WITH LONG-TERM CURRENT USE OF INSULIN: ICD-10-CM

## 2023-10-07 LAB
ABO + RH BLD: NORMAL
ALBUMIN SERPL BCP-MCNC: 2.7 G/DL (ref 3.5–5.2)
ALP SERPL-CCNC: 71 U/L (ref 55–135)
ALT SERPL W/O P-5'-P-CCNC: 20 U/L (ref 10–44)
ANION GAP SERPL CALC-SCNC: 12 MMOL/L (ref 8–16)
AST SERPL-CCNC: 15 U/L (ref 10–40)
BASOPHILS # BLD AUTO: 0.02 K/UL (ref 0–0.2)
BASOPHILS NFR BLD: 0.2 % (ref 0–1.9)
BILIRUB SERPL-MCNC: 0.4 MG/DL (ref 0.1–1)
BLD GP AB SCN CELLS X3 SERPL QL: NORMAL
BUN SERPL-MCNC: 21 MG/DL (ref 8–23)
CALCIUM SERPL-MCNC: 8.5 MG/DL (ref 8.7–10.5)
CHLORIDE SERPL-SCNC: 107 MMOL/L (ref 95–110)
CO2 SERPL-SCNC: 17 MMOL/L (ref 23–29)
CREAT SERPL-MCNC: 1.4 MG/DL (ref 0.5–1.4)
D DIMER PPP IA.FEU-MCNC: 3.84 MG/L FEU
DIFFERENTIAL METHOD: ABNORMAL
EOSINOPHIL # BLD AUTO: 0.1 K/UL (ref 0–0.5)
EOSINOPHIL NFR BLD: 1.4 % (ref 0–8)
ERYTHROCYTE [DISTWIDTH] IN BLOOD BY AUTOMATED COUNT: 15.1 % (ref 11.5–14.5)
EST. GFR  (NO RACE VARIABLE): 43 ML/MIN/1.73 M^2
GLUCOSE SERPL-MCNC: 212 MG/DL (ref 70–110)
HCT VFR BLD AUTO: 21.7 % (ref 37–48.5)
HGB BLD-MCNC: 6.7 G/DL (ref 12–16)
IMM GRANULOCYTES # BLD AUTO: 0.18 K/UL (ref 0–0.04)
IMM GRANULOCYTES NFR BLD AUTO: 1.8 % (ref 0–0.5)
LYMPHOCYTES # BLD AUTO: 1.9 K/UL (ref 1–4.8)
LYMPHOCYTES NFR BLD: 18.4 % (ref 18–48)
MCH RBC QN AUTO: 29.3 PG (ref 27–31)
MCHC RBC AUTO-ENTMCNC: 30.9 G/DL (ref 32–36)
MCV RBC AUTO: 95 FL (ref 82–98)
MONOCYTES # BLD AUTO: 0.6 K/UL (ref 0.3–1)
MONOCYTES NFR BLD: 5.5 % (ref 4–15)
NEUTROPHILS # BLD AUTO: 7.5 K/UL (ref 1.8–7.7)
NEUTROPHILS NFR BLD: 72.7 % (ref 38–73)
NRBC BLD-RTO: 0 /100 WBC
PLATELET # BLD AUTO: 207 K/UL (ref 150–450)
PMV BLD AUTO: 10 FL (ref 9.2–12.9)
POTASSIUM SERPL-SCNC: 4 MMOL/L (ref 3.5–5.1)
PROT SERPL-MCNC: 5.8 G/DL (ref 6–8.4)
RBC # BLD AUTO: 2.29 M/UL (ref 4–5.4)
SODIUM SERPL-SCNC: 136 MMOL/L (ref 136–145)
SPECIMEN OUTDATE: NORMAL
TROPONIN I SERPL DL<=0.01 NG/ML-MCNC: 0.68 NG/ML (ref 0–0.03)
WBC # BLD AUTO: 10.28 K/UL (ref 3.9–12.7)

## 2023-10-07 PROCEDURE — 94640 AIRWAY INHALATION TREATMENT: CPT | Mod: XB

## 2023-10-07 PROCEDURE — 85379 FIBRIN DEGRADATION QUANT: CPT | Performed by: STUDENT IN AN ORGANIZED HEALTH CARE EDUCATION/TRAINING PROGRAM

## 2023-10-07 PROCEDURE — 93005 ELECTROCARDIOGRAM TRACING: CPT

## 2023-10-07 PROCEDURE — 80053 COMPREHEN METABOLIC PANEL: CPT | Performed by: STUDENT IN AN ORGANIZED HEALTH CARE EDUCATION/TRAINING PROGRAM

## 2023-10-07 PROCEDURE — 99285 EMERGENCY DEPT VISIT HI MDM: CPT | Mod: 25

## 2023-10-07 PROCEDURE — 36415 COLL VENOUS BLD VENIPUNCTURE: CPT | Performed by: STUDENT IN AN ORGANIZED HEALTH CARE EDUCATION/TRAINING PROGRAM

## 2023-10-07 PROCEDURE — 93010 EKG 12-LEAD: ICD-10-PCS | Mod: ,,, | Performed by: INTERNAL MEDICINE

## 2023-10-07 PROCEDURE — 84484 ASSAY OF TROPONIN QUANT: CPT | Performed by: STUDENT IN AN ORGANIZED HEALTH CARE EDUCATION/TRAINING PROGRAM

## 2023-10-07 PROCEDURE — 25000242 PHARM REV CODE 250 ALT 637 W/ HCPCS: Performed by: STUDENT IN AN ORGANIZED HEALTH CARE EDUCATION/TRAINING PROGRAM

## 2023-10-07 PROCEDURE — 94761 N-INVAS EAR/PLS OXIMETRY MLT: CPT

## 2023-10-07 PROCEDURE — 11000001 HC ACUTE MED/SURG PRIVATE ROOM

## 2023-10-07 PROCEDURE — 86900 BLOOD TYPING SEROLOGIC ABO: CPT | Performed by: STUDENT IN AN ORGANIZED HEALTH CARE EDUCATION/TRAINING PROGRAM

## 2023-10-07 PROCEDURE — 99900035 HC TECH TIME PER 15 MIN (STAT)

## 2023-10-07 PROCEDURE — 93010 ELECTROCARDIOGRAM REPORT: CPT | Mod: ,,, | Performed by: INTERNAL MEDICINE

## 2023-10-07 PROCEDURE — 85025 COMPLETE CBC W/AUTO DIFF WBC: CPT | Performed by: STUDENT IN AN ORGANIZED HEALTH CARE EDUCATION/TRAINING PROGRAM

## 2023-10-07 PROCEDURE — 27000221 HC OXYGEN, UP TO 24 HOURS

## 2023-10-07 RX ORDER — HYDROCODONE BITARTRATE AND ACETAMINOPHEN 500; 5 MG/1; MG/1
TABLET ORAL
Status: DISCONTINUED | OUTPATIENT
Start: 2023-10-08 | End: 2023-10-11 | Stop reason: HOSPADM

## 2023-10-07 RX ORDER — IPRATROPIUM BROMIDE AND ALBUTEROL SULFATE 2.5; .5 MG/3ML; MG/3ML
3 SOLUTION RESPIRATORY (INHALATION)
Status: COMPLETED | OUTPATIENT
Start: 2023-10-07 | End: 2023-10-07

## 2023-10-07 RX ADMIN — IPRATROPIUM BROMIDE AND ALBUTEROL SULFATE 3 ML: 2.5; .5 SOLUTION RESPIRATORY (INHALATION) at 11:10

## 2023-10-08 LAB
ALBUMIN SERPL BCP-MCNC: 2.9 G/DL (ref 3.5–5.2)
ALP SERPL-CCNC: 62 U/L (ref 55–135)
ALT SERPL W/O P-5'-P-CCNC: 15 U/L (ref 10–44)
ANION GAP SERPL CALC-SCNC: 11 MMOL/L (ref 8–16)
AST SERPL-CCNC: 12 U/L (ref 10–40)
BASOPHILS # BLD AUTO: 0.02 K/UL (ref 0–0.2)
BASOPHILS NFR BLD: 0.2 % (ref 0–1.9)
BILIRUB SERPL-MCNC: 0.5 MG/DL (ref 0.1–1)
BLD PROD TYP BPU: NORMAL
BLOOD UNIT EXPIRATION DATE: NORMAL
BLOOD UNIT TYPE CODE: 5100
BLOOD UNIT TYPE: NORMAL
BUN SERPL-MCNC: 20 MG/DL (ref 8–23)
CALCIUM SERPL-MCNC: 8.9 MG/DL (ref 8.7–10.5)
CHLORIDE SERPL-SCNC: 107 MMOL/L (ref 95–110)
CO2 SERPL-SCNC: 17 MMOL/L (ref 23–29)
CODING SYSTEM: NORMAL
CREAT SERPL-MCNC: 1.4 MG/DL (ref 0.5–1.4)
CROSSMATCH INTERPRETATION: NORMAL
DIFFERENTIAL METHOD: ABNORMAL
DISPENSE STATUS: NORMAL
EOSINOPHIL # BLD AUTO: 0 K/UL (ref 0–0.5)
EOSINOPHIL NFR BLD: 0.1 % (ref 0–8)
ERYTHROCYTE [DISTWIDTH] IN BLOOD BY AUTOMATED COUNT: 14.6 % (ref 11.5–14.5)
EST. GFR  (NO RACE VARIABLE): 43 ML/MIN/1.73 M^2
FERRITIN SERPL-MCNC: 118 NG/ML (ref 20–300)
FOLATE SERPL-MCNC: 6.1 NG/ML (ref 4–24)
GLUCOSE SERPL-MCNC: 236 MG/DL (ref 70–110)
HCT VFR BLD AUTO: 25.6 % (ref 37–48.5)
HGB BLD-MCNC: 8.1 G/DL (ref 12–16)
IMM GRANULOCYTES # BLD AUTO: 0.25 K/UL (ref 0–0.04)
IMM GRANULOCYTES NFR BLD AUTO: 2.4 % (ref 0–0.5)
IRON SERPL-MCNC: 48 UG/DL (ref 30–160)
LYMPHOCYTES # BLD AUTO: 0.4 K/UL (ref 1–4.8)
LYMPHOCYTES NFR BLD: 4.3 % (ref 18–48)
MCH RBC QN AUTO: 29.6 PG (ref 27–31)
MCHC RBC AUTO-ENTMCNC: 31.6 G/DL (ref 32–36)
MCV RBC AUTO: 93 FL (ref 82–98)
MONOCYTES # BLD AUTO: 0.1 K/UL (ref 0.3–1)
MONOCYTES NFR BLD: 1.4 % (ref 4–15)
NEUTROPHILS # BLD AUTO: 9.4 K/UL (ref 1.8–7.7)
NEUTROPHILS NFR BLD: 91.6 % (ref 38–73)
NRBC BLD-RTO: 0 /100 WBC
NUM UNITS TRANS PACKED RBC: NORMAL
OB PNL STL: NEGATIVE
PLATELET # BLD AUTO: 212 K/UL (ref 150–450)
PMV BLD AUTO: 10.1 FL (ref 9.2–12.9)
POCT GLUCOSE: 184 MG/DL (ref 70–110)
POCT GLUCOSE: 186 MG/DL (ref 70–110)
POCT GLUCOSE: 227 MG/DL (ref 70–110)
POCT GLUCOSE: 269 MG/DL (ref 70–110)
POCT GLUCOSE: 271 MG/DL (ref 70–110)
POTASSIUM SERPL-SCNC: 4.1 MMOL/L (ref 3.5–5.1)
PROT SERPL-MCNC: 6.3 G/DL (ref 6–8.4)
RBC # BLD AUTO: 2.74 M/UL (ref 4–5.4)
RETICS/RBC NFR AUTO: 6.9 % (ref 0.5–2.5)
SATURATED IRON: 19 % (ref 20–50)
SODIUM SERPL-SCNC: 135 MMOL/L (ref 136–145)
TOTAL IRON BINDING CAPACITY: 259 UG/DL (ref 250–450)
TRANSFERRIN SERPL-MCNC: 175 MG/DL (ref 200–375)
TROPONIN I SERPL DL<=0.01 NG/ML-MCNC: 0.56 NG/ML (ref 0–0.03)
TROPONIN I SERPL DL<=0.01 NG/ML-MCNC: 0.57 NG/ML (ref 0–0.03)
TROPONIN I SERPL DL<=0.01 NG/ML-MCNC: 0.66 NG/ML (ref 0–0.03)
VIT B12 SERPL-MCNC: 491 PG/ML (ref 210–950)
WBC # BLD AUTO: 10.3 K/UL (ref 3.9–12.7)

## 2023-10-08 PROCEDURE — 82728 ASSAY OF FERRITIN: CPT | Performed by: STUDENT IN AN ORGANIZED HEALTH CARE EDUCATION/TRAINING PROGRAM

## 2023-10-08 PROCEDURE — 93010 ELECTROCARDIOGRAM REPORT: CPT | Mod: ,,, | Performed by: INTERNAL MEDICINE

## 2023-10-08 PROCEDURE — 99223 PR INITIAL HOSPITAL CARE,LEVL III: ICD-10-PCS | Mod: ,,, | Performed by: STUDENT IN AN ORGANIZED HEALTH CARE EDUCATION/TRAINING PROGRAM

## 2023-10-08 PROCEDURE — G0378 HOSPITAL OBSERVATION PER HR: HCPCS

## 2023-10-08 PROCEDURE — 25500020 PHARM REV CODE 255: Performed by: STUDENT IN AN ORGANIZED HEALTH CARE EDUCATION/TRAINING PROGRAM

## 2023-10-08 PROCEDURE — 82272 OCCULT BLD FECES 1-3 TESTS: CPT | Performed by: STUDENT IN AN ORGANIZED HEALTH CARE EDUCATION/TRAINING PROGRAM

## 2023-10-08 PROCEDURE — 36415 COLL VENOUS BLD VENIPUNCTURE: CPT | Performed by: STUDENT IN AN ORGANIZED HEALTH CARE EDUCATION/TRAINING PROGRAM

## 2023-10-08 PROCEDURE — 80053 COMPREHEN METABOLIC PANEL: CPT | Performed by: STUDENT IN AN ORGANIZED HEALTH CARE EDUCATION/TRAINING PROGRAM

## 2023-10-08 PROCEDURE — 96372 THER/PROPH/DIAG INJ SC/IM: CPT | Performed by: STUDENT IN AN ORGANIZED HEALTH CARE EDUCATION/TRAINING PROGRAM

## 2023-10-08 PROCEDURE — 94761 N-INVAS EAR/PLS OXIMETRY MLT: CPT

## 2023-10-08 PROCEDURE — 25000242 PHARM REV CODE 250 ALT 637 W/ HCPCS: Performed by: STUDENT IN AN ORGANIZED HEALTH CARE EDUCATION/TRAINING PROGRAM

## 2023-10-08 PROCEDURE — 11000001 HC ACUTE MED/SURG PRIVATE ROOM

## 2023-10-08 PROCEDURE — 84466 ASSAY OF TRANSFERRIN: CPT | Performed by: STUDENT IN AN ORGANIZED HEALTH CARE EDUCATION/TRAINING PROGRAM

## 2023-10-08 PROCEDURE — 85045 AUTOMATED RETICULOCYTE COUNT: CPT | Performed by: STUDENT IN AN ORGANIZED HEALTH CARE EDUCATION/TRAINING PROGRAM

## 2023-10-08 PROCEDURE — 93010 EKG 12-LEAD: ICD-10-PCS | Mod: ,,, | Performed by: INTERNAL MEDICINE

## 2023-10-08 PROCEDURE — 99900035 HC TECH TIME PER 15 MIN (STAT)

## 2023-10-08 PROCEDURE — 63600175 PHARM REV CODE 636 W HCPCS: Performed by: STUDENT IN AN ORGANIZED HEALTH CARE EDUCATION/TRAINING PROGRAM

## 2023-10-08 PROCEDURE — 85025 COMPLETE CBC W/AUTO DIFF WBC: CPT | Performed by: STUDENT IN AN ORGANIZED HEALTH CARE EDUCATION/TRAINING PROGRAM

## 2023-10-08 PROCEDURE — 25000003 PHARM REV CODE 250: Performed by: STUDENT IN AN ORGANIZED HEALTH CARE EDUCATION/TRAINING PROGRAM

## 2023-10-08 PROCEDURE — 86922 COMPATIBILITY TEST ANTIGLOB: CPT | Performed by: STUDENT IN AN ORGANIZED HEALTH CARE EDUCATION/TRAINING PROGRAM

## 2023-10-08 PROCEDURE — 36430 TRANSFUSION BLD/BLD COMPNT: CPT

## 2023-10-08 PROCEDURE — P9016 RBC LEUKOCYTES REDUCED: HCPCS | Performed by: STUDENT IN AN ORGANIZED HEALTH CARE EDUCATION/TRAINING PROGRAM

## 2023-10-08 PROCEDURE — 82607 VITAMIN B-12: CPT | Performed by: STUDENT IN AN ORGANIZED HEALTH CARE EDUCATION/TRAINING PROGRAM

## 2023-10-08 PROCEDURE — 82746 ASSAY OF FOLIC ACID SERUM: CPT | Performed by: STUDENT IN AN ORGANIZED HEALTH CARE EDUCATION/TRAINING PROGRAM

## 2023-10-08 PROCEDURE — 93005 ELECTROCARDIOGRAM TRACING: CPT

## 2023-10-08 PROCEDURE — 83540 ASSAY OF IRON: CPT | Performed by: STUDENT IN AN ORGANIZED HEALTH CARE EDUCATION/TRAINING PROGRAM

## 2023-10-08 PROCEDURE — 94799 UNLISTED PULMONARY SVC/PX: CPT

## 2023-10-08 PROCEDURE — 27000221 HC OXYGEN, UP TO 24 HOURS

## 2023-10-08 PROCEDURE — 84484 ASSAY OF TROPONIN QUANT: CPT | Mod: 91 | Performed by: STUDENT IN AN ORGANIZED HEALTH CARE EDUCATION/TRAINING PROGRAM

## 2023-10-08 PROCEDURE — 99223 1ST HOSP IP/OBS HIGH 75: CPT | Mod: ,,, | Performed by: STUDENT IN AN ORGANIZED HEALTH CARE EDUCATION/TRAINING PROGRAM

## 2023-10-08 PROCEDURE — 99900031 HC PATIENT EDUCATION (STAT)

## 2023-10-08 PROCEDURE — 94640 AIRWAY INHALATION TREATMENT: CPT

## 2023-10-08 RX ORDER — LIDOCAINE HYDROCHLORIDE 20 MG/ML
15 SOLUTION OROPHARYNGEAL ONCE
Status: COMPLETED | OUTPATIENT
Start: 2023-10-08 | End: 2023-10-08

## 2023-10-08 RX ORDER — SODIUM CHLORIDE 0.9 % (FLUSH) 0.9 %
10 SYRINGE (ML) INJECTION
Status: DISCONTINUED | OUTPATIENT
Start: 2023-10-08 | End: 2023-10-11 | Stop reason: HOSPADM

## 2023-10-08 RX ORDER — MAG HYDROX/ALUMINUM HYD/SIMETH 200-200-20
30 SUSPENSION, ORAL (FINAL DOSE FORM) ORAL ONCE
Status: DISCONTINUED | OUTPATIENT
Start: 2023-10-08 | End: 2023-10-08

## 2023-10-08 RX ORDER — ERGOCALCIFEROL 1.25 MG/1
50000 CAPSULE ORAL
Status: ON HOLD | COMMUNITY
End: 2023-10-11 | Stop reason: HOSPADM

## 2023-10-08 RX ORDER — FAMOTIDINE 20 MG/1
40 TABLET, FILM COATED ORAL DAILY
Status: DISCONTINUED | OUTPATIENT
Start: 2023-10-08 | End: 2023-10-11 | Stop reason: HOSPADM

## 2023-10-08 RX ORDER — ISOSORBIDE DINITRATE 20 MG/1
60 TABLET ORAL DAILY
Status: DISCONTINUED | OUTPATIENT
Start: 2023-10-09 | End: 2023-10-11 | Stop reason: HOSPADM

## 2023-10-08 RX ORDER — NIFEDIPINE 60 MG/1
60 TABLET, EXTENDED RELEASE ORAL DAILY
COMMUNITY

## 2023-10-08 RX ORDER — MAG HYDROX/ALUMINUM HYD/SIMETH 200-200-20
30 SUSPENSION, ORAL (FINAL DOSE FORM) ORAL ONCE
Status: COMPLETED | OUTPATIENT
Start: 2023-10-08 | End: 2023-10-08

## 2023-10-08 RX ORDER — DOXAZOSIN 2 MG/1
4 TABLET ORAL NIGHTLY
Status: DISCONTINUED | OUTPATIENT
Start: 2023-10-08 | End: 2023-10-11 | Stop reason: HOSPADM

## 2023-10-08 RX ORDER — GABAPENTIN 300 MG/1
300 CAPSULE ORAL 3 TIMES DAILY
Status: DISCONTINUED | OUTPATIENT
Start: 2023-10-08 | End: 2023-10-11 | Stop reason: HOSPADM

## 2023-10-08 RX ORDER — FUROSEMIDE 20 MG/1
20 TABLET ORAL DAILY
Status: DISCONTINUED | OUTPATIENT
Start: 2023-10-09 | End: 2023-10-09

## 2023-10-08 RX ORDER — HYDROXYZINE PAMOATE 25 MG/1
25 CAPSULE ORAL EVERY 4 HOURS PRN
Status: DISCONTINUED | OUTPATIENT
Start: 2023-10-08 | End: 2023-10-11 | Stop reason: HOSPADM

## 2023-10-08 RX ORDER — HYDROCODONE BITARTRATE AND ACETAMINOPHEN 5; 325 MG/1; MG/1
1 TABLET ORAL EVERY 8 HOURS PRN
Status: DISCONTINUED | OUTPATIENT
Start: 2023-10-08 | End: 2023-10-11 | Stop reason: HOSPADM

## 2023-10-08 RX ORDER — LEVOTHYROXINE SODIUM 100 UG/1
100 TABLET ORAL
Status: DISCONTINUED | OUTPATIENT
Start: 2023-10-09 | End: 2023-10-08

## 2023-10-08 RX ORDER — ALBUTEROL SULFATE 0.83 MG/ML
2.5 SOLUTION RESPIRATORY (INHALATION) EVERY 4 HOURS PRN
Status: DISCONTINUED | OUTPATIENT
Start: 2023-10-08 | End: 2023-10-11 | Stop reason: HOSPADM

## 2023-10-08 RX ORDER — NIFEDIPINE 30 MG/1
30 TABLET, EXTENDED RELEASE ORAL DAILY
Status: DISCONTINUED | OUTPATIENT
Start: 2023-10-08 | End: 2023-10-11 | Stop reason: HOSPADM

## 2023-10-08 RX ORDER — POLYETHYLENE GLYCOL 3350 17 G/17G
17 POWDER, FOR SOLUTION ORAL 3 TIMES DAILY PRN
Status: DISCONTINUED | OUTPATIENT
Start: 2023-10-08 | End: 2023-10-11 | Stop reason: HOSPADM

## 2023-10-08 RX ORDER — LEVOTHYROXINE SODIUM 88 UG/1
88 TABLET ORAL
Status: DISCONTINUED | OUTPATIENT
Start: 2023-10-09 | End: 2023-10-08

## 2023-10-08 RX ORDER — FAMOTIDINE 40 MG/1
40 TABLET, FILM COATED ORAL DAILY
COMMUNITY

## 2023-10-08 RX ORDER — ALBUTEROL SULFATE 90 UG/1
2 AEROSOL, METERED RESPIRATORY (INHALATION) EVERY 6 HOURS PRN
COMMUNITY

## 2023-10-08 RX ORDER — GLUCAGON 1 MG
1 KIT INJECTION
Status: DISCONTINUED | OUTPATIENT
Start: 2023-10-08 | End: 2023-10-11 | Stop reason: HOSPADM

## 2023-10-08 RX ORDER — ACETAMINOPHEN 325 MG/1
650 TABLET ORAL EVERY 6 HOURS PRN
Status: DISCONTINUED | OUTPATIENT
Start: 2023-10-08 | End: 2023-10-11 | Stop reason: HOSPADM

## 2023-10-08 RX ORDER — CARVEDILOL 12.5 MG/1
12.5 TABLET ORAL 2 TIMES DAILY WITH MEALS
COMMUNITY

## 2023-10-08 RX ORDER — CARVEDILOL 12.5 MG/1
12.5 TABLET ORAL 2 TIMES DAILY WITH MEALS
Status: DISCONTINUED | OUTPATIENT
Start: 2023-10-08 | End: 2023-10-11 | Stop reason: HOSPADM

## 2023-10-08 RX ORDER — INSULIN ASPART 100 [IU]/ML
0-10 INJECTION, SOLUTION INTRAVENOUS; SUBCUTANEOUS
Status: DISCONTINUED | OUTPATIENT
Start: 2023-10-08 | End: 2023-10-11 | Stop reason: HOSPADM

## 2023-10-08 RX ORDER — IBUPROFEN 200 MG
16 TABLET ORAL
Status: DISCONTINUED | OUTPATIENT
Start: 2023-10-08 | End: 2023-10-11 | Stop reason: HOSPADM

## 2023-10-08 RX ORDER — RANOLAZINE 500 MG/1
500 TABLET, EXTENDED RELEASE ORAL 2 TIMES DAILY
Status: DISCONTINUED | OUTPATIENT
Start: 2023-10-08 | End: 2023-10-11 | Stop reason: HOSPADM

## 2023-10-08 RX ORDER — ALUMINUM HYDROXIDE, MAGNESIUM HYDROXIDE, AND SIMETHICONE 2400; 240; 2400 MG/30ML; MG/30ML; MG/30ML
30 SUSPENSION ORAL EVERY 6 HOURS PRN
Status: DISCONTINUED | OUTPATIENT
Start: 2023-10-08 | End: 2023-10-11 | Stop reason: HOSPADM

## 2023-10-08 RX ORDER — IBUPROFEN 200 MG
24 TABLET ORAL
Status: DISCONTINUED | OUTPATIENT
Start: 2023-10-08 | End: 2023-10-11 | Stop reason: HOSPADM

## 2023-10-08 RX ORDER — TRAZODONE HYDROCHLORIDE 50 MG/1
100 TABLET ORAL NIGHTLY
Status: DISCONTINUED | OUTPATIENT
Start: 2023-10-08 | End: 2023-10-11 | Stop reason: HOSPADM

## 2023-10-08 RX ORDER — FLUOXETINE HYDROCHLORIDE 20 MG/1
20 CAPSULE ORAL DAILY
Status: DISCONTINUED | OUTPATIENT
Start: 2023-10-08 | End: 2023-10-11 | Stop reason: HOSPADM

## 2023-10-08 RX ORDER — TALC
6 POWDER (GRAM) TOPICAL NIGHTLY PRN
Status: DISCONTINUED | OUTPATIENT
Start: 2023-10-08 | End: 2023-10-11 | Stop reason: HOSPADM

## 2023-10-08 RX ORDER — LEVOTHYROXINE SODIUM 88 UG/1
88 TABLET ORAL
Status: DISCONTINUED | OUTPATIENT
Start: 2023-10-09 | End: 2023-10-11 | Stop reason: HOSPADM

## 2023-10-08 RX ORDER — LEVOTHYROXINE SODIUM 88 UG/1
88 TABLET ORAL
COMMUNITY

## 2023-10-08 RX ORDER — NAPROXEN SODIUM 220 MG/1
81 TABLET, FILM COATED ORAL DAILY
Status: DISCONTINUED | OUTPATIENT
Start: 2023-10-08 | End: 2023-10-11 | Stop reason: HOSPADM

## 2023-10-08 RX ORDER — ATORVASTATIN CALCIUM 80 MG/1
80 TABLET, FILM COATED ORAL NIGHTLY
Status: DISCONTINUED | OUTPATIENT
Start: 2023-10-08 | End: 2023-10-11 | Stop reason: HOSPADM

## 2023-10-08 RX ORDER — DOXAZOSIN 4 MG/1
8 TABLET ORAL NIGHTLY
COMMUNITY

## 2023-10-08 RX ORDER — LIDOCAINE HYDROCHLORIDE 20 MG/ML
15 SOLUTION OROPHARYNGEAL ONCE
Status: DISCONTINUED | OUTPATIENT
Start: 2023-10-08 | End: 2023-10-08

## 2023-10-08 RX ADMIN — INSULIN ASPART 3 UNITS: 100 INJECTION, SOLUTION INTRAVENOUS; SUBCUTANEOUS at 04:10

## 2023-10-08 RX ADMIN — ALBUTEROL SULFATE 2.5 MG: 2.5 SOLUTION RESPIRATORY (INHALATION) at 06:10

## 2023-10-08 RX ADMIN — INSULIN ASPART 6 UNITS: 100 INJECTION, SOLUTION INTRAVENOUS; SUBCUTANEOUS at 08:10

## 2023-10-08 RX ADMIN — INSULIN ASPART 2 UNITS: 100 INJECTION, SOLUTION INTRAVENOUS; SUBCUTANEOUS at 04:10

## 2023-10-08 RX ADMIN — RANOLAZINE 500 MG: 500 TABLET, EXTENDED RELEASE ORAL at 07:10

## 2023-10-08 RX ADMIN — ALUMINUM HYDROXIDE, MAGNESIUM HYDROXIDE, AND DIMETHICONE 30 ML: 200; 20; 200 SUSPENSION ORAL at 08:10

## 2023-10-08 RX ADMIN — ASPIRIN 81 MG 81 MG: 81 TABLET ORAL at 11:10

## 2023-10-08 RX ADMIN — FLUOXETINE 20 MG: 20 CAPSULE ORAL at 07:10

## 2023-10-08 RX ADMIN — POLYETHYLENE GLYCOL 3350 17 G: 17 POWDER, FOR SOLUTION ORAL at 03:10

## 2023-10-08 RX ADMIN — GABAPENTIN 300 MG: 300 CAPSULE ORAL at 07:10

## 2023-10-08 RX ADMIN — FAMOTIDINE 40 MG: 20 TABLET ORAL at 07:10

## 2023-10-08 RX ADMIN — LIDOCAINE HYDROCHLORIDE 15 ML: 20 SOLUTION ORAL; TOPICAL at 03:10

## 2023-10-08 RX ADMIN — IOHEXOL 100 ML: 350 INJECTION, SOLUTION INTRAVENOUS at 01:10

## 2023-10-08 RX ADMIN — ALUMINUM HYDROXIDE, MAGNESIUM HYDROXIDE, AND DIMETHICONE 30 ML: 200; 20; 200 SUSPENSION ORAL at 03:10

## 2023-10-08 RX ADMIN — INSULIN ASPART 4 UNITS: 100 INJECTION, SOLUTION INTRAVENOUS; SUBCUTANEOUS at 11:10

## 2023-10-08 RX ADMIN — LIDOCAINE HYDROCHLORIDE 15 ML: 20 SOLUTION ORAL at 08:10

## 2023-10-08 RX ADMIN — ALBUTEROL SULFATE 2.5 MG: 2.5 SOLUTION RESPIRATORY (INHALATION) at 12:10

## 2023-10-08 RX ADMIN — ACETAMINOPHEN 650 MG: 325 TABLET ORAL at 09:10

## 2023-10-08 RX ADMIN — TRAZODONE HYDROCHLORIDE 100 MG: 50 TABLET ORAL at 08:10

## 2023-10-08 RX ADMIN — ATORVASTATIN CALCIUM 80 MG: 80 TABLET, FILM COATED ORAL at 08:10

## 2023-10-08 RX ADMIN — INSULIN DETEMIR 15 UNITS: 100 INJECTION, SOLUTION SUBCUTANEOUS at 11:10

## 2023-10-08 NOTE — SUBJECTIVE & OBJECTIVE
Past Medical History:   Diagnosis Date    Asthma     Coronary artery disease     Diabetes mellitus     Dyslipidemia     GERD (gastroesophageal reflux disease)     Hypertension     Neuropathy     Thyroid disease        Past Surgical History:   Procedure Laterality Date    ANKLE HARDWARE REMOVAL Left 3/31/2022    Procedure: REMOVAL, HARDWARE, ANKLE;  Surgeon: Alec Pettit MD;  Location: [facility];  Service: Orthopedics;  Laterality: Left;    ANKLE HARDWARE REMOVAL Right 8/18/2022    Procedure: REMOVAL, HARDWARE, ANKLE;  Surgeon: Alec Pettit MD;  Location: [facility];  Service: Orthopedics;  Laterality: Right;    APPLICATION OF LARGE EXTERNAL FIXATION DEVICE TO TIBIA Left 11/05/2021    Procedure: APPLICATION, EXTERNAL FIXATION DEVICE, LARGE, TIBIA;  Surgeon: Bk Richter MD;  Location: [facility];  Service: Orthopedics;  Laterality: Left;    APPLICATION OF WOUND VACUUM-ASSISTED CLOSURE DEVICE Right 8/18/2022    Procedure: APPLICATION, WOUND VAC;  Surgeon: Alec Pettit MD;  Location: [facility];  Service: Orthopedics;  Laterality: Right;    cardiac stents      thinks 3 total (1st 2 was in 2009 and later 2015 or so)    CHOLECYSTECTOMY      CLOSED REDUCTION Right 5/11/2022    Procedure: CLOSED REDUCTION;  Surgeon: Alec Pettit MD;  Location: [facility];  Service: Orthopedics;  Laterality: Right;  Right Ankle    CLOSURE OF WOUND Right 8/18/2022    Procedure: CLOSURE, WOUND;  Surgeon: Alec Pettit MD;  Location: [facility];  Service: Orthopedics;  Laterality: Right;    EXTERNAL FIXATION Right 5/11/2022    Procedure: EXTERNAL FIXATION;  Surgeon: Alec Pettit MD;  Location: [facility];  Service: Orthopedics;  Laterality: Right;  Right Ankle    FEMORAL BYPASS      HYSTERECTOMY      IRRIGATION AND DEBRIDEMENT OF LOWER EXTREMITY Right 8/18/2022    Procedure: IRRIGATION AND DEBRIDEMENT, LOWER EXTREMITY;  Surgeon: Alec Pettit MD;  Location: [facility];  Service: Orthopedics;  Laterality: Right;    OPEN REDUCTION AND INTERNAL  FIXATION (ORIF) OF PILON FRACTURE Left 11/12/2021    Procedure: ORIF, FRACTURE, PILON;  Surgeon: Alec Pettit MD;  Location: [facility];  Service: Orthopedics;  Laterality: Left;    REMOVAL OF EXTERNAL FIXATION DEVICE Left 11/12/2021    Procedure: REMOVAL, EXTERNAL FIXATION DEVICE;  Surgeon: Alec Pettit MD;  Location: [facility];  Service: Orthopedics;  Laterality: Left;    REMOVAL OF EXTERNAL FIXATION DEVICE Right 5/20/2022    Procedure: REMOVAL, EXTERNAL FIXATION DEVICE;  Surgeon: Alec Pettit MD;  Location: [facility];  Service: Orthopedics;  Laterality: Right;    stents to kidney      TIBIOTALOCALCANEAL FUSION USING INTRAMEDULLARY STEPHANIE Right 9/28/2023    Procedure: FUSION, TIBIOTALOCALCANEAL, USING INTRAMEDULLARY STEPHANIE;  Surgeon: Juventino Murray MD;  Location: [facility];  Service: Orthopedics;  Laterality: Right;       Review of patient's allergies indicates:   Allergen Reactions    Codeine Nausea Only    Conjugated estrogens      Other reaction(s): Vomiting and cream causes vaginal swelling    Diphenhydramine hcl      Other reaction(s): Itching and elevated BS    Honey     Iodine Itching    Nyquil d [doxylamin-pse-dm-acetaminophen]      Other reaction(s): Propensity to adverse reactions to drug    Pcn [penicillins] Other (See Comments)     Headache and nausea with PO PCN     Potassium      Other reaction(s): Not available    Povidone-iodine      Other reaction(s): Not available, Propensity to adverse reactions to drug    Sulfa (sulfonamide antibiotics)     Oxycontin [oxycodone] Nausea And Vomiting       No current facility-administered medications on file prior to encounter.     Current Outpatient Medications on File Prior to Encounter   Medication Sig    albuterol sulfate (PROAIR HFA INHL) Inhale into the lungs.    alendronate (FOSAMAX) 70 MG tablet Take 1 tablet (70 mg total) by mouth every 7 days.    apremilast (OTEZLA) 30 mg Tab Take 1 tablet (30 mg total) by mouth 2 (two) times daily.    aspirin (ECOTRIN) 81  MG EC tablet Take 1 tablet (81 mg total) by mouth 2 (two) times daily.    aspirin 81 MG Chew Take 81 mg by mouth once daily.    atorvastatin (LIPITOR) 80 MG tablet Take 1 tablet (80 mg total) by mouth every evening.    clopidogreL (PLAVIX) 75 mg tablet Take 75 mg by mouth once daily.    doxycycline (VIBRAMYCIN) 100 MG Cap Take 1 capsule (100 mg total) by mouth 2 (two) times daily. for 21 days    ergocalciferol (ERGOCALCIFEROL) 50,000 unit Cap Take 50,000 Units by mouth every 7 days.    fluoxetine (PROZAC) 20 MG capsule TAKE 1 CAPSULE(S) EVERY DAY BY ORAL ROUTE FOR 30 DAYS.    furosemide (LASIX) 20 MG tablet furosemide 20 mg tablet, [RxNorm: 795437]    gabapentin (NEURONTIN) 300 MG capsule Take 300 mg by mouth 2 (two) times daily.    HYDROcodone-acetaminophen (NORCO) 7.5-325 mg per tablet Take 1 tablet by mouth every 4 (four) hours as needed for Pain.    ibuprofen (ADVIL,MOTRIN) 800 MG tablet ibuprofen 800 mg tablet, [RxNorm: 864798]    insulin (LANTUS SOLOSTAR U-100 INSULIN) glargine 100 units/mL SubQ pen Lantus Solostar U-100 Insulin 100 unit/mL (3 mL) subcutaneous pen, [RxNorm: 036708]    insulin lispro (HUMALOG KWIKPEN INSULIN) 100 unit/mL pen HumaLOG KwikPen (U-100) Insulin 100 unit/mL subcutaneous, [RxNorm: 8273427]    isosorbide dinitrate (ISORDIL) 30 MG Tab Take 30 mg by mouth once daily. Take 1 and 1/2 half tablets by mouth once daily    levothyroxine (SYNTHROID) 100 MCG tablet Take 1 tablet by mouth once daily.    lisinopriL (PRINIVIL,ZESTRIL) 40 MG tablet Take 0.5 tablets (20 mg total) by mouth once daily.    metFORMIN (GLUCOPHAGE) 500 MG tablet Take 1 tablet (500 mg total) by mouth daily with breakfast.    nitroGLYCERIN (NITROSTAT) 0.4 MG SL tablet Place 1 tablet (0.4 mg total) under the tongue every 5 (five) minutes as needed for Chest pain.    ondansetron (ZOFRAN-ODT) 4 MG TbDL Take 4 mg by mouth every 6 (six) hours as needed.    pravastatin (PRAVACHOL) 40 MG tablet Take 1 tablet by mouth every evening.     ranolazine (RANEXA) 500 MG Tb12 Take 500 mg by mouth 2 (two) times daily.    tiZANidine (ZANAFLEX) 4 MG tablet Take 1 tablet (4 mg total) by mouth every 8 (eight) hours.    trazodone (DESYREL) 100 MG tablet      Family History       Problem Relation (Age of Onset)    Breast cancer Sister    Cancer Maternal Aunt    Heart disease Mother          Tobacco Use    Smoking status: Former     Current packs/day: 0.00     Types: Cigarettes     Quit date: 11/15/2008     Years since quittin.9    Smokeless tobacco: Never   Substance and Sexual Activity    Alcohol use: No    Drug use: No    Sexual activity: Not Currently     Partners: Male     Birth control/protection: Surgical     Comment:      Review of Systems   Constitutional:  Positive for chills. Negative for fever.   HENT:  Negative for congestion and sore throat.    Respiratory:  Positive for cough and shortness of breath (much improved).    Cardiovascular:  Negative for chest pain.   Gastrointestinal:  Negative for abdominal pain, diarrhea, nausea and vomiting.   Genitourinary:  Negative for dysuria and hematuria.   Musculoskeletal:  Positive for arthralgias.   Neurological:  Negative for dizziness, syncope and light-headedness.   Psychiatric/Behavioral:  Negative for confusion.      Objective:     Vital Signs (Most Recent):  Temp: 98.8 °F (37.1 °C) (10/08/23 07)  Pulse: 79 (10/08/23 1000)  Resp: 18 (10/08/23 0718)  BP: (!) 127/59 (10/08/23 07)  SpO2: 98 % (10/08/23 0816) Vital Signs (24h Range):  Temp:  [97.9 °F (36.6 °C)-99.6 °F (37.6 °C)] 98.8 °F (37.1 °C)  Pulse:  [66-87] 79  Resp:  [18-25] 18  SpO2:  [93 %-100 %] 98 %  BP: (122-180)/(59-96) 127/59     Weight: 97 kg (213 lb 13.5 oz)  Body mass index is 39.11 kg/m².     Physical Exam  Vitals reviewed.   Constitutional:       General: She is not in acute distress.     Appearance: She is obese.   HENT:      Head: Normocephalic and atraumatic.      Mouth/Throat:      Mouth: Mucous membranes are  "moist.   Eyes:      Conjunctiva/sclera: Conjunctivae normal.      Pupils: Pupils are equal, round, and reactive to light.   Cardiovascular:      Rate and Rhythm: Normal rate and regular rhythm.      Heart sounds: Normal heart sounds. No murmur heard.  Pulmonary:      Effort: Pulmonary effort is normal. No respiratory distress.      Breath sounds: Normal breath sounds.   Abdominal:      General: Bowel sounds are normal. There is no distension.      Palpations: Abdomen is soft.      Tenderness: There is no abdominal tenderness.   Musculoskeletal:      Cervical back: Neck supple.      Comments: RLE with splint in place   Neurological:      General: No focal deficit present.      Mental Status: She is alert and oriented to person, place, and time.   Psychiatric:         Mood and Affect: Mood normal.         Behavior: Behavior normal.              CRANIAL NERVES     CN III, IV, VI   Pupils are equal, round, and reactive to light.       Significant Labs: All pertinent labs within the past 24 hours have been reviewed.  A1C:   Recent Labs   Lab 05/22/23  1909 09/18/23  1607   HGBA1C 6.3* 5.6     Blood Culture: No results for input(s): "LABBLOO" in the last 48 hours.  BMP:   Recent Labs   Lab 10/07/23  2256   *      K 4.0      CO2 17*   BUN 21   CREATININE 1.4   CALCIUM 8.5*     CBC:   Recent Labs   Lab 10/07/23  2256 10/08/23  0548   WBC 10.28 10.30   HGB 6.7* 8.1*   HCT 21.7* 25.6*    212     CMP:   Recent Labs   Lab 10/07/23  2256      K 4.0      CO2 17*   *   BUN 21   CREATININE 1.4   CALCIUM 8.5*   PROT 5.8*   ALBUMIN 2.7*   BILITOT 0.4   ALKPHOS 71   AST 15   ALT 20   ANIONGAP 12     Cardiac Markers: No results for input(s): "CKMB", "MYOGLOBIN", "BNP", "TROPISTAT" in the last 48 hours.  Coagulation: No results for input(s): "PT", "INR", "APTT" in the last 48 hours.  POCT Glucose:   Recent Labs   Lab 10/08/23  0413 10/08/23  0818   POCTGLUCOSE 271* 269*     Troponin:   Recent " Labs   Lab 10/07/23  2256 10/08/23  0234 10/08/23  0548   TROPONINI 0.676* 0.657* 0.565*     TSH:   Recent Labs   Lab 05/23/23  0604   TSH 0.416         Significant Imaging: I have reviewed all pertinent imaging results/findings within the past 24 hours.    CXR: No acute abnormality.    CTA: There is no evidence pulmonary embolus.  There are small bilateral pleural effusions with associated compressive atelectasis.

## 2023-10-08 NOTE — ASSESSMENT & PLAN NOTE
Currently normotensive; awaiting family to bring patient's home meds as she is unsure of what she takes

## 2023-10-08 NOTE — ED PROVIDER NOTES
Encounter Date: 10/7/2023       History     Chief Complaint   Patient presents with    Shortness of Breath     To ED per [facility], SOB onset 1 hour PTA. 87% RA on arrival per EMS.  Pt also c/o L sided CP     63-year-old female with history of CAD, diabetes, asthma, presenting with shortness of breath began 1 hour prior to arrival.  EMS report that patient was satting 87% on room air, and was wheezing heavily.  They treated with Solu-Medrol, breathing treatments, reported improvement in patient's work of breathing.  Patient denies fever, congestion.  Does endorse cough.  Denies any chest pain.  Patient recently had an orthopedic procedure.  Denies any leg pain.      Review of patient's allergies indicates:   Allergen Reactions    Codeine Nausea Only    Conjugated estrogens      Other reaction(s): Vomiting and cream causes vaginal swelling    Diphenhydramine hcl      Other reaction(s): Itching and elevated BS    Honey     Iodine Itching    Nyquil d [doxylamin-pse-dm-acetaminophen]      Other reaction(s): Propensity to adverse reactions to drug    Pcn [penicillins] Other (See Comments)     Headache and nausea with PO PCN     Potassium      Other reaction(s): Not available    Povidone-iodine      Other reaction(s): Not available, Propensity to adverse reactions to drug    Sulfa (sulfonamide antibiotics)     Oxycontin [oxycodone] Nausea And Vomiting     Past Medical History:   Diagnosis Date    Asthma     Coronary artery disease     Diabetes mellitus     Dyslipidemia     GERD (gastroesophageal reflux disease)     Hypertension     Neuropathy     Thyroid disease      Past Surgical History:   Procedure Laterality Date    ANKLE HARDWARE REMOVAL Left 3/31/2022    Procedure: REMOVAL, HARDWARE, ANKLE;  Surgeon: Alec Pettit MD;  Location: [facility];  Service: Orthopedics;  Laterality: Left;    ANKLE HARDWARE REMOVAL Right 8/18/2022    Procedure: REMOVAL, HARDWARE, ANKLE;  Surgeon: Alec Pettit MD;  Location: [facility];  Service:  Orthopedics;  Laterality: Right;    APPLICATION OF LARGE EXTERNAL FIXATION DEVICE TO TIBIA Left 11/05/2021    Procedure: APPLICATION, EXTERNAL FIXATION DEVICE, LARGE, TIBIA;  Surgeon: Bk Richter MD;  Location: [facility];  Service: Orthopedics;  Laterality: Left;    APPLICATION OF WOUND VACUUM-ASSISTED CLOSURE DEVICE Right 8/18/2022    Procedure: APPLICATION, WOUND VAC;  Surgeon: Alec Pettit MD;  Location: [facility];  Service: Orthopedics;  Laterality: Right;    cardiac stents      thinks 3 total (1st 2 was in 2009 and later 2015 or so)    CHOLECYSTECTOMY      CLOSED REDUCTION Right 5/11/2022    Procedure: CLOSED REDUCTION;  Surgeon: Alec Pettit MD;  Location: [facility];  Service: Orthopedics;  Laterality: Right;  Right Ankle    CLOSURE OF WOUND Right 8/18/2022    Procedure: CLOSURE, WOUND;  Surgeon: Alec Pettit MD;  Location: [facility];  Service: Orthopedics;  Laterality: Right;    EXTERNAL FIXATION Right 5/11/2022    Procedure: EXTERNAL FIXATION;  Surgeon: Alec Pettit MD;  Location: [facility];  Service: Orthopedics;  Laterality: Right;  Right Ankle    FEMORAL BYPASS      HYSTERECTOMY      IRRIGATION AND DEBRIDEMENT OF LOWER EXTREMITY Right 8/18/2022    Procedure: IRRIGATION AND DEBRIDEMENT, LOWER EXTREMITY;  Surgeon: Alec Pettit MD;  Location: [facility];  Service: Orthopedics;  Laterality: Right;    OPEN REDUCTION AND INTERNAL FIXATION (ORIF) OF PILON FRACTURE Left 11/12/2021    Procedure: ORIF, FRACTURE, PILON;  Surgeon: Alec Pettit MD;  Location: [facility];  Service: Orthopedics;  Laterality: Left;    REMOVAL OF EXTERNAL FIXATION DEVICE Left 11/12/2021    Procedure: REMOVAL, EXTERNAL FIXATION DEVICE;  Surgeon: Alec Pettit MD;  Location: [facility];  Service: Orthopedics;  Laterality: Left;    REMOVAL OF EXTERNAL FIXATION DEVICE Right 5/20/2022    Procedure: REMOVAL, EXTERNAL FIXATION DEVICE;  Surgeon: Alec Pettit MD;  Location: [facility];  Service: Orthopedics;  Laterality: Right;    stents to  kidney      TIBIOTALOCALCANEAL FUSION USING INTRAMEDULLARY STEPHANIE Right 2023    Procedure: FUSION, TIBIOTALOCALCANEAL, USING INTRAMEDULLARY STEPHANIE;  Surgeon: Juventino Murray MD;  Location: [facility];  Service: Orthopedics;  Laterality: Right;     Family History   Problem Relation Age of Onset    Breast cancer Sister     Cancer Maternal Aunt     Heart disease Mother     Colon cancer Neg Hx     Ovarian cancer Neg Hx      Social History     Tobacco Use    Smoking status: Former     Current packs/day: 0.00     Types: Cigarettes     Quit date: 11/15/2008     Years since quittin.9    Smokeless tobacco: Never   Substance Use Topics    Alcohol use: No    Drug use: No     Review of Systems   Constitutional:  Negative for fever.   HENT:  Negative for congestion and sore throat.    Respiratory:  Positive for cough, shortness of breath and wheezing.    Cardiovascular:  Negative for chest pain.   Gastrointestinal:  Negative for abdominal pain, diarrhea, nausea and vomiting.   Genitourinary:  Negative for dysuria.   Musculoskeletal:  Negative for back pain.   Skin:  Negative for rash.   Neurological:  Negative for weakness.   Hematological:  Does not bruise/bleed easily.       Physical Exam     Initial Vitals [10/07/23 2237]   BP Pulse Resp Temp SpO2   (!) 146/96 77 (!) 22 99.6 °F (37.6 °C) 100 %      MAP       --         Physical Exam    Nursing note and vitals reviewed.  Constitutional: She appears well-developed. No distress.   HENT:   Head: Normocephalic.   Eyes: Pupils are equal, round, and reactive to light.   Neck:   Normal range of motion.  Cardiovascular:            No murmur heard.  Pulmonary/Chest: No respiratory distress.   Poor air movement.  Limited wheezing at apices.  No crackles.  No accessory muscle use.   Abdominal: Abdomen is soft.   Musculoskeletal:         General: No edema.      Cervical back: Normal range of motion.     Neurological: She is alert and oriented to person, place, and time.   Skin: Skin  is warm.   Psychiatric: She has a normal mood and affect.         ED Course   Procedures  Labs Reviewed   CBC W/ AUTO DIFFERENTIAL - Abnormal; Notable for the following components:       Result Value    RBC 2.29 (*)     Hemoglobin 6.7 (*)     Hematocrit 21.7 (*)     MCHC 30.9 (*)     RDW 15.1 (*)     Immature Granulocytes 1.8 (*)     Immature Grans (Abs) 0.18 (*)     All other components within normal limits   COMPREHENSIVE METABOLIC PANEL - Abnormal; Notable for the following components:    CO2 17 (*)     Glucose 212 (*)     Calcium 8.5 (*)     Total Protein 5.8 (*)     Albumin 2.7 (*)     eGFR 43 (*)     All other components within normal limits   TROPONIN I - Abnormal; Notable for the following components:    Troponin I 0.676 (*)     All other components within normal limits   D DIMER, QUANTITATIVE - Abnormal; Notable for the following components:    D-Dimer 3.84 (*)     All other components within normal limits   OCCULT BLOOD X 1, STOOL   TYPE & SCREEN   PREPARE RBC SOFT     EKG Readings: (Independently Interpreted)   Initial Reading: No STEMI. Rhythm: Normal Sinus Rhythm. Heart Rate: 70. Ectopy: No Ectopy. Conduction: Normal.       Imaging Results              CTA Chest Non-Coronary (PE Studies) (In process)                      X-Ray Chest AP Portable (In process)                      Medications   0.9%  NaCl infusion (for blood administration) (has no administration in time range)   sodium chloride 0.9% flush 10 mL (has no administration in time range)   melatonin tablet 6 mg (has no administration in time range)   albuterol-ipratropium 2.5 mg-0.5 mg/3 mL nebulizer solution 3 mL (3 mLs Nebulization Given 10/7/23 1489)   iohexoL (OMNIPAQUE 350) injection 100 mL (100 mLs Intravenous Given 10/8/23 0116)     Medical Decision Making  DDX: Likely asthma/COPD exacerbation given history, exam. R/o PNA. Unlikely PE given history, physical, risk factor analysis, +other more likely diagnosis. Low suspicion ACS but will  screen given risk factors.  DX:  CBC, CMP, troponin, D-dimer, EKG, chest x-ray.   TX: Analgesia PRN. Albuterol/atrovent nebs PRN. Steroids. Antibiotics if indicated by CXR or will be admitted. Treatment/consult as indicated by studies.  Dispo: Pending studies. If symptoms controlled, studies WNL or stable for outpatient management, discharge to follow up with PMD within 3-5 days with steroid course and albuterol PRN, precautions for return, and recommendations for supportive care. If no improvement in respiratory distress with appropriate observation in ED, consider observation vs. admission for acute asthma exacerbation.        Amount and/or Complexity of Data Reviewed  Labs: ordered.  Radiology: ordered.    Risk  OTC drugs.  Prescription drug management.  Decision regarding hospitalization.               ED Course as of 10/08/23 0250   Sun Oct 08, 2023   0031 Patient has no blood on OSIEL.  Reports dark stools, but no blood in stools. [NB]      ED Course User Index  [NB] Shaquille Boyd MD                    Clinical Impression:   Final diagnoses:  [R06.02] Shortness of breath        ED Disposition Condition    Admit Stable                Shaquille Boyd MD  10/07/23 2250       Shaquille Boyd MD  10/08/23 0250

## 2023-10-08 NOTE — NURSING
"Pt with c/o shortness of breath and chest heaviness. VSS. Sats 98% on 2L NC. NADN. Pt states that she "feels like something is wrapped around her neck". Pt denies feeling anxious at this time. Pt sitting up in bed.     Dr. Jacques notified. Orders received for Troponin and GI cocktail.    No acute changes noted to recent EKG or CXR.   " Acute UTI with E.coli on Ucx  - CT abd/pelvis: No obstructive uropathy or urolithiasis. Asymmetrically enlarged right kidney with asymmetric right perinephric stranding.   UA Moderate Leukocyte esterase, 26-50 WBC   - D/c Zosyn. Start rocephin per ID  - ID recs to follow on timing of transition to PO  - ID Dr. Nolasco following, recs appreciated  - Trend temps, CBC

## 2023-10-08 NOTE — HPI
63yF with HTN, DM-2, hyperlipidemia ,hypothyroidism, CAD , CHF presented to ER via EMS for acute onset of SOB. Denies associated chest pain. No known sick contacts. Reports cough started afterward productive of scant mucus. No fever. Per ER notes, pt with wheezing on arrival and RA sat of 87%. She received solumedrol from EMS. She was also found to have significant acute on chronic anemia and received 1u pRBCs. She denies any recent bleeding including hemoptysis, melena, blood in stool, hematuria. Of note, she underwent right Tibiotalar calcaneal nail, right ankle fusion and right subtalar fusion 9/29/23.

## 2023-10-08 NOTE — ASSESSMENT & PLAN NOTE
Patient with Hypoxic Respiratory failure which is Acute.  she is not on home oxygen. Supplemental oxygen was provided and noted-      .   Signs/symptoms of respiratory failure include- increased work of breathing. Contributing diagnoses includes - COPD and asthma Labs and images were reviewed. Patient Has not had a recent ABG. Will treat underlying causes and adjust management of respiratory failure as follows- s/p steroids by EMS; cont nebs and oxygen as needed. Will wean as tolerated. Considering hyperglycemia and no wheezing today, will hold off on additional steroids.

## 2023-10-08 NOTE — PLAN OF CARE
V/S stable. H/H improved this AM. 3 units of insulin given. Patient turned Q2.  Problem: Adult Inpatient Plan of Care  Goal: Absence of Hospital-Acquired Illness or Injury  Outcome: Ongoing, Progressing  Goal: Optimal Comfort and Wellbeing  Outcome: Ongoing, Progressing  Goal: Readiness for Transition of Care  Outcome: Ongoing, Progressing     Problem: Bariatric Environmental Safety  Goal: Safety Maintained with Care  Outcome: Ongoing, Progressing     Problem: Diabetes Comorbidity  Goal: Blood Glucose Level Within Targeted Range  Outcome: Ongoing, Progressing

## 2023-10-08 NOTE — ED NOTES
Pt resting comfortably on stretcher - RR even and unlabored. 2L NC. Blood transfusing without difficulty. No needs at this time, encouraged to call for needs. Side rails up, call bell in reach.  Updated on POC. V/u.

## 2023-10-08 NOTE — ED NOTES
Dr Boyd discussed blood transfusion with pt and pt .   Consent signed per  and witnessed per myself

## 2023-10-08 NOTE — ASSESSMENT & PLAN NOTE
Patient's FSGs are controlled on current medication regimen.  Last A1c reviewed-   Lab Results   Component Value Date    HGBA1C 5.6 09/18/2023     Most recent fingerstick glucose reviewed-   Recent Labs   Lab 10/08/23  0413 10/08/23  0818   POCTGLUCOSE 271* 269*     Current correctional scale  Medium  Maintain anti-hyperglycemic dose as follows-   Antihyperglycemics (From admission, onward)    Start     Stop Route Frequency Ordered    10/08/23 0519  insulin aspart U-100 pen 0-10 Units         -- SubQ Before meals & nightly PRN 10/08/23 0419        Hold Oral hypoglycemics while patient is in the hospital.

## 2023-10-08 NOTE — H&P
[facility] ([address] [place] [address] Medicine  History & Physical    Patient Name: Merna Regalado  MRN: [id number]  Patient Class: OP- Observation  Admission Date: 10/7/2023  Attending Physician: Jerman Jacques MD   Primary Care Provider: Bianca Godoy MD         Patient information was obtained from patient and ER records.     Subjective:     Principal Problem:Acute hypoxemic respiratory failure    Chief Complaint:   Chief Complaint   Patient presents with    Shortness of Breath     To ED per [facility], SOB onset 1 hour PTA. 87% RA on arrival per EMS.  Pt also c/o L sided CP        HPI: 63yF with HTN, DM-2, hyperlipidemia ,hypothyroidism, CAD , CHF presented to ER via EMS for acute onset of SOB. Denies associated chest pain. No known sick contacts. Reports cough started afterward productive of scant mucus. No fever. Per ER notes, pt with wheezing on arrival and RA sat of 87%. She received solumedrol from EMS. She was also found to have significant acute on chronic anemia and received 1u pRBCs. She denies any recent bleeding including hemoptysis, melena, blood in stool, hematuria. Of note, she underwent right Tibiotalar calcaneal nail, right ankle fusion and right subtalar fusion 9/29/23.       Past Medical History:   Diagnosis Date    Asthma     Coronary artery disease     Diabetes mellitus     Dyslipidemia     GERD (gastroesophageal reflux disease)     Hypertension     Neuropathy     Thyroid disease        Past Surgical History:   Procedure Laterality Date    ANKLE HARDWARE REMOVAL Left 3/31/2022    Procedure: REMOVAL, HARDWARE, ANKLE;  Surgeon: Alec Pettit MD;  Location: [facility];  Service: Orthopedics;  Laterality: Left;    ANKLE HARDWARE REMOVAL Right 8/18/2022    Procedure: REMOVAL, HARDWARE, ANKLE;  Surgeon: Alec Pettit MD;  Location: [facility];  Service: Orthopedics;  Laterality: Right;    APPLICATION OF LARGE EXTERNAL FIXATION DEVICE TO TIBIA Left 11/05/2021    Procedure: APPLICATION,  EXTERNAL FIXATION DEVICE, LARGE, TIBIA;  Surgeon: Bk Richter MD;  Location: [facility];  Service: Orthopedics;  Laterality: Left;    APPLICATION OF WOUND VACUUM-ASSISTED CLOSURE DEVICE Right 8/18/2022    Procedure: APPLICATION, WOUND VAC;  Surgeon: Alec Pettit MD;  Location: [facility];  Service: Orthopedics;  Laterality: Right;    cardiac stents      thinks 3 total (1st 2 was in 2009 and later 2015 or so)    CHOLECYSTECTOMY      CLOSED REDUCTION Right 5/11/2022    Procedure: CLOSED REDUCTION;  Surgeon: Alec Pettit MD;  Location: [facility];  Service: Orthopedics;  Laterality: Right;  Right Ankle    CLOSURE OF WOUND Right 8/18/2022    Procedure: CLOSURE, WOUND;  Surgeon: Alec Pettit MD;  Location: [facility];  Service: Orthopedics;  Laterality: Right;    EXTERNAL FIXATION Right 5/11/2022    Procedure: EXTERNAL FIXATION;  Surgeon: Alec Pettit MD;  Location: [facility];  Service: Orthopedics;  Laterality: Right;  Right Ankle    FEMORAL BYPASS      HYSTERECTOMY      IRRIGATION AND DEBRIDEMENT OF LOWER EXTREMITY Right 8/18/2022    Procedure: IRRIGATION AND DEBRIDEMENT, LOWER EXTREMITY;  Surgeon: Alec Pettit MD;  Location: [facility];  Service: Orthopedics;  Laterality: Right;    OPEN REDUCTION AND INTERNAL FIXATION (ORIF) OF PILON FRACTURE Left 11/12/2021    Procedure: ORIF, FRACTURE, PILON;  Surgeon: Alec Pettit MD;  Location: [facility];  Service: Orthopedics;  Laterality: Left;    REMOVAL OF EXTERNAL FIXATION DEVICE Left 11/12/2021    Procedure: REMOVAL, EXTERNAL FIXATION DEVICE;  Surgeon: Alec Pettit MD;  Location: [facility];  Service: Orthopedics;  Laterality: Left;    REMOVAL OF EXTERNAL FIXATION DEVICE Right 5/20/2022    Procedure: REMOVAL, EXTERNAL FIXATION DEVICE;  Surgeon: Alec Pettit MD;  Location: [facility];  Service: Orthopedics;  Laterality: Right;    stents to kidney      TIBIOTALOCALCANEAL FUSION USING INTRAMEDULLARY STEPHANIE Right 9/28/2023    Procedure: FUSION, TIBIOTALOCALCANEAL,  USING INTRAMEDULLARY STEPHANIE;  Surgeon: Juventino Murray MD;  Location: [facility];  Service: Orthopedics;  Laterality: Right;       Review of patient's allergies indicates:   Allergen Reactions    Codeine Nausea Only    Conjugated estrogens      Other reaction(s): Vomiting and cream causes vaginal swelling    Diphenhydramine hcl      Other reaction(s): Itching and elevated BS    Honey     Iodine Itching    Nyquil d [doxylamin-pse-dm-acetaminophen]      Other reaction(s): Propensity to adverse reactions to drug    Pcn [penicillins] Other (See Comments)     Headache and nausea with PO PCN     Potassium      Other reaction(s): Not available    Povidone-iodine      Other reaction(s): Not available, Propensity to adverse reactions to drug    Sulfa (sulfonamide antibiotics)     Oxycontin [oxycodone] Nausea And Vomiting       No current facility-administered medications on file prior to encounter.     Current Outpatient Medications on File Prior to Encounter   Medication Sig    albuterol sulfate (PROAIR HFA INHL) Inhale into the lungs.    alendronate (FOSAMAX) 70 MG tablet Take 1 tablet (70 mg total) by mouth every 7 days.    apremilast (OTEZLA) 30 mg Tab Take 1 tablet (30 mg total) by mouth 2 (two) times daily.    aspirin (ECOTRIN) 81 MG EC tablet Take 1 tablet (81 mg total) by mouth 2 (two) times daily.    aspirin 81 MG Chew Take 81 mg by mouth once daily.    atorvastatin (LIPITOR) 80 MG tablet Take 1 tablet (80 mg total) by mouth every evening.    clopidogreL (PLAVIX) 75 mg tablet Take 75 mg by mouth once daily.    doxycycline (VIBRAMYCIN) 100 MG Cap Take 1 capsule (100 mg total) by mouth 2 (two) times daily. for 21 days    ergocalciferol (ERGOCALCIFEROL) 50,000 unit Cap Take 50,000 Units by mouth every 7 days.    fluoxetine (PROZAC) 20 MG capsule TAKE 1 CAPSULE(S) EVERY DAY BY ORAL ROUTE FOR 30 DAYS.    furosemide (LASIX) 20 MG tablet furosemide 20 mg tablet, [RxNorm: 236969]    gabapentin  (NEURONTIN) 300 MG capsule Take 300 mg by mouth 2 (two) times daily.    HYDROcodone-acetaminophen (NORCO) 7.5-325 mg per tablet Take 1 tablet by mouth every 4 (four) hours as needed for Pain.    ibuprofen (ADVIL,MOTRIN) 800 MG tablet ibuprofen 800 mg tablet, [RxNorm: 327204]    insulin (LANTUS SOLOSTAR U-100 INSULIN) glargine 100 units/mL SubQ pen Lantus Solostar U-100 Insulin 100 unit/mL (3 mL) subcutaneous pen, [RxNorm: 264091]    insulin lispro (HUMALOG KWIKPEN INSULIN) 100 unit/mL pen HumaLOG KwikPen (U-100) Insulin 100 unit/mL subcutaneous, [RxNorm: 7132082]    isosorbide dinitrate (ISORDIL) 30 MG Tab Take 30 mg by mouth once daily. Take 1 and 1/2 half tablets by mouth once daily    levothyroxine (SYNTHROID) 100 MCG tablet Take 1 tablet by mouth once daily.    lisinopriL (PRINIVIL,ZESTRIL) 40 MG tablet Take 0.5 tablets (20 mg total) by mouth once daily.    metFORMIN (GLUCOPHAGE) 500 MG tablet Take 1 tablet (500 mg total) by mouth daily with breakfast.    nitroGLYCERIN (NITROSTAT) 0.4 MG SL tablet Place 1 tablet (0.4 mg total) under the tongue every 5 (five) minutes as needed for Chest pain.    ondansetron (ZOFRAN-ODT) 4 MG TbDL Take 4 mg by mouth every 6 (six) hours as needed.    pravastatin (PRAVACHOL) 40 MG tablet Take 1 tablet by mouth every evening.    ranolazine (RANEXA) 500 MG Tb12 Take 500 mg by mouth 2 (two) times daily.    tiZANidine (ZANAFLEX) 4 MG tablet Take 1 tablet (4 mg total) by mouth every 8 (eight) hours.    trazodone (DESYREL) 100 MG tablet      Family History       Problem Relation (Age of Onset)    Breast cancer Sister    Cancer Maternal Aunt    Heart disease Mother          Tobacco Use    Smoking status: Former     Current packs/day: 0.00     Types: Cigarettes     Quit date: 11/15/2008     Years since quittin.9    Smokeless tobacco: Never   Substance and Sexual Activity    Alcohol use: No    Drug use: No    Sexual activity: Not Currently     Partners: Male     Birth  control/protection: Surgical     Comment:      Review of Systems   Constitutional:  Positive for chills. Negative for fever.   HENT:  Negative for congestion and sore throat.    Respiratory:  Positive for cough and shortness of breath (much improved).    Cardiovascular:  Negative for chest pain.   Gastrointestinal:  Negative for abdominal pain, diarrhea, nausea and vomiting.   Genitourinary:  Negative for dysuria and hematuria.   Musculoskeletal:  Positive for arthralgias.   Neurological:  Negative for dizziness, syncope and light-headedness.   Psychiatric/Behavioral:  Negative for confusion.      Objective:     Vital Signs (Most Recent):  Temp: 98.8 °F (37.1 °C) (10/08/23 0718)  Pulse: 79 (10/08/23 1000)  Resp: 18 (10/08/23 0718)  BP: (!) 127/59 (10/08/23 0718)  SpO2: 98 % (10/08/23 0816) Vital Signs (24h Range):  Temp:  [97.9 °F (36.6 °C)-99.6 °F (37.6 °C)] 98.8 °F (37.1 °C)  Pulse:  [66-87] 79  Resp:  [18-25] 18  SpO2:  [93 %-100 %] 98 %  BP: (122-180)/(59-96) 127/59     Weight: 97 kg (213 lb 13.5 oz)  Body mass index is 39.11 kg/m².     Physical Exam  Vitals reviewed.   Constitutional:       General: She is not in acute distress.     Appearance: She is obese.   HENT:      Head: Normocephalic and atraumatic.      Mouth/Throat:      Mouth: Mucous membranes are moist.   Eyes:      Conjunctiva/sclera: Conjunctivae normal.      Pupils: Pupils are equal, round, and reactive to light.   Cardiovascular:      Rate and Rhythm: Normal rate and regular rhythm.      Heart sounds: Normal heart sounds. No murmur heard.  Pulmonary:      Effort: Pulmonary effort is normal. No respiratory distress.      Breath sounds: Normal breath sounds.   Abdominal:      General: Bowel sounds are normal. There is no distension.      Palpations: Abdomen is soft.      Tenderness: There is no abdominal tenderness.   Musculoskeletal:      Cervical back: Neck supple.      Comments: RLE with splint in place   Neurological:      General: No  "focal deficit present.      Mental Status: She is alert and oriented to person, place, and time.   Psychiatric:         Mood and Affect: Mood normal.         Behavior: Behavior normal.              CRANIAL NERVES     CN III, IV, VI   Pupils are equal, round, and reactive to light.       Significant Labs: All pertinent labs within the past 24 hours have been reviewed.  A1C:   Recent Labs   Lab 05/22/23  1909 09/18/23  1607   HGBA1C 6.3* 5.6     Blood Culture: No results for input(s): "LABBLOO" in the last 48 hours.  BMP:   Recent Labs   Lab 10/07/23  2256   *      K 4.0      CO2 17*   BUN 21   CREATININE 1.4   CALCIUM 8.5*     CBC:   Recent Labs   Lab 10/07/23  2256 10/08/23  0548   WBC 10.28 10.30   HGB 6.7* 8.1*   HCT 21.7* 25.6*    212     CMP:   Recent Labs   Lab 10/07/23  2256      K 4.0      CO2 17*   *   BUN 21   CREATININE 1.4   CALCIUM 8.5*   PROT 5.8*   ALBUMIN 2.7*   BILITOT 0.4   ALKPHOS 71   AST 15   ALT 20   ANIONGAP 12     Cardiac Markers: No results for input(s): "CKMB", "MYOGLOBIN", "BNP", "TROPISTAT" in the last 48 hours.  Coagulation: No results for input(s): "PT", "INR", "APTT" in the last 48 hours.  POCT Glucose:   Recent Labs   Lab 10/08/23  0413 10/08/23  0818   POCTGLUCOSE 271* 269*     Troponin:   Recent Labs   Lab 10/07/23  2256 10/08/23  0234 10/08/23  0548   TROPONINI 0.676* 0.657* 0.565*     TSH:   Recent Labs   Lab 05/23/23  0604   TSH 0.416         Significant Imaging: I have reviewed all pertinent imaging results/findings within the past 24 hours.    CXR: No acute abnormality.    CTA: There is no evidence pulmonary embolus.  There are small bilateral pleural effusions with associated compressive atelectasis.    Assessment/Plan:     * Acute hypoxemic respiratory failure  Patient with Hypoxic Respiratory failure which is Acute.  she is not on home oxygen. Supplemental oxygen was provided and noted-      .   Signs/symptoms of respiratory " failure include- increased work of breathing. Contributing diagnoses includes - COPD and asthma Labs and images were reviewed. Patient Has not had a recent ABG. Will treat underlying causes and adjust management of respiratory failure as follows- s/p steroids by EMS; cont nebs and oxygen as needed. Will wean as tolerated. Considering hyperglycemia and no wheezing today, will hold off on additional steroids.     Symptomatic anemia  Check iron, ferritin, retic, B12, folate.   S/p 1u pRBCs      CAD (coronary artery disease)  Cont home statin, asa. Resume other meds pending home med rec. Hold plavix in light of anemia      Elevated troponin  Chronic; likely demand in light of anemia  S/p 1u pRBCs      Hypothyroid  Cont home synthroid      Hyperlipidemia associated with type 2 diabetes mellitus  Cont home statin      Diabetes mellitus  Patient's FSGs are controlled on current medication regimen.  Last A1c reviewed-   Lab Results   Component Value Date    HGBA1C 5.6 09/18/2023     Most recent fingerstick glucose reviewed-   Recent Labs   Lab 10/08/23  0413 10/08/23  0818   POCTGLUCOSE 271* 269*     Current correctional scale  Medium  Maintain anti-hyperglycemic dose as follows-   Antihyperglycemics (From admission, onward)    Start     Stop Route Frequency Ordered    10/08/23 0519  insulin aspart U-100 pen 0-10 Units         -- SubQ Before meals & nightly PRN 10/08/23 0419        Hold Oral hypoglycemics while patient is in the hospital.    Hypertension  Currently normotensive; awaiting family to bring patient's home meds as she is unsure of what she takes      VTE Risk Mitigation (From admission, onward)         Ordered     IP VTE HIGH RISK PATIENT  Once         10/08/23 0214     Place sequential compression device  Until discontinued         10/08/23 0214                   On 10/08/2023, patient should be placed in hospital observation services under my care.        Jerman Jacques MD  Department of Hospital Medicine  [facility]  [facility] - Med Surg (3rd Fl)

## 2023-10-08 NOTE — NURSING
Dr. Boyd okayed for repeat troponin and cardiac monitor to be ordered.    Spoke with patient to convey results but  patient could not talk at momment will call back

## 2023-10-09 LAB
ALBUMIN SERPL BCP-MCNC: 2.9 G/DL (ref 3.5–5.2)
ALP SERPL-CCNC: 46 U/L (ref 55–135)
ALT SERPL W/O P-5'-P-CCNC: 14 U/L (ref 10–44)
ANION GAP SERPL CALC-SCNC: 10 MMOL/L (ref 8–16)
AST SERPL-CCNC: 13 U/L (ref 10–40)
BASOPHILS # BLD AUTO: 0.03 K/UL (ref 0–0.2)
BASOPHILS NFR BLD: 0.2 % (ref 0–1.9)
BILIRUB SERPL-MCNC: 0.8 MG/DL (ref 0.1–1)
BNP SERPL-MCNC: 997 PG/ML (ref 0–99)
BUN SERPL-MCNC: 21 MG/DL (ref 8–23)
CALCIUM SERPL-MCNC: 9.1 MG/DL (ref 8.7–10.5)
CHLORIDE SERPL-SCNC: 107 MMOL/L (ref 95–110)
CO2 SERPL-SCNC: 20 MMOL/L (ref 23–29)
CREAT SERPL-MCNC: 1.2 MG/DL (ref 0.5–1.4)
DIFFERENTIAL METHOD: ABNORMAL
EOSINOPHIL # BLD AUTO: 0 K/UL (ref 0–0.5)
EOSINOPHIL NFR BLD: 0.3 % (ref 0–8)
ERYTHROCYTE [DISTWIDTH] IN BLOOD BY AUTOMATED COUNT: 15.6 % (ref 11.5–14.5)
EST. GFR  (NO RACE VARIABLE): 52 ML/MIN/1.73 M^2
GLUCOSE SERPL-MCNC: 183 MG/DL (ref 70–110)
HCT VFR BLD AUTO: 25.2 % (ref 37–48.5)
HGB BLD-MCNC: 7.9 G/DL (ref 12–16)
IMM GRANULOCYTES # BLD AUTO: 0.12 K/UL (ref 0–0.04)
IMM GRANULOCYTES NFR BLD AUTO: 1 % (ref 0–0.5)
LYMPHOCYTES # BLD AUTO: 1.7 K/UL (ref 1–4.8)
LYMPHOCYTES NFR BLD: 14.5 % (ref 18–48)
MCH RBC QN AUTO: 29.7 PG (ref 27–31)
MCHC RBC AUTO-ENTMCNC: 31.3 G/DL (ref 32–36)
MCV RBC AUTO: 95 FL (ref 82–98)
MONOCYTES # BLD AUTO: 0.9 K/UL (ref 0.3–1)
MONOCYTES NFR BLD: 7.7 % (ref 4–15)
NEUTROPHILS # BLD AUTO: 9.2 K/UL (ref 1.8–7.7)
NEUTROPHILS NFR BLD: 76.3 % (ref 38–73)
NRBC BLD-RTO: 0 /100 WBC
PLATELET # BLD AUTO: 231 K/UL (ref 150–450)
PMV BLD AUTO: 10 FL (ref 9.2–12.9)
POCT GLUCOSE: 135 MG/DL (ref 70–110)
POCT GLUCOSE: 184 MG/DL (ref 70–110)
POCT GLUCOSE: 204 MG/DL (ref 70–110)
POCT GLUCOSE: 207 MG/DL (ref 70–110)
POTASSIUM SERPL-SCNC: 4.9 MMOL/L (ref 3.5–5.1)
PROT SERPL-MCNC: 6 G/DL (ref 6–8.4)
RBC # BLD AUTO: 2.66 M/UL (ref 4–5.4)
SODIUM SERPL-SCNC: 137 MMOL/L (ref 136–145)
WBC # BLD AUTO: 12.02 K/UL (ref 3.9–12.7)

## 2023-10-09 PROCEDURE — 27000221 HC OXYGEN, UP TO 24 HOURS

## 2023-10-09 PROCEDURE — 80053 COMPREHEN METABOLIC PANEL: CPT | Performed by: STUDENT IN AN ORGANIZED HEALTH CARE EDUCATION/TRAINING PROGRAM

## 2023-10-09 PROCEDURE — 36415 COLL VENOUS BLD VENIPUNCTURE: CPT | Performed by: STUDENT IN AN ORGANIZED HEALTH CARE EDUCATION/TRAINING PROGRAM

## 2023-10-09 PROCEDURE — 11000001 HC ACUTE MED/SURG PRIVATE ROOM

## 2023-10-09 PROCEDURE — 94761 N-INVAS EAR/PLS OXIMETRY MLT: CPT

## 2023-10-09 PROCEDURE — 83880 ASSAY OF NATRIURETIC PEPTIDE: CPT | Performed by: STUDENT IN AN ORGANIZED HEALTH CARE EDUCATION/TRAINING PROGRAM

## 2023-10-09 PROCEDURE — 25000003 PHARM REV CODE 250: Performed by: STUDENT IN AN ORGANIZED HEALTH CARE EDUCATION/TRAINING PROGRAM

## 2023-10-09 PROCEDURE — 92610 EVALUATE SWALLOWING FUNCTION: CPT

## 2023-10-09 PROCEDURE — 99233 PR SUBSEQUENT HOSPITAL CARE,LEVL III: ICD-10-PCS | Mod: ,,, | Performed by: INTERNAL MEDICINE

## 2023-10-09 PROCEDURE — 85025 COMPLETE CBC W/AUTO DIFF WBC: CPT | Performed by: STUDENT IN AN ORGANIZED HEALTH CARE EDUCATION/TRAINING PROGRAM

## 2023-10-09 PROCEDURE — 99233 SBSQ HOSP IP/OBS HIGH 50: CPT | Mod: ,,, | Performed by: INTERNAL MEDICINE

## 2023-10-09 PROCEDURE — 99900035 HC TECH TIME PER 15 MIN (STAT)

## 2023-10-09 RX ORDER — FUROSEMIDE 40 MG/1
40 TABLET ORAL DAILY
Status: DISCONTINUED | OUTPATIENT
Start: 2023-10-10 | End: 2023-10-11 | Stop reason: HOSPADM

## 2023-10-09 RX ORDER — ALBUTEROL SULFATE 90 UG/1
2 AEROSOL, METERED RESPIRATORY (INHALATION) EVERY 6 HOURS PRN
Status: DISCONTINUED | OUTPATIENT
Start: 2023-10-09 | End: 2023-10-11 | Stop reason: HOSPADM

## 2023-10-09 RX ADMIN — GABAPENTIN 300 MG: 300 CAPSULE ORAL at 03:10

## 2023-10-09 RX ADMIN — FAMOTIDINE 40 MG: 20 TABLET ORAL at 08:10

## 2023-10-09 RX ADMIN — TRAZODONE HYDROCHLORIDE 100 MG: 50 TABLET ORAL at 08:10

## 2023-10-09 RX ADMIN — CARVEDILOL 12.5 MG: 12.5 TABLET, FILM COATED ORAL at 08:10

## 2023-10-09 RX ADMIN — ATORVASTATIN CALCIUM 80 MG: 80 TABLET, FILM COATED ORAL at 08:10

## 2023-10-09 RX ADMIN — INSULIN ASPART 2 UNITS: 100 INJECTION, SOLUTION INTRAVENOUS; SUBCUTANEOUS at 08:10

## 2023-10-09 RX ADMIN — ASPIRIN 81 MG 81 MG: 81 TABLET ORAL at 08:10

## 2023-10-09 RX ADMIN — RANOLAZINE 500 MG: 500 TABLET, EXTENDED RELEASE ORAL at 08:10

## 2023-10-09 RX ADMIN — ISOSORBIDE DINITRATE 60 MG: 20 TABLET ORAL at 08:10

## 2023-10-09 RX ADMIN — ACETAMINOPHEN 650 MG: 325 TABLET ORAL at 10:10

## 2023-10-09 RX ADMIN — INSULIN DETEMIR 15 UNITS: 100 INJECTION, SOLUTION SUBCUTANEOUS at 08:10

## 2023-10-09 RX ADMIN — FUROSEMIDE 20 MG: 20 TABLET ORAL at 08:10

## 2023-10-09 RX ADMIN — INSULIN ASPART 4 UNITS: 100 INJECTION, SOLUTION INTRAVENOUS; SUBCUTANEOUS at 12:10

## 2023-10-09 RX ADMIN — HYDROXYZINE PAMOATE 25 MG: 25 CAPSULE ORAL at 02:10

## 2023-10-09 RX ADMIN — FLUOXETINE 20 MG: 20 CAPSULE ORAL at 08:10

## 2023-10-09 RX ADMIN — NIFEDIPINE 30 MG: 30 TABLET, FILM COATED, EXTENDED RELEASE ORAL at 08:10

## 2023-10-09 RX ADMIN — LEVOTHYROXINE SODIUM 88 MCG: 88 TABLET ORAL at 06:10

## 2023-10-09 RX ADMIN — GABAPENTIN 300 MG: 300 CAPSULE ORAL at 08:10

## 2023-10-09 RX ADMIN — DOXAZOSIN 4 MG: 2 TABLET ORAL at 08:10

## 2023-10-09 RX ADMIN — CARVEDILOL 12.5 MG: 12.5 TABLET, FILM COATED ORAL at 05:10

## 2023-10-09 NOTE — HOSPITAL COURSE
10/9/23  This am feeling much better   But c/o wheezing and spitting up grayish  sputum .  Sees Dr valenzuela for copd and uses inhaler BID  ;past smoker quit about 16 yrs ago   Has h/o COPD   She received 1 unit of PRBC ; 6.7 to 7.9 g      10/10:no wheezing. Back to baseline respiratory status  Her H/H continues to slowly trend down. Denies bleeding. Occult stool negative. Discuss discharge with repeat labsin 24-48 hours but she reports she has no transportation.     10/11 PT Hd stable on room air.  H/h stable.  Will discharge home with home health and repeat labs 10/12.  F/u PCP within 1 week of hospital discharge.  Defer to PCP for GI referral.  F/u with Dr. Alec Pettit for recent surgery right Tibiotalar calcaneal nail, right ankle fusion and right subtalar fusion 9/29/23.

## 2023-10-09 NOTE — ASSESSMENT & PLAN NOTE
Patient with Hypoxic Respiratory failure which is Acute.  she is not on home oxygen. Supplemental oxygen was provided and noted-      .   Signs/symptoms of respiratory failure include- increased work of breathing. Contributing diagnoses includes - COPD and asthma Labs and images were reviewed. Patient Has not had a recent ABG. Will treat underlying causes and adjust management of respiratory failure as follows- s/p steroids by EMS; cont nebs and oxygen as needed. Will wean as tolerated. Considering hyperglycemia and no wheezing today, will hold off on additional steroids.       10/9/23  She feels wheezy but I dont hear any wheezing   Will resume her albuterol   check BNP CT shows effusions ; will increase lasix

## 2023-10-09 NOTE — ASSESSMENT & PLAN NOTE
Check iron, ferritin, retic, B12, folate.   S/p 1u pRBCs      10/9  HB improved 6.7 to 7.9   She reports no melena ; no BPR   Continue famotidine

## 2023-10-09 NOTE — PROGRESS NOTES
[facility] ([address] [place] [address] Medicine  Progress Note    Patient Name: Merna Regalado  MRN: [id number]  Patient Class: IP- Inpatient   Admission Date: 10/7/2023  Length of Stay: 1 days  Attending Physician: La Nena Vasquez MD  Primary Care Provider: Bianca Godoy MD        Subjective:     Principal Problem:Acute hypoxemic respiratory failure        HPI:  63yF with HTN, DM-2, hyperlipidemia ,hypothyroidism, CAD , CHF presented to ER via EMS for acute onset of SOB. Denies associated chest pain. No known sick contacts. Reports cough started afterward productive of scant mucus. No fever. Per ER notes, pt with wheezing on arrival and RA sat of 87%. She received solumedrol from EMS. She was also found to have significant acute on chronic anemia and received 1u pRBCs. She denies any recent bleeding including hemoptysis, melena, blood in stool, hematuria. Of note, she underwent right Tibiotalar calcaneal nail, right ankle fusion and right subtalar fusion 9/29/23.       Overview/Hospital Course:  10/9/23  This am feeling much better   But c/o wheezing and spitting up grayish  sputum .  Sees Dr valenzuela for copd and uses inhaler BID  ;past smoker quit about 16 yrs ago   Has h/o COPD   She received 1 unit of PRBC ; 6.7 to 7.9 g                    Review of Systems   Constitutional:  Positive for chills. Negative for fever.   HENT:  Negative for congestion and sore throat.    Respiratory:  Positive for cough and shortness of breath (much improved).    Cardiovascular:  Negative for chest pain.   Gastrointestinal:  Negative for abdominal pain, diarrhea, nausea and vomiting.   Genitourinary:  Negative for dysuria and hematuria.   Musculoskeletal:  Positive for arthralgias.   Neurological:  Negative for dizziness, syncope and light-headedness.   Psychiatric/Behavioral:  Negative for confusion.      Objective:     Vital Signs (Most Recent):  Temp: 98.4 °F (36.9 °C) (10/09/23 0720)  Pulse: 81 (10/09/23 0800)  Resp: 18  "(10/09/23 0720)  BP: (!) 148/67 (10/09/23 0720)  SpO2: 100 % (10/09/23 0720) Vital Signs (24h Range):  Temp:  [98.2 °F (36.8 °C)-98.5 °F (36.9 °C)] 98.4 °F (36.9 °C)  Pulse:  [70-86] 81  Resp:  [16-20] 18  SpO2:  [95 %-100 %] 100 %  BP: (126-148)/(60-80) 148/67     Weight: 97 kg (213 lb 13.5 oz)  Body mass index is 39.11 kg/m².    Intake/Output Summary (Last 24 hours) at 10/9/2023 0910  Last data filed at 10/9/2023 0834  Gross per 24 hour   Intake 360 ml   Output 1300 ml   Net -940 ml         Physical Exam  Vitals reviewed.   Constitutional:       General: She is not in acute distress.     Appearance: She is obese.   HENT:      Head: Normocephalic and atraumatic.      Mouth/Throat:      Mouth: Mucous membranes are moist.   Eyes:      Conjunctiva/sclera: Conjunctivae normal.      Pupils: Pupils are equal, round, and reactive to light.   Cardiovascular:      Rate and Rhythm: Normal rate and regular rhythm.      Heart sounds: Normal heart sounds. No murmur heard.  Pulmonary:      Effort: Pulmonary effort is normal. No respiratory distress.      Breath sounds: Normal breath sounds.   Abdominal:      General: Bowel sounds are normal. There is no distension.      Palpations: Abdomen is soft.      Tenderness: There is no abdominal tenderness.   Musculoskeletal:      Cervical back: Neck supple.      Comments: RLE with splint in place   Neurological:      General: No focal deficit present.      Mental Status: She is alert and oriented to person, place, and time.   Psychiatric:         Mood and Affect: Mood normal.         Behavior: Behavior normal.             Significant Labs: All pertinent labs within the past 24 hours have been reviewed.  ABGs: No results for input(s): "PH", "PCO2", "HCO3", "POCSATURATED", "BE", "TOTALHB", "COHB", "METHB", "O2HB", "POCFIO2", "PO2" in the last 48 hours.  CBC:   Recent Labs   Lab 10/07/23  2256 10/08/23  0548 10/09/23  0555   WBC 10.28 10.30 12.02   HGB 6.7* 8.1* 7.9*   HCT 21.7* 25.6* " "25.2*    212 231     Cardiac Markers: No results for input(s): "CKMB", "MYOGLOBIN", "BNP", "TROPISTAT" in the last 48 hours.  TSH:   Recent Labs   Lab 05/23/23  0604   TSH 0.416     BMP  Lab Results   Component Value Date     10/09/2023    K 4.9 10/09/2023     10/09/2023    CO2 20 (L) 10/09/2023    BUN 21 10/09/2023    CREATININE 1.2 10/09/2023    CALCIUM 9.1 10/09/2023    ANIONGAP 10 10/09/2023    EGFRNORACEVR 52 (A) 10/09/2023       Significant Imaging: I have reviewed all pertinent imaging results/findings within the past 24 hours.  CT: I have reviewed all pertinent results/findings within the past 24 hours and my personal findings are:  reviewed  CXR: I have reviewed all pertinent results/findings within the past 24 hours and my personal findings are:  reviewed        Assessment/Plan:      * Acute hypoxemic respiratory failure  Patient with Hypoxic Respiratory failure which is Acute.  she is not on home oxygen. Supplemental oxygen was provided and noted-      .   Signs/symptoms of respiratory failure include- increased work of breathing. Contributing diagnoses includes - COPD and asthma Labs and images were reviewed. Patient Has not had a recent ABG. Will treat underlying causes and adjust management of respiratory failure as follows- s/p steroids by EMS; cont nebs and oxygen as needed. Will wean as tolerated. Considering hyperglycemia and no wheezing today, will hold off on additional steroids.       10/9/23  She feels wheezy but I dont hear any wheezing   Will resume her albuterol   check BNP CT shows effusions ; will increase lasix           Symptomatic anemia  Check iron, ferritin, retic, B12, folate.   S/p 1u pRBCs      10/9  HB improved 6.7 to 7.9   She reports no melena ; no BPR   Continue famotidine     CAD (coronary artery disease)  Cont home statin, asa. Resume other meds pending home med rec. Hold plavix in light of anemia.          Elevated troponin  Chronic; likely demand in light " of anemia  S/p 1u pRBCs    10/9/23  No chest pain   EKG :troponin trending down       Normal sinus rhythm Nonspecific ST and T wave abnormality Abnormal ECG When compared with ECG of 07-OCT-2023 22:41, T wave inversion now evident in Late      Hypothyroid  Cont home synthroid.  Lab Results   Component Value Date    TSH 0.416 05/23/2023           Hyperlipidemia associated with type 2 diabetes mellitus  Cont home statin.  Lab Results   Component Value Date    LDLCALC 70.8 02/06/2008           Diabetes mellitus  Patient's FSGs are controlled on current medication regimen.  Last A1c reviewed-   Lab Results   Component Value Date    HGBA1C 5.6 09/18/2023     Most recent fingerstick glucose reviewed-   Recent Labs   Lab 10/08/23  1123 10/08/23  1620 10/08/23  1955 10/09/23  0748   POCTGLUCOSE 227* 184* 186* 184*     Current correctional scale  Medium  Maintain anti-hyperglycemic dose as follows-   Antihyperglycemics (From admission, onward)    Start     Stop Route Frequency Ordered    10/08/23 1130  insulin detemir U-100 (Levemir) pen 15 Units         -- SubQ 2 times daily 10/08/23 1116    10/08/23 0519  insulin aspart U-100 pen 0-10 Units         -- SubQ Before meals & nightly PRN 10/08/23 0419        Hold Oral hypoglycemics while patient is in the hospital.  10/9/23  Glucose 183, 184     Hypertension  Currently normotensive; awaiting family to bring patient's home meds as she is unsure of what she takes      10/9/23  Continue antihypertensives        VTE Risk Mitigation (From admission, onward)         Ordered     IP VTE HIGH RISK PATIENT  Once         10/08/23 0214     Place sequential compression device  Until discontinued         10/08/23 0214                Discharge Planning   [place]:      Code Status: Full Code   Is the patient medically ready for discharge?:     Reason for patient still in hospital (select all that apply): Treatment  Discharge Plan A: Home Health, Home with family                  La Nena Vasquez,  MD  Department of Hospital Medicine   [facility] S[facility] (3rd Fl)

## 2023-10-09 NOTE — ASSESSMENT & PLAN NOTE
Chronic; likely demand in light of anemia  S/p 1u pRBCs    10/9/23  No chest pain   EKG :troponin trending down       Normal sinus rhythm Nonspecific ST and T wave abnormality Abnormal ECG When compared with ECG of 07-OCT-2023 22:41, T wave inversion now evident in Late

## 2023-10-09 NOTE — PLAN OF CARE
[facility] - Med Surg (3rd Fl)  Initial Discharge Assessment       Primary Care Provider: Bianca Godoy MD    Admission Diagnosis: Shortness of breath [R06.02]    Admission Date: 10/7/2023  Expected Discharge Date:     Transition of Care Barriers: (P) None    Payor: MEDICAID / Plan: [facility] COMMUNITY PLAN [facility] ([place] MEDICAID) / Product Type: Managed Medicaid /     Extended Emergency Contact Information  Primary Emergency Contact: OlegAyah   [place]  Mobile Phone: [phone]  Relation: Daughter  Secondary Emergency Contact: Annie Regalado  Mobile Phone: [phone]  Relation: Daughter  Preferred language: English   needed? No    Discharge Plan A: (P) Home Health, Home with family  Discharge Plan B: (P) Home with family      [address] [address]  Phone: [phone] Fax: [phone]    [place] [address]  Phone: [phone] Fax: [phone]      Initial Assessment (most recent)       Adult Discharge Assessment - 10/09/23 0904          Discharge Assessment    Assessment Type Discharge Planning Assessment (P)      Confirmed/corrected address, phone number and insurance Yes (P)      Confirmed Demographics Correct on Facesheet (P)      Source of Information patient (P)      Reason For Admission shortness of breath (P)      People in Home friend(s) (P)    Pt states she lives with her friend Jone    Do you expect to return to your current living situation? Yes (P)      Do you have help at home or someone to help you manage your care at home? Yes (P)      Who are your caregiver(s) and their phone number(s)? Ayah [phone] (P)      Current cognitive status: Alert/Oriented (P)      Equipment Currently Used at Home shower chair;walker, standard (P)      Readmission within 30 days? No (P)      Patient currently being followed by outpatient  case management? No (P)      Do you currently have service(s) that help you manage your care at home? No (P)      Do you take prescription medications? Yes (P)      Do you have prescription coverage? Yes (P)      Coverage [facility] Plan (P)      Do you have any problems affording any of your prescribed medications? No (P)      Is the patient taking medications as prescribed? yes (P)      Who is going to help you get home at discharge? Daughters or friend (P)      Are you on dialysis? No (P)      DME Needed Upon Discharge  none (P)      Transition of Care Barriers None (P)      Discharge Plan A Home Health;Home with family (P)      Discharge Plan B Home with family (P)                       CM spoke with patient at bedside. Denies any needs at this time. CM to remain available

## 2023-10-09 NOTE — ASSESSMENT & PLAN NOTE
Cont home statin, asa. Resume other meds pending home med rec. Hold plavix in light of anemia.

## 2023-10-09 NOTE — PLAN OF CARE
Problem: Adult Inpatient Plan of Care  Goal: Plan of Care Review  Outcome: Ongoing, Progressing  Goal: Patient-Specific Goal (Individualized)  Outcome: Ongoing, Progressing  Goal: Absence of Hospital-Acquired Illness or Injury  Outcome: Ongoing, Progressing  Goal: Optimal Comfort and Wellbeing  Outcome: Ongoing, Progressing  Goal: Readiness for Transition of Care  Outcome: Ongoing, Progressing     Problem: Bariatric Environmental Safety  Goal: Safety Maintained with Care  Outcome: Ongoing, Progressing     Problem: Diabetes Comorbidity  Goal: Blood Glucose Level Within Targeted Range  Outcome: Ongoing, Progressing     Problem: Fall Injury Risk  Goal: Absence of Fall and Fall-Related Injury  Outcome: Ongoing, Progressing     Problem: Skin Injury Risk Increased  Goal: Skin Health and Integrity  Outcome: Ongoing, Progressing

## 2023-10-09 NOTE — ASSESSMENT & PLAN NOTE
Patient's FSGs are controlled on current medication regimen.  Last A1c reviewed-   Lab Results   Component Value Date    HGBA1C 5.6 09/18/2023     Most recent fingerstick glucose reviewed-   Recent Labs   Lab 10/08/23  1123 10/08/23  1620 10/08/23  1955 10/09/23  0748   POCTGLUCOSE 227* 184* 186* 184*     Current correctional scale  Medium  Maintain anti-hyperglycemic dose as follows-   Antihyperglycemics (From admission, onward)    Start     Stop Route Frequency Ordered    10/08/23 1130  insulin detemir U-100 (Levemir) pen 15 Units         -- SubQ 2 times daily 10/08/23 1116    10/08/23 0519  insulin aspart U-100 pen 0-10 Units         -- SubQ Before meals & nightly PRN 10/08/23 0419        Hold Oral hypoglycemics while patient is in the hospital.  10/9/23  Glucose 183, 184

## 2023-10-09 NOTE — ASSESSMENT & PLAN NOTE
Currently normotensive; awaiting family to bring patient's home meds as she is unsure of what she takes      10/9/23  Continue antihypertensives

## 2023-10-09 NOTE — PT/OT/SLP EVAL
"Speech Language Pathology Evaluation  Bedside Swallow    Patient Name:  Merna Reaglado   MRN:  [id number]  Admitting Diagnosis: Acute hypoxemic respiratory failure    Recommendations:                 General Recommendations: GI consult 2/2 possible esophageal and/or gastric dysfunction; SLP to f/u 1-2x in acute care to monitor for oropharyngeal swallow changes  Diet recommendations:  Soft & Bite Sized Diet - IDDSI Level 6, Thin liquids - IDDSI Level 0. PO meds (1 at a time) w/ thin liquids.  Aspiration Precautions: Behavioral reflux precautions  General Precautions: Standard, aspiration  Communication strategies:  none    Assessment:     Merna Regalado is a 64 y/o F who presents w/ no overt clinical signs of oropharyngeal dysphagia. Signs of possible esophageal and/or gastric dysfunction (e.g., reduced esophageal motility, GERD/reflux), given odynophagia, painful belching, early satiety, reports of chronic "heartburn," acute event of coughing/choking w/ meat + chest heaviness relieved post GI cocktail this AM per chart review, which may be c/w pt's GERD + T2DM. Pt would likely benefit from GI consult for further workup. At this current time, further SLP intervention does not appear to be indicated. However, SLP will follow 1-2x additional times in acute care to ensure no change in swallow function from an oropharyngeal standpoint that would warrant further investigation of pt's oropharyngeal swallow physiology.    History:     Past Medical History:   Diagnosis Date    Asthma     Coronary artery disease     Diabetes mellitus     Dyslipidemia     GERD (gastroesophageal reflux disease)     Hypertension     Neuropathy     Thyroid disease        Past Surgical History:   Procedure Laterality Date    ANKLE HARDWARE REMOVAL Left 3/31/2022    Procedure: REMOVAL, HARDWARE, ANKLE;  Surgeon: Alec Pettit MD;  Location: [facility];  Service: Orthopedics;  Laterality: Left;    ANKLE HARDWARE REMOVAL Right 8/18/2022    " Procedure: REMOVAL, HARDWARE, ANKLE;  Surgeon: Alec Pettit MD;  Location: [facility];  Service: Orthopedics;  Laterality: Right;    APPLICATION OF LARGE EXTERNAL FIXATION DEVICE TO TIBIA Left 11/05/2021    Procedure: APPLICATION, EXTERNAL FIXATION DEVICE, LARGE, TIBIA;  Surgeon: Bk Richter MD;  Location: [facility];  Service: Orthopedics;  Laterality: Left;    APPLICATION OF WOUND VACUUM-ASSISTED CLOSURE DEVICE Right 8/18/2022    Procedure: APPLICATION, WOUND VAC;  Surgeon: Alec Pettit MD;  Location: [facility];  Service: Orthopedics;  Laterality: Right;    cardiac stents      thinks 3 total (1st 2 was in 2009 and later 2015 or so)    CHOLECYSTECTOMY      CLOSED REDUCTION Right 5/11/2022    Procedure: CLOSED REDUCTION;  Surgeon: Alec Pettit MD;  Location: [facility];  Service: Orthopedics;  Laterality: Right;  Right Ankle    CLOSURE OF WOUND Right 8/18/2022    Procedure: CLOSURE, WOUND;  Surgeon: Alec Pettit MD;  Location: [facility];  Service: Orthopedics;  Laterality: Right;    EXTERNAL FIXATION Right 5/11/2022    Procedure: EXTERNAL FIXATION;  Surgeon: Alec Pettit MD;  Location: [facility];  Service: Orthopedics;  Laterality: Right;  Right Ankle    FEMORAL BYPASS      HYSTERECTOMY      IRRIGATION AND DEBRIDEMENT OF LOWER EXTREMITY Right 8/18/2022    Procedure: IRRIGATION AND DEBRIDEMENT, LOWER EXTREMITY;  Surgeon: Alec Pettit MD;  Location: [facility];  Service: Orthopedics;  Laterality: Right;    OPEN REDUCTION AND INTERNAL FIXATION (ORIF) OF PILON FRACTURE Left 11/12/2021    Procedure: ORIF, FRACTURE, PILON;  Surgeon: Alec Pettit MD;  Location: [facility];  Service: Orthopedics;  Laterality: Left;    REMOVAL OF EXTERNAL FIXATION DEVICE Left 11/12/2021    Procedure: REMOVAL, EXTERNAL FIXATION DEVICE;  Surgeon: Alec Pettit MD;  Location: [facility];  Service: Orthopedics;  Laterality: Left;    REMOVAL OF EXTERNAL FIXATION DEVICE Right 5/20/2022    Procedure: REMOVAL, EXTERNAL FIXATION DEVICE;  Surgeon: Alec  "CORINNE Pettit MD;  Location: [facility];  Service: Orthopedics;  Laterality: Right;    stents to kidney      TIBIOTALOCALCANEAL FUSION USING INTRAMEDULLARY STEPHANIE Right 9/28/2023    Procedure: FUSION, TIBIOTALOCALCANEAL, USING INTRAMEDULLARY STEPHANIE;  Surgeon: Juventino Murray MD;  Location: [facility];  Service: Orthopedics;  Laterality: Right;       Prior Intubation HX:  n/a    Modified Barium Swallow: n/a    Chest X-Rays: 10/08/23 - FINDINGS:  There is bibasilar atelectasis similar to 10/07/2023.  There is no pneumothorax, pleural effusion or focal consolidation. There is atherosclerotic disease of the aorta.     Impression:     There is bibasilar atelectasis similar to 10/07/2023.    Prior diet: Regular solid / thin liquid diet - current hospital diet order consistency and PTA    Subjective     Pt seen for clinical swallow evaluation s/p consult placed due to "dysphagia." Pt seen at bedside, received awake/alert & appeared to be breathing comfortably on NC. Pt denied chronic dysphagia/reports during pt interview. Endorsed acute onset of dysphagia, that began 1-day prior & characterized by odynophagia across all swallows including across saliva swallows. Pt also reported event of coughing/choking w/ meat yesterday but w/ no coughing/choking & no globus sensation w/ PO intake since this singular event. Endorsed chronic GERD symptomology w/ medication management, hx of early satiety w/ meals, and significant belching w/ meals as of late.  Patient goals: none stated     Pain/Comfort:  Pain Rating 1: 3/10  Location - Side 1: Bilateral  Location 1: throat  Pain Addressed 1: Nurse notified    Respiratory Status: 100% SpO2 on Nasal cannula, flow 3 L/min    Objective:     Oral Musculature Evaluation  Oral Musculature: WFL  Dentition: scattered dentition, teeth in poor condition  Secretion Management: adequate  Mucosal Quality: adequate  Mandibular Strength and Mobility: WFL  Oral Labial Strength and Mobility: WFL, functional pursing, " functional retraction, functional seal  Lingual Strength and Mobility: WFL, functional lateral movement, functional anterior elevation, functional protrusion  Velar Elevation: WFL  Buccal Strength and Mobility: WFL  Volitional Cough: perceptually WFL  Voice Prior to PO Intake: perceptually WFL    Predisposing Dysphagia Risk Factors:  GERD, T2DM, CHF, COPD    Precipitating Dysphagia Risk Factors:  Acute hypoxemic respiratory failure    Bedside Swallow Eval:   Consistencies Assessed:  Thin liquids - pt regulated sequential straw drinking - self fed  Solids - cleveland cracker x1 - self fed      Oral Phase:   WFL    Pharyngeal Phase:   Facial grimacing w/ reported odynophagia across all swallowing events  Denied globus sensation  No throat clearing  No coughing/choking  No vocal quality change  Belching noted post trials that pt reported pain during event    Compensatory Strategies  None      Goals:   Multidisciplinary Problems       SLP Goals          Problem: SLP    Goal Priority Disciplines Outcome   SLP Goal     SLP    Description: GOALS:    (1) Pt will tolerate least-restrictive oral diet to maintain adequate nutrition/hydration w/o acute dysphagia-related pulmonary complication                       Plan:     Patient to be seen:  5 x/week   Plan of Care expires:  10/23/23  Plan of Care reviewed with:  patient   SLP Follow-Up:  Yes       Discharge recommendations:  other (see comments) (per medical team)   Barriers to Discharge:  Level of Skilled Assistance Needed      Time Tracking:     SLP Treatment Date:   10/09/23  Speech Start Time:  1129  Speech Stop Time:  1142     Speech Total Time (min):  13 min    Billable Minutes: Eval Swallow and Oral Function 13 minutes    10/09/2023

## 2023-10-09 NOTE — SUBJECTIVE & OBJECTIVE
Review of Systems   Constitutional:  Positive for chills. Negative for fever.   HENT:  Negative for congestion and sore throat.    Respiratory:  Positive for cough and shortness of breath (much improved).    Cardiovascular:  Negative for chest pain.   Gastrointestinal:  Negative for abdominal pain, diarrhea, nausea and vomiting.   Genitourinary:  Negative for dysuria and hematuria.   Musculoskeletal:  Positive for arthralgias.   Neurological:  Negative for dizziness, syncope and light-headedness.   Psychiatric/Behavioral:  Negative for confusion.      Objective:     Vital Signs (Most Recent):  Temp: 98.4 °F (36.9 °C) (10/09/23 0720)  Pulse: 81 (10/09/23 0800)  Resp: 18 (10/09/23 0720)  BP: (!) 148/67 (10/09/23 0720)  SpO2: 100 % (10/09/23 0720) Vital Signs (24h Range):  Temp:  [98.2 °F (36.8 °C)-98.5 °F (36.9 °C)] 98.4 °F (36.9 °C)  Pulse:  [70-86] 81  Resp:  [16-20] 18  SpO2:  [95 %-100 %] 100 %  BP: (126-148)/(60-80) 148/67     Weight: 97 kg (213 lb 13.5 oz)  Body mass index is 39.11 kg/m².    Intake/Output Summary (Last 24 hours) at 10/9/2023 0910  Last data filed at 10/9/2023 0834  Gross per 24 hour   Intake 360 ml   Output 1300 ml   Net -940 ml         Physical Exam  Vitals reviewed.   Constitutional:       General: She is not in acute distress.     Appearance: She is obese.   HENT:      Head: Normocephalic and atraumatic.      Mouth/Throat:      Mouth: Mucous membranes are moist.   Eyes:      Conjunctiva/sclera: Conjunctivae normal.      Pupils: Pupils are equal, round, and reactive to light.   Cardiovascular:      Rate and Rhythm: Normal rate and regular rhythm.      Heart sounds: Normal heart sounds. No murmur heard.  Pulmonary:      Effort: Pulmonary effort is normal. No respiratory distress.      Breath sounds: Normal breath sounds.   Abdominal:      General: Bowel sounds are normal. There is no distension.      Palpations: Abdomen is soft.      Tenderness: There is no abdominal tenderness.  "  Musculoskeletal:      Cervical back: Neck supple.      Comments: RLE with splint in place   Neurological:      General: No focal deficit present.      Mental Status: She is alert and oriented to person, place, and time.   Psychiatric:         Mood and Affect: Mood normal.         Behavior: Behavior normal.             Significant Labs: All pertinent labs within the past 24 hours have been reviewed.  ABGs: No results for input(s): "PH", "PCO2", "HCO3", "POCSATURATED", "BE", "TOTALHB", "COHB", "METHB", "O2HB", "POCFIO2", "PO2" in the last 48 hours.  CBC:   Recent Labs   Lab 10/07/23  2256 10/08/23  0548 10/09/23  0555   WBC 10.28 10.30 12.02   HGB 6.7* 8.1* 7.9*   HCT 21.7* 25.6* 25.2*    212 231     Cardiac Markers: No results for input(s): "CKMB", "MYOGLOBIN", "BNP", "TROPISTAT" in the last 48 hours.  TSH:   Recent Labs   Lab 05/23/23  0604   TSH 0.416     BMP  Lab Results   Component Value Date     10/09/2023    K 4.9 10/09/2023     10/09/2023    CO2 20 (L) 10/09/2023    BUN 21 10/09/2023    CREATININE 1.2 10/09/2023    CALCIUM 9.1 10/09/2023    ANIONGAP 10 10/09/2023    EGFRNORACEVR 52 (A) 10/09/2023       Significant Imaging: I have reviewed all pertinent imaging results/findings within the past 24 hours.  CT: I have reviewed all pertinent results/findings within the past 24 hours and my personal findings are:  reviewed  CXR: I have reviewed all pertinent results/findings within the past 24 hours and my personal findings are:  reviewed    "

## 2023-10-10 LAB
ANION GAP SERPL CALC-SCNC: 11 MMOL/L (ref 8–16)
BASOPHILS # BLD AUTO: 0.04 K/UL (ref 0–0.2)
BASOPHILS NFR BLD: 0.5 % (ref 0–1.9)
BUN SERPL-MCNC: 26 MG/DL (ref 8–23)
CALCIUM SERPL-MCNC: 8.6 MG/DL (ref 8.7–10.5)
CHLORIDE SERPL-SCNC: 107 MMOL/L (ref 95–110)
CO2 SERPL-SCNC: 19 MMOL/L (ref 23–29)
CREAT SERPL-MCNC: 1.3 MG/DL (ref 0.5–1.4)
DIFFERENTIAL METHOD: ABNORMAL
EOSINOPHIL # BLD AUTO: 0.2 K/UL (ref 0–0.5)
EOSINOPHIL NFR BLD: 2.3 % (ref 0–8)
ERYTHROCYTE [DISTWIDTH] IN BLOOD BY AUTOMATED COUNT: 15.2 % (ref 11.5–14.5)
EST. GFR  (NO RACE VARIABLE): 47 ML/MIN/1.73 M^2
GLUCOSE SERPL-MCNC: 119 MG/DL (ref 70–110)
HCT VFR BLD AUTO: 25.3 % (ref 37–48.5)
HGB BLD-MCNC: 7.5 G/DL (ref 12–16)
IMM GRANULOCYTES # BLD AUTO: 0.04 K/UL (ref 0–0.04)
IMM GRANULOCYTES NFR BLD AUTO: 0.5 % (ref 0–0.5)
LYMPHOCYTES # BLD AUTO: 1.7 K/UL (ref 1–4.8)
LYMPHOCYTES NFR BLD: 20.3 % (ref 18–48)
MCH RBC QN AUTO: 28.6 PG (ref 27–31)
MCHC RBC AUTO-ENTMCNC: 29.6 G/DL (ref 32–36)
MCV RBC AUTO: 97 FL (ref 82–98)
MONOCYTES # BLD AUTO: 0.5 K/UL (ref 0.3–1)
MONOCYTES NFR BLD: 6.3 % (ref 4–15)
NEUTROPHILS # BLD AUTO: 5.9 K/UL (ref 1.8–7.7)
NEUTROPHILS NFR BLD: 70.1 % (ref 38–73)
NRBC BLD-RTO: 0 /100 WBC
PLATELET # BLD AUTO: 203 K/UL (ref 150–450)
PMV BLD AUTO: 10 FL (ref 9.2–12.9)
POCT GLUCOSE: 121 MG/DL (ref 70–110)
POCT GLUCOSE: 162 MG/DL (ref 70–110)
POCT GLUCOSE: 173 MG/DL (ref 70–110)
POCT GLUCOSE: 238 MG/DL (ref 70–110)
POTASSIUM SERPL-SCNC: 4.3 MMOL/L (ref 3.5–5.1)
RBC # BLD AUTO: 2.62 M/UL (ref 4–5.4)
SODIUM SERPL-SCNC: 137 MMOL/L (ref 136–145)
WBC # BLD AUTO: 8.38 K/UL (ref 3.9–12.7)

## 2023-10-10 PROCEDURE — 80048 BASIC METABOLIC PNL TOTAL CA: CPT | Performed by: INTERNAL MEDICINE

## 2023-10-10 PROCEDURE — 25000003 PHARM REV CODE 250: Performed by: STUDENT IN AN ORGANIZED HEALTH CARE EDUCATION/TRAINING PROGRAM

## 2023-10-10 PROCEDURE — A4216 STERILE WATER/SALINE, 10 ML: HCPCS | Performed by: STUDENT IN AN ORGANIZED HEALTH CARE EDUCATION/TRAINING PROGRAM

## 2023-10-10 PROCEDURE — 27000221 HC OXYGEN, UP TO 24 HOURS

## 2023-10-10 PROCEDURE — 92526 ORAL FUNCTION THERAPY: CPT

## 2023-10-10 PROCEDURE — 99232 SBSQ HOSP IP/OBS MODERATE 35: CPT | Mod: ,,, | Performed by: INTERNAL MEDICINE

## 2023-10-10 PROCEDURE — 11000001 HC ACUTE MED/SURG PRIVATE ROOM

## 2023-10-10 PROCEDURE — 25000003 PHARM REV CODE 250: Performed by: INTERNAL MEDICINE

## 2023-10-10 PROCEDURE — 94761 N-INVAS EAR/PLS OXIMETRY MLT: CPT

## 2023-10-10 PROCEDURE — 36415 COLL VENOUS BLD VENIPUNCTURE: CPT | Performed by: INTERNAL MEDICINE

## 2023-10-10 PROCEDURE — 99900031 HC PATIENT EDUCATION (STAT)

## 2023-10-10 PROCEDURE — 99232 PR SUBSEQUENT HOSPITAL CARE,LEVL II: ICD-10-PCS | Mod: ,,, | Performed by: INTERNAL MEDICINE

## 2023-10-10 PROCEDURE — 99900035 HC TECH TIME PER 15 MIN (STAT)

## 2023-10-10 PROCEDURE — 85025 COMPLETE CBC W/AUTO DIFF WBC: CPT | Performed by: INTERNAL MEDICINE

## 2023-10-10 RX ADMIN — TRAZODONE HYDROCHLORIDE 100 MG: 50 TABLET ORAL at 08:10

## 2023-10-10 RX ADMIN — DOXAZOSIN 4 MG: 2 TABLET ORAL at 08:10

## 2023-10-10 RX ADMIN — CARVEDILOL 12.5 MG: 12.5 TABLET, FILM COATED ORAL at 08:10

## 2023-10-10 RX ADMIN — FUROSEMIDE 40 MG: 40 TABLET ORAL at 08:10

## 2023-10-10 RX ADMIN — ATORVASTATIN CALCIUM 80 MG: 80 TABLET, FILM COATED ORAL at 08:10

## 2023-10-10 RX ADMIN — GABAPENTIN 300 MG: 300 CAPSULE ORAL at 02:10

## 2023-10-10 RX ADMIN — ACETAMINOPHEN 650 MG: 325 TABLET ORAL at 03:10

## 2023-10-10 RX ADMIN — INSULIN DETEMIR 15 UNITS: 100 INJECTION, SOLUTION SUBCUTANEOUS at 08:10

## 2023-10-10 RX ADMIN — FAMOTIDINE 40 MG: 20 TABLET ORAL at 08:10

## 2023-10-10 RX ADMIN — Medication 10 ML: at 08:10

## 2023-10-10 RX ADMIN — LEVOTHYROXINE SODIUM 88 MCG: 88 TABLET ORAL at 05:10

## 2023-10-10 RX ADMIN — POLYETHYLENE GLYCOL 3350 17 G: 17 POWDER, FOR SOLUTION ORAL at 09:10

## 2023-10-10 RX ADMIN — RANOLAZINE 500 MG: 500 TABLET, EXTENDED RELEASE ORAL at 08:10

## 2023-10-10 RX ADMIN — NIFEDIPINE 30 MG: 30 TABLET, FILM COATED, EXTENDED RELEASE ORAL at 08:10

## 2023-10-10 RX ADMIN — ASPIRIN 81 MG 81 MG: 81 TABLET ORAL at 08:10

## 2023-10-10 RX ADMIN — INSULIN ASPART 2 UNITS: 100 INJECTION, SOLUTION INTRAVENOUS; SUBCUTANEOUS at 08:10

## 2023-10-10 RX ADMIN — INSULIN ASPART 2 UNITS: 100 INJECTION, SOLUTION INTRAVENOUS; SUBCUTANEOUS at 12:10

## 2023-10-10 RX ADMIN — INSULIN ASPART 2 UNITS: 100 INJECTION, SOLUTION INTRAVENOUS; SUBCUTANEOUS at 05:10

## 2023-10-10 RX ADMIN — FLUOXETINE 20 MG: 20 CAPSULE ORAL at 08:10

## 2023-10-10 RX ADMIN — GABAPENTIN 300 MG: 300 CAPSULE ORAL at 08:10

## 2023-10-10 RX ADMIN — ISOSORBIDE DINITRATE 60 MG: 20 TABLET ORAL at 08:10

## 2023-10-10 NOTE — ASSESSMENT & PLAN NOTE
Currently normotensive; awaiting family to bring patient's home meds as she is unsure of what she takes      10/10/23  Continue antihypertensives

## 2023-10-10 NOTE — ASSESSMENT & PLAN NOTE
Chronic; likely demand in light of anemia  S/p 1u pRBCs    10/9/23  No chest pain   EKG :troponin trending down       Normal sinus rhythm Nonspecific ST and T wave abnormality Abnormal ECG When compared with ECG of 07-OCT-2023 22:41, T wave inversion now evident in Late    10/10: remains chest pain free. Trop down trending

## 2023-10-10 NOTE — SUBJECTIVE & OBJECTIVE
Interval History: no acute events. Breathing back to baseline    Review of Systems   Constitutional:  Negative for activity change, fatigue, fever and unexpected weight change.   HENT:  Negative for congestion, ear pain, hearing loss, rhinorrhea and sore throat.    Eyes:  Negative for redness and visual disturbance.   Respiratory:  Negative for cough, shortness of breath and wheezing.    Cardiovascular:  Negative for chest pain, palpitations and leg swelling.   Gastrointestinal:  Negative for abdominal pain, blood in stool, constipation, diarrhea, nausea and vomiting.   Genitourinary:  Negative for dysuria, frequency and urgency.   Musculoskeletal:  Positive for arthralgias. Negative for back pain, joint swelling and neck pain.   Skin:  Negative for color change, rash and wound.   Neurological:  Negative for dizziness, tremors, weakness, light-headedness and headaches.     Objective:     Vital Signs (Most Recent):  Temp: 97.6 °F (36.4 °C) (10/10/23 0711)  Pulse: 61 (10/10/23 0958)  Resp: 16 (10/10/23 0711)  BP: (!) 145/63 (10/10/23 0711)  SpO2: 100 % (10/10/23 0711) Vital Signs (24h Range):  Temp:  [97.4 °F (36.3 °C)-98.2 °F (36.8 °C)] 97.6 °F (36.4 °C)  Pulse:  [50-76] 61  Resp:  [16-20] 16  SpO2:  [97 %-100 %] 100 %  BP: ()/(51-69) 145/63     Weight: 97 kg (213 lb 13.5 oz)  Body mass index is 39.11 kg/m².    Intake/Output Summary (Last 24 hours) at 10/10/2023 1142  Last data filed at 10/9/2023 1802  Gross per 24 hour   Intake 480 ml   Output 800 ml   Net -320 ml         Physical Exam  Vitals and nursing note reviewed.   Constitutional:       General: She is not in acute distress.     Appearance: She is well-developed. She is obese.   HENT:      Head: Normocephalic and atraumatic.      Right Ear: External ear normal.      Left Ear: External ear normal.      Nose: Nose normal.   Eyes:      General:         Right eye: No discharge.         Left eye: No discharge.      Conjunctiva/sclera: Conjunctivae normal.    Neck:      Thyroid: No thyromegaly.   Cardiovascular:      Rate and Rhythm: Normal rate and regular rhythm.      Heart sounds: No murmur heard.  Pulmonary:      Effort: Pulmonary effort is normal. No respiratory distress.      Breath sounds: Normal breath sounds. No wheezing or rales.   Abdominal:      General: Bowel sounds are normal. There is no distension.      Palpations: Abdomen is soft.      Tenderness: There is no abdominal tenderness.   Musculoskeletal:      Comments: Right ankle in cast   Skin:     General: Skin is warm and dry.   Neurological:      Mental Status: She is alert and oriented to person, place, and time.   Psychiatric:         Behavior: Behavior normal.         Thought Content: Thought content normal.             Significant Labs: All pertinent labs within the past 24 hours have been reviewed.  CBC:   Recent Labs   Lab 10/09/23  0555 10/10/23  0548   WBC 12.02 8.38   HGB 7.9* 7.5*   HCT 25.2* 25.3*    203     CMP:   Recent Labs   Lab 10/09/23  0555 10/10/23  0548    137   K 4.9 4.3    107   CO2 20* 19*   * 119*   BUN 21 26*   CREATININE 1.2 1.3   CALCIUM 9.1 8.6*   PROT 6.0  --    ALBUMIN 2.9*  --    BILITOT 0.8  --    ALKPHOS 46*  --    AST 13  --    ALT 14  --    ANIONGAP 10 11     Recent Lab Results  (Last 5 results in the past 24 hours)        10/10/23  1122   10/10/23  0709   10/10/23  0548   10/09/23  2002   10/09/23  1553        Anion Gap     11           Baso #     0.04           Basophil %     0.5           BUN     26           Calcium     8.6           Chloride     107           CO2     19           Creatinine     1.3           Differential Method     Automated           eGFR     47           Eos #     0.2           Eosinophil %     2.3           Glucose     119           Gran # (ANC)     5.9           Gran %     70.1           Hematocrit     25.3           Hemoglobin     7.5           Immature Grans (Abs)     0.04  Comment: Mild elevation in immature  granulocytes is non specific and   can be seen in a variety of conditions including stress response,   acute inflammation, trauma and pregnancy. Correlation with other   laboratory and clinical findings is essential.             Immature Granulocytes     0.5           Lymph #     1.7           Lymph %     20.3           MCH     28.6           MCHC     29.6           MCV     97           Mono #     0.5           Mono %     6.3           MPV     10.0           nRBC     0           Platelet Count     203           POCT Glucose 162   121     207   135       Potassium     4.3           RBC     2.62           RDW     15.2           Sodium     137           WBC     8.38                                  Significant Imaging: I have reviewed all pertinent imaging results/findings within the past 24 hours.

## 2023-10-10 NOTE — ASSESSMENT & PLAN NOTE
Patient with Hypoxic Respiratory failure which is Acute.  she is not on home oxygen. Supplemental oxygen was provided and noted-      .   Signs/symptoms of respiratory failure include- increased work of breathing. Contributing diagnoses includes - COPD and asthma Labs and images were reviewed. Patient Has not had a recent ABG. Will treat underlying causes and adjust management of respiratory failure as follows- s/p steroids by EMS; cont nebs and oxygen as needed. Will wean as tolerated. Considering hyperglycemia and no wheezing today, will hold off on additional steroids.       10/9/23  She feels wheezy but I dont hear any wheezing   Will resume her albuterol   check BNP CT shows effusions ; will increase lasix     10/10:  Much improved. No wheezing. I agree, no steroids. Cont lasix. She seems back to baseline from respiratory standpoint today

## 2023-10-10 NOTE — ASSESSMENT & PLAN NOTE
Patient's FSGs are controlled on current medication regimen.  Last A1c reviewed-   Lab Results   Component Value Date    HGBA1C 5.6 09/18/2023     Most recent fingerstick glucose reviewed-   Recent Labs   Lab 10/09/23  1553 10/09/23  2002 10/10/23  0709 10/10/23  1122   POCTGLUCOSE 135* 207* 121* 162*     Current correctional scale  Medium  Maintain anti-hyperglycemic dose as follows-   Antihyperglycemics (From admission, onward)    Start     Stop Route Frequency Ordered    10/08/23 1130  insulin detemir U-100 (Levemir) pen 15 Units         -- SubQ 2 times daily 10/08/23 1116    10/08/23 0519  insulin aspart U-100 pen 0-10 Units         -- SubQ Before meals & nightly PRN 10/08/23 0419        Hold Oral hypoglycemics while patient is in the hospital.      10/10: glucose stable. Cont same

## 2023-10-10 NOTE — PT/OT/SLP PROGRESS
"Speech Language Pathology Treatment    Patient Name:  Merna Regalado   MRN:  [id number]  Admitting Diagnosis: Acute hypoxemic respiratory failure    Recommendations:                 General Recommendations: SLP to f/u 1x additional time in acute care to monitor for oropharyngeal swallow changes; Should previous symptomology resume pt may benefit GI consult  Diet recommendations:  Soft & Bite Sized Diet - IDDSI Level 6, Liquid Diet Level: Thin liquids - IDDSI Level 0. PO meds (1 at a time) w/ thin liquids.  Aspiration Precautions: Behavioral reflux precautions   General Precautions: Standard, aspiration  Communication strategies: none    Assessment:     Merna Regalado is a 62 y/o F who presents w/ no overt clinical signs of oropharyngeal dysphagia. It should be noted that pt previously presented w/ signs of possible esophageal and/or gastric dysfunction (e.g., reduced esophageal motility, GERD/reflux) at time of initial evaluation, given odynophagia, painful belching, early satiety, reports of chronic "heartburn," acute event of coughing/choking w/ meat + chest heaviness relieved post GI cocktail yesterday AM per chart review, which may be c/w pt's GERD + T2DM; however, pt appears to present w/ improvement in symptoms at this current time. Should previous symptomology resume, pt would likely benefit from GI consult for further workup. At this current time, further SLP intervention does not appear to be indicated. However, SLP will follow 1x additional time in acute care to ensure no change in swallow function from an oropharyngeal standpoint that would warrant further investigation of pt's oropharyngeal swallow physiology.       Subjective     Pt seen at bedside, received awake/alert. Pt appeared to be breathing comfortably on NC. She reported improvement to pain w/ swallowing (rated 2.5 today compared to 3 day prior). She reported that she is tolerating current oral diet with no coughing/choking since last seen " by SLP.  Patient goals: none stated     Pain/Comfort:  Pain Rating 1: 0/10    Respiratory Status: Nasal cannula, flow 1 L/min    Objective:     Has the patient been evaluated by SLP for swallowing?   Yes  Keep patient NPO? No   Current Respiratory Status: 98% SpO2    Pt administered the following oral trials to ensure no change in swallow function from an oropharyngeal standpoint:    - thin liquids via pt regulated sequential straw drinking  - regular solids via cleveland cracker x1    Oral phase:  - WFL    Pharyngeal phase:  - No overt s/s of pharyngeal dysphagia  - No overt s/s of aspiration  - No throat clearing and no coughing/choking  - Denied globus sensation  - No vocal quality change  - No facial grimacing appreciated, denied odynophagia  - No belching      Goals:   Multidisciplinary Problems       SLP Goals          Problem: SLP    Goal Priority Disciplines Outcome   SLP Goal     SLP Ongoing, Progressing   Description: GOALS:    (1) Pt will tolerate least-restrictive oral diet to maintain adequate nutrition/hydration w/o acute dysphagia-related pulmonary complication                       Plan:     Patient to be seen:  5 x/week   Plan of Care expires:  10/23/23  Plan of Care reviewed with:  patient   SLP Follow-Up:  Yes       Discharge recommendations:  other (see comments) (per medical team)   Barriers to Discharge:   none per SLP    Time Tracking:     SLP Treatment Date:   10/10/23  Speech Start Time:  1132  Speech Stop Time:  1141     Speech Total Time (min):  9 min    Billable Minutes: Treatment Swallowing Dysfunction 9 minutes    10/10/2023

## 2023-10-10 NOTE — ASSESSMENT & PLAN NOTE
Check iron, ferritin, retic, B12, folate.   S/p 1u pRBCs      10/9  HB improved 6.7 to 7.9   She reports no melena ; no BPR   Continue famotidine     10/10:  H/H is very slowly down trending since transfusion. Stool occult negative. Iron studies not revealing.  Discussed with patient we could trend this as outpatient. She does not need transfusion today. However, she has no transportation for the close follow up I would recommend. Therefore, will keep her another 24 hours and trend CBC tomorrow AM. If still down trending may need transfer for GI work up. I do not think she is actively bleeding from her recent surgery (no swelling, pain, bruising) but ankle is still in cast and not able to eval wound.

## 2023-10-10 NOTE — PROGRESS NOTES
[facility] ([facility])  [facility] Medicine  Progress Note    Patient Name: Merna Regalado  MRN: [id number]  Patient Class: IP- Inpatient   Admission Date: 10/7/2023  Length of Stay: 2 days  Attending Physician: La Nena Vasquez MD  Primary Care Provider: Bianca Godoy MD        Subjective:     Principal Problem:Acute hypoxemic respiratory failure        HPI:  63yF with HTN, DM-2, hyperlipidemia ,hypothyroidism, CAD , CHF presented to ER via EMS for acute onset of SOB. Denies associated chest pain. No known sick contacts. Reports cough started afterward productive of scant mucus. No fever. Per ER notes, pt with wheezing on arrival and RA sat of 87%. She received solumedrol from EMS. She was also found to have significant acute on chronic anemia and received 1u pRBCs. She denies any recent bleeding including hemoptysis, melena, blood in stool, hematuria. Of note, she underwent right Tibiotalar calcaneal nail, right ankle fusion and right subtalar fusion 9/29/23.       Overview/Hospital Course:  10/9/23  This am feeling much better   But c/o wheezing and spitting up grayish  sputum .  Sees Dr valenzuela for copd and uses inhaler BID  ;past smoker quit about 16 yrs ago   Has h/o COPD   She received 1 unit of PRBC ; 6.7 to 7.9 g      10/10:no wheezing. Back to baseline respiratory status  Her H/H continues to slowly trend down. Denies bleeding. Occult stool negative. Discuss discharge with repeat labsin 24-48 hours but she reports she has no transportation.       Interval History: no acute events. Breathing back to baseline    Review of Systems   Constitutional:  Negative for activity change, fatigue, fever and unexpected weight change.   HENT:  Negative for congestion, ear pain, hearing loss, rhinorrhea and sore throat.    Eyes:  Negative for redness and visual disturbance.   Respiratory:  Negative for cough, shortness of breath and wheezing.    Cardiovascular:  Negative for chest pain, palpitations and leg swelling.    Gastrointestinal:  Negative for abdominal pain, blood in stool, constipation, diarrhea, nausea and vomiting.   Genitourinary:  Negative for dysuria, frequency and urgency.   Musculoskeletal:  Positive for arthralgias. Negative for back pain, joint swelling and neck pain.   Skin:  Negative for color change, rash and wound.   Neurological:  Negative for dizziness, tremors, weakness, light-headedness and headaches.     Objective:     Vital Signs (Most Recent):  Temp: 97.6 °F (36.4 °C) (10/10/23 0711)  Pulse: 61 (10/10/23 0958)  Resp: 16 (10/10/23 0711)  BP: (!) 145/63 (10/10/23 0711)  SpO2: 100 % (10/10/23 0711) Vital Signs (24h Range):  Temp:  [97.4 °F (36.3 °C)-98.2 °F (36.8 °C)] 97.6 °F (36.4 °C)  Pulse:  [50-76] 61  Resp:  [16-20] 16  SpO2:  [97 %-100 %] 100 %  BP: ()/(51-69) 145/63     Weight: 97 kg (213 lb 13.5 oz)  Body mass index is 39.11 kg/m².    Intake/Output Summary (Last 24 hours) at 10/10/2023 1142  Last data filed at 10/9/2023 1802  Gross per 24 hour   Intake 480 ml   Output 800 ml   Net -320 ml         Physical Exam  Vitals and nursing note reviewed.   Constitutional:       General: She is not in acute distress.     Appearance: She is well-developed. She is obese.   HENT:      Head: Normocephalic and atraumatic.      Right Ear: External ear normal.      Left Ear: External ear normal.      Nose: Nose normal.   Eyes:      General:         Right eye: No discharge.         Left eye: No discharge.      Conjunctiva/sclera: Conjunctivae normal.   Neck:      Thyroid: No thyromegaly.   Cardiovascular:      Rate and Rhythm: Normal rate and regular rhythm.      Heart sounds: No murmur heard.  Pulmonary:      Effort: Pulmonary effort is normal. No respiratory distress.      Breath sounds: Normal breath sounds. No wheezing or rales.   Abdominal:      General: Bowel sounds are normal. There is no distension.      Palpations: Abdomen is soft.      Tenderness: There is no abdominal tenderness.    Musculoskeletal:      Comments: Right ankle in cast   Skin:     General: Skin is warm and dry.   Neurological:      Mental Status: She is alert and oriented to person, place, and time.   Psychiatric:         Behavior: Behavior normal.         Thought Content: Thought content normal.             Significant Labs: All pertinent labs within the past 24 hours have been reviewed.  CBC:   Recent Labs   Lab 10/09/23  0555 10/10/23  0548   WBC 12.02 8.38   HGB 7.9* 7.5*   HCT 25.2* 25.3*    203     CMP:   Recent Labs   Lab 10/09/23  0555 10/10/23  0548    137   K 4.9 4.3    107   CO2 20* 19*   * 119*   BUN 21 26*   CREATININE 1.2 1.3   CALCIUM 9.1 8.6*   PROT 6.0  --    ALBUMIN 2.9*  --    BILITOT 0.8  --    ALKPHOS 46*  --    AST 13  --    ALT 14  --    ANIONGAP 10 11     Recent Lab Results  (Last 5 results in the past 24 hours)        10/10/23  1122   10/10/23  0709   10/10/23  0548   10/09/23  2002   10/09/23  1553        Anion Gap     11           Baso #     0.04           Basophil %     0.5           BUN     26           Calcium     8.6           Chloride     107           CO2     19           Creatinine     1.3           Differential Method     Automated           eGFR     47           Eos #     0.2           Eosinophil %     2.3           Glucose     119           Gran # (ANC)     5.9           Gran %     70.1           Hematocrit     25.3           Hemoglobin     7.5           Immature Grans (Abs)     0.04  Comment: Mild elevation in immature granulocytes is non specific and   can be seen in a variety of conditions including stress response,   acute inflammation, trauma and pregnancy. Correlation with other   laboratory and clinical findings is essential.             Immature Granulocytes     0.5           Lymph #     1.7           Lymph %     20.3           MCH     28.6           MCHC     29.6           MCV     97           Mono #     0.5           Mono %     6.3           MPV     10.0            nRBC     0           Platelet Count     203           POCT Glucose 162   121     207   135       Potassium     4.3           RBC     2.62           RDW     15.2           Sodium     137           WBC     8.38                                  Significant Imaging: I have reviewed all pertinent imaging results/findings within the past 24 hours.      Assessment/Plan:      * Acute hypoxemic respiratory failure  Patient with Hypoxic Respiratory failure which is Acute.  she is not on home oxygen. Supplemental oxygen was provided and noted-      .   Signs/symptoms of respiratory failure include- increased work of breathing. Contributing diagnoses includes - COPD and asthma Labs and images were reviewed. Patient Has not had a recent ABG. Will treat underlying causes and adjust management of respiratory failure as follows- s/p steroids by EMS; cont nebs and oxygen as needed. Will wean as tolerated. Considering hyperglycemia and no wheezing today, will hold off on additional steroids.       10/9/23  She feels wheezy but I dont hear any wheezing   Will resume her albuterol   check BNP CT shows effusions ; will increase lasix     10/10:  Much improved. No wheezing. I agree, no steroids. Cont lasix. She seems back to baseline from respiratory standpoint today          Symptomatic anemia  Check iron, ferritin, retic, B12, folate.   S/p 1u pRBCs      10/9  HB improved 6.7 to 7.9   She reports no melena ; no BPR   Continue famotidine     10/10:  H/H is very slowly down trending since transfusion. Stool occult negative. Iron studies not revealing.  Discussed with patient we could trend this as outpatient. She does not need transfusion today. However, she has no transportation for the close follow up I would recommend. Therefore, will keep her another 24 hours and trend CBC tomorrow AM. If still down trending may need transfer for GI work up. I do not think she is actively bleeding from her recent surgery (no swelling, pain,  bruising) but ankle is still in cast and not able to eval wound.     CAD (coronary artery disease)  Cont home statin, asa. Resume other meds pending home med rec. Hold plavix in light of anemia.          Elevated troponin  Chronic; likely demand in light of anemia  S/p 1u pRBCs    10/9/23  No chest pain   EKG :troponin trending down       Normal sinus rhythm Nonspecific ST and T wave abnormality Abnormal ECG When compared with ECG of 07-OCT-2023 22:41, T wave inversion now evident in Late    10/10: remains chest pain free. Trop down trending    Hypothyroid  Cont home synthroid.  Lab Results   Component Value Date    TSH 0.416 05/23/2023           Hyperlipidemia associated with type 2 diabetes mellitus  Cont home statin.  Lab Results   Component Value Date    LDLCALC 70.8 02/06/2008           Diabetes mellitus  Patient's FSGs are controlled on current medication regimen.  Last A1c reviewed-   Lab Results   Component Value Date    HGBA1C 5.6 09/18/2023     Most recent fingerstick glucose reviewed-   Recent Labs   Lab 10/09/23  1553 10/09/23  2002 10/10/23  0709 10/10/23  1122   POCTGLUCOSE 135* 207* 121* 162*     Current correctional scale  Medium  Maintain anti-hyperglycemic dose as follows-   Antihyperglycemics (From admission, onward)    Start     Stop Route Frequency Ordered    10/08/23 1130  insulin detemir U-100 (Levemir) pen 15 Units         -- SubQ 2 times daily 10/08/23 1116    10/08/23 0519  insulin aspart U-100 pen 0-10 Units         -- SubQ Before meals & nightly PRN 10/08/23 0419        Hold Oral hypoglycemics while patient is in the hospital.      10/10: glucose stable. Cont same    Hypertension  Currently normotensive; awaiting family to bring patient's home meds as she is unsure of what she takes      10/10/23  Continue antihypertensives        VTE Risk Mitigation (From admission, onward)         Ordered     IP VTE HIGH RISK PATIENT  Once         10/08/23 0214     Place sequential compression device   Until discontinued         10/08/23 0214                Discharge Planning   [place]:      Code Status: Full Code   Is the patient medically ready for discharge?:     Reason for patient still in hospital (select all that apply): Laboratory test and Treatment  Discharge Plan A: Home Health, Home with family                  Jalyn Garcia MD  Department of Hospital Medicine   [facility] (3rd Fl)

## 2023-10-11 VITALS
DIASTOLIC BLOOD PRESSURE: 55 MMHG | HEIGHT: 62 IN | HEART RATE: 65 BPM | SYSTOLIC BLOOD PRESSURE: 118 MMHG | RESPIRATION RATE: 18 BRPM | OXYGEN SATURATION: 94 % | WEIGHT: 213.88 LBS | TEMPERATURE: 99 F | BODY MASS INDEX: 39.36 KG/M2

## 2023-10-11 LAB
ALBUMIN SERPL BCP-MCNC: 2.7 G/DL (ref 3.5–5.2)
ALP SERPL-CCNC: 90 U/L (ref 55–135)
ALT SERPL W/O P-5'-P-CCNC: 8 U/L (ref 10–44)
ANION GAP SERPL CALC-SCNC: 10 MMOL/L (ref 8–16)
AST SERPL-CCNC: 10 U/L (ref 10–40)
BASOPHILS # BLD AUTO: 0.03 K/UL (ref 0–0.2)
BASOPHILS NFR BLD: 0.4 % (ref 0–1.9)
BILIRUB SERPL-MCNC: 0.4 MG/DL (ref 0.1–1)
BUN SERPL-MCNC: 25 MG/DL (ref 8–23)
CALCIUM SERPL-MCNC: 8.7 MG/DL (ref 8.7–10.5)
CHLORIDE SERPL-SCNC: 106 MMOL/L (ref 95–110)
CO2 SERPL-SCNC: 21 MMOL/L (ref 23–29)
CREAT SERPL-MCNC: 1.3 MG/DL (ref 0.5–1.4)
DIFFERENTIAL METHOD: ABNORMAL
EOSINOPHIL # BLD AUTO: 0.2 K/UL (ref 0–0.5)
EOSINOPHIL NFR BLD: 2 % (ref 0–8)
ERYTHROCYTE [DISTWIDTH] IN BLOOD BY AUTOMATED COUNT: 14.7 % (ref 11.5–14.5)
EST. GFR  (NO RACE VARIABLE): 47 ML/MIN/1.73 M^2
GLUCOSE SERPL-MCNC: 221 MG/DL (ref 70–110)
HCT VFR BLD AUTO: 26.4 % (ref 37–48.5)
HGB BLD-MCNC: 8.1 G/DL (ref 12–16)
IMM GRANULOCYTES # BLD AUTO: 0.02 K/UL (ref 0–0.04)
IMM GRANULOCYTES NFR BLD AUTO: 0.3 % (ref 0–0.5)
LYMPHOCYTES # BLD AUTO: 1.8 K/UL (ref 1–4.8)
LYMPHOCYTES NFR BLD: 24.8 % (ref 18–48)
MCH RBC QN AUTO: 29.2 PG (ref 27–31)
MCHC RBC AUTO-ENTMCNC: 30.7 G/DL (ref 32–36)
MCV RBC AUTO: 95 FL (ref 82–98)
MONOCYTES # BLD AUTO: 0.4 K/UL (ref 0.3–1)
MONOCYTES NFR BLD: 5.7 % (ref 4–15)
NEUTROPHILS # BLD AUTO: 4.9 K/UL (ref 1.8–7.7)
NEUTROPHILS NFR BLD: 66.8 % (ref 38–73)
NRBC BLD-RTO: 0 /100 WBC
PLATELET # BLD AUTO: 198 K/UL (ref 150–450)
PMV BLD AUTO: 9.5 FL (ref 9.2–12.9)
POCT GLUCOSE: 197 MG/DL (ref 70–110)
POCT GLUCOSE: 248 MG/DL (ref 70–110)
POTASSIUM SERPL-SCNC: 4.8 MMOL/L (ref 3.5–5.1)
PROT SERPL-MCNC: 5.7 G/DL (ref 6–8.4)
RBC # BLD AUTO: 2.77 M/UL (ref 4–5.4)
SODIUM SERPL-SCNC: 137 MMOL/L (ref 136–145)
WBC # BLD AUTO: 7.33 K/UL (ref 3.9–12.7)

## 2023-10-11 PROCEDURE — 80053 COMPREHEN METABOLIC PANEL: CPT | Performed by: PHYSICIAN ASSISTANT

## 2023-10-11 PROCEDURE — 99239 HOSP IP/OBS DSCHRG MGMT >30: CPT | Mod: ,,, | Performed by: PHYSICIAN ASSISTANT

## 2023-10-11 PROCEDURE — 25000003 PHARM REV CODE 250: Performed by: PHYSICIAN ASSISTANT

## 2023-10-11 PROCEDURE — 94760 N-INVAS EAR/PLS OXIMETRY 1: CPT

## 2023-10-11 PROCEDURE — 25000003 PHARM REV CODE 250: Performed by: INTERNAL MEDICINE

## 2023-10-11 PROCEDURE — 36415 COLL VENOUS BLD VENIPUNCTURE: CPT | Performed by: PHYSICIAN ASSISTANT

## 2023-10-11 PROCEDURE — 85025 COMPLETE CBC W/AUTO DIFF WBC: CPT | Performed by: PHYSICIAN ASSISTANT

## 2023-10-11 PROCEDURE — 25000003 PHARM REV CODE 250: Performed by: STUDENT IN AN ORGANIZED HEALTH CARE EDUCATION/TRAINING PROGRAM

## 2023-10-11 PROCEDURE — 99900035 HC TECH TIME PER 15 MIN (STAT)

## 2023-10-11 PROCEDURE — 99239 PR HOSPITAL DISCHARGE DAY,>30 MIN: ICD-10-PCS | Mod: ,,, | Performed by: PHYSICIAN ASSISTANT

## 2023-10-11 RX ORDER — DOXYCYCLINE HYCLATE 100 MG
100 TABLET ORAL EVERY 12 HOURS
Start: 2023-10-11 | End: 2024-01-22

## 2023-10-11 RX ORDER — DOXYCYCLINE HYCLATE 100 MG
100 TABLET ORAL EVERY 12 HOURS
Status: DISCONTINUED | OUTPATIENT
Start: 2023-10-11 | End: 2023-10-11 | Stop reason: HOSPADM

## 2023-10-11 RX ORDER — FUROSEMIDE 40 MG/1
40 TABLET ORAL DAILY
Qty: 30 TABLET | Refills: 0 | Status: SHIPPED | OUTPATIENT
Start: 2023-10-12 | End: 2024-10-11

## 2023-10-11 RX ADMIN — ISOSORBIDE DINITRATE 60 MG: 20 TABLET ORAL at 09:10

## 2023-10-11 RX ADMIN — LEVOTHYROXINE SODIUM 88 MCG: 88 TABLET ORAL at 05:10

## 2023-10-11 RX ADMIN — ALUMINUM HYDROXIDE, MAGNESIUM HYDROXIDE, AND DIMETHICONE 30 ML: 400; 400; 40 SUSPENSION ORAL at 09:10

## 2023-10-11 RX ADMIN — CARVEDILOL 12.5 MG: 12.5 TABLET, FILM COATED ORAL at 09:10

## 2023-10-11 RX ADMIN — FUROSEMIDE 40 MG: 40 TABLET ORAL at 09:10

## 2023-10-11 RX ADMIN — FAMOTIDINE 40 MG: 20 TABLET ORAL at 09:10

## 2023-10-11 RX ADMIN — NIFEDIPINE 30 MG: 30 TABLET, FILM COATED, EXTENDED RELEASE ORAL at 09:10

## 2023-10-11 RX ADMIN — ASPIRIN 81 MG 81 MG: 81 TABLET ORAL at 09:10

## 2023-10-11 RX ADMIN — INSULIN ASPART 2 UNITS: 100 INJECTION, SOLUTION INTRAVENOUS; SUBCUTANEOUS at 12:10

## 2023-10-11 RX ADMIN — INSULIN DETEMIR 15 UNITS: 100 INJECTION, SOLUTION SUBCUTANEOUS at 09:10

## 2023-10-11 RX ADMIN — RANOLAZINE 500 MG: 500 TABLET, EXTENDED RELEASE ORAL at 09:10

## 2023-10-11 RX ADMIN — INSULIN ASPART 4 UNITS: 100 INJECTION, SOLUTION INTRAVENOUS; SUBCUTANEOUS at 09:10

## 2023-10-11 RX ADMIN — DOXYCYCLINE HYCLATE 100 MG: 100 TABLET, COATED ORAL at 09:10

## 2023-10-11 RX ADMIN — GABAPENTIN 300 MG: 300 CAPSULE ORAL at 09:10

## 2023-10-11 RX ADMIN — FLUOXETINE 20 MG: 20 CAPSULE ORAL at 09:10

## 2023-10-11 NOTE — ASSESSMENT & PLAN NOTE
Patient's FSGs are controlled on current medication regimen.  Last A1c reviewed-   Lab Results   Component Value Date    HGBA1C 5.6 09/18/2023     Most recent fingerstick glucose reviewed-   Recent Labs   Lab 10/10/23  1122 10/10/23  1602 10/10/23  2004 10/11/23  0709   POCTGLUCOSE 162* 173* 238* 248*     Current correctional scale  Medium  Maintain anti-hyperglycemic dose as follows-   Antihyperglycemics (From admission, onward)    Start     Stop Route Frequency Ordered    10/08/23 1130  insulin detemir U-100 (Levemir) pen 15 Units         -- SubQ 2 times daily 10/08/23 1116    10/08/23 0519  insulin aspart U-100 pen 0-10 Units         -- SubQ Before meals & nightly PRN 10/08/23 0419        Hold Oral hypoglycemics while patient is in the hospital.      10/10: glucose stable. Cont same

## 2023-10-11 NOTE — ASSESSMENT & PLAN NOTE
Check iron, ferritin, retic, B12, folate.   S/p 1u pRBCs      10/9  HB improved 6.7 to 7.9   She reports no melena ; no BPR   Continue famotidine     10/10:  H/H is very slowly down trending since transfusion. Stool occult negative. Iron studies not revealing.  Discussed with patient we could trend this as outpatient. She does not need transfusion today. However, she has no transportation for the close follow up I would recommend. Therefore, will keep her another 24 hours and trend CBC tomorrow AM. If still down trending may need transfer for GI work up. I do not think she is actively bleeding from her recent surgery (no swelling, pain, bruising) but ankle is still in cast and not able to eval wound.     10/11 Resolving. H/H stable.  F/u home health with CBC 10/13

## 2023-10-11 NOTE — PLAN OF CARE
[facility] - Med Surg (3rd Fl)  Discharge Final Note    Primary Care Provider: Bianca Godoy MD    Expected Discharge Date: 10/11/2023    Final Discharge Note (most recent)       Final Note - 10/11/23 1208          Final Note    Assessment Type Final Discharge Note (P)      Anticipated Discharge Disposition Home-Health Care Svc (P)      What phone number can be called within the next 1-3 days to see how you are doing after discharge? [phone] (P)      Hospital Resources/Appts/Education Provided Provided patient/caregiver with written discharge plan information;Appointments scheduled and added to AVS (P)         Post-Acute Status    Post-Acute Authorization Home Health (P)      Home Health Status Set-up Complete/Auth obtained (P)    STAT Home health    Discharge Delays None known at this time (P)                      Important Message from Medicare             Contact Info       Bianca Godoy MD   Specialty: Family Medicine   Relationship: PCP - General    [address] [place] [id number]   Phone: [phone]       Next Steps: Go on 10/18/2023    Instructions: At 930 am          CM phoned Dr Boss office at [facility] regarding rescheduling pt appt. Per nurse, they will phone pt with appt date and time and will try to see her on Monday.

## 2023-10-11 NOTE — PLAN OF CARE
10/11/23 0948   Post-Acute Status   Post-Acute Authorization Home Health   Coverage [facility]   Patient choice form signed by patient/caregiver List with quality metrics by geographic area provided   Discharge Plan   Discharge Plan A Home Health     CM will fax referrals for home health. No preference on any agency.     Patient states her daughter will bring her home at discharge

## 2023-10-11 NOTE — PROGRESS NOTES
[facility] ([facility])  [facility] Medicine  Progress Note    Patient Name: Merna Regalado  MRN: [id number]  Patient Class: IP- Inpatient   Admission Date: 10/7/2023  Length of Stay: 3 days  Attending Physician: La Nena Vasquez MD  Primary Care Provider: Bianca Godoy MD        Subjective:     Principal Problem:Acute hypoxemic respiratory failure        HPI:  63yF with HTN, DM-2, hyperlipidemia ,hypothyroidism, CAD , CHF presented to ER via EMS for acute onset of SOB. Denies associated chest pain. No known sick contacts. Reports cough started afterward productive of scant mucus. No fever. Per ER notes, pt with wheezing on arrival and RA sat of 87%. She received solumedrol from EMS. She was also found to have significant acute on chronic anemia and received 1u pRBCs. She denies any recent bleeding including hemoptysis, melena, blood in stool, hematuria. Of note, she underwent right Tibiotalar calcaneal nail, right ankle fusion and right subtalar fusion 9/29/23.       Overview/Hospital Course:  10/9/23  This am feeling much better   But c/o wheezing and spitting up grayish  sputum .  Sees Dr valenzuela for copd and uses inhaler BID  ;past smoker quit about 16 yrs ago   Has h/o COPD   She received 1 unit of PRBC ; 6.7 to 7.9 g      10/10:no wheezing. Back to baseline respiratory status  Her H/H continues to slowly trend down. Denies bleeding. Occult stool negative. Discuss discharge with repeat labsin 24-48 hours but she reports she has no transportation.     10/11 PT Hd stable on room air.  H/h stable.  Will discharge home with home health and repeat labs 10/12.  F/u PCP within 1 week of hospital discharge.  Defer to PCP for GI referral.  F/u with Dr. Alec Pettit for recent surgery right Tibiotalar calcaneal nail, right ankle fusion and right subtalar fusion 9/29/23.      Interval History: no acute events. Breathing back to baseline    Review of Systems   Constitutional:  Negative for activity change, fatigue, fever  and unexpected weight change.   HENT:  Negative for congestion, ear pain, hearing loss, rhinorrhea and sore throat.    Eyes:  Negative for redness and visual disturbance.   Respiratory:  Negative for cough, shortness of breath and wheezing.    Cardiovascular:  Negative for chest pain, palpitations and leg swelling.   Gastrointestinal:  Negative for abdominal pain, blood in stool, constipation, diarrhea, nausea and vomiting.   Genitourinary:  Negative for dysuria, frequency and urgency.   Musculoskeletal:  Positive for arthralgias. Negative for back pain, joint swelling and neck pain.   Skin:  Negative for color change, rash and wound.   Neurological:  Negative for dizziness, tremors, weakness, light-headedness and headaches.     Objective:     Vital Signs (Most Recent):  Temp: 98.4 °F (36.9 °C) (10/11/23 0741)  Pulse: 76 (10/11/23 0800)  Resp: 18 (10/11/23 0741)  BP: 120/60 (10/11/23 0741)  SpO2: 95 % (10/11/23 0745) Vital Signs (24h Range):  Temp:  [97.3 °F (36.3 °C)-99.3 °F (37.4 °C)] 98.4 °F (36.9 °C)  Pulse:  [59-76] 76  Resp:  [16-20] 18  SpO2:  [91 %-98 %] 95 %  BP: (102-147)/(49-65) 120/60     Weight: 97 kg (213 lb 13.5 oz)  Body mass index is 39.11 kg/m².    Intake/Output Summary (Last 24 hours) at 10/11/2023 0935  Last data filed at 10/11/2023 0455  Gross per 24 hour   Intake 250 ml   Output 3300 ml   Net -3050 ml           Physical Exam  Vitals and nursing note reviewed.   Constitutional:       General: She is not in acute distress.     Appearance: She is well-developed. She is obese.   HENT:      Head: Normocephalic and atraumatic.      Right Ear: External ear normal.      Left Ear: External ear normal.      Nose: Nose normal.   Eyes:      General:         Right eye: No discharge.         Left eye: No discharge.      Conjunctiva/sclera: Conjunctivae normal.   Neck:      Thyroid: No thyromegaly.   Cardiovascular:      Rate and Rhythm: Normal rate and regular rhythm.      Heart sounds: No murmur  heard.  Pulmonary:      Effort: Pulmonary effort is normal. No respiratory distress.      Breath sounds: Normal breath sounds. No wheezing or rales.   Abdominal:      General: Bowel sounds are normal. There is no distension.      Palpations: Abdomen is soft.      Tenderness: There is no abdominal tenderness.   Musculoskeletal:      Comments: Right ankle in cast   Skin:     General: Skin is warm and dry.   Neurological:      Mental Status: She is alert and oriented to person, place, and time.   Psychiatric:         Behavior: Behavior normal.         Thought Content: Thought content normal.             Significant Labs: All pertinent labs within the past 24 hours have been reviewed.  CBC:   Recent Labs   Lab 10/10/23  0548 10/11/23  0837   WBC 8.38 7.33   HGB 7.5* 8.1*   HCT 25.3* 26.4*    198       CMP:   Recent Labs   Lab 10/10/23  0548 10/11/23  0837    137   K 4.3 4.8    106   CO2 19* 21*   * 221*   BUN 26* 25*   CREATININE 1.3 1.3   CALCIUM 8.6* 8.7   PROT  --  5.7*   ALBUMIN  --  2.7*   BILITOT  --  0.4   ALKPHOS  --  90   AST  --  10   ALT  --  8*   ANIONGAP 11 10       Recent Lab Results  (Last 5 results in the past 24 hours)        10/11/23  0837   10/11/23  0709   10/10/23  2004   10/10/23  1602   10/10/23  1122        Albumin 2.7               ALP 90               ALT 8               Anion Gap 10               AST 10               Baso # 0.03               Basophil % 0.4               BILIRUBIN TOTAL 0.4  Comment: For infants and newborns, interpretation of results should be based  on gestational age, weight and in agreement with clinical  observations.    Premature Infant recommended reference ranges:  Up to 24 hours.............<8.0 mg/dL  Up to 48 hours............<12.0 mg/dL  3-5 days..................<15.0 mg/dL  6-29 days.................<15.0 mg/dL                 BUN 25               Calcium 8.7               Chloride 106               CO2 21               Creatinine 1.3                Differential Method Automated               eGFR 47               Eos # 0.2               Eosinophil % 2.0               Glucose 221               Gran # (ANC) 4.9               Gran % 66.8               Hematocrit 26.4               Hemoglobin 8.1               Immature Grans (Abs) 0.02  Comment: Mild elevation in immature granulocytes is non specific and   can be seen in a variety of conditions including stress response,   acute inflammation, trauma and pregnancy. Correlation with other   laboratory and clinical findings is essential.                 Immature Granulocytes 0.3               Lymph # 1.8               Lymph % 24.8               MCH 29.2               MCHC 30.7               MCV 95               Mono # 0.4               Mono % 5.7               MPV 9.5               nRBC 0               Platelet Count 198               POCT Glucose   248   238   173   162       Potassium 4.8               PROTEIN TOTAL 5.7               RBC 2.77               RDW 14.7               Sodium 137               WBC 7.33                                      Significant Imaging: I have reviewed all pertinent imaging results/findings within the past 24 hours.      Assessment/Plan:      * Acute hypoxemic respiratory failure  Patient with Hypoxic Respiratory failure which is Acute.  she is not on home oxygen. Supplemental oxygen was provided and noted-      .   Signs/symptoms of respiratory failure include- increased work of breathing. Contributing diagnoses includes - COPD and asthma Labs and images were reviewed. Patient Has not had a recent ABG. Will treat underlying causes and adjust management of respiratory failure as follows- s/p steroids by EMS; cont nebs and oxygen as needed. Will wean as tolerated. Considering hyperglycemia and no wheezing today, will hold off on additional steroids.       10/9/23  She feels wheezy but I dont hear any wheezing   Will resume her albuterol   check BNP CT shows effusions ;  will increase lasix     10/10:  Much improved. No wheezing. I agree, no steroids. Cont lasix. She seems back to baseline from respiratory standpoint today    Resolved 10/11 d/c with lasix 40mg qd.  Due to anemia and fluid overload.            Symptomatic anemia  Check iron, ferritin, retic, B12, folate.   S/p 1u pRBCs      10/9  HB improved 6.7 to 7.9   She reports no melena ; no BPR   Continue famotidine     10/10:  H/H is very slowly down trending since transfusion. Stool occult negative. Iron studies not revealing.  Discussed with patient we could trend this as outpatient. She does not need transfusion today. However, she has no transportation for the close follow up I would recommend. Therefore, will keep her another 24 hours and trend CBC tomorrow AM. If still down trending may need transfer for GI work up. I do not think she is actively bleeding from her recent surgery (no swelling, pain, bruising) but ankle is still in cast and not able to eval wound.     10/11 Resolving. H/H stable.  F/u home health with CBC 10/13    CAD (coronary artery disease)  Cont home statin, asa. Resume other meds pending home med rec. Hold plavix in light of anemia.          Elevated troponin  Chronic; likely demand in light of anemia  S/p 1u pRBCs    10/9/23  No chest pain   EKG :troponin trending down       Normal sinus rhythm Nonspecific ST and T wave abnormality Abnormal ECG When compared with ECG of 07-OCT-2023 22:41, T wave inversion now evident in Late    10/10: remains chest pain free. Trop down trending    Hypothyroid  Cont home synthroid.  Lab Results   Component Value Date    TSH 0.416 05/23/2023           Hyperlipidemia associated with type 2 diabetes mellitus  Cont home statin.  Lab Results   Component Value Date    LDLCALC 70.8 02/06/2008           Diabetes mellitus  Patient's FSGs are controlled on current medication regimen.  Last A1c reviewed-   Lab Results   Component Value Date    HGBA1C 5.6 09/18/2023     Most  recent fingerstick glucose reviewed-   Recent Labs   Lab 10/10/23  1122 10/10/23  1602 10/10/23  2004 10/11/23  0709   POCTGLUCOSE 162* 173* 238* 248*     Current correctional scale  Medium  Maintain anti-hyperglycemic dose as follows-   Antihyperglycemics (From admission, onward)    Start     Stop Route Frequency Ordered    10/08/23 1130  insulin detemir U-100 (Levemir) pen 15 Units         -- SubQ 2 times daily 10/08/23 1116    10/08/23 0519  insulin aspart U-100 pen 0-10 Units         -- SubQ Before meals & nightly PRN 10/08/23 0419        Hold Oral hypoglycemics while patient is in the hospital.      10/10: glucose stable. Cont same    Hypertension  Currently normotensive; awaiting family to bring patient's home meds as she is unsure of what she takes      10/10/23  Continue antihypertensives        VTE Risk Mitigation (From admission, onward)         Ordered     IP VTE HIGH RISK PATIENT  Once         10/08/23 0214     Place sequential compression device  Until discontinued         10/08/23 0214                Discharge Planning   [place]:      Code Status: Full Code   Is the patient medically ready for discharge?:     Reason for patient still in hospital (select all that apply): Other (specify) discharge today  Discharge Plan A: Home Health, Home with family                  Dana Steinberg PA-C  Department of Hospital Medicine   [place] - [facility] Surg (3rd Fl)

## 2023-10-11 NOTE — PLAN OF CARE
10/11/23 1206   Post-Acute Status   Post-Acute Authorization Home Health   Home Health Status Set-up Complete/Auth obtained   Coverage [facility] Community Plan     Pt accepted by [facility]

## 2023-10-11 NOTE — DISCHARGE SUMMARY
[facility] ([facility])  [facility] Medicine  Discharge Summary      Patient Name: Merna Regalado  MRN: [id number]  [facility]: [id number]  Patient Class: IP- Inpatient  Admission Date: 10/7/2023  Hospital Length of Stay: 3 days  Discharge Date and Time:  10/11/2023 9:53 AM  Attending Physician: La Nena Vasquez MD   Discharging Provider: Dana Steinberg PA-C  Primary Care Provider: Bianca Godoy MD    Primary Care Team: Networked reference to record PCT     HPI:   63yF with HTN, DM-2, hyperlipidemia ,hypothyroidism, CAD , CHF presented to ER via EMS for acute onset of SOB. Denies associated chest pain. No known sick contacts. Reports cough started afterward productive of scant mucus. No fever. Per ER notes, pt with wheezing on arrival and RA sat of 87%. She received solumedrol from EMS. She was also found to have significant acute on chronic anemia and received 1u pRBCs. She denies any recent bleeding including hemoptysis, melena, blood in stool, hematuria. Of note, she underwent right Tibiotalar calcaneal nail, right ankle fusion and right subtalar fusion 9/29/23.       * No surgery found *      Hospital Course:   10/9/23  This am feeling much better   But c/o wheezing and spitting up grayish  sputum .  Sees Dr valenzuela for copd and uses inhaler BID  ;past smoker quit about 16 yrs ago   Has h/o COPD   She received 1 unit of PRBC ; 6.7 to 7.9 g      10/10:no wheezing. Back to baseline respiratory status  Her H/H continues to slowly trend down. Denies bleeding. Occult stool negative. Discuss discharge with repeat labsin 24-48 hours but she reports she has no transportation.     10/11 PT Hd stable on room air.  H/h stable.  Will discharge home with home health and repeat labs 10/12.  F/u PCP within 1 week of hospital discharge.  Defer to PCP for GI referral.  F/u with Dr. Alec Pettit for recent surgery right Tibiotalar calcaneal nail, right ankle fusion and right subtalar fusion 9/29/23.       Goals of Care Treatment  Preferences:  Code Status: Full Code      Consults:     No new Assessment & Plan notes have been filed under this hospital service since the last note was generated.  Service: Hospital Medicine    Final Active Diagnoses:    Diagnosis Date Noted POA    PRINCIPAL PROBLEM:  Acute hypoxemic respiratory failure [J96.01] 04/08/2019 Yes    Symptomatic anemia [D64.9] 05/11/2022 Yes    Elevated troponin [R79.89] 11/01/2021 Yes    CAD (coronary artery disease) [I25.10] 11/01/2021 Yes    Hypothyroid [E03.9] 04/08/2019 Yes    Hyperlipidemia associated with type 2 diabetes mellitus [E11.69, E78.5] 09/05/2017 Yes     Chronic    Hypertension [I10] 09/05/2017 Yes     Chronic    Diabetes mellitus [E11.9] 09/05/2017 Yes      Problems Resolved During this Admission:       Discharged Condition: stable    Disposition: Home-Health Care [facility]    Follow Up:   Follow-up Information       Bianca Godoy MD. Go on 10/18/2023.    Specialty: Family Medicine  Why: At 930 am  Contact information:  [address] [place] [address] [id number] [phone]                           Patient Instructions:      Ambulatory referral/consult to Home Health   Standing Status: Future   Referral Priority: Routine Referral Type: Home Health   Referral Reason: Specialty Services Required   Requested Specialty: Home Health Services   Number of Visits Requested: 1       Significant Diagnostic Studies: see A&P    Pending Diagnostic Studies:       None           Medications:  Reconciled Home Medications:      Medication List        START taking these medications      doxycycline 100 MG tablet  Commonly known as: VIBRA-TABS  Take 1 tablet (100 mg total) by mouth every 12 (twelve) hours.  Replaces: doxycycline 100 MG Cap            CHANGE how you take these medications      furosemide 40 MG tablet  Commonly known as: LASIX  Take 1 tablet (40 mg total) by mouth once daily.  Start taking on: October 12, 2023  What changed:   medication strength  how much to take      lisinopriL 40 MG tablet  Commonly known as: PRINIVIL,ZESTRIL  Take 0.5 tablets (20 mg total) by mouth once daily.  What changed: how much to take            CONTINUE taking these medications      albuterol 90 mcg/actuation inhaler  Commonly known as: PROVENTIL/VENTOLIN HFA  Inhale 2 puffs into the lungs every 6 (six) hours as needed for Wheezing. Rescue     alendronate 70 MG tablet  Commonly known as: FOSAMAX  Take 1 tablet (70 mg total) by mouth every 7 days.     aspirin 81 MG Chew  Take 81 mg by mouth once daily.     atorvastatin 80 MG tablet  Commonly known as: LIPITOR  Take 1 tablet (80 mg total) by mouth every evening.     carvediloL 12.5 MG tablet  Commonly known as: COREG  Take 12.5 mg by mouth 2 (two) times daily with meals.     doxazosin 4 MG tablet  Commonly known as: CARDURA  Take 8 mg by mouth every evening.     famotidine 40 MG tablet  Commonly known as: PEPCID  Take 40 mg by mouth once daily.     FLUoxetine 20 MG capsule  TAKE 1 CAPSULE(S) EVERY DAY BY ORAL ROUTE FOR 30 DAYS.     gabapentin 300 MG capsule  Commonly known as: NEURONTIN  Take 300 mg by mouth 3 (three) times daily.     HumaLOG KwikPen Insulin 100 unit/mL pen  Generic drug: insulin lispro  HumaLOG KwikPen (U-100) Insulin 100 unit/mL subcutaneous, [RxNorm: 7061071]     HYDROcodone-acetaminophen 7.5-325 mg per tablet  Commonly known as: NORCO  Take 1 tablet by mouth every 4 (four) hours as needed for Pain.     isosorbide dinitrate 30 MG Tab  Commonly known as: ISORDIL  Take 60 mg by mouth once daily.     LANTUS SOLOSTAR U-100 INSULIN glargine 100 units/mL SubQ pen  Generic drug: insulin  Lantus Solostar U-100 Insulin 100 unit/mL (3 mL) subcutaneous pen, [RxNorm: 324176]     levothyroxine 88 MCG tablet  Commonly known as: SYNTHROID  Take 88 mcg by mouth before breakfast.     metFORMIN 500 MG tablet  Commonly known as: GLUCOPHAGE  Take 1 tablet (500 mg total) by mouth daily with breakfast.     NIFEdipine 60 MG Tbsr  Commonly known as: ADALAT  CC  Take 60 mg by mouth once daily.     nitroGLYCERIN 0.4 MG SL tablet  Commonly known as: NITROSTAT  Place 1 tablet (0.4 mg total) under the tongue every 5 (five) minutes as needed for Chest pain.     ondansetron 4 MG Tbdl  Commonly known as: ZOFRAN-ODT  Take 4 mg by mouth every 6 (six) hours as needed.     OTEZLA 30 mg Tab  Generic drug: apremilast  Take 1 tablet (30 mg total) by mouth 2 (two) times daily.     ranolazine 500 MG Tb12  Commonly known as: RANEXA  Take 500 mg by mouth 2 (two) times daily.     tiZANidine 4 MG tablet  Commonly known as: ZANAFLEX  Take 1 tablet (4 mg total) by mouth every 8 (eight) hours.     traZODone 100 MG tablet  Commonly known as: DESYREL            STOP taking these medications      clopidogreL 75 mg tablet  Commonly known as: PLAVIX     doxycycline 100 MG Cap  Commonly known as: VIBRAMYCIN  Replaced by: doxycycline 100 MG tablet     ergocalciferol 50,000 unit Cap  Commonly known as: ERGOCALCIFEROL     ibuprofen 800 MG tablet  Commonly known as: ADVIL,MOTRIN              Indwelling Lines/Drains at time of discharge:   Lines/Drains/Airways       Drain  Duration             Female External Urinary Catheter 10/09/23 1953 1 day                    Time spent on the discharge of patient: 35 minutes         Dana Steinberg PA-C  Department of Hospital Medicine  [place] - [facility] Surg (3rd Fl)

## 2023-10-11 NOTE — PLAN OF CARE
Problem: Adult Inpatient Plan of Care  Goal: Plan of Care Review  Outcome: Ongoing, Progressing  Flowsheets (Taken 10/11/2023 1123)  Plan of Care Reviewed With:   patient   family     Problem: Bariatric Environmental Safety  Goal: Safety Maintained with Care  Outcome: Ongoing, Progressing     Problem: Diabetes Comorbidity  Goal: Blood Glucose Level Within Targeted Range  Outcome: Ongoing, Progressing     Problem: Fall Injury Risk  Goal: Absence of Fall and Fall-Related Injury  Outcome: Ongoing, Progressing

## 2023-10-11 NOTE — ASSESSMENT & PLAN NOTE
Patient with Hypoxic Respiratory failure which is Acute.  she is not on home oxygen. Supplemental oxygen was provided and noted-      .   Signs/symptoms of respiratory failure include- increased work of breathing. Contributing diagnoses includes - COPD and asthma Labs and images were reviewed. Patient Has not had a recent ABG. Will treat underlying causes and adjust management of respiratory failure as follows- s/p steroids by EMS; cont nebs and oxygen as needed. Will wean as tolerated. Considering hyperglycemia and no wheezing today, will hold off on additional steroids.       10/9/23  She feels wheezy but I dont hear any wheezing   Will resume her albuterol   check BNP CT shows effusions ; will increase lasix     10/10:  Much improved. No wheezing. I agree, no steroids. Cont lasix. She seems back to baseline from respiratory standpoint today    Resolved 10/11 d/c with lasix 40mg qd.  Due to anemia and fluid overload.

## 2023-10-11 NOTE — SUBJECTIVE & OBJECTIVE
Interval History: no acute events. Breathing back to baseline    Review of Systems   Constitutional:  Negative for activity change, fatigue, fever and unexpected weight change.   HENT:  Negative for congestion, ear pain, hearing loss, rhinorrhea and sore throat.    Eyes:  Negative for redness and visual disturbance.   Respiratory:  Negative for cough, shortness of breath and wheezing.    Cardiovascular:  Negative for chest pain, palpitations and leg swelling.   Gastrointestinal:  Negative for abdominal pain, blood in stool, constipation, diarrhea, nausea and vomiting.   Genitourinary:  Negative for dysuria, frequency and urgency.   Musculoskeletal:  Positive for arthralgias. Negative for back pain, joint swelling and neck pain.   Skin:  Negative for color change, rash and wound.   Neurological:  Negative for dizziness, tremors, weakness, light-headedness and headaches.     Objective:     Vital Signs (Most Recent):  Temp: 98.4 °F (36.9 °C) (10/11/23 0741)  Pulse: 76 (10/11/23 0800)  Resp: 18 (10/11/23 0741)  BP: 120/60 (10/11/23 0741)  SpO2: 95 % (10/11/23 0745) Vital Signs (24h Range):  Temp:  [97.3 °F (36.3 °C)-99.3 °F (37.4 °C)] 98.4 °F (36.9 °C)  Pulse:  [59-76] 76  Resp:  [16-20] 18  SpO2:  [91 %-98 %] 95 %  BP: (102-147)/(49-65) 120/60     Weight: 97 kg (213 lb 13.5 oz)  Body mass index is 39.11 kg/m².    Intake/Output Summary (Last 24 hours) at 10/11/2023 0978  Last data filed at 10/11/2023 0455  Gross per 24 hour   Intake 250 ml   Output 3300 ml   Net -3050 ml           Physical Exam  Vitals and nursing note reviewed.   Constitutional:       General: She is not in acute distress.     Appearance: She is well-developed. She is obese.   HENT:      Head: Normocephalic and atraumatic.      Right Ear: External ear normal.      Left Ear: External ear normal.      Nose: Nose normal.   Eyes:      General:         Right eye: No discharge.         Left eye: No discharge.      Conjunctiva/sclera: Conjunctivae normal.    Neck:      Thyroid: No thyromegaly.   Cardiovascular:      Rate and Rhythm: Normal rate and regular rhythm.      Heart sounds: No murmur heard.  Pulmonary:      Effort: Pulmonary effort is normal. No respiratory distress.      Breath sounds: Normal breath sounds. No wheezing or rales.   Abdominal:      General: Bowel sounds are normal. There is no distension.      Palpations: Abdomen is soft.      Tenderness: There is no abdominal tenderness.   Musculoskeletal:      Comments: Right ankle in cast   Skin:     General: Skin is warm and dry.   Neurological:      Mental Status: She is alert and oriented to person, place, and time.   Psychiatric:         Behavior: Behavior normal.         Thought Content: Thought content normal.             Significant Labs: All pertinent labs within the past 24 hours have been reviewed.  CBC:   Recent Labs   Lab 10/10/23  0548 10/11/23  0837   WBC 8.38 7.33   HGB 7.5* 8.1*   HCT 25.3* 26.4*    198       CMP:   Recent Labs   Lab 10/10/23  0548 10/11/23  0837    137   K 4.3 4.8    106   CO2 19* 21*   * 221*   BUN 26* 25*   CREATININE 1.3 1.3   CALCIUM 8.6* 8.7   PROT  --  5.7*   ALBUMIN  --  2.7*   BILITOT  --  0.4   ALKPHOS  --  90   AST  --  10   ALT  --  8*   ANIONGAP 11 10       Recent Lab Results  (Last 5 results in the past 24 hours)        10/11/23  0837   10/11/23  0709   10/10/23  2004   10/10/23  1602   10/10/23  1122        Albumin 2.7               ALP 90               ALT 8               Anion Gap 10               AST 10               Baso # 0.03               Basophil % 0.4               BILIRUBIN TOTAL 0.4  Comment: For infants and newborns, interpretation of results should be based  on gestational age, weight and in agreement with clinical  observations.    Premature Infant recommended reference ranges:  Up to 24 hours.............<8.0 mg/dL  Up to 48 hours............<12.0 mg/dL  3-5 days..................<15.0 mg/dL  6-29  days.................<15.0 mg/dL                 BUN 25               Calcium 8.7               Chloride 106               CO2 21               Creatinine 1.3               Differential Method Automated               eGFR 47               Eos # 0.2               Eosinophil % 2.0               Glucose 221               Gran # (ANC) 4.9               Gran % 66.8               Hematocrit 26.4               Hemoglobin 8.1               Immature Grans (Abs) 0.02  Comment: Mild elevation in immature granulocytes is non specific and   can be seen in a variety of conditions including stress response,   acute inflammation, trauma and pregnancy. Correlation with other   laboratory and clinical findings is essential.                 Immature Granulocytes 0.3               Lymph # 1.8               Lymph % 24.8               MCH 29.2               MCHC 30.7               MCV 95               Mono # 0.4               Mono % 5.7               MPV 9.5               nRBC 0               Platelet Count 198               POCT Glucose   248   238   173   162       Potassium 4.8               PROTEIN TOTAL 5.7               RBC 2.77               RDW 14.7               Sodium 137               WBC 7.33                                      Significant Imaging: I have reviewed all pertinent imaging results/findings within the past 24 hours.

## 2023-10-11 NOTE — ASSESSMENT & PLAN NOTE
Check iron, ferritin, retic, B12, folate.   S/p 1u pRBCs      10/9  HB improved 6.7 to 7.9   She reports no melena ; no BPR   Continue famotidine     10/10:  H/H is very slowly down trending since transfusion. Stool occult negative. Iron studies not revealing.  Discussed with patient we could trend this as outpatient. She does not need transfusion today. However, she has no transportation for the close follow up I would recommend. Therefore, will keep her another 24 hours and trend CBC tomorrow AM. If still down trending may need transfer for GI work up. I do not think she is actively bleeding from her recent surgery (no swelling, pain, bruising) but ankle is still in cast and not able to eval wound.     10/11 Resolving. H/H stable.  F/u home health with CBC 10/13.  Defer to PCP for outpatient anemia workup.

## 2023-10-12 ENCOUNTER — PATIENT OUTREACH (OUTPATIENT)
Dept: ADMINISTRATIVE | Facility: CLINIC | Age: 63
End: 2023-10-12
Payer: MEDICAID

## 2023-10-12 ENCOUNTER — PATIENT MESSAGE (OUTPATIENT)
Dept: ADMINISTRATIVE | Facility: CLINIC | Age: 63
End: 2023-10-12
Payer: MEDICAID

## 2023-10-12 NOTE — PROGRESS NOTES
C3 nurse attempted to contact Merna Regalado for a TCC post hospital discharge follow up call. No answer. The patient does not have a scheduled HOSFU appointment, and the pt does not have an [facility] PCP.

## 2023-10-13 NOTE — PROGRESS NOTES
C3 nurse 2nd attempt to contact Merna Regalado for a TCC post hospital discharge follow up call. No answer. The patient does not have a scheduled HOSFU appointment, and the pt does not have an [facility] PCP.

## 2023-10-16 NOTE — PROGRESS NOTES
C3 nurse 3rd attempt to contact Merna Regalado for a TCC post hospital discharge follow up call. No answer. The patient does not have a scheduled HOSFU appointment, and the pt does not have an [facility] PCP.

## 2023-10-20 ENCOUNTER — LAB VISIT (OUTPATIENT)
Dept: LAB | Facility: HOSPITAL | Age: 63
End: 2023-10-20
Attending: INTERNAL MEDICINE
Payer: MEDICAID

## 2023-10-20 DIAGNOSIS — I25.10 CORONARY ATHEROSCLEROSIS OF NATIVE CORONARY ARTERY: Primary | ICD-10-CM

## 2023-10-20 LAB
FERRITIN SERPL-MCNC: 92 NG/ML (ref 20–300)
IRON SATN MFR SERPL: 14 % (ref 20–50)
IRON SERPL-MCNC: 32 UG/DL (ref 30–160)
TOTAL IRON BINDING CAPACITY: 224 UG/DL (ref 250–450)

## 2023-10-20 PROCEDURE — 83540 ASSAY OF IRON: CPT | Performed by: INTERNAL MEDICINE

## 2023-10-20 PROCEDURE — 83550 IRON BINDING TEST: CPT | Performed by: INTERNAL MEDICINE

## 2023-10-20 PROCEDURE — 36415 COLL VENOUS BLD VENIPUNCTURE: CPT | Performed by: INTERNAL MEDICINE

## 2023-10-20 PROCEDURE — 82728 ASSAY OF FERRITIN: CPT | Performed by: INTERNAL MEDICINE

## 2023-10-28 NOTE — PROVIDER PROGRESS NOTES - EMERGENCY DEPT.
Encounter Date: 10/7/2023    ED Physician Progress Notes        Physician Note:   Critical Care ED Physician Time (minutes):  -- Performed by: Shaquille Boyd M.D.  -- Date/Time: 12:54 PM 10/28/2023   -- Direct Patient Care (Face Time): 5  -- Additional History from Records or Taking Additional History: 5  -- Ordering, Reviewing, and Interpreting Diagnostic Studies: 5  -- Total Time in Documentation: 15  -- Consultation with Other Physicians: 5  -- Consultation with Family Related to Condition: 0  -- Total Critical Care Time: 35  -- Critical care was necessary to treat the following conditions: Anemia  -- Critical care was time spent personally by me on the following activities: blood transfusion  -- blood draw for specimens discussions with consultants,   -- development of treatment plan with patient or surrogate,   -- examination of patient, ordering and performing treatments   -- review of radiographic studies, re-evaluation of pt's condition  -- review of labs and evaluation of response to treatment

## 2023-11-09 ENCOUNTER — HOSPITAL ENCOUNTER (EMERGENCY)
Facility: HOSPITAL | Age: 63
Discharge: HOME | End: 2023-11-10
Attending: STUDENT IN AN ORGANIZED HEALTH CARE EDUCATION/TRAINING PROGRAM
Payer: MEDICAID

## 2023-11-09 DIAGNOSIS — R10.84 GENERALIZED ABDOMINAL PAIN: Primary | ICD-10-CM

## 2023-11-09 LAB
ALBUMIN SERPL BCP-MCNC: 4.1 G/DL (ref 3.5–5.2)
ALP SERPL-CCNC: 62 U/L (ref 55–135)
ALT SERPL W/O P-5'-P-CCNC: 9 U/L (ref 10–44)
ANION GAP SERPL CALC-SCNC: 15 MMOL/L (ref 8–16)
ANION GAP SERPL CALC-SCNC: 9 MMOL/L (ref 8–16)
AST SERPL-CCNC: 23 U/L (ref 10–40)
BASOPHILS # BLD AUTO: 0.03 K/UL (ref 0–0.2)
BASOPHILS NFR BLD: 0.5 % (ref 0–1.9)
BILIRUB SERPL-MCNC: 0.8 MG/DL (ref 0.1–1)
BUN SERPL-MCNC: 34 MG/DL (ref 8–23)
BUN SERPL-MCNC: 38 MG/DL (ref 8–23)
CALCIUM SERPL-MCNC: 8.4 MG/DL (ref 8.7–10.5)
CALCIUM SERPL-MCNC: 9.4 MG/DL (ref 8.7–10.5)
CHLORIDE SERPL-SCNC: 106 MMOL/L (ref 95–110)
CHLORIDE SERPL-SCNC: 98 MMOL/L (ref 95–110)
CO2 SERPL-SCNC: 20 MMOL/L (ref 23–29)
CO2 SERPL-SCNC: 21 MMOL/L (ref 23–29)
CREAT SERPL-MCNC: 2 MG/DL (ref 0.5–1.4)
CREAT SERPL-MCNC: 2.5 MG/DL (ref 0.5–1.4)
DIFFERENTIAL METHOD: ABNORMAL
EOSINOPHIL # BLD AUTO: 0 K/UL (ref 0–0.5)
EOSINOPHIL NFR BLD: 0.7 % (ref 0–8)
ERYTHROCYTE [DISTWIDTH] IN BLOOD BY AUTOMATED COUNT: 12.9 % (ref 11.5–14.5)
EST. GFR  (NO RACE VARIABLE): 21 ML/MIN/1.73 M^2
EST. GFR  (NO RACE VARIABLE): 28 ML/MIN/1.73 M^2
GLUCOSE SERPL-MCNC: 144 MG/DL (ref 70–110)
GLUCOSE SERPL-MCNC: 210 MG/DL (ref 70–110)
HCT VFR BLD AUTO: 34 % (ref 37–48.5)
HGB BLD-MCNC: 10.8 G/DL (ref 12–16)
IMM GRANULOCYTES # BLD AUTO: 0.01 K/UL (ref 0–0.04)
IMM GRANULOCYTES NFR BLD AUTO: 0.2 % (ref 0–0.5)
LACTATE SERPL-SCNC: 1.7 MMOL/L (ref 0.5–2.2)
LACTATE SERPL-SCNC: 2.3 MMOL/L (ref 0.5–2.2)
LIPASE SERPL-CCNC: 18 U/L (ref 4–60)
LYMPHOCYTES # BLD AUTO: 1.8 K/UL (ref 1–4.8)
LYMPHOCYTES NFR BLD: 30.7 % (ref 18–48)
MCH RBC QN AUTO: 28.3 PG (ref 27–31)
MCHC RBC AUTO-ENTMCNC: 31.8 G/DL (ref 32–36)
MCV RBC AUTO: 89 FL (ref 82–98)
MONOCYTES # BLD AUTO: 0.3 K/UL (ref 0.3–1)
MONOCYTES NFR BLD: 5.7 % (ref 4–15)
NEUTROPHILS # BLD AUTO: 3.6 K/UL (ref 1.8–7.7)
NEUTROPHILS NFR BLD: 62.2 % (ref 38–73)
NRBC BLD-RTO: 0 /100 WBC
PLATELET # BLD AUTO: 179 K/UL (ref 150–450)
PMV BLD AUTO: 10.6 FL (ref 9.2–12.9)
POTASSIUM SERPL-SCNC: 5 MMOL/L (ref 3.5–5.1)
POTASSIUM SERPL-SCNC: 5.3 MMOL/L (ref 3.5–5.1)
PROT SERPL-MCNC: 7.5 G/DL (ref 6–8.4)
RBC # BLD AUTO: 3.82 M/UL (ref 4–5.4)
SODIUM SERPL-SCNC: 133 MMOL/L (ref 136–145)
SODIUM SERPL-SCNC: 136 MMOL/L (ref 136–145)
WBC # BLD AUTO: 5.76 K/UL (ref 3.9–12.7)

## 2023-11-09 PROCEDURE — 63600175 PHARM REV CODE 636 W HCPCS: Performed by: STUDENT IN AN ORGANIZED HEALTH CARE EDUCATION/TRAINING PROGRAM

## 2023-11-09 PROCEDURE — 36415 COLL VENOUS BLD VENIPUNCTURE: CPT | Performed by: STUDENT IN AN ORGANIZED HEALTH CARE EDUCATION/TRAINING PROGRAM

## 2023-11-09 PROCEDURE — 25000003 PHARM REV CODE 250: Performed by: STUDENT IN AN ORGANIZED HEALTH CARE EDUCATION/TRAINING PROGRAM

## 2023-11-09 PROCEDURE — 83605 ASSAY OF LACTIC ACID: CPT | Performed by: STUDENT IN AN ORGANIZED HEALTH CARE EDUCATION/TRAINING PROGRAM

## 2023-11-09 PROCEDURE — 96365 THER/PROPH/DIAG IV INF INIT: CPT

## 2023-11-09 PROCEDURE — 80053 COMPREHEN METABOLIC PANEL: CPT | Performed by: STUDENT IN AN ORGANIZED HEALTH CARE EDUCATION/TRAINING PROGRAM

## 2023-11-09 PROCEDURE — 83690 ASSAY OF LIPASE: CPT | Performed by: STUDENT IN AN ORGANIZED HEALTH CARE EDUCATION/TRAINING PROGRAM

## 2023-11-09 PROCEDURE — 80048 BASIC METABOLIC PNL TOTAL CA: CPT | Mod: XB | Performed by: STUDENT IN AN ORGANIZED HEALTH CARE EDUCATION/TRAINING PROGRAM

## 2023-11-09 PROCEDURE — 96361 HYDRATE IV INFUSION ADD-ON: CPT

## 2023-11-09 PROCEDURE — 99285 EMERGENCY DEPT VISIT HI MDM: CPT | Mod: 25

## 2023-11-09 PROCEDURE — 85025 COMPLETE CBC W/AUTO DIFF WBC: CPT | Performed by: STUDENT IN AN ORGANIZED HEALTH CARE EDUCATION/TRAINING PROGRAM

## 2023-11-09 RX ADMIN — SODIUM CHLORIDE 1000 ML: 9 INJECTION, SOLUTION INTRAVENOUS at 10:11

## 2023-11-09 RX ADMIN — SODIUM CHLORIDE 1000 ML: 9 INJECTION, SOLUTION INTRAVENOUS at 09:11

## 2023-11-09 RX ADMIN — PROMETHAZINE HYDROCHLORIDE 12.5 MG: 25 INJECTION INTRAMUSCULAR; INTRAVENOUS at 09:11

## 2023-11-10 VITALS
SYSTOLIC BLOOD PRESSURE: 137 MMHG | RESPIRATION RATE: 18 BRPM | WEIGHT: 210 LBS | TEMPERATURE: 99 F | DIASTOLIC BLOOD PRESSURE: 63 MMHG | HEIGHT: 62 IN | OXYGEN SATURATION: 97 % | HEART RATE: 70 BPM | BODY MASS INDEX: 38.64 KG/M2

## 2023-11-10 LAB
ANION GAP SERPL CALC-SCNC: 12 MMOL/L (ref 8–16)
BUN SERPL-MCNC: 32 MG/DL (ref 8–23)
CALCIUM SERPL-MCNC: 8.2 MG/DL (ref 8.7–10.5)
CHLORIDE SERPL-SCNC: 108 MMOL/L (ref 95–110)
CO2 SERPL-SCNC: 18 MMOL/L (ref 23–29)
CREAT SERPL-MCNC: 1.9 MG/DL (ref 0.5–1.4)
EST. GFR  (NO RACE VARIABLE): 30 ML/MIN/1.73 M^2
GLUCOSE SERPL-MCNC: 119 MG/DL (ref 70–110)
POTASSIUM SERPL-SCNC: 4.8 MMOL/L (ref 3.5–5.1)
SODIUM SERPL-SCNC: 138 MMOL/L (ref 136–145)

## 2023-11-10 PROCEDURE — 80048 BASIC METABOLIC PNL TOTAL CA: CPT | Performed by: STUDENT IN AN ORGANIZED HEALTH CARE EDUCATION/TRAINING PROGRAM

## 2023-11-10 PROCEDURE — 25000003 PHARM REV CODE 250: Performed by: STUDENT IN AN ORGANIZED HEALTH CARE EDUCATION/TRAINING PROGRAM

## 2023-11-10 PROCEDURE — 36415 COLL VENOUS BLD VENIPUNCTURE: CPT | Performed by: STUDENT IN AN ORGANIZED HEALTH CARE EDUCATION/TRAINING PROGRAM

## 2023-11-10 PROCEDURE — 96361 HYDRATE IV INFUSION ADD-ON: CPT

## 2023-11-10 RX ORDER — PROMETHAZINE HYDROCHLORIDE 25 MG/1
25 TABLET ORAL
Status: COMPLETED | OUTPATIENT
Start: 2023-11-10 | End: 2023-11-10

## 2023-11-10 RX ORDER — ONDANSETRON 4 MG/1
4 TABLET, ORALLY DISINTEGRATING ORAL
Status: DISCONTINUED | OUTPATIENT
Start: 2023-11-10 | End: 2023-11-10

## 2023-11-10 RX ORDER — PROMETHAZINE HYDROCHLORIDE 25 MG/1
25 TABLET ORAL EVERY 6 HOURS PRN
Qty: 15 TABLET | Refills: 0 | Status: SHIPPED | OUTPATIENT
Start: 2023-11-10

## 2023-11-10 RX ADMIN — PROMETHAZINE HYDROCHLORIDE 25 MG: 25 TABLET ORAL at 01:11

## 2023-11-10 RX ADMIN — SODIUM CHLORIDE 1000 ML: 9 INJECTION, SOLUTION INTRAVENOUS at 12:11

## 2023-11-10 NOTE — ED PROVIDER NOTES
Encounter Date: 11/9/2023       History     Chief Complaint   Patient presents with    Emesis     Pt to ED with c/o N/V/D and abdominal pain x 2 weeks. Denies any fevers.      63-year-old female with history of CAD, diabetes, hypertension, COPD, presenting with two weeks of nausea, vomiting, diarrhea, abdominal pain.  Patient reports abdominal pain is generalized.  She was prescribed Zofran by a PCP, but states this did not help.  States that she was unable to see her primary care physician until two weeks from now, and so came to the emergency department.  No fever.  No dysuria or back pain.      Review of patient's allergies indicates:   Allergen Reactions    Codeine Nausea Only    Conjugated estrogens      Other reaction(s): Vomiting and cream causes vaginal swelling    Diphenhydramine hcl      Other reaction(s): Itching and elevated BS    Honey     Iodine Itching    Nyquil d [doxylamin-pse-dm-acetaminophen]      Other reaction(s): Propensity to adverse reactions to drug    Pcn [penicillins] Other (See Comments)     Headache and nausea with PO PCN     Potassium      Other reaction(s): Not available    Povidone-iodine      Other reaction(s): Not available, Propensity to adverse reactions to drug    Sulfa (sulfonamide antibiotics)     Oxycontin [oxycodone] Nausea And Vomiting     Past Medical History:   Diagnosis Date    Asthma     Coronary artery disease     Diabetes mellitus     Dyslipidemia     GERD (gastroesophageal reflux disease)     Hypertension     Neuropathy     Thyroid disease      Past Surgical History:   Procedure Laterality Date    ANKLE HARDWARE REMOVAL Left 3/31/2022    Procedure: REMOVAL, HARDWARE, ANKLE;  Surgeon: Alec Pettit MD;  Location: [facility];  Service: Orthopedics;  Laterality: Left;    ANKLE HARDWARE REMOVAL Right 8/18/2022    Procedure: REMOVAL, HARDWARE, ANKLE;  Surgeon: Alec Pettit MD;  Location: [facility];  Service: Orthopedics;  Laterality: Right;    APPLICATION OF LARGE EXTERNAL  FIXATION DEVICE TO TIBIA Left 11/05/2021    Procedure: APPLICATION, EXTERNAL FIXATION DEVICE, LARGE, TIBIA;  Surgeon: Bk Richter MD;  Location: [facility];  Service: Orthopedics;  Laterality: Left;    APPLICATION OF WOUND VACUUM-ASSISTED CLOSURE DEVICE Right 8/18/2022    Procedure: APPLICATION, WOUND VAC;  Surgeon: Alec Pettit MD;  Location: [facility];  Service: Orthopedics;  Laterality: Right;    cardiac stents      thinks 3 total (1st 2 was in 2009 and later 2015 or so)    CHOLECYSTECTOMY      CLOSED REDUCTION Right 5/11/2022    Procedure: CLOSED REDUCTION;  Surgeon: Alec Pettit MD;  Location: [facility];  Service: Orthopedics;  Laterality: Right;  Right Ankle    CLOSURE OF WOUND Right 8/18/2022    Procedure: CLOSURE, WOUND;  Surgeon: Alec Pettit MD;  Location: [facility];  Service: Orthopedics;  Laterality: Right;    EXTERNAL FIXATION Right 5/11/2022    Procedure: EXTERNAL FIXATION;  Surgeon: Alec Pettit MD;  Location: [facility];  Service: Orthopedics;  Laterality: Right;  Right Ankle    FEMORAL BYPASS      HYSTERECTOMY      IRRIGATION AND DEBRIDEMENT OF LOWER EXTREMITY Right 8/18/2022    Procedure: IRRIGATION AND DEBRIDEMENT, LOWER EXTREMITY;  Surgeon: Alec Pettit MD;  Location: [facility];  Service: Orthopedics;  Laterality: Right;    OPEN REDUCTION AND INTERNAL FIXATION (ORIF) OF PILON FRACTURE Left 11/12/2021    Procedure: ORIF, FRACTURE, PILON;  Surgeon: Alec Pettit MD;  Location: [facility];  Service: Orthopedics;  Laterality: Left;    REMOVAL OF EXTERNAL FIXATION DEVICE Left 11/12/2021    Procedure: REMOVAL, EXTERNAL FIXATION DEVICE;  Surgeon: Alec Pettit MD;  Location: [facility];  Service: Orthopedics;  Laterality: Left;    REMOVAL OF EXTERNAL FIXATION DEVICE Right 5/20/2022    Procedure: REMOVAL, EXTERNAL FIXATION DEVICE;  Surgeon: Alec Pettit MD;  Location: [facility];  Service: Orthopedics;  Laterality: Right;    stents to kidney      TIBIOTALOCALCANEAL FUSION USING INTRAMEDULLARY STEPHANIE Right  2023    Procedure: FUSION, TIBIOTALOCALCANEAL, USING INTRAMEDULLARY STEPHANIE;  Surgeon: Juventino Murray MD;  Location: [facility];  Service: Orthopedics;  Laterality: Right;     Family History   Problem Relation Age of Onset    Breast cancer Sister     Cancer Maternal Aunt     Heart disease Mother     Colon cancer Neg Hx     Ovarian cancer Neg Hx      Social History     Tobacco Use    Smoking status: Former     Current packs/day: 0.00     Types: Cigarettes     Quit date: 11/15/2008     Years since quittin.9    Smokeless tobacco: Never   Substance Use Topics    Alcohol use: No    Drug use: No     Review of Systems   Constitutional:  Negative for fever.   HENT:  Negative for sore throat.    Respiratory:  Negative for shortness of breath.    Cardiovascular:  Negative for chest pain.   Gastrointestinal:  Positive for abdominal pain, diarrhea, nausea and vomiting.   Genitourinary:  Negative for dysuria.   Musculoskeletal:  Negative for back pain.   Skin:  Negative for rash.   Neurological:  Negative for weakness.   Hematological:  Does not bruise/bleed easily.       Physical Exam     Initial Vitals [23]   BP Pulse Resp Temp SpO2   (!) 189/72 67 18 97.8 °F (36.6 °C) 99 %      MAP       --         Physical Exam    Nursing note and vitals reviewed.  Constitutional: She appears well-developed.   HENT:   Head: Normocephalic.   Eyes: Pupils are equal, round, and reactive to light.   Neck:   Normal range of motion.  Cardiovascular:            No murmur heard.  Pulmonary/Chest: No respiratory distress. She has no wheezes. She has no rales.   Abdominal: Abdomen is soft.   Mild diffuse tenderness to palpation.  No rebound or guarding.  No CVA tenderness.   Musculoskeletal:         General: No edema.      Cervical back: Normal range of motion.     Neurological: She is alert.   Skin: Skin is warm.   Psychiatric: She has a normal mood and affect.         ED Course   Procedures  Labs Reviewed   CBC W/ AUTO  DIFFERENTIAL - Abnormal; Notable for the following components:       Result Value    RBC 3.82 (*)     Hemoglobin 10.8 (*)     Hematocrit 34.0 (*)     MCHC 31.8 (*)     All other components within normal limits   COMPREHENSIVE METABOLIC PANEL - Abnormal; Notable for the following components:    Sodium 133 (*)     Potassium 5.3 (*)     CO2 20 (*)     Glucose 210 (*)     BUN 38 (*)     Creatinine 2.5 (*)     ALT 9 (*)     eGFR 21 (*)     All other components within normal limits   LACTIC ACID, PLASMA - Abnormal; Notable for the following components:    Lactate (Lactic Acid) 2.3 (*)     All other components within normal limits   BASIC METABOLIC PANEL - Abnormal; Notable for the following components:    CO2 21 (*)     Glucose 144 (*)     BUN 34 (*)     Creatinine 2.0 (*)     Calcium 8.4 (*)     eGFR 28 (*)     All other components within normal limits   LIPASE   LACTIC ACID, PLASMA          Imaging Results              CT Abdomen Pelvis  Without Contrast (Final result)  Result time 11/09/23 23:10:48      Final result by Wesley Sherwood MD (11/09/23 23:10:48)                   Impression:      No acute findings.      Electronically signed by: Wesley Sherwood  Date:    11/09/2023  Time:    23:10               Narrative:    EXAMINATION:  CT ABDOMEN PELVIS WITHOUT CONTRAST    CLINICAL HISTORY:  Abdominal pain, acute, nonlocalized;    TECHNIQUE:  .    Low dose axial images, sagittal and coronal reformations were obtained from the lung bases to the pubic symphysis.  Oral contrast was not administered.    COMPARISON:  Radiographs lumbar spine 01/19/2023    FINDINGS:  Heart: Normal in size. No pericardial effusion.    Lung Bases: Well aerated, without consolidation or pleural fluid.    Liver: Normal in size and attenuation, with no focal hepatic lesions.    Gallbladder: Absent.    Bile Ducts: No evidence of dilated ducts.    Pancreas: No mass or peripancreatic fat stranding.    Spleen: Unremarkable.    Adrenals:  Unremarkable.    Kidneys/ Ureters: Nonobstructive bilateral nephrolithiasis.    Bladder: No evidence of wall thickening.    Reproductive organs: Hysterectomy.    GI Tract/Mesentery: No evidence of bowel obstruction or inflammation.    Peritoneal Space: No ascites. No free air.    Retroperitoneum: No significant adenopathy.    Abdominal wall: Unremarkable.    Vasculature: Femoral femoral bypass graft.  Severe multifocal atherosclerosis of the aorta and its branches.  Bilateral renal artery stents.    Bones: Chronic L2 anterior wedge compression fracture.                                       Medications   sodium chloride 0.9% bolus 1,000 mL 1,000 mL (1,000 mLs Intravenous New Bag 11/10/23 0001)   sodium chloride 0.9% bolus 1,000 mL 1,000 mL (0 mLs Intravenous Stopped 11/9/23 2204)   promethazine (PHENERGAN) 12.5 mg in dextrose 5 % (D5W) 50 mL IVPB (0 mg Intravenous Stopped 11/9/23 2123)   sodium chloride 0.9% bolus 1,000 mL 1,000 mL (0 mLs Intravenous Stopped 11/9/23 2303)     Medical Decision Making  DDX: Unlikely acute abdominal pathology such as appendicitis/cholecystitis given benign abdomen, negative Pearl's sign at this time. R/o pancreatitis, UTI. Possible GERD/gastritis vs. AGE given history, benign abdomen.  DX: BMP, CBC, LFT, lipase, UA/Udip. CTAP.  TX: Analgesia PRN. Antiemetic PRN. IVF. Treatment/consult as indicated by studies.  Dispo: Pending studies. If studies WNL, symptoms controlled, tolerating PO, discharge to follow up with primary doctor within 2 days with recommendations for supportive care at home and strict precautions for return.        Amount and/or Complexity of Data Reviewed  Labs: ordered. Decision-making details documented in ED Course.  Radiology: ordered.               ED Course as of 11/10/23 0021   Thu Nov 09, 2023 2122 Creatinine(!): 2.5  Significant NICHO, likely from dehydration. [NB]   Fri Nov 10, 2023   0021 Shaquille Boyd 12:21 AM  Symptoms improved. Discussed results,  findings, supportive care, follow up recommendations, and return to ED precautions with this patient. Patient verbalized understanding and agreement.  Patient is stable for discharge at this time.   [NB]      ED Course User Index  [NB] Shaquille Boyd MD                    Clinical Impression:   Final diagnoses:  [R10.84] Generalized abdominal pain (Primary)        ED Disposition Condition    Discharge Stable          ED Prescriptions    None       Follow-up Information       Follow up With Specialties Details Why Contact Info    Bianca Godoy MD Family Medicine Schedule an appointment as soon as possible for a visit in 2 days  [address] [place] [address]  [place] [address]  [phone]      [facility] - Emergency Dept Emergency Medicine  If symptoms worsen [address] [place] [id number]  [phone]             Shaquille Boyd MD  11/09/23 2046       Shaquille Boyd MD  11/10/23 0021

## 2023-12-08 ENCOUNTER — LAB VISIT (OUTPATIENT)
Dept: LAB | Facility: HOSPITAL | Age: 63
End: 2023-12-08
Attending: INTERNAL MEDICINE
Payer: MEDICAID

## 2023-12-08 DIAGNOSIS — L40.9 PSORIASIS: ICD-10-CM

## 2023-12-08 LAB
ANION GAP SERPL CALC-SCNC: 5 MMOL/L (ref 8–16)
BUN SERPL-MCNC: 23 MG/DL (ref 8–23)
CALCIUM SERPL-MCNC: 9 MG/DL (ref 8.7–10.5)
CHLORIDE SERPL-SCNC: 108 MMOL/L (ref 95–110)
CO2 SERPL-SCNC: 22 MMOL/L (ref 23–29)
CREAT SERPL-MCNC: 1.3 MG/DL (ref 0.5–1.4)
EST. GFR  (NO RACE VARIABLE): 47 ML/MIN/1.73 M^2
GLUCOSE SERPL-MCNC: 73 MG/DL (ref 70–110)
POTASSIUM SERPL-SCNC: 5.3 MMOL/L (ref 3.5–5.1)
SODIUM SERPL-SCNC: 135 MMOL/L (ref 136–145)

## 2023-12-08 PROCEDURE — 80048 BASIC METABOLIC PNL TOTAL CA: CPT | Performed by: INTERNAL MEDICINE

## 2023-12-08 PROCEDURE — 36415 COLL VENOUS BLD VENIPUNCTURE: CPT | Performed by: INTERNAL MEDICINE

## 2024-01-08 PROBLEM — J96.01 ACUTE HYPOXEMIC RESPIRATORY FAILURE: Status: RESOLVED | Noted: 2019-04-08 | Resolved: 2024-01-08

## 2024-07-17 PROBLEM — I16.1 HYPERTENSIVE EMERGENCY: Status: ACTIVE | Noted: 2024-07-17

## 2024-09-15 PROBLEM — Z98.1 S/P ANKLE FUSION: Status: ACTIVE | Noted: 2024-09-15

## 2024-09-16 PROBLEM — R26.81 GAIT INSTABILITY: Status: ACTIVE | Noted: 2024-09-16

## 2024-09-17 ENCOUNTER — CLINICAL SUPPORT (OUTPATIENT)
Dept: REHABILITATION | Facility: HOSPITAL | Age: 64
End: 2024-09-17
Payer: MEDICAID

## 2024-09-17 DIAGNOSIS — Z98.1 S/P ANKLE FUSION: ICD-10-CM

## 2024-09-17 DIAGNOSIS — Z87.81 S/P ORIF (OPEN REDUCTION INTERNAL FIXATION) FRACTURE: ICD-10-CM

## 2024-09-17 DIAGNOSIS — M25.572 CHRONIC PAIN OF BOTH ANKLES: Primary | ICD-10-CM

## 2024-09-17 DIAGNOSIS — M25.571 CHRONIC PAIN OF BOTH ANKLES: Primary | ICD-10-CM

## 2024-09-17 DIAGNOSIS — Z98.890 S/P ORIF (OPEN REDUCTION INTERNAL FIXATION) FRACTURE: ICD-10-CM

## 2024-09-17 DIAGNOSIS — G89.29 CHRONIC PAIN OF BOTH ANKLES: Primary | ICD-10-CM

## 2024-09-17 DIAGNOSIS — R26.81 GAIT INSTABILITY: ICD-10-CM

## 2024-09-17 PROCEDURE — 97161 PT EVAL LOW COMPLEX 20 MIN: CPT | Mod: PN

## 2024-09-17 PROCEDURE — 97110 THERAPEUTIC EXERCISES: CPT | Mod: PN

## 2024-09-17 NOTE — PLAN OF CARE
[facility]   Physical Therapy Initial Evaluation     Date: 9/17/2024   Name: Merna Regalado  Clinic Number: [id number]    Therapy Diagnosis:   Encounter Diagnoses   Name Primary?    S/P ankle fusion, right     S/P ORIF (open reduction internal fixation) pilon fracture, left     Gait instability     Chronic pain of both ankles Yes     Physician: Alec Pettit MD    Physician Orders: PT Eval and Treat   Medical Diagnosis from Referral: Diagnosis Z98.1 (ICD-10-CM) - S/P ankle fusion Z98.890,Z87.81 (ICD-10-CM) - S/P ORIF (open reduction internal fixation) fracture R26.81 (ICD-10-CM) - Gait instability  Evaluation Date: 9/17/2024  Authorization Period Expiration: 9/16/2025  Plan of Care Expiration: 10/29/2024  Progress Note Due: visit 2  Visit # / Visits authorized: 1/ 1   FOTO: 1/3    Precautions: Diabetes, Fall, and Stent Placement on L neck       Time In: 13:50  Time Out: 14:40  Total Appointment Time (timed & untimed codes): 50 minutes      SUBJECTIVE     Date of onset: 2021 for the left ankle; 2022 for the right ankle    History of current condition - Merna reports: she passed out and broke her left ankle (trimalleolar fracture) and then she also passed out on the toilet and broke her right ankle. She currently has a fused ankle on the right and hardware on the left ankle. Currently, she is on blood pressure medicine, which has been maintaining her BP and denies passing out recently. She reports she went to the doctor this morning to check her blood pressure which she states about in the 180s/80s. She currently is not walking with an AD and feels very unsteady. She continues to lose her balance at home. She reports falling about a month ago, which caused her to break her wheelchair. She reports one leg is higher than the other that started after her surgery. Right ankle gives her the most trouble. Patient reports she did not have any type of therapy following her surgery for her left  ankle in 2021, and she has been doing exercises on her own. This is the first time she has had therapy for both ankles. Patient also reports she has osteoporosis which tends to give her issues with activities at home.    Falls: in 2021 and 2022 resulting in both ankles fracturing bones; recent fall about a month ago    Imaging, bone scan films:   FINDINGS:  Postop change with evidence of transfixation of the distal extremity ankle, tibiotalar fusion is noted with evidence of a metallic raheel bypassing the articular cartilage space.  There has been operative section of the distal fibula with tapered distal margin.  No abundant callus formation has occurred.  Subtalar joint effusion has evolved.  Prominent plantar calcaneal heel spur.  No evidence bony destructive changes.  Pre Achilles fat pad appears well maintained.  Achilles tendon shadow appears normal.     Impression:     Transfixation of distal extremity right ankle with evidence of subtalar and tibiotalar osseous fusion.  No mature bone formation has occurred at the site of postoperative change.    Prior Therapy: no therapy  Social History:  lives with their spouse  Occupation: unemployed  Prior Level of Function: Independent without difficulty  Current Level of Function: Independent with severe difficulty and pain    Pain:  Current 7/10, worst 8/10, best 3/10   Location: bilateral ankles; right ankle more significant  Description: Burning and Pinching, Stabbing  Aggravating Factors: Standing and walking   Easing Factors: rest and OTC medication (ibuprofen)    Patients goals: Patient would like to be able to walk and carry objects while doing so, be able to play with her grandkids in physical activities, would like to be able to  her grandson, and be able to walk without any pain.      Medical History:   Past Medical History:   Diagnosis Date    Asthma     Coronary artery disease     Diabetes mellitus     Dyslipidemia     GERD (gastroesophageal reflux  disease)     Hypertension     Neuropathy     Thyroid disease        Surgical History:   Merna Regalado  has a past surgical history that includes Cholecystectomy; Hysterectomy; cardiac stents; stents to kidney; Application of large external fixation device to tibia (Left, 11/05/2021); Open reduction and internal fixation (ORIF) of pilon fracture (Left, 11/12/2021); Removal of external fixation device (Left, 11/12/2021); Femoral bypass; Ankle hardware removal (Left, 3/31/2022); Closed reduction (Right, 5/11/2022); External fixation (Right, 5/11/2022); Removal of external fixation device (Right, 5/20/2022); Application of wound vacuum-assisted closure device (Right, 8/18/2022); Ankle hardware removal (Right, 8/18/2022); Irrigation and debridement of lower extremity (Right, 8/18/2022); Closure of wound (Right, 8/18/2022); and Tibiotalocalcaneal fusion using intramedullary raheel (Right, 9/28/2023).    Medications:   Merna has a current medication list which includes the following prescription(s): albuterol, alendronate, otezla, aspirin, atorvastatin, carvedilol, clobetasol, ergocalciferol, famotidine, fluoxetine, furosemide, gabapentin, ibuprofen, insulin glargine u-100 (lantus), insulin aspart u-100, insulin lispro, levothyroxine, lisinopril, lisinopril-hydrochlorothiazide, metformin, nifedipine, nitroglycerin, ondansetron, pen needle, diabetic, promethazine, and trazodone.    Allergies:   Review of patient's allergies indicates:   Allergen Reactions    Mushroom Swelling    Codeine Nausea Only    Conjugated estrogens      Other reaction(s): Vomiting and cream causes vaginal swelling    Diphenhydramine hcl      Other reaction(s): Itching and elevated BS    Honey     Iodine Itching    Nyquil d [doxylamin-pse-dm-acetaminophen]      Other reaction(s): Propensity to adverse reactions to drug    Pcn [penicillins] Other (See Comments)     Headache and nausea with PO PCN     Potassium      Other reaction(s): Not available     Povidone-iodine      Other reaction(s): Not available, Propensity to adverse reactions to drug    Sulfa (sulfonamide antibiotics)     Oxycontin [oxycodone] Nausea And Vomiting        OBJECTIVE     In degrees:  ACTIVE RANGE OF MOTION    LEFT RIGHT   Dorsiflexion -12 -5   Plantarflexion 40 22   PASSIVE RANGE OF MOTION   Dorsiflexion -15 0   Plantarflexion 45 30   Inversion 30 5   Eversion 25 5     Joint mobility:  Right ankle: ROM greatly limited in right ankle secondary to fusion of subtalar and talocural joints.   Talocrural joint: moderate to severe limitations secondary to fusion   Subtalar joint: severe limitations secondary to fusion   Intermetatarsal joints: no notable limitations  Left ankle: ROM greatly limited in left ankle secondary to trimalleolar fracture surgery with capsular end-feels.   Talocrural joint: minimal limitations   Subtalar joint: severe limitation   Intermetatarsal joints: no notable limitations    LOWER EXTREMITY STRENGTH    Left Right   Knee Extension 4/5 4-/5   Knee Flexion 4-/5 3+/5     Hip Flexion 3-/5 3-/5   Ankle Dorsiflexion 4+/5 4+/5   Ankle Plantarflexion 4-/5 4-/5   Ankle Inversion 3+/5 3+/5   Ankle Eversion 3+/5 3+/5     FLEXIBILITY   Dorsiflexion Unable to appropriately assess gastroc flexibility     DERMATOMES: Sensation: Light Touch: Intact  MYOTOMES: WNL    SPECIAL TESTS    LEFT RIGHT   Anterior Drawer (ATFL) - -   Posterior Drawer (PTFL) - -     PALPATION:  Patient reports primary pain region along:  Right ankle: TTP at calcaneus, achilles tendon, dorsal/plantar aspects of the tibiofibular joint, along incisions from surgery  Left ankle: TTP at calcaneus, achilles tendon, along incisions from surgery    GAIT: Merna ambulates with no assistive device with min A.     GAIT DEVIATIONS: Merna displays unsteady, shuffling, antalgic gait with decreased step length, decreased trunk sway, and inability to push off in terminal stance d/t limitations of ankle ROM and pain.    Squat  Mechanics: Unable to assess d/t strength deficit    Outcome Measures:  5TSTS: 25 sec with use of hand support  TU sec with RW  Sharpened Romberg Test (Tandem Stance) right>left  EO: 1 sec  EC: did not assess  Sharpened Romberg Test (Tandom Stance) left>right  EO: 1 sec  EC: did not assess  Romberg Test:   EO: 30 sec   EC: 30 sec    Limitation/Restriction for FOTO Foot Survey    Therapist reviewed FOTO scores for Merna Regalado on 2024.   FOTO documents entered into frents - see Media section.    Limitation Score: 47%       TREATMENT     Total Treatment time (time-based codes) separate from Evaluation: 10 minutes      Merna received the treatments listed below:      therapeutic exercises to develop strength for 10 minutes including:  - 4-way RTB 2 x 10  - Ankle circles x 1 min ea direction (CW/CCW)    manual therapy techniques:  were applied to the:  for  minutes, including:      neuromuscular re-education activities to improve:  for  minutes. The following activities were included:      therapeutic activities to improve functional performance for   minutes, including:      gait training to improve functional mobility and safety for   minutes, including:      direct contact modalities after being cleared for contraindications:     supervised modalities after being cleared for contradictions:     hot pack for  minutes to .    cold pack for  minutes to .      PATIENT EDUCATION AND HOME EXERCISES     Education provided:   - Cont home exercise program  - Invest in walker for ambulating     - Pt/family was provided educational information, including: role of PT, goals for PT, scheduling - pt verbalized understanding. Discussed insurance limitations with pt.     Written Home Exercises Provided: yes. Exercises were reviewed and Merna was able to demonstrate them prior to the end of the session.  Merna demonstrated good  understanding of the education provided. See EMR under Patient Instructions for exercises  provided during therapy sessions.    ASSESSMENT     Merna is a 64 y.o. female referred to outpatient Physical Therapy with a medical diagnosis of Diagnosis Z98.1 (ICD-10-CM) - S/P ankle fusion Z98.890,Z87.81 (ICD-10-CM) - S/P ORIF (open reduction internal fixation) fracture R26.81 (ICD-10-CM) - Gait instability. Patient presents with decreased ROM secondary to right ankle fusion and post left ankle surgery, decreased strength deficits, impaired balance and proprioception, and reports of moderate levels of pain. Patient will benefit from skilled P.T. services to address the above deficits. BP was unable to be measured prior to therapy as the device was unable to accurately read patient's BP; patient reports BP was taken this morning at the doctor's appointment measuring about 180s/80s. Patient instructed to continue to take BP medication to help maintain appropriate levels for therapy. Patient is a high fall risk and discussed obtaining a standard W/C for at home use. Initiated ankle strengthening and ROM exercises today. Patient tolerated exercises well without increased reports of pain.    Medical necessity is demonstrated by the following IMPAIRMENTS/PROBLEMS:  -Decreased function (LEFS)  -Decreased pain free ankle AROM in all directions  -Decreased ankle stability and strength  -Decreased participation in regular exercise routine  -Impaired proprioception and balance resulting in a high fall risk  -Increased pain (NPS)  -(+) TTP:     Intervention strategies designed to address these impairments will be instrumental in achieving the stated functional goals, including her ability to safely perform mobility tasks. Based on the examination, the patient's rehabilitation potential to achieve functional goals is good.    Patient prognosis is Good.   Patient will benefit from skilled outpatient Physical Therapy to address the deficits stated above and in the chart below, provide patient /family education, and to maximize  patientt's level of independence.     Plan of care discussed with patient: Yes  Patient's spiritual, cultural and educational needs considered and patient is agreeable to the plan of care and goals as stated below:     Anticipated Barriers for therapy: risk for falls, chronic pain post ankle fusion    Medical Necessity is demonstrated by the following  History  Co-morbidities and personal factors that may impact the plan of care Co-morbidities:   diabetes and osteoporosis  and fusion of right ankle, coronary artery disease with stent placement, hypertension    Personal Factors:   no deficits     moderate   Examination  Body Structures and Functions, activity limitations and participation restrictions that may impact the plan of care Body Regions:   lower extremities    Body Systems:    ROM  balance  gait  blood pressure    Participation Restrictions:   Ambulation w/o AD       low   Clinical Presentation stable and uncomplicated low   Decision Making/ Complexity Score: low     Goals:  Short Term Goals: 2 weeks   1. Patient demonstrates independence with HEP.   2. Patient demonstrates improvement of LE strength by 1/2 a grade.  3. Patient will be able to complete a sit to stand without hand support as a result of improvement of lower extremity strength.  4. Patient will improve TUG score from 23 sec to 18 sec with AD.  5. Patient will improve 5TSTS from 25 sec with hand support to being able to complete 5 consecutive repetitions without hand support for 20 sec.    Long Term Goals: 4 weeks   1. Patient demonstrates increased LE strength by 1 grade to improve tolerance to functional activities.   2. Patient will be able to complete walk independently without AD for 200 feet.   3. Patient demonstrates improved overall function per FOTO to 47% or more.   4. Patient will improve TUG score from 18 sec to 15 sec with AD.  5. Patient will improve 5TSTS without hand support for 18 sec.    PLAN   Plan of care Certification:  9/17/2024 to 10/29/2024.    Outpatient Physical Therapy 1 times weekly for 4 weeks to include the following interventions: Cervical/Lumbar Traction, Electrical Stimulation  , Gait Training, Manual Therapy, Moist Heat/ Ice, Neuromuscular Re-ed, Patient Education, Therapeutic Activities, and Therapeutic Exercise.     Amber Garg, PT    Pt may be seen by PTA as part of the rehabilitation team.     9/17/2024    I CERTIFY THE NEED FOR THESE SERVICES FURNISHED UNDER THIS PLAN OF TREATMENT AND WHILE UNDER MY CARE   Physician's comments:     Physician's Signature: ___________________________________________________

## 2024-09-24 ENCOUNTER — CLINICAL SUPPORT (OUTPATIENT)
Dept: REHABILITATION | Facility: HOSPITAL | Age: 64
End: 2024-09-24
Payer: MEDICAID

## 2024-09-24 DIAGNOSIS — R26.81 GAIT INSTABILITY: ICD-10-CM

## 2024-09-24 DIAGNOSIS — Z98.1 S/P ANKLE FUSION: Primary | ICD-10-CM

## 2024-09-24 DIAGNOSIS — G89.29 CHRONIC PAIN OF BOTH ANKLES: ICD-10-CM

## 2024-09-24 DIAGNOSIS — Z98.890 S/P ORIF (OPEN REDUCTION INTERNAL FIXATION) FRACTURE: ICD-10-CM

## 2024-09-24 DIAGNOSIS — M25.572 CHRONIC PAIN OF BOTH ANKLES: ICD-10-CM

## 2024-09-24 DIAGNOSIS — Z87.81 S/P ORIF (OPEN REDUCTION INTERNAL FIXATION) FRACTURE: ICD-10-CM

## 2024-09-24 DIAGNOSIS — M25.571 CHRONIC PAIN OF BOTH ANKLES: ICD-10-CM

## 2024-09-24 PROCEDURE — 97110 THERAPEUTIC EXERCISES: CPT | Mod: PN

## 2024-09-24 NOTE — PROGRESS NOTES
[facility]   Physical Therapy Treatment Note      Name: Merna Regalado  Clinic Number: [id number]    Therapy Diagnosis:   Encounter Diagnoses   Name Primary?    S/P ankle fusion, right Yes    Chronic pain of both ankles     Gait instability     S/P ORIF (open reduction internal fixation) pilon fracture, left      Physician: Alec Pettit MD    Visit Date: 9/24/2024    Physician Orders: PT Eval and Treat   Medical Diagnosis from Referral: Diagnosis Z98.1 (ICD-10-CM) - S/P ankle fusion Z98.890,Z87.81 (ICD-10-CM) - S/P ORIF (open reduction internal fixation) fracture R26.81 (ICD-10-CM) - Gait instability  Evaluation Date: 9/17/2024  Authorization Period Expiration: 9/16/2025  Plan of Care Expiration: 10/29/2024  Progress Note Due: visit 2  Visit # / Visits authorized: 1/4  FOTO: 1/3     Precautions: Diabetes, Fall, and Stent Placement on L neck        Time In: 09:48  Time Out: 10:30  Total Appointment Time (timed & untimed codes): 42 minutes    PTA Visit #: -/5       Subjective     Patient reports: she was in pain this morning but not in pain now. Her exercises were good last time, and she has been trying to do her exercises at home. She finds when she uses the walker here she is able to turn better, but she doesn't use her walker at home.  She was compliant with home exercise program.  Response to previous treatment: positive; no soreness  Functional change: moderate    Pain: 3/10  Location: bilateral ankles     Objective      Objective Measures updated at progress report unless specified.     Treatment     Merna received the treatments listed below:      therapeutic exercises to develop strength for 10 minutes including:  -  2 x 10 4-way RTB   - Ankle PF/DF x 1 min on BAPS board  -  2 x 10 long arc quad #2  - 2 x 10 seated marches #2   - 2 x 10 SLR (#1 left leg; no weight on right leg)  - 2 x 10 hip abduction RTB  - 2 x 10 bridges with RTB    Not performed:  - Ankle circles x 1 min ea  direction (CW/CCW)    manual therapy techniques: Soft tissue Mobilization were applied to: bilateral ankles for 5 minutes, including:  - PROM of both ankles (PF/DF; Inv/Ev)  - Scar mobilizations    neuromuscular re-education activities to improve:  for  minutes. The following activities were included:      therapeutic activities to improve functional performance for   minutes, including:      gait training to improve functional mobility and safety for   minutes, including:      direct contact modalities after being cleared for contraindications:     supervised modalities after being cleared for contradictions:     hot pack for  minutes to .    cold pack for  minutes to .    Patient Education and Home Exercises       Education provided:   - Cont home exercise program  - AD assistance    Written Home Exercises Provided: Patient instructed to cont prior HEP. Exercises were reviewed and Merna was able to demonstrate them prior to the end of the session.  Merna demonstrated good  understanding of the education provided. See Electronic Medical Record under Patient Instructions for exercises provided during therapy sessions    Assessment     Patient tolerated today's session well. Patient ambulated into the clinic today without an AD. She was instructed to look into getting another AD to help with ambulating within the community. Progressed exercises for LE strengthening and B ankle ROM. PROM provided to bilateral ankles to maintain mobility and scar mobilizations. Patient requires VC for proper technique for exercises.     Merna Is progressing well towards her goals.   Patient prognosis is Good.     Patient will continue to benefit from skilled outpatient physical therapy to address the deficits listed in the problem list box on initial evaluation, provide pt/family education and to maximize pt's level of independence in the home and community environment.     Patient's spiritual, cultural and educational needs considered  and pt agreeable to plan of care and goals.     Anticipated Barriers for therapy: risk for falls, chronic pain; post ankle fusion    Goals:  Short Term Goals: 2 weeks   1. Patient demonstrates independence with HEP.   2. Patient demonstrates improvement of LE strength by 1/2 a grade.  3. Patient will be able to complete a sit to stand without hand support as a result of improvement of lower extremity strength.  4. Patient will improve TUG score from 23 sec to 18 sec with AD.  5. Patient will improve 5TSTS from 25 sec with hand support to being able to complete 5 consecutive repetitions without hand support for 20 sec.     Long Term Goals: 4 weeks   1. Patient demonstrates increased LE strength by 1 grade to improve tolerance to functional activities.   2. Patient will be able to complete walk independently without AD for 200 feet.   3. Patient demonstrates improved overall function per FOTO to 47% or more.   4. Patient will improve TUG score from 18 sec to 15 sec with AD.  5. Patient will improve 5TSTS without hand support for 18 sec.     PLAN   Plan of care Certification: 9/17/2024 to 10/29/2024.     Outpatient Physical Therapy 1 times weekly for 4 weeks to include the following interventions: Cervical/Lumbar Traction, Electrical Stimulation  , Gait Training, Manual Therapy, Moist Heat/ Ice, Neuromuscular Re-ed, Patient Education, Therapeutic Activities, and Therapeutic Exercise.     Amber Garg, PT

## 2024-10-02 ENCOUNTER — CLINICAL SUPPORT (OUTPATIENT)
Dept: REHABILITATION | Facility: HOSPITAL | Age: 64
End: 2024-10-02
Payer: MEDICAID

## 2024-10-02 DIAGNOSIS — R26.81 GAIT INSTABILITY: ICD-10-CM

## 2024-10-02 DIAGNOSIS — M25.572 CHRONIC PAIN OF BOTH ANKLES: ICD-10-CM

## 2024-10-02 DIAGNOSIS — Z87.81 S/P ORIF (OPEN REDUCTION INTERNAL FIXATION) FRACTURE: ICD-10-CM

## 2024-10-02 DIAGNOSIS — M25.571 CHRONIC PAIN OF BOTH ANKLES: ICD-10-CM

## 2024-10-02 DIAGNOSIS — Z98.1 S/P ANKLE FUSION: Primary | ICD-10-CM

## 2024-10-02 DIAGNOSIS — G89.29 CHRONIC PAIN OF BOTH ANKLES: ICD-10-CM

## 2024-10-02 DIAGNOSIS — Z98.890 S/P ORIF (OPEN REDUCTION INTERNAL FIXATION) FRACTURE: ICD-10-CM

## 2024-10-02 PROCEDURE — 97110 THERAPEUTIC EXERCISES: CPT | Mod: PN

## 2024-10-02 NOTE — PROGRESS NOTES
[facility]   Physical Therapy Treatment Note      Name: Merna Regalado  Clinic Number: [id number]    Therapy Diagnosis:   Encounter Diagnoses   Name Primary?    S/P ankle fusion, right Yes    Chronic pain of both ankles     Gait instability     S/P ORIF (open reduction internal fixation) pilon fracture, left      Physician: Alec Pettit MD    Visit Date: 10/2/2024    Physician Orders: PT Eval and Treat   Medical Diagnosis from Referral: Diagnosis Z98.1 (ICD-10-CM) - S/P ankle fusion Z98.890,Z87.81 (ICD-10-CM) - S/P ORIF (open reduction internal fixation) fracture R26.81 (ICD-10-CM) - Gait instability  Evaluation Date: 9/17/2024  Authorization Period Expiration: 9/16/2025  Plan of Care Expiration: 10/29/2024  Progress Note Due: visit 2  Visit # / Visits authorized: 2/4  FOTO: 1/3     Precautions: Diabetes, Fall, and Stent Placement on L neck        Time In: 13:00  Time Out: 13:45  Total Appointment Time (timed & untimed codes): 40 minutes    PTA Visit #: -/5       Subjective     Patient reports: she is in a lot of pain getting in/out of the dually truck. She requested the walker to help her walk into the clinic. Both of her feet are hurting really good.  Response to previous treatment: no change  Functional change: moderate    Pain: 10/10  Location: bilateral ankles     Objective      Objective Measures updated at progress report unless specified.     Treatment     Merna received the treatments listed below:      therapeutic exercises to develop strength for 40 minutes including:  + ankle circles 1 min CW, CCW on wedge  + AROM PF/DF, Ev/Inv 2 x 2 min on wedge  -  2 x 10 long arc quad #2  - 2 x 10 seated marches #2   - 2 x 10 seated hip abduction RTB  + 2 x 10 seated hip adduction iso squeezes with yellow ball    Not performed:  - 2 x 10 SLR (#1 left leg; no weight on right leg)  - 2 x 10 bridges with RTB  -  2 x 10 4-way RTB  - Ankle PF/DF x 1 min on BAPS board    manual therapy  techniques: Soft tissue Mobilization were applied to: bilateral ankles for 0 minutes, including:  Not performed:  - PROM of both ankles (PF/DF; Inv/Ev)  - Scar mobilizations    neuromuscular re-education activities to improve:  for  minutes. The following activities were included:      therapeutic activities to improve functional performance for   minutes, including:      gait training to improve functional mobility and safety for   minutes, including:      direct contact modalities after being cleared for contraindications:     supervised modalities after being cleared for contradictions:     hot pack for  minutes to .    cold pack for  minutes to .    Patient Education and Home Exercises       Education provided:   - Cont home exercise program  - AD assistance    Written Home Exercises Provided: Patient instructed to cont prior HEP. Exercises were reviewed and Merna was able to demonstrate them prior to the end of the session.  Merna demonstrated good  understanding of the education provided. See Electronic Medical Record under Patient Instructions for exercises provided during therapy sessions    Assessment     Patient required SW to enter into the clinic today secondary to pain in bilateral feet; left>R. Patient presented with increased difficulty ambulating. Today's session was modified to accommodate patient's reported pain levels. Instructed patient to rest and elevate at home.     Merna Is progressing well towards her goals.   Patient prognosis is Good.     Patient will continue to benefit from skilled outpatient physical therapy to address the deficits listed in the problem list box on initial evaluation, provide pt/family education and to maximize pt's level of independence in the home and community environment.     Patient's spiritual, cultural and educational needs considered and pt agreeable to plan of care and goals.     Anticipated Barriers for therapy: risk for falls, chronic pain; post ankle  fusion    Goals:  Short Term Goals: 2 weeks   1. Patient demonstrates independence with HEP.   2. Patient demonstrates improvement of LE strength by 1/2 a grade.  3. Patient will be able to complete a sit to stand without hand support as a result of improvement of lower extremity strength.  4. Patient will improve TUG score from 23 sec to 18 sec with AD.  5. Patient will improve 5TSTS from 25 sec with hand support to being able to complete 5 consecutive repetitions without hand support for 20 sec.     Long Term Goals: 4 weeks   1. Patient demonstrates increased LE strength by 1 grade to improve tolerance to functional activities.   2. Patient will be able to complete walk independently without AD for 200 feet.   3. Patient demonstrates improved overall function per FOTO to 47% or more.   4. Patient will improve TUG score from 18 sec to 15 sec with AD.  5. Patient will improve 5TSTS without hand support for 18 sec.     PLAN   Plan of care Certification: 9/17/2024 to 10/29/2024.     Outpatient Physical Therapy 1 times weekly for 4 weeks to include the following interventions: Cervical/Lumbar Traction, Electrical Stimulation  , Gait Training, Manual Therapy, Moist Heat/ Ice, Neuromuscular Re-ed, Patient Education, Therapeutic Activities, and Therapeutic Exercise.     Amber Garg, PT

## 2024-10-08 ENCOUNTER — CLINICAL SUPPORT (OUTPATIENT)
Dept: REHABILITATION | Facility: HOSPITAL | Age: 64
End: 2024-10-08
Payer: MEDICAID

## 2024-10-08 DIAGNOSIS — Z98.1 S/P ANKLE FUSION: Primary | ICD-10-CM

## 2024-10-08 PROCEDURE — 97110 THERAPEUTIC EXERCISES: CPT | Mod: PN,CQ

## 2024-10-08 NOTE — PROGRESS NOTES
[facility]   Physical Therapy Treatment Note      Name: Merna Regalado  Clinic Number: [id number]    Physician: Alec Pettit MD    Visit Date: 10/8/2024    Physician Orders: PT Eval and Treat   Medical Diagnosis from Referral: Diagnosis Z98.1 (ICD-10-CM) - S/P ankle fusion Z98.890,Z87.81 (ICD-10-CM) - S/P ORIF (open reduction internal fixation) fracture R26.81 (ICD-10-CM) - Gait instability  Evaluation Date: 9/17/2024  Authorization Period Expiration: 9/16/2025  Plan of Care Expiration: 10/29/2024  Progress Note Due: visit 2  Visit # / Visits authorized: 2/4  FOTO: 1/3     Precautions: Diabetes, Fall, and Stent Placement on L neck        Time In: 13:47  Time Out: 14:29  Total Appointment Time (timed & untimed codes): 42 minutes    PTA Visit #: 1/5       Subjective     Patient reports: pain in worse today L>R.  Response to previous treatment: no change  Functional change: moderate    Pain: 10/10  Location: bilateral ankles     Objective      Objective Measures updated at progress report unless specified.     Treatment     Merna received the treatments listed below:      therapeutic exercises to develop strength for 42 minutes including:  - ankle 1 min ANTHONY forward/backward on BAPS  - ankle circles L foot only, CW, CWW 1 min on BAPS  - AROM PF/DF, Ev/Inv 2 x 2 min on wedge  -  2 x 10 long arc quad #2  - 2 x 10 seated marches #2   - 2 x 10 seated hip abduction RTB  - 2 x 10 seated hip adduction iso squeezes with yellow ball  + 2 x 10 seated HS curl RTB   + 5' cycle    Not performed:  - 2 x 10 SLR (#1 left leg; no weight on right leg)  - 2 x 10 bridges with RTB  -  2 x 10 4-way RTB  - Ankle PF/DF x 1 min on BAPS board    manual therapy techniques: Soft tissue Mobilization were applied to: bilateral ankles for 0 minutes, including:  Not performed:  - PROM of both ankles (PF/DF; Inv/Ev)  - Scar mobilizations    neuromuscular re-education activities to improve:  for  minutes. The following  activities were included:      therapeutic activities to improve functional performance for   minutes, including:      gait training to improve functional mobility and safety for   minutes, including:      direct contact modalities after being cleared for contraindications:     supervised modalities after being cleared for contradictions:     hot pack for  minutes to .    cold pack for  minutes to .    Patient Education and Home Exercises       Education provided:   - Cont home exercise program  - AD assistance    Written Home Exercises Provided: Patient instructed to cont prior HEP. Exercises were reviewed and Merna was able to demonstrate them prior to the end of the session.  Merna demonstrated good  understanding of the education provided. See Electronic Medical Record under Patient Instructions for exercises provided during therapy sessions    Assessment     Pt presents today with walker pushed out in front. She expresses pain in ANTHONY ankles. Performed ex's as indicated with additions as noted. Despite high pain rating, pt completed therapy session without pain complaints. She only expressed expected ROM limitations.   Merna Is progressing well towards her goals.   Patient prognosis is Good.     Patient will continue to benefit from skilled outpatient physical therapy to address the deficits listed in the problem list box on initial evaluation, provide pt/family education and to maximize pt's level of independence in the home and community environment.     Patient's spiritual, cultural and educational needs considered and pt agreeable to plan of care and goals.     Anticipated Barriers for therapy: risk for falls, chronic pain; post ankle fusion    Goals:  Short Term Goals: 2 weeks   1. Patient demonstrates independence with HEP.   2. Patient demonstrates improvement of LE strength by 1/2 a grade.  3. Patient will be able to complete a sit to stand without hand support as a result of improvement of lower  extremity strength.  4. Patient will improve TUG score from 23 sec to 18 sec with AD.  5. Patient will improve 5TSTS from 25 sec with hand support to being able to complete 5 consecutive repetitions without hand support for 20 sec.     Long Term Goals: 4 weeks   1. Patient demonstrates increased LE strength by 1 grade to improve tolerance to functional activities.   2. Patient will be able to complete walk independently without AD for 200 feet.   3. Patient demonstrates improved overall function per FOTO to 47% or more.   4. Patient will improve TUG score from 18 sec to 15 sec with AD.  5. Patient will improve 5TSTS without hand support for 18 sec.     PLAN   Plan of care Certification: 9/17/2024 to 10/29/2024.     Outpatient Physical Therapy 1 times weekly for 4 weeks to include the following interventions: Cervical/Lumbar Traction, Electrical Stimulation  , Gait Training, Manual Therapy, Moist Heat/ Ice, Neuromuscular Re-ed, Patient Education, Therapeutic Activities, and Therapeutic Exercise.     Flores Lomas, PTA

## 2024-10-29 ENCOUNTER — CLINICAL SUPPORT (OUTPATIENT)
Dept: REHABILITATION | Facility: HOSPITAL | Age: 64
End: 2024-10-29
Payer: MEDICAID

## 2024-10-29 DIAGNOSIS — R26.81 GAIT INSTABILITY: ICD-10-CM

## 2024-10-29 DIAGNOSIS — Z98.890 S/P ORIF (OPEN REDUCTION INTERNAL FIXATION) FRACTURE: ICD-10-CM

## 2024-10-29 DIAGNOSIS — M25.572 CHRONIC PAIN OF BOTH ANKLES: ICD-10-CM

## 2024-10-29 DIAGNOSIS — Z87.81 S/P ORIF (OPEN REDUCTION INTERNAL FIXATION) FRACTURE: ICD-10-CM

## 2024-10-29 DIAGNOSIS — G89.29 CHRONIC PAIN OF BOTH ANKLES: ICD-10-CM

## 2024-10-29 DIAGNOSIS — Z98.1 S/P ANKLE FUSION: Primary | ICD-10-CM

## 2024-10-29 DIAGNOSIS — M25.571 CHRONIC PAIN OF BOTH ANKLES: ICD-10-CM

## 2024-10-29 PROCEDURE — 97110 THERAPEUTIC EXERCISES: CPT | Mod: PN

## 2024-12-10 ENCOUNTER — CLINICAL SUPPORT (OUTPATIENT)
Dept: REHABILITATION | Facility: HOSPITAL | Age: 64
End: 2024-12-10
Attending: ORTHOPAEDIC SURGERY
Payer: MEDICAID

## 2024-12-10 DIAGNOSIS — Z98.1 S/P ANKLE FUSION: ICD-10-CM

## 2024-12-10 DIAGNOSIS — Z98.890 S/P ORIF (OPEN REDUCTION INTERNAL FIXATION) FRACTURE: ICD-10-CM

## 2024-12-10 DIAGNOSIS — Z87.81 S/P ORIF (OPEN REDUCTION INTERNAL FIXATION) FRACTURE: ICD-10-CM

## 2024-12-10 PROCEDURE — 97161 PT EVAL LOW COMPLEX 20 MIN: CPT | Mod: PN

## 2024-12-10 PROCEDURE — 97110 THERAPEUTIC EXERCISES: CPT | Mod: PN

## 2024-12-10 NOTE — PLAN OF CARE
[facility]   Physical Therapy Initial Evaluation     Date: 12/10/2024   Name: Merna Regalado  Clinic Number: [id number]    Therapy Diagnosis:   Encounter Diagnoses   Name Primary?    S/P ankle fusion, right     S/P ORIF (open reduction internal fixation) pilon fracture, left      Physician: Alec Pettit MD    Physician Orders: PT Eval and Treat   Medical Diagnosis from Referral: Diagnosis Z98.1 (ICD-10-CM) - S/P ankle fusion Z98.890,Z87.81 (ICD-10-CM) - S/P ORIF (open reduction internal fixation) fracture R26.81 (ICD-10-CM) - Gait instability  Evaluation Date: 12/10/2024  Authorization Period Expiration: 11/27/2025  Plan of Care Expiration: 1/21/2025  Progress Note Due: visit 4  Visit # / Visits authorized: 1/ 1   FOTO: 1/3    Precautions: Diabetes, Fall, and Stent Placement on L neck       Time In: 0905  Time Out: 0945  Total Appointment Time (timed & untimed codes): 40 minutes      SUBJECTIVE     Date of onset: 2021 for the left ankle; 2022 for the right ankle    History of current condition - Merna reports: she passed out and broke her left ankle (trimalleolar fracture) and then she also passed out on the toilet and broke her right ankle. She currently has a fused ankle on the right and hardware on the left ankle. Currently, she is having increased pain in her left ankle. She may be getting the hardware removed on the left side.     Falls: in 2021 and 2022 resulting in both ankles fracturing bones; recent fall about a month ago    Imaging, bone scan films:   FINDINGS:  Postop change with evidence of transfixation of the distal extremity ankle, tibiotalar fusion is noted with evidence of a metallic raheel bypassing the articular cartilage space.  There has been operative section of the distal fibula with tapered distal margin.  No abundant callus formation has occurred.  Subtalar joint effusion has evolved.  Prominent plantar calcaneal heel spur.  No evidence bony destructive changes.   Pre Achilles fat pad appears well maintained.  Achilles tendon shadow appears normal.     Impression:     Transfixation of distal extremity right ankle with evidence of subtalar and tibiotalar osseous fusion.  No mature bone formation has occurred at the site of postoperative change.    Prior Therapy: P.T for B ankle pain  Social History:  lives with their spouse  Occupation: unemployed  Prior Level of Function: Independent without difficulty  Current Level of Function: Independent with severe difficulty and pain    Pain:  Current 4/10, worst 10/10, best 0/10   Location: bilateral ankles; left ankle more significant  Description: Burning and Pinching, Stabbing  Aggravating Factors: walking   Easing Factors: rest and OTC medication (ibuprofen)    Patients goals: Patient would like to have no pain with walking.      Medical History:   Past Medical History:   Diagnosis Date    Asthma     Coronary artery disease     Diabetes mellitus     Dyslipidemia     GERD (gastroesophageal reflux disease)     Hypertension     Neuropathy     Thyroid disease        Surgical History:   Merna Regalado  has a past surgical history that includes Cholecystectomy; Hysterectomy; cardiac stents; stents to kidney; Application of large external fixation device to tibia (Left, 11/05/2021); Open reduction and internal fixation (ORIF) of pilon fracture (Left, 11/12/2021); Removal of external fixation device (Left, 11/12/2021); Femoral bypass; Ankle hardware removal (Left, 3/31/2022); Closed reduction (Right, 5/11/2022); External fixation (Right, 5/11/2022); Removal of external fixation device (Right, 5/20/2022); Application of wound vacuum-assisted closure device (Right, 8/18/2022); Ankle hardware removal (Right, 8/18/2022); Irrigation and debridement of lower extremity (Right, 8/18/2022); Closure of wound (Right, 8/18/2022); and Tibiotalocalcaneal fusion using intramedullary raheel (Right, 9/28/2023).    Medications:   Merna has a current  medication list which includes the following prescription(s): albuterol, alendronate, otezla, aspirin, atorvastatin, carvedilol, clobetasol, ergocalciferol, famotidine, fluoxetine, furosemide, gabapentin, ibuprofen, insulin glargine u-100 (lantus), insulin aspart u-100, insulin lispro, levothyroxine, lisinopril, lisinopril-hydrochlorothiazide, metformin, nifedipine, nitroglycerin, ondansetron, pen needle, diabetic, promethazine, and trazodone.    Allergies:   Review of patient's allergies indicates:   Allergen Reactions    Mushroom Swelling    Codeine Nausea Only    Conjugated estrogens      Other reaction(s): Vomiting and cream causes vaginal swelling    Diphenhydramine hcl      Other reaction(s): Itching and elevated BS    Honey     Iodine Itching    Nyquil d [doxylamin-pse-dm-acetaminophen]      Other reaction(s): Propensity to adverse reactions to drug    Pcn [penicillins] Other (See Comments)     Headache and nausea with PO PCN     Potassium      Other reaction(s): Not available    Povidone-iodine      Other reaction(s): Not available, Propensity to adverse reactions to drug    Sulfa (sulfonamide antibiotics)     Oxycontin [oxycodone] Nausea And Vomiting        OBJECTIVE     In degrees:  ACTIVE RANGE OF MOTION    LEFT RIGHT   Dorsiflexion -12 -5   Plantarflexion 40 22   PASSIVE RANGE OF MOTION   Dorsiflexion -15 0   Plantarflexion 45 30   Inversion 30 5   Eversion 25 5     Joint mobility:  Right ankle: ROM greatly limited in right ankle secondary to fusion of subtalar and talocural joints.   Talocrural joint: moderate to severe limitations secondary to fusion   Subtalar joint: severe limitations secondary to fusion   Intermetatarsal joints: no notable limitations  Left ankle: ROM greatly limited in left ankle secondary to trimalleolar fracture surgery with capsular end-feels.   Talocrural joint: minimal limitations   Subtalar joint: severe limitation   Intermetatarsal joints: no notable limitations    LOWER  EXTREMITY STRENGTH    Left Right   Knee Extension 4/5 4-/5   Knee Flexion 4-/5 3+/5     Hip Flexion 3-/5 3-/5   Ankle Dorsiflexion 4+/5 4+/5   Ankle Plantarflexion 4-/5 4-/5   Ankle Inversion 3+/5 3+/5   Ankle Eversion 3+/5 3+/5     FLEXIBILITY   Dorsiflexion Unable to appropriately assess gastroc flexibility     DERMATOMES: Sensation: Light Touch: Intact  MYOTOMES: WNL    SPECIAL TESTS    LEFT RIGHT   Anterior Drawer (ATFL) - -   Posterior Drawer (PTFL) - -     PALPATION:  Patient reports primary pain region along:  Right ankle: TTP at calcaneus, achilles tendon, dorsal/plantar aspects of the tibiofibular joint, along incisions from surgery  Left ankle: TTP at calcaneus, achilles tendon, along incisions from surgery    GAIT: Merna ambulates with RW  Mod I Assist.     GAIT DEVIATIONS: Menra displays mild antalgic limp on left LE with RW.    Squat Mechanics: Unable to assess d/t strength deficit    Outcome Measures:  5TSTS: 13 sec without use of hand support  TU sec with RW  Sharpened Romberg Test (Tandem Stance) right>left  EO:  unable  EC: did not assess  Sharpened Romberg Test (Tandom Stance) left>right  EO: 1 sec  EC: did not assess  Romberg Test:   EO: 30 sec   EC: 30 sec    Limitation/Restriction for FOTO Foot Survey    Therapist reviewed FOTO scores for Merna Regalado on 12/10/2024.   FOTO documents entered into [employer] - see Media section.    Limitation Score: 47%       TREATMENT     Total Treatment time (time-based codes) separate from Evaluation: 10 minutes      Merna received the treatments listed below:      therapeutic exercises to develop strength for 10 minutes including:  - recumbent bike 5 min level 4  -standing mini squats 2 x 10  -standing marches 2 x 10  -seated long arc quad 2 x 10 ea.  -seated hamstring curls red theraband     manual therapy techniques:  were applied to the:  for  minutes, including:      neuromuscular re-education activities to improve:  for  minutes. The following  activities were included:      therapeutic activities to improve functional performance for   minutes, including:      gait training to improve functional mobility and safety for   minutes, including:      direct contact modalities after being cleared for contraindications:     supervised modalities after being cleared for contradictions:     hot pack for  minutes to .    cold pack for  minutes to .      PATIENT EDUCATION AND HOME EXERCISES     Education provided:   - Importance of HEP    - Pt/family was provided educational information, including: role of PT, goals for PT, scheduling - pt verbalized understanding. Discussed insurance limitations with pt.     Written Home Exercises Provided: yes. Exercises were reviewed and Merna was able to demonstrate them prior to the end of the session.  Merna demonstrated good  understanding of the education provided. See EMR under Patient Instructions for exercises provided during therapy sessions.    ASSESSMENT     Merna is a 64 y.o. female referred to outpatient Physical Therapy with a medical diagnosis of Diagnosis Z98.1 (ICD-10-CM) - S/P ankle fusion Z98.890,Z87.81 (ICD-10-CM) - S/P ORIF (open reduction internal fixation) fracture R26.81 (ICD-10-CM) - Gait instability. Patient presents with  decreased strength, impaired balance and proprioception, and reports of moderate levels of pain. Patient will benefit from skilled P.T. services to address the above deficits.     Medical necessity is demonstrated by the following IMPAIRMENTS/PROBLEMS:  -Decreased function (LEFS)  -Decreased pain free ankle AROM in all directions  -Decreased ankle stability and strength  -Decreased participation in regular exercise routine  -Impaired proprioception and balance resulting in a high fall risk  -Increased pain (NPS)  -(+) TTP:     Intervention strategies designed to address these impairments will be instrumental in achieving the stated functional goals, including her ability to safely  perform mobility tasks. Based on the examination, the patient's rehabilitation potential to achieve functional goals is good.    Patient prognosis is Good.   Patient will benefit from skilled outpatient Physical Therapy to address the deficits stated above and in the chart below, provide patient /family education, and to maximize patientt's level of independence.     Plan of care discussed with patient: Yes  Patient's spiritual, cultural and educational needs considered and patient is agreeable to the plan of care and goals as stated below:     Anticipated Barriers for therapy: risk for falls, chronic pain post ankle fusion    Medical Necessity is demonstrated by the following  History  Co-morbidities and personal factors that may impact the plan of care Co-morbidities:   diabetes and osteoporosis  and fusion of right ankle, coronary artery disease with stent placement, hypertension    Personal Factors:   no deficits     moderate   Examination  Body Structures and Functions, activity limitations and participation restrictions that may impact the plan of care Body Regions:   lower extremities    Body Systems:    ROM  balance  gait  blood pressure    Participation Restrictions:   Ambulation w/o AD       low   Clinical Presentation stable and uncomplicated low   Decision Making/ Complexity Score: low     Goals:  Short Term Goals: 2 weeks   1. Patient demonstrates independence with HEP.   2. Patient demonstrates improvement of LE strength by 1/2 a grade.  3. Patient will be able to complete a sit to stand without hand support as a result of improvement of lower extremity strength.  4. Patient will improve TUG score from 35 sec to 23 sec with AD.    Long Term Goals: 4 weeks   1. Patient demonstrates increased LE strength by 1 grade to improve tolerance to functional activities.   2. Patient will be able to complete walk independently without AD for 200 feet.   3. Patient demonstrates improved overall function per FOTO to  47% or more.   4. Patient will improve TUG score from 23 sec to 15 sec with AD.    PLAN   Plan of care Certification: 12/10/2024 to 1/21/2025.    Outpatient Physical Therapy 1 times weekly for 4 weeks to include the following interventions: Cervical/Lumbar Traction, Electrical Stimulation  , Gait Training, Manual Therapy, Moist Heat/ Ice, Neuromuscular Re-ed, Patient Education, Therapeutic Activities, and Therapeutic Exercise.     Amber Garg, PT    Pt may be seen by PTA as part of the rehabilitation team.     12/10/2024    I CERTIFY THE NEED FOR THESE SERVICES FURNISHED UNDER THIS PLAN OF TREATMENT AND WHILE UNDER MY CARE   Physician's comments:     Physician's Signature: ___________________________________________________

## 2024-12-18 ENCOUNTER — CLINICAL SUPPORT (OUTPATIENT)
Dept: REHABILITATION | Facility: HOSPITAL | Age: 64
End: 2024-12-18
Payer: MEDICAID

## 2024-12-18 DIAGNOSIS — M25.572 CHRONIC PAIN OF BOTH ANKLES: Primary | ICD-10-CM

## 2024-12-18 DIAGNOSIS — M25.571 CHRONIC PAIN OF BOTH ANKLES: Primary | ICD-10-CM

## 2024-12-18 DIAGNOSIS — G89.29 CHRONIC PAIN OF BOTH ANKLES: Primary | ICD-10-CM

## 2024-12-18 PROBLEM — R53.1 WEAKNESS GENERALIZED: Status: ACTIVE | Noted: 2024-12-18

## 2024-12-18 PROCEDURE — 97110 THERAPEUTIC EXERCISES: CPT | Mod: PN,CQ

## 2024-12-18 NOTE — PROGRESS NOTES
"  [facility]   Physical Therapy Treatment Note      Name: Merna Regalado  Clinic Number: [id number]    Physician: Alec Pettit MD    Visit Date: 12/18/2024    Therapy Diagnosis:        Encounter Diagnoses   Name Primary?    S/P ankle fusion, right      S/P ORIF (open reduction internal fixation) pilon fracture, left        Physician: Alec Pettit MD     Physician Orders: PT Eval and Treat   Medical Diagnosis from Referral: Diagnosis Z98.1 (ICD-10-CM) - S/P ankle fusion Z98.890,Z87.81 (ICD-10-CM) - S/P ORIF (open reduction internal fixation) fracture R26.81 (ICD-10-CM) - Gait instability  Evaluation Date: 12/10/2024  Authorization Period Expiration: 11/27/2025  Plan of Care Expiration: 1/21/2025  Progress Note Due: visit 4  Visit # / Visits authorized: 1/ 1   FOTO: 1/3     Precautions: Diabetes, Fall, and Stent Placement on L neck        Time In: 1039  Time Out:1119  Total Appointment Time (timed & untimed codes): 40 minutes    PTA Visit #: 1/5       Subjective     Patient reports: "It feels better." She states "I wish I could walkout holding onto anything."  She was compliant with home exercise program.  Response to previous treatment: Improved  Functional change: No change reported    Pain: 3/10  Location: bilateral ankles     Objective      Objective Measures updated at progress report unless specified.     Treatment     Merna received the treatments listed below:      therapeutic exercises to develop strength for 40 minutes including:  -recumbent bike 5 min level 4  -standing mini squats 2 x 10  -standing marches 2 x 10  -seated long arc quad 2 x 10 ea.  -seated hamstring curls red theraband  -standing w/ RW heel raises 2x10  -seated towel curls 2x10      Patient Education and Home Exercises       Education provided:   - importance of HEP    Written Home Exercises Provided: Pt instructed to continue prior HEP. Exercises were reviewed and Merna was able to demonstrate them prior " to the end of the session.  Merna demonstrated fair  understanding of the education provided. See Electronic Medical Record under Patient Instructions for exercises provided during therapy sessions    Assessment     Added ex's as indicated without issue. Pt was hesitant to perform standing heel raises. With encouragement, she completed well.     Patient prognosis is Good.     Patient will continue to benefit from skilled outpatient physical therapy to address the deficits listed in the problem list box on initial evaluation, provide pt/family education and to maximize pt's level of independence in the home and community environment.     Patient's spiritual, cultural and educational needs considered and pt agreeable to plan of care and goals.     Anticipated barriers to physical therapy: risk for falls, chronic pain post ankle fusion     Goals:   Short Term Goals: 2 weeks   1. Patient demonstrates independence with HEP.   2. Patient demonstrates improvement of LE strength by 1/2 a grade.  3. Patient will be able to complete a sit to stand without hand support as a result of improvement of lower extremity strength.  4. Patient will improve TUG score from 35 sec to 23 sec with AD.     Long Term Goals: 4 weeks   1. Patient demonstrates increased LE strength by 1 grade to improve tolerance to functional activities.   2. Patient will be able to complete walk independently without AD for 200 feet.   3. Patient demonstrates improved overall function per FOTO to 47% or more.   4. Patient will improve TUG score from 23 sec to 15 sec with AD.     PLAN   Plan of care Certification: 12/10/2024 to 1/21/2025.     Outpatient Physical Therapy 1 times weekly for 4 weeks to include the following interventions: Cervical/Lumbar Traction, Electrical Stimulation  , Gait Training, Manual Therapy, Moist Heat/ Ice, Neuromuscular Re-ed, Patient Education, Therapeutic Activities, and Therapeutic Exercise.       Flores Lomas, PTA

## 2025-05-23 ENCOUNTER — OFFICE VISIT (OUTPATIENT)
Dept: ENDOCRINOLOGY | Facility: CLINIC | Age: 65
End: 2025-05-23
Payer: MEDICAID

## 2025-05-23 VITALS
WEIGHT: 140 LBS | BODY MASS INDEX: 25.76 KG/M2 | DIASTOLIC BLOOD PRESSURE: 68 MMHG | RESPIRATION RATE: 16 BRPM | HEIGHT: 62 IN | OXYGEN SATURATION: 99 % | SYSTOLIC BLOOD PRESSURE: 118 MMHG | HEART RATE: 68 BPM

## 2025-05-23 DIAGNOSIS — N30.00 ACUTE CYSTITIS WITHOUT HEMATURIA: ICD-10-CM

## 2025-05-23 DIAGNOSIS — I25.10 CORONARY ARTERY DISEASE, UNSPECIFIED VESSEL OR LESION TYPE, UNSPECIFIED WHETHER ANGINA PRESENT, UNSPECIFIED WHETHER NATIVE OR TRANSPLANTED HEART: ICD-10-CM

## 2025-05-23 DIAGNOSIS — Z79.4 TYPE 2 DIABETES MELLITUS WITH HYPERGLYCEMIA, WITH LONG-TERM CURRENT USE OF INSULIN: Primary | ICD-10-CM

## 2025-05-23 DIAGNOSIS — E11.65 TYPE 2 DIABETES MELLITUS WITH HYPERGLYCEMIA, WITH LONG-TERM CURRENT USE OF INSULIN: Primary | ICD-10-CM

## 2025-05-23 DIAGNOSIS — E03.4 HYPOTHYROIDISM DUE TO ACQUIRED ATROPHY OF THYROID: ICD-10-CM

## 2025-05-23 PROCEDURE — 99999 PR PBB SHADOW E&M-EST. PATIENT-LVL IV: CPT | Mod: PBBFAC,,, | Performed by: STUDENT IN AN ORGANIZED HEALTH CARE EDUCATION/TRAINING PROGRAM

## 2025-05-23 PROCEDURE — 99214 OFFICE O/P EST MOD 30 MIN: CPT | Mod: PBBFAC | Performed by: STUDENT IN AN ORGANIZED HEALTH CARE EDUCATION/TRAINING PROGRAM

## 2025-05-23 RX ORDER — LANCETS
EACH MISCELLANEOUS
Qty: 100 EACH | Refills: 11 | Status: SHIPPED | OUTPATIENT
Start: 2025-05-23

## 2025-05-23 RX ORDER — SEMAGLUTIDE 0.68 MG/ML
0.25 INJECTION, SOLUTION SUBCUTANEOUS
Qty: 3 ML | Refills: 0 | Status: SHIPPED | OUTPATIENT
Start: 2025-05-23

## 2025-05-23 RX ORDER — BLOOD-GLUCOSE SENSOR
EACH MISCELLANEOUS
Qty: 2 EACH | Refills: 11 | Status: SHIPPED | OUTPATIENT
Start: 2025-05-23

## 2025-05-23 RX ORDER — NITROFURANTOIN 25; 75 MG/1; MG/1
100 CAPSULE ORAL 2 TIMES DAILY
Qty: 10 CAPSULE | Refills: 0 | Status: SHIPPED | OUTPATIENT
Start: 2025-05-23 | End: 2025-05-28

## 2025-05-23 RX ORDER — EMPAGLIFLOZIN 25 MG/1
25 TABLET, FILM COATED ORAL DAILY
Qty: 30 TABLET | Refills: 11 | Status: SHIPPED | OUTPATIENT
Start: 2025-05-23

## 2025-05-23 NOTE — PROGRESS NOTES
"Subjective:      Patient ID: Merna Regalado is a 64 y.o. female.    Chief Complaint:  Type 2 diabetes mellitus    History of Present Illness  This is a 64 y.o. female. with a past medical history of Type 2 diabetes mellitus, CAD, CHF,  here for evaluation of Type 2 diabetes mellitus.        Type 2 diabetes mellitus  Diagnosed around 2017    Current diabetes medications:  - Lantus - not taking  - Novolog - not taking    Past diabetes medications:  - Metformin, does not remember other names of meds tried    Lab Results   Component Value Date    CREATININE 1.3 11/27/2024    EGFRNORACEVR 46.5 (A) 11/27/2024       Known diabetic complications: nephropathy and cardiovascular disease    Weight trend:  Wt Readings from Last 8 Encounters:   05/23/25 63.5 kg (140 lb)   11/27/24 67.4 kg (148 lb 11.2 oz)   09/16/24 67.9 kg (149 lb 12.8 oz)   07/17/24 69.7 kg (153 lb 12.3 oz)   07/17/24 69.7 kg (153 lb 12.3 oz)   01/22/24 81.1 kg (178 lb 14.4 oz)   01/08/24 85.1 kg (187 lb 8 oz)   11/29/23 83 kg (183 lb)       Family history of diabetes:  Yes      Blood glucose monitoring at home: 100-500s      Diabetes Management Status  Statin: Not taking  ACE/ARB: Taking    Screening or Prevention Patient's value   HgA1C Testing and Control   Lab Results   Component Value Date    HGBA1C 11.8 (H) 04/16/2025        LDL control Lab Results   Component Value Date    LDLCALC 70.8 02/06/2008      Nephropathy screening No results found for: "MICALBCREAT"     Lab Results   Component Value Date    TSH 0.416 05/23/2023       Last eye exam: : 04/13/2017      Review of Systems  As above    Social and family history reviewed  Current medications and allergies reviewed    Objective:   /68   Pulse 68   Resp 16   Ht 5' 2" (1.575 m)   Wt 63.5 kg (140 lb) Comment: pt reported  SpO2 99%   BMI 25.61 kg/m²   Physical Exam  Alert, oriented    BP Readings from Last 1 Encounters:   05/23/25 118/68      Wt Readings from Last 1 Encounters:   05/23/25 " 0907 63.5 kg (140 lb)     Body mass index is 25.61 kg/m².    Lab Review:   Lab Results   Component Value Date    HGBA1C 11.8 (H) 04/16/2025     Lab Results   Component Value Date    CHOL 157 02/06/2008    HDL 33 (L) 02/06/2008    LDLCALC 70.8 02/06/2008    TRIG 266 (H) 02/06/2008    CHOLHDL 21.0 02/06/2008     Lab Results   Component Value Date     (L) 11/27/2024    K 4.0 11/27/2024    CL 98 11/27/2024    CO2 21 (L) 11/27/2024     (HH) 11/27/2024    BUN 21 11/27/2024    CREATININE 1.3 11/27/2024    CALCIUM 9.2 11/27/2024    PROT 6.8 11/27/2024    ALBUMIN 3.4 (L) 11/27/2024    BILITOT 0.6 11/27/2024    ALKPHOS 111 11/27/2024    AST 14 11/27/2024    ALT 5 (L) 11/27/2024    ANIONGAP 9 11/27/2024    ESTGFRAFRICA >60 07/28/2022    EGFRNONAA 55 (A) 07/28/2022    TSH 0.416 05/23/2023       All pertinent labs reviewed    Assessment and Plan     Type 2 diabetes mellitus with hyperglycemia, with long-term current use of insulin  Uncontrolled based on A1c and reported POCT glucose with significant glycemic variability.    Difficulty with keeping up with MDI regimen so has not been doing. Will start with GLP-1 RA followed by SGLT-2 inhibitor and starting CGM and see response.    Does not wish to take metformin    Plan  - Start Ozempic 0.25 mg weekly, increase to 0.5 mg weekly after 4 weeks if tolerated  - Start Jardiance 25 mg daily  - Start FreeStyle Sharon 3 CGM    RTC 6 weeks      Hypothyroid  Noted on levothyroxine 88 mcg daily  Will check TSH when next labs planned    CAD (coronary artery disease)  Start GLP-1 RA and SGLT-2 inhibitor with CV benefit        Toni Allan MD  Endocrinology

## 2025-05-23 NOTE — ASSESSMENT & PLAN NOTE
Uncontrolled based on A1c and reported POCT glucose with significant glycemic variability.    Difficulty with keeping up with MDI regimen so has not been doing. Will start with GLP-1 RA followed by SGLT-2 inhibitor and starting CGM and see response.    Does not wish to take metformin    Plan  - Start Ozempic 0.25 mg weekly, increase to 0.5 mg weekly after 4 weeks if tolerated  - Start Jardiance 25 mg daily  - Start FreeStyle Sharon 3 CGM    RTC 6 weeks

## 2025-06-09 ENCOUNTER — TELEPHONE (OUTPATIENT)
Dept: ENDOCRINOLOGY | Facility: CLINIC | Age: 65
End: 2025-06-09
Payer: MEDICAID

## 2025-06-09 NOTE — TELEPHONE ENCOUNTER
Merna Regalado  MRN: [id number]  : 1960  PCP: Bianca Godoy  Home Phone [phone]   Work Phone Not on file.   Mobile [phone]       MESSAGE:   Patient states CGM was not sent to pharmacy.     It was sent on , but denied?     Ph: [phone]

## 2025-06-30 ENCOUNTER — OFFICE VISIT (OUTPATIENT)
Dept: ENDOCRINOLOGY | Facility: CLINIC | Age: 65
End: 2025-06-30
Payer: MEDICAID

## 2025-06-30 VITALS
BODY MASS INDEX: 23.4 KG/M2 | WEIGHT: 127.19 LBS | SYSTOLIC BLOOD PRESSURE: 180 MMHG | DIASTOLIC BLOOD PRESSURE: 72 MMHG | HEIGHT: 62 IN

## 2025-06-30 DIAGNOSIS — I25.10 CORONARY ARTERY DISEASE, UNSPECIFIED VESSEL OR LESION TYPE, UNSPECIFIED WHETHER ANGINA PRESENT, UNSPECIFIED WHETHER NATIVE OR TRANSPLANTED HEART: ICD-10-CM

## 2025-06-30 DIAGNOSIS — E03.4 HYPOTHYROIDISM DUE TO ACQUIRED ATROPHY OF THYROID: ICD-10-CM

## 2025-06-30 DIAGNOSIS — E11.65 TYPE 2 DIABETES MELLITUS WITH HYPERGLYCEMIA, WITH LONG-TERM CURRENT USE OF INSULIN: Primary | ICD-10-CM

## 2025-06-30 DIAGNOSIS — Z79.4 TYPE 2 DIABETES MELLITUS WITH HYPERGLYCEMIA, WITH LONG-TERM CURRENT USE OF INSULIN: Primary | ICD-10-CM

## 2025-06-30 PROCEDURE — 4010F ACE/ARB THERAPY RXD/TAKEN: CPT | Mod: CPTII,,, | Performed by: STUDENT IN AN ORGANIZED HEALTH CARE EDUCATION/TRAINING PROGRAM

## 2025-06-30 PROCEDURE — 3008F BODY MASS INDEX DOCD: CPT | Mod: CPTII,,, | Performed by: STUDENT IN AN ORGANIZED HEALTH CARE EDUCATION/TRAINING PROGRAM

## 2025-06-30 PROCEDURE — 3077F SYST BP >= 140 MM HG: CPT | Mod: CPTII,,, | Performed by: STUDENT IN AN ORGANIZED HEALTH CARE EDUCATION/TRAINING PROGRAM

## 2025-06-30 PROCEDURE — 3046F HEMOGLOBIN A1C LEVEL >9.0%: CPT | Mod: CPTII,,, | Performed by: STUDENT IN AN ORGANIZED HEALTH CARE EDUCATION/TRAINING PROGRAM

## 2025-06-30 PROCEDURE — 3078F DIAST BP <80 MM HG: CPT | Mod: CPTII,,, | Performed by: STUDENT IN AN ORGANIZED HEALTH CARE EDUCATION/TRAINING PROGRAM

## 2025-06-30 PROCEDURE — 99213 OFFICE O/P EST LOW 20 MIN: CPT | Mod: PBBFAC | Performed by: STUDENT IN AN ORGANIZED HEALTH CARE EDUCATION/TRAINING PROGRAM

## 2025-06-30 PROCEDURE — 1159F MED LIST DOCD IN RCRD: CPT | Mod: CPTII,,, | Performed by: STUDENT IN AN ORGANIZED HEALTH CARE EDUCATION/TRAINING PROGRAM

## 2025-06-30 PROCEDURE — 99214 OFFICE O/P EST MOD 30 MIN: CPT | Mod: S$PBB,,, | Performed by: STUDENT IN AN ORGANIZED HEALTH CARE EDUCATION/TRAINING PROGRAM

## 2025-06-30 PROCEDURE — 99999 PR PBB SHADOW E&M-EST. PATIENT-LVL III: CPT | Mod: PBBFAC,,, | Performed by: STUDENT IN AN ORGANIZED HEALTH CARE EDUCATION/TRAINING PROGRAM

## 2025-06-30 PROCEDURE — 95251 CONT GLUC MNTR ANALYSIS I&R: CPT | Mod: ,,, | Performed by: STUDENT IN AN ORGANIZED HEALTH CARE EDUCATION/TRAINING PROGRAM

## 2025-06-30 PROCEDURE — 1160F RVW MEDS BY RX/DR IN RCRD: CPT | Mod: CPTII,,, | Performed by: STUDENT IN AN ORGANIZED HEALTH CARE EDUCATION/TRAINING PROGRAM

## 2025-06-30 PROCEDURE — G2211 COMPLEX E/M VISIT ADD ON: HCPCS | Mod: ,,, | Performed by: STUDENT IN AN ORGANIZED HEALTH CARE EDUCATION/TRAINING PROGRAM

## 2025-06-30 RX ORDER — BLOOD-GLUCOSE SENSOR
EACH MISCELLANEOUS
Qty: 2 EACH | Refills: 11 | Status: SHIPPED | OUTPATIENT
Start: 2025-06-30

## 2025-06-30 RX ORDER — SEMAGLUTIDE 0.68 MG/ML
0.5 INJECTION, SOLUTION SUBCUTANEOUS
Qty: 3 ML | Refills: 11 | Status: SHIPPED | OUTPATIENT
Start: 2025-06-30

## 2025-06-30 RX ORDER — FUROSEMIDE 40 MG/1
40 TABLET ORAL DAILY PRN
Qty: 30 TABLET | Refills: 11 | Status: SHIPPED | OUTPATIENT
Start: 2025-06-30 | End: 2026-06-30

## 2025-06-30 RX ORDER — GABAPENTIN 300 MG/1
300 CAPSULE ORAL NIGHTLY
COMMUNITY

## 2025-06-30 RX ORDER — EMPAGLIFLOZIN 25 MG/1
25 TABLET, FILM COATED ORAL DAILY
Qty: 30 TABLET | Refills: 11 | Status: SHIPPED | OUTPATIENT
Start: 2025-06-30

## 2025-06-30 RX ORDER — LOSARTAN POTASSIUM 50 MG/1
50 TABLET ORAL DAILY
Qty: 90 TABLET | Refills: 3 | Status: SHIPPED | OUTPATIENT
Start: 2025-06-30 | End: 2026-06-30

## 2025-06-30 RX ORDER — INSULIN GLARGINE 100 [IU]/ML
10 INJECTION, SOLUTION SUBCUTANEOUS NIGHTLY
Qty: 3 ML | Refills: 0 | Status: SHIPPED | OUTPATIENT
Start: 2025-06-30

## 2025-06-30 NOTE — PROGRESS NOTES
"Subjective:      Patient ID: Merna Regalado is a 64 y.o. female.    Chief Complaint:  Type 2 diabetes mellitus    History of Present Illness  This is a 64 y.o. female. with a past medical history of Type 2 diabetes mellitus, CAD, CHF,  here for evaluation of Type 2 diabetes mellitus.        Type 2 diabetes mellitus  Diagnosed around 2017    Current diabetes medications:  - Lantus 10 U PRN  - Ozempic 0.25 mg weekly  - Jardiance 25 mg daily    Past diabetes medications:  - Metformin    Lab Results   Component Value Date    CREATININE 1.3 11/27/2024    EGFRNORACEVR 46.5 (A) 11/27/2024       Known diabetic complications: nephropathy and cardiovascular disease    Weight trend:  Wt Readings from Last 8 Encounters:   06/30/25 57.7 kg (127 lb 3.2 oz)   05/23/25 63.5 kg (140 lb)   11/27/24 67.4 kg (148 lb 11.2 oz)   09/16/24 67.9 kg (149 lb 12.8 oz)   07/17/24 69.7 kg (153 lb 12.3 oz)   07/17/24 69.7 kg (153 lb 12.3 oz)   01/22/24 81.1 kg (178 lb 14.4 oz)   01/08/24 85.1 kg (187 lb 8 oz)       Family history of diabetes:  Yes      Blood glucose monitoring at home:             Diabetes Management Status  Statin: Not taking  ACE/ARB: Taking    Screening or Prevention Patient's value   HgA1C Testing and Control   Lab Results   Component Value Date    HGBA1C 11.8 (H) 04/16/2025        LDL control Lab Results   Component Value Date    LDLCALC 70.8 02/06/2008      Nephropathy screening No results found for: "MICALBCREAT"     Lab Results   Component Value Date    TSH 0.416 05/23/2023       Last eye exam: : 04/13/2017      Review of Systems  As above    Social and family history reviewed  Current medications and allergies reviewed    Objective:   BP (!) 180/72 (BP Location: Right arm, Patient Position: Sitting)   Ht 5' 2" (1.575 m)   Wt 57.7 kg (127 lb 3.2 oz)   BMI 23.27 kg/m²   Physical Exam  Alert, oriented    BP Readings from Last 1 Encounters:   06/30/25 (!) 180/72      Wt Readings from Last 1 Encounters:   06/30/25 1510 " 57.7 kg (127 lb 3.2 oz)     Body mass index is 23.27 kg/m².    Lab Review:   Lab Results   Component Value Date    HGBA1C 11.8 (H) 04/16/2025     Lab Results   Component Value Date    CHOL 157 02/06/2008    HDL 33 (L) 02/06/2008    LDLCALC 70.8 02/06/2008    TRIG 266 (H) 02/06/2008    CHOLHDL 21.0 02/06/2008     Lab Results   Component Value Date     (L) 11/27/2024    K 4.0 11/27/2024    CL 98 11/27/2024    CO2 21 (L) 11/27/2024     (HH) 11/27/2024    BUN 21 11/27/2024    CREATININE 1.3 11/27/2024    CALCIUM 9.2 11/27/2024    PROT 6.8 11/27/2024    ALBUMIN 3.4 (L) 11/27/2024    BILITOT 0.6 11/27/2024    ALKPHOS 111 11/27/2024    AST 14 11/27/2024    ALT 5 (L) 11/27/2024    ANIONGAP 9 11/27/2024    ESTGFRAFRICA >60 07/28/2022    EGFRNONAA 55 (A) 07/28/2022    TSH 0.416 05/23/2023       All pertinent labs reviewed    Assessment and Plan     Type 2 diabetes mellitus with hyperglycemia, with long-term current use of insulin  Control improved with use of CGM. Glucose however, still above goal with TIR only 30% and average glucose 215 on CGM. Hyperglycemia variable depending on intake. No hypoglycemia.    Tolerating GLP-1 RA. Increase dose for added weight/glucose benefit. Continue SGLT-2 inhibitor. Get UA given some dysuria.     Ok to use basal insulin PRN    Does not wish to take metformin    Plan  - Lantus 10 U daily PRN  - Increase Ozempic to 0.5 mg weekly  - Continue Jardiance 25 mg daily  - Continue FreeStyle Sharon 3 Plus CGM    RTC 8 weeks      Hypothyroid  Noted on levothyroxine 88 mcg daily  Will check TSH when next labs planned    CAD (coronary artery disease)  Continue GLP-1 RA and SGLT-2 inhibitor with CV benefit        Toni Allan MD  Endocrinology

## 2025-06-30 NOTE — ASSESSMENT & PLAN NOTE
Control improved with use of CGM. Glucose however, still above goal with TIR only 30% and average glucose 215 on CGM. Hyperglycemia variable depending on intake. No hypoglycemia.    Tolerating GLP-1 RA. Increase dose for added weight/glucose benefit. Continue SGLT-2 inhibitor. Get UA given some dysuria.     Ok to use basal insulin PRN    Does not wish to take metformin    Plan  - Lantus 10 U daily PRN  - Increase Ozempic to 0.5 mg weekly  - Continue Jardiance 25 mg daily  - Continue FreeStyle Sharon 3 Plus CGM    RTC 8 weeks

## 2025-06-30 NOTE — PATIENT INSTRUCTIONS
Increase Ozempic to 0.5 mg weekly    Continue Jardiance 25 mg daily    We will use Lantus just 'as needed'    Start losartan 50 mg daily for blood pressure    Start furosemide (Lasix) 40 mg as needed for fluid retention

## 2025-08-27 ENCOUNTER — OFFICE VISIT (OUTPATIENT)
Dept: ENDOCRINOLOGY | Facility: CLINIC | Age: 65
End: 2025-08-27
Payer: MEDICAID

## 2025-08-27 VITALS
WEIGHT: 124.63 LBS | DIASTOLIC BLOOD PRESSURE: 60 MMHG | HEIGHT: 62 IN | SYSTOLIC BLOOD PRESSURE: 120 MMHG | HEART RATE: 75 BPM | RESPIRATION RATE: 16 BRPM | BODY MASS INDEX: 22.93 KG/M2

## 2025-08-27 DIAGNOSIS — E03.4 HYPOTHYROIDISM DUE TO ACQUIRED ATROPHY OF THYROID: ICD-10-CM

## 2025-08-27 DIAGNOSIS — Z79.4 TYPE 2 DIABETES MELLITUS WITH HYPERGLYCEMIA, WITH LONG-TERM CURRENT USE OF INSULIN: Primary | ICD-10-CM

## 2025-08-27 DIAGNOSIS — E11.65 TYPE 2 DIABETES MELLITUS WITH HYPERGLYCEMIA, WITH LONG-TERM CURRENT USE OF INSULIN: Primary | ICD-10-CM

## 2025-08-27 DIAGNOSIS — I25.10 CORONARY ARTERY DISEASE, UNSPECIFIED VESSEL OR LESION TYPE, UNSPECIFIED WHETHER ANGINA PRESENT, UNSPECIFIED WHETHER NATIVE OR TRANSPLANTED HEART: ICD-10-CM

## 2025-08-27 PROCEDURE — 99999 PR PBB SHADOW E&M-EST. PATIENT-LVL IV: CPT | Mod: PBBFAC,,, | Performed by: STUDENT IN AN ORGANIZED HEALTH CARE EDUCATION/TRAINING PROGRAM

## 2025-08-27 PROCEDURE — 99214 OFFICE O/P EST MOD 30 MIN: CPT | Mod: PBBFAC | Performed by: STUDENT IN AN ORGANIZED HEALTH CARE EDUCATION/TRAINING PROGRAM

## 2025-08-27 RX ORDER — ATORVASTATIN CALCIUM 20 MG/1
20 TABLET, FILM COATED ORAL DAILY
COMMUNITY